# Patient Record
Sex: FEMALE | Race: WHITE | NOT HISPANIC OR LATINO | Employment: OTHER | URBAN - METROPOLITAN AREA
[De-identification: names, ages, dates, MRNs, and addresses within clinical notes are randomized per-mention and may not be internally consistent; named-entity substitution may affect disease eponyms.]

---

## 2017-01-24 ENCOUNTER — COMMUNICATION - HEALTHEAST (OUTPATIENT)
Dept: INTERNAL MEDICINE | Facility: CLINIC | Age: 62
End: 2017-01-24

## 2017-01-24 DIAGNOSIS — F41.9 ANXIETY: ICD-10-CM

## 2017-02-09 ENCOUNTER — COMMUNICATION - HEALTHEAST (OUTPATIENT)
Dept: INTERNAL MEDICINE | Facility: CLINIC | Age: 62
End: 2017-02-09

## 2017-02-27 ENCOUNTER — COMMUNICATION - HEALTHEAST (OUTPATIENT)
Dept: INTERNAL MEDICINE | Facility: CLINIC | Age: 62
End: 2017-02-27

## 2017-02-27 DIAGNOSIS — F41.9 ANXIETY: ICD-10-CM

## 2017-03-28 ENCOUNTER — COMMUNICATION - HEALTHEAST (OUTPATIENT)
Dept: INTERNAL MEDICINE | Facility: CLINIC | Age: 62
End: 2017-03-28

## 2017-03-29 ENCOUNTER — COMMUNICATION - HEALTHEAST (OUTPATIENT)
Dept: INTERNAL MEDICINE | Facility: CLINIC | Age: 62
End: 2017-03-29

## 2017-03-29 DIAGNOSIS — F41.9 ANXIETY: ICD-10-CM

## 2017-04-06 ENCOUNTER — COMMUNICATION - HEALTHEAST (OUTPATIENT)
Dept: PULMONOLOGY | Facility: OTHER | Age: 62
End: 2017-04-06

## 2017-04-06 DIAGNOSIS — K21.9 GERD (GASTROESOPHAGEAL REFLUX DISEASE): ICD-10-CM

## 2017-04-28 ENCOUNTER — COMMUNICATION - HEALTHEAST (OUTPATIENT)
Dept: INTERNAL MEDICINE | Facility: CLINIC | Age: 62
End: 2017-04-28

## 2017-04-28 DIAGNOSIS — F41.9 ANXIETY: ICD-10-CM

## 2017-05-02 ENCOUNTER — COMMUNICATION - HEALTHEAST (OUTPATIENT)
Dept: INTERNAL MEDICINE | Facility: CLINIC | Age: 62
End: 2017-05-02

## 2017-05-02 DIAGNOSIS — F41.9 ANXIETY: ICD-10-CM

## 2017-05-31 ENCOUNTER — COMMUNICATION - HEALTHEAST (OUTPATIENT)
Dept: INTERNAL MEDICINE | Facility: CLINIC | Age: 62
End: 2017-05-31

## 2017-05-31 DIAGNOSIS — F41.9 ANXIETY: ICD-10-CM

## 2017-05-31 DIAGNOSIS — E78.00 PURE HYPERCHOLESTEROLEMIA: ICD-10-CM

## 2017-06-05 ENCOUNTER — COMMUNICATION - HEALTHEAST (OUTPATIENT)
Dept: INTERNAL MEDICINE | Facility: CLINIC | Age: 62
End: 2017-06-05

## 2017-06-05 DIAGNOSIS — R05.3 CHRONIC COUGH: ICD-10-CM

## 2017-06-12 ENCOUNTER — COMMUNICATION - HEALTHEAST (OUTPATIENT)
Dept: INTERNAL MEDICINE | Facility: CLINIC | Age: 62
End: 2017-06-12

## 2017-06-12 ENCOUNTER — AMBULATORY - HEALTHEAST (OUTPATIENT)
Dept: INTERNAL MEDICINE | Facility: CLINIC | Age: 62
End: 2017-06-12

## 2017-06-12 DIAGNOSIS — F41.9 ANXIETY: ICD-10-CM

## 2017-06-16 ENCOUNTER — COMMUNICATION - HEALTHEAST (OUTPATIENT)
Dept: INTERNAL MEDICINE | Facility: CLINIC | Age: 62
End: 2017-06-16

## 2017-06-16 DIAGNOSIS — F41.9 ANXIETY: ICD-10-CM

## 2017-06-30 ENCOUNTER — COMMUNICATION - HEALTHEAST (OUTPATIENT)
Dept: INTERNAL MEDICINE | Facility: CLINIC | Age: 62
End: 2017-06-30

## 2017-07-03 ENCOUNTER — OFFICE VISIT - HEALTHEAST (OUTPATIENT)
Dept: INTERNAL MEDICINE | Facility: CLINIC | Age: 62
End: 2017-07-03

## 2017-07-03 DIAGNOSIS — J45.909 ASTHMA: ICD-10-CM

## 2017-07-03 DIAGNOSIS — F41.9 ANXIETY: ICD-10-CM

## 2017-07-03 DIAGNOSIS — E78.00 PURE HYPERCHOLESTEROLEMIA: ICD-10-CM

## 2017-07-03 DIAGNOSIS — R50.9 FEVER: ICD-10-CM

## 2017-07-13 ENCOUNTER — COMMUNICATION - HEALTHEAST (OUTPATIENT)
Dept: INTERNAL MEDICINE | Facility: CLINIC | Age: 62
End: 2017-07-13

## 2017-07-18 ENCOUNTER — OFFICE VISIT - HEALTHEAST (OUTPATIENT)
Dept: INTERNAL MEDICINE | Facility: CLINIC | Age: 62
End: 2017-07-18

## 2017-07-18 DIAGNOSIS — R05.9 COUGH: ICD-10-CM

## 2017-07-18 DIAGNOSIS — E78.00 PURE HYPERCHOLESTEROLEMIA: ICD-10-CM

## 2017-07-18 DIAGNOSIS — Z29.9 PREVENTIVE MEASURE: ICD-10-CM

## 2017-07-18 DIAGNOSIS — F32.9 MAJOR DEPRESSION: ICD-10-CM

## 2017-07-18 DIAGNOSIS — J45.909 ASTHMA: ICD-10-CM

## 2017-07-18 LAB
CHOLEST SERPL-MCNC: 176 MG/DL
FASTING STATUS PATIENT QL REPORTED: YES
HDLC SERPL-MCNC: 71 MG/DL
LDLC SERPL CALC-MCNC: 93 MG/DL
TRIGL SERPL-MCNC: 60 MG/DL

## 2017-07-19 ENCOUNTER — COMMUNICATION - HEALTHEAST (OUTPATIENT)
Dept: INTERNAL MEDICINE | Facility: CLINIC | Age: 62
End: 2017-07-19

## 2017-07-31 ENCOUNTER — COMMUNICATION - HEALTHEAST (OUTPATIENT)
Dept: INTERNAL MEDICINE | Facility: CLINIC | Age: 62
End: 2017-07-31

## 2017-07-31 DIAGNOSIS — F41.9 ANXIETY: ICD-10-CM

## 2017-07-31 DIAGNOSIS — R05.3 CHRONIC COUGH: ICD-10-CM

## 2017-08-04 ENCOUNTER — COMMUNICATION - HEALTHEAST (OUTPATIENT)
Dept: INTERNAL MEDICINE | Facility: CLINIC | Age: 62
End: 2017-08-04

## 2017-08-07 ENCOUNTER — HOSPITAL ENCOUNTER (OUTPATIENT)
Dept: MAMMOGRAPHY | Facility: CLINIC | Age: 62
Discharge: HOME | End: 2017-08-07
Attending: INTERNAL MEDICINE

## 2017-08-07 DIAGNOSIS — Z12.31 OTHER SCREENING MAMMOGRAM: ICD-10-CM

## 2017-08-16 ENCOUNTER — COMMUNICATION - HEALTHEAST (OUTPATIENT)
Dept: ADMINISTRATIVE | Facility: CLINIC | Age: 62
End: 2017-08-16

## 2017-08-29 ENCOUNTER — COMMUNICATION - HEALTHEAST (OUTPATIENT)
Dept: INTERNAL MEDICINE | Facility: CLINIC | Age: 62
End: 2017-08-29

## 2017-08-29 DIAGNOSIS — E78.00 PURE HYPERCHOLESTEROLEMIA: ICD-10-CM

## 2017-08-29 DIAGNOSIS — F41.9 ANXIETY: ICD-10-CM

## 2017-09-05 ENCOUNTER — COMMUNICATION - HEALTHEAST (OUTPATIENT)
Dept: INTERNAL MEDICINE | Facility: CLINIC | Age: 62
End: 2017-09-05

## 2017-09-05 DIAGNOSIS — F41.9 ANXIETY: ICD-10-CM

## 2017-09-06 ENCOUNTER — COMMUNICATION - HEALTHEAST (OUTPATIENT)
Dept: INTERNAL MEDICINE | Facility: CLINIC | Age: 62
End: 2017-09-06

## 2017-09-06 DIAGNOSIS — F41.9 ANXIETY: ICD-10-CM

## 2017-09-07 ENCOUNTER — OFFICE VISIT - HEALTHEAST (OUTPATIENT)
Dept: INTERNAL MEDICINE | Facility: CLINIC | Age: 62
End: 2017-09-07

## 2017-09-07 DIAGNOSIS — R26.89 IMBALANCE: ICD-10-CM

## 2017-09-07 DIAGNOSIS — F32.9 MAJOR DEPRESSION: ICD-10-CM

## 2017-09-07 DIAGNOSIS — E78.00 PURE HYPERCHOLESTEROLEMIA: ICD-10-CM

## 2017-09-07 DIAGNOSIS — F41.1 ANXIETY STATE: ICD-10-CM

## 2017-09-07 ASSESSMENT — MIFFLIN-ST. JEOR: SCORE: 1258.03

## 2017-09-28 ENCOUNTER — COMMUNICATION - HEALTHEAST (OUTPATIENT)
Dept: INTERNAL MEDICINE | Facility: CLINIC | Age: 62
End: 2017-09-28

## 2017-09-28 DIAGNOSIS — R05.3 CHRONIC COUGH: ICD-10-CM

## 2017-09-28 RX ORDER — ALBUTEROL SULFATE 90 UG/1
AEROSOL, METERED RESPIRATORY (INHALATION)
Qty: 18 G | Refills: 3 | Status: SHIPPED | OUTPATIENT
Start: 2017-09-28 | End: 2021-12-09

## 2017-10-18 ENCOUNTER — OFFICE VISIT - HEALTHEAST (OUTPATIENT)
Dept: INTERNAL MEDICINE | Facility: CLINIC | Age: 62
End: 2017-10-18

## 2017-10-18 DIAGNOSIS — E78.00 PURE HYPERCHOLESTEROLEMIA: ICD-10-CM

## 2017-10-18 DIAGNOSIS — F41.1 ANXIETY STATE: ICD-10-CM

## 2017-10-18 DIAGNOSIS — J45.909 ASTHMA: ICD-10-CM

## 2017-10-18 DIAGNOSIS — F32.9 MAJOR DEPRESSION: ICD-10-CM

## 2017-10-18 ASSESSMENT — MIFFLIN-ST. JEOR: SCORE: 1253.49

## 2017-10-26 ENCOUNTER — COMMUNICATION - HEALTHEAST (OUTPATIENT)
Dept: INTERNAL MEDICINE | Facility: CLINIC | Age: 62
End: 2017-10-26

## 2017-10-26 DIAGNOSIS — F41.9 ANXIETY: ICD-10-CM

## 2017-11-03 ENCOUNTER — AMBULATORY - HEALTHEAST (OUTPATIENT)
Dept: LAB | Facility: CLINIC | Age: 62
End: 2017-11-03

## 2017-11-03 DIAGNOSIS — F32.9 MAJOR DEPRESSION: ICD-10-CM

## 2017-11-03 DIAGNOSIS — E78.00 PURE HYPERCHOLESTEROLEMIA: ICD-10-CM

## 2017-11-03 LAB
CHOLEST SERPL-MCNC: 171 MG/DL
FASTING STATUS PATIENT QL REPORTED: YES
HDLC SERPL-MCNC: 71 MG/DL
LDLC SERPL CALC-MCNC: 91 MG/DL
TRIGL SERPL-MCNC: 44 MG/DL

## 2017-11-07 ENCOUNTER — COMMUNICATION - HEALTHEAST (OUTPATIENT)
Dept: INTERNAL MEDICINE | Facility: CLINIC | Age: 62
End: 2017-11-07

## 2017-11-27 ENCOUNTER — COMMUNICATION - HEALTHEAST (OUTPATIENT)
Dept: INTERNAL MEDICINE | Facility: CLINIC | Age: 62
End: 2017-11-27

## 2017-11-27 DIAGNOSIS — F41.9 ANXIETY: ICD-10-CM

## 2017-12-28 ENCOUNTER — COMMUNICATION - HEALTHEAST (OUTPATIENT)
Dept: INTERNAL MEDICINE | Facility: CLINIC | Age: 62
End: 2017-12-28

## 2017-12-28 DIAGNOSIS — F41.9 ANXIETY: ICD-10-CM

## 2018-01-26 ENCOUNTER — COMMUNICATION - HEALTHEAST (OUTPATIENT)
Dept: INTERNAL MEDICINE | Facility: CLINIC | Age: 63
End: 2018-01-26

## 2018-01-26 DIAGNOSIS — F41.9 ANXIETY: ICD-10-CM

## 2018-01-29 ENCOUNTER — COMMUNICATION - HEALTHEAST (OUTPATIENT)
Dept: INTERNAL MEDICINE | Facility: CLINIC | Age: 63
End: 2018-01-29

## 2018-01-29 DIAGNOSIS — F41.9 ANXIETY: ICD-10-CM

## 2018-02-13 ENCOUNTER — OFFICE VISIT - HEALTHEAST (OUTPATIENT)
Dept: INTERNAL MEDICINE | Facility: CLINIC | Age: 63
End: 2018-02-13

## 2018-02-13 DIAGNOSIS — J20.9 ACUTE BRONCHITIS: ICD-10-CM

## 2018-02-13 DIAGNOSIS — J10.1 INFLUENZA A: ICD-10-CM

## 2018-02-13 ASSESSMENT — MIFFLIN-ST. JEOR: SCORE: 1294.32

## 2018-02-28 ENCOUNTER — COMMUNICATION - HEALTHEAST (OUTPATIENT)
Dept: INTERNAL MEDICINE | Facility: CLINIC | Age: 63
End: 2018-02-28

## 2018-02-28 DIAGNOSIS — F41.9 ANXIETY: ICD-10-CM

## 2018-02-28 DIAGNOSIS — E78.00 PURE HYPERCHOLESTEROLEMIA: ICD-10-CM

## 2018-03-28 ENCOUNTER — COMMUNICATION - HEALTHEAST (OUTPATIENT)
Dept: INTERNAL MEDICINE | Facility: CLINIC | Age: 63
End: 2018-03-28

## 2018-03-28 DIAGNOSIS — J45.909 ASTHMA: ICD-10-CM

## 2018-03-28 DIAGNOSIS — F41.9 ANXIETY: ICD-10-CM

## 2018-03-30 ENCOUNTER — COMMUNICATION - HEALTHEAST (OUTPATIENT)
Dept: INTERNAL MEDICINE | Facility: CLINIC | Age: 63
End: 2018-03-30

## 2018-04-02 ENCOUNTER — COMMUNICATION - HEALTHEAST (OUTPATIENT)
Dept: INTERNAL MEDICINE | Facility: CLINIC | Age: 63
End: 2018-04-02

## 2018-04-05 ENCOUNTER — COMMUNICATION - HEALTHEAST (OUTPATIENT)
Dept: INTERNAL MEDICINE | Facility: CLINIC | Age: 63
End: 2018-04-05

## 2018-04-30 ENCOUNTER — COMMUNICATION - HEALTHEAST (OUTPATIENT)
Dept: INTERNAL MEDICINE | Facility: CLINIC | Age: 63
End: 2018-04-30

## 2018-04-30 DIAGNOSIS — F41.9 ANXIETY: ICD-10-CM

## 2018-04-30 DIAGNOSIS — J45.909 ASTHMA: ICD-10-CM

## 2018-05-01 ENCOUNTER — COMMUNICATION - HEALTHEAST (OUTPATIENT)
Dept: INTERNAL MEDICINE | Facility: CLINIC | Age: 63
End: 2018-05-01

## 2018-05-01 DIAGNOSIS — J45.909 ASTHMA: ICD-10-CM

## 2018-05-01 DIAGNOSIS — F41.9 ANXIETY: ICD-10-CM

## 2018-06-01 ENCOUNTER — COMMUNICATION - HEALTHEAST (OUTPATIENT)
Dept: INTERNAL MEDICINE | Facility: CLINIC | Age: 63
End: 2018-06-01

## 2018-06-01 DIAGNOSIS — F41.9 ANXIETY: ICD-10-CM

## 2018-07-02 ENCOUNTER — COMMUNICATION - HEALTHEAST (OUTPATIENT)
Dept: INTERNAL MEDICINE | Facility: CLINIC | Age: 63
End: 2018-07-02

## 2018-07-02 DIAGNOSIS — F41.9 ANXIETY: ICD-10-CM

## 2018-08-06 ENCOUNTER — COMMUNICATION - HEALTHEAST (OUTPATIENT)
Dept: INTERNAL MEDICINE | Facility: CLINIC | Age: 63
End: 2018-08-06

## 2018-08-06 DIAGNOSIS — F41.9 ANXIETY: ICD-10-CM

## 2018-09-04 ENCOUNTER — COMMUNICATION - HEALTHEAST (OUTPATIENT)
Dept: INTERNAL MEDICINE | Facility: CLINIC | Age: 63
End: 2018-09-04

## 2018-09-04 DIAGNOSIS — F41.9 ANXIETY: ICD-10-CM

## 2018-09-04 DIAGNOSIS — J45.909 ASTHMA: ICD-10-CM

## 2018-09-06 ENCOUNTER — COMMUNICATION - HEALTHEAST (OUTPATIENT)
Dept: INTERNAL MEDICINE | Facility: CLINIC | Age: 63
End: 2018-09-06

## 2018-09-06 DIAGNOSIS — F41.9 ANXIETY: ICD-10-CM

## 2018-10-08 ENCOUNTER — COMMUNICATION - HEALTHEAST (OUTPATIENT)
Dept: INTERNAL MEDICINE | Facility: CLINIC | Age: 63
End: 2018-10-08

## 2018-10-08 DIAGNOSIS — J45.909 ASTHMA: ICD-10-CM

## 2018-10-08 DIAGNOSIS — R05.3 CHRONIC COUGH: ICD-10-CM

## 2018-10-08 DIAGNOSIS — F41.9 ANXIETY: ICD-10-CM

## 2018-11-08 ENCOUNTER — COMMUNICATION - HEALTHEAST (OUTPATIENT)
Dept: INTERNAL MEDICINE | Facility: CLINIC | Age: 63
End: 2018-11-08

## 2018-11-08 DIAGNOSIS — F41.9 ANXIETY: ICD-10-CM

## 2018-11-08 DIAGNOSIS — J45.909 ASTHMA: ICD-10-CM

## 2018-12-12 ENCOUNTER — COMMUNICATION - HEALTHEAST (OUTPATIENT)
Dept: INTERNAL MEDICINE | Facility: CLINIC | Age: 63
End: 2018-12-12

## 2018-12-12 DIAGNOSIS — J45.909 ASTHMA: ICD-10-CM

## 2018-12-12 DIAGNOSIS — R05.3 CHRONIC COUGH: ICD-10-CM

## 2018-12-12 DIAGNOSIS — F41.9 ANXIETY: ICD-10-CM

## 2019-01-10 ENCOUNTER — COMMUNICATION - HEALTHEAST (OUTPATIENT)
Dept: INTERNAL MEDICINE | Facility: CLINIC | Age: 64
End: 2019-01-10

## 2019-01-10 DIAGNOSIS — F41.9 ANXIETY: ICD-10-CM

## 2019-01-24 ENCOUNTER — COMMUNICATION - HEALTHEAST (OUTPATIENT)
Dept: INTERNAL MEDICINE | Facility: CLINIC | Age: 64
End: 2019-01-24

## 2019-01-24 DIAGNOSIS — F41.9 ANXIETY: ICD-10-CM

## 2019-02-12 ENCOUNTER — COMMUNICATION - HEALTHEAST (OUTPATIENT)
Dept: INTERNAL MEDICINE | Facility: CLINIC | Age: 64
End: 2019-02-12

## 2019-02-12 DIAGNOSIS — F41.9 ANXIETY: ICD-10-CM

## 2019-02-18 ENCOUNTER — OFFICE VISIT - HEALTHEAST (OUTPATIENT)
Dept: INTERNAL MEDICINE | Facility: CLINIC | Age: 64
End: 2019-02-18

## 2019-02-18 DIAGNOSIS — F32.5 MAJOR DEPRESSIVE DISORDER WITH SINGLE EPISODE, IN FULL REMISSION (H): ICD-10-CM

## 2019-02-18 DIAGNOSIS — F41.1 ANXIETY STATE: ICD-10-CM

## 2019-02-18 DIAGNOSIS — J45.40 MODERATE PERSISTENT ASTHMA WITHOUT COMPLICATION: ICD-10-CM

## 2019-02-18 DIAGNOSIS — K21.9 GASTROESOPHAGEAL REFLUX DISEASE WITHOUT ESOPHAGITIS: ICD-10-CM

## 2019-02-18 DIAGNOSIS — Z12.11 SCREEN FOR COLON CANCER: ICD-10-CM

## 2019-02-18 DIAGNOSIS — E78.00 PURE HYPERCHOLESTEROLEMIA: ICD-10-CM

## 2019-02-18 ASSESSMENT — MIFFLIN-ST. JEOR: SCORE: 1398.65

## 2019-03-11 ENCOUNTER — COMMUNICATION - HEALTHEAST (OUTPATIENT)
Dept: INTERNAL MEDICINE | Facility: CLINIC | Age: 64
End: 2019-03-11

## 2019-03-12 ENCOUNTER — COMMUNICATION - HEALTHEAST (OUTPATIENT)
Dept: INTERNAL MEDICINE | Facility: CLINIC | Age: 64
End: 2019-03-12

## 2019-03-12 DIAGNOSIS — E78.00 PURE HYPERCHOLESTEROLEMIA: ICD-10-CM

## 2019-03-20 ENCOUNTER — COMMUNICATION - HEALTHEAST (OUTPATIENT)
Dept: INTERNAL MEDICINE | Facility: CLINIC | Age: 64
End: 2019-03-20

## 2019-03-20 DIAGNOSIS — F41.1 ANXIETY STATE: ICD-10-CM

## 2019-04-22 ENCOUNTER — COMMUNICATION - HEALTHEAST (OUTPATIENT)
Dept: INTERNAL MEDICINE | Facility: CLINIC | Age: 64
End: 2019-04-22

## 2019-04-22 DIAGNOSIS — F41.1 ANXIETY STATE: ICD-10-CM

## 2019-05-08 ENCOUNTER — COMMUNICATION - HEALTHEAST (OUTPATIENT)
Dept: INTERNAL MEDICINE | Facility: CLINIC | Age: 64
End: 2019-05-08

## 2019-05-08 DIAGNOSIS — F41.1 ANXIETY STATE: ICD-10-CM

## 2019-05-21 ENCOUNTER — OFFICE VISIT - HEALTHEAST (OUTPATIENT)
Dept: INTERNAL MEDICINE | Facility: CLINIC | Age: 64
End: 2019-05-21

## 2019-05-21 DIAGNOSIS — E78.00 PURE HYPERCHOLESTEROLEMIA: ICD-10-CM

## 2019-05-21 DIAGNOSIS — Z12.31 VISIT FOR SCREENING MAMMOGRAM: ICD-10-CM

## 2019-05-21 DIAGNOSIS — J45.40 MODERATE PERSISTENT ASTHMA WITHOUT COMPLICATION: ICD-10-CM

## 2019-05-21 DIAGNOSIS — F41.1 ANXIETY STATE: ICD-10-CM

## 2019-05-21 DIAGNOSIS — R03.0 PREHYPERTENSION: ICD-10-CM

## 2019-05-21 LAB
ALBUMIN SERPL-MCNC: 3.7 G/DL (ref 3.5–5)
ALP SERPL-CCNC: 109 U/L (ref 45–120)
ALT SERPL W P-5'-P-CCNC: 25 U/L (ref 0–45)
ANION GAP SERPL CALCULATED.3IONS-SCNC: 10 MMOL/L (ref 5–18)
AST SERPL W P-5'-P-CCNC: 26 U/L (ref 0–40)
BILIRUB DIRECT SERPL-MCNC: 0.2 MG/DL
BILIRUB SERPL-MCNC: 0.5 MG/DL (ref 0–1)
BUN SERPL-MCNC: 5 MG/DL (ref 8–22)
CALCIUM SERPL-MCNC: 9.8 MG/DL (ref 8.5–10.5)
CHLORIDE BLD-SCNC: 105 MMOL/L (ref 98–107)
CHOLEST SERPL-MCNC: 217 MG/DL
CO2 SERPL-SCNC: 27 MMOL/L (ref 22–31)
CREAT SERPL-MCNC: 0.85 MG/DL (ref 0.6–1.1)
ERYTHROCYTE [DISTWIDTH] IN BLOOD BY AUTOMATED COUNT: 12.5 % (ref 11–14.5)
FASTING STATUS PATIENT QL REPORTED: YES
GFR SERPL CREATININE-BSD FRML MDRD: >60 ML/MIN/1.73M2
GLUCOSE BLD-MCNC: 85 MG/DL (ref 70–125)
HCT VFR BLD AUTO: 41.2 % (ref 35–47)
HDLC SERPL-MCNC: 71 MG/DL
HGB BLD-MCNC: 13.4 G/DL (ref 12–16)
LDLC SERPL CALC-MCNC: 132 MG/DL
MCH RBC QN AUTO: 27.9 PG (ref 27–34)
MCHC RBC AUTO-ENTMCNC: 32.6 G/DL (ref 32–36)
MCV RBC AUTO: 86 FL (ref 80–100)
PLATELET # BLD AUTO: 328 THOU/UL (ref 140–440)
PMV BLD AUTO: 8.2 FL (ref 7–10)
POTASSIUM BLD-SCNC: 3.3 MMOL/L (ref 3.5–5)
PROT SERPL-MCNC: 6.9 G/DL (ref 6–8)
RBC # BLD AUTO: 4.82 MILL/UL (ref 3.8–5.4)
SODIUM SERPL-SCNC: 142 MMOL/L (ref 136–145)
TRIGL SERPL-MCNC: 68 MG/DL
WBC: 6.6 THOU/UL (ref 4–11)

## 2019-05-21 ASSESSMENT — MIFFLIN-ST. JEOR: SCORE: 1394.11

## 2019-05-22 ENCOUNTER — COMMUNICATION - HEALTHEAST (OUTPATIENT)
Dept: INTERNAL MEDICINE | Facility: CLINIC | Age: 64
End: 2019-05-22

## 2019-06-13 ENCOUNTER — COMMUNICATION - HEALTHEAST (OUTPATIENT)
Dept: INTERNAL MEDICINE | Facility: CLINIC | Age: 64
End: 2019-06-13

## 2019-06-13 ENCOUNTER — HOSPITAL ENCOUNTER (OUTPATIENT)
Dept: MAMMOGRAPHY | Facility: CLINIC | Age: 64
Discharge: HOME | End: 2019-06-13
Attending: INTERNAL MEDICINE

## 2019-06-13 DIAGNOSIS — Z12.31 VISIT FOR SCREENING MAMMOGRAM: ICD-10-CM

## 2019-06-13 DIAGNOSIS — J45.909 ASTHMA: ICD-10-CM

## 2019-06-18 ENCOUNTER — OFFICE VISIT - HEALTHEAST (OUTPATIENT)
Dept: INTERNAL MEDICINE | Facility: CLINIC | Age: 64
End: 2019-06-18

## 2019-06-18 DIAGNOSIS — B37.31 VAGINAL CANDIDIASIS: ICD-10-CM

## 2019-06-18 DIAGNOSIS — Z12.11 SCREENING FOR COLON CANCER: ICD-10-CM

## 2019-06-18 DIAGNOSIS — F32.5 MAJOR DEPRESSIVE DISORDER WITH SINGLE EPISODE, IN FULL REMISSION (H): ICD-10-CM

## 2019-06-18 DIAGNOSIS — F41.1 ANXIETY STATE: ICD-10-CM

## 2019-06-18 DIAGNOSIS — I10 ESSENTIAL HYPERTENSION: ICD-10-CM

## 2019-06-18 DIAGNOSIS — B35.3 TINEA PEDIS OF RIGHT FOOT: ICD-10-CM

## 2019-06-18 DIAGNOSIS — J45.40 MODERATE PERSISTENT ASTHMA WITHOUT COMPLICATION: ICD-10-CM

## 2019-06-18 RX ORDER — CLOTRIMAZOLE AND BETAMETHASONE DIPROPIONATE 10; .64 MG/G; MG/G
CREAM TOPICAL
Qty: 45 G | Refills: 3 | Status: SHIPPED | OUTPATIENT
Start: 2019-06-18 | End: 2022-04-26

## 2019-06-18 ASSESSMENT — MIFFLIN-ST. JEOR: SCORE: 1394.11

## 2019-07-17 ENCOUNTER — COMMUNICATION - HEALTHEAST (OUTPATIENT)
Dept: INTERNAL MEDICINE | Facility: CLINIC | Age: 64
End: 2019-07-17

## 2019-07-17 DIAGNOSIS — F41.1 ANXIETY STATE: ICD-10-CM

## 2019-08-15 ENCOUNTER — COMMUNICATION - HEALTHEAST (OUTPATIENT)
Dept: INTERNAL MEDICINE | Facility: CLINIC | Age: 64
End: 2019-08-15

## 2019-08-15 DIAGNOSIS — F41.1 ANXIETY STATE: ICD-10-CM

## 2019-09-06 ENCOUNTER — OFFICE VISIT - HEALTHEAST (OUTPATIENT)
Dept: INTERNAL MEDICINE | Facility: CLINIC | Age: 64
End: 2019-09-06

## 2019-09-06 DIAGNOSIS — E78.00 PURE HYPERCHOLESTEROLEMIA: ICD-10-CM

## 2019-09-06 DIAGNOSIS — Z12.11 SCREEN FOR COLON CANCER: ICD-10-CM

## 2019-09-06 DIAGNOSIS — I10 ESSENTIAL HYPERTENSION: ICD-10-CM

## 2019-09-06 DIAGNOSIS — J45.40 MODERATE PERSISTENT ASTHMA WITHOUT COMPLICATION: ICD-10-CM

## 2019-09-06 LAB
ANION GAP SERPL CALCULATED.3IONS-SCNC: 13 MMOL/L (ref 5–18)
BUN SERPL-MCNC: 12 MG/DL (ref 8–22)
CALCIUM SERPL-MCNC: 9.7 MG/DL (ref 8.5–10.5)
CHLORIDE BLD-SCNC: 103 MMOL/L (ref 98–107)
CO2 SERPL-SCNC: 25 MMOL/L (ref 22–31)
CREAT SERPL-MCNC: 0.97 MG/DL (ref 0.6–1.1)
GFR SERPL CREATININE-BSD FRML MDRD: 58 ML/MIN/1.73M2
GLUCOSE BLD-MCNC: 119 MG/DL (ref 70–125)
POTASSIUM BLD-SCNC: 3 MMOL/L (ref 3.5–5)
SODIUM SERPL-SCNC: 141 MMOL/L (ref 136–145)

## 2019-09-06 ASSESSMENT — MIFFLIN-ST. JEOR: SCORE: 1339.68

## 2019-09-09 ENCOUNTER — COMMUNICATION - HEALTHEAST (OUTPATIENT)
Dept: INTERNAL MEDICINE | Facility: CLINIC | Age: 64
End: 2019-09-09

## 2019-09-09 DIAGNOSIS — E87.6 HYPOKALEMIA: ICD-10-CM

## 2019-09-16 ENCOUNTER — COMMUNICATION - HEALTHEAST (OUTPATIENT)
Dept: INTERNAL MEDICINE | Facility: CLINIC | Age: 64
End: 2019-09-16

## 2019-09-16 DIAGNOSIS — F41.1 ANXIETY STATE: ICD-10-CM

## 2019-09-17 ENCOUNTER — AMBULATORY - HEALTHEAST (OUTPATIENT)
Dept: LAB | Facility: CLINIC | Age: 64
End: 2019-09-17

## 2019-09-17 DIAGNOSIS — E87.6 HYPOKALEMIA: ICD-10-CM

## 2019-09-17 LAB — POTASSIUM BLD-SCNC: 3.3 MMOL/L (ref 3.5–5)

## 2019-09-18 ENCOUNTER — COMMUNICATION - HEALTHEAST (OUTPATIENT)
Dept: INTERNAL MEDICINE | Facility: CLINIC | Age: 64
End: 2019-09-18

## 2019-10-03 ENCOUNTER — COMMUNICATION - HEALTHEAST (OUTPATIENT)
Dept: INTERNAL MEDICINE | Facility: CLINIC | Age: 64
End: 2019-10-03

## 2019-10-03 DIAGNOSIS — R05.3 CHRONIC COUGH: ICD-10-CM

## 2019-10-15 ENCOUNTER — COMMUNICATION - HEALTHEAST (OUTPATIENT)
Dept: INTERNAL MEDICINE | Facility: CLINIC | Age: 64
End: 2019-10-15

## 2019-10-15 DIAGNOSIS — F41.1 ANXIETY STATE: ICD-10-CM

## 2019-11-12 ENCOUNTER — OFFICE VISIT - HEALTHEAST (OUTPATIENT)
Dept: INTERNAL MEDICINE | Facility: CLINIC | Age: 64
End: 2019-11-12

## 2019-11-12 DIAGNOSIS — J45.40 MODERATE PERSISTENT ASTHMA WITHOUT COMPLICATION: ICD-10-CM

## 2019-11-12 DIAGNOSIS — E78.00 PURE HYPERCHOLESTEROLEMIA: ICD-10-CM

## 2019-11-12 DIAGNOSIS — F32.5 MAJOR DEPRESSIVE DISORDER WITH SINGLE EPISODE, IN FULL REMISSION (H): ICD-10-CM

## 2019-11-12 DIAGNOSIS — K05.00 GINGIVITIS, ACUTE: ICD-10-CM

## 2019-11-12 DIAGNOSIS — I10 ESSENTIAL HYPERTENSION: ICD-10-CM

## 2019-11-12 DIAGNOSIS — K21.9 GASTROESOPHAGEAL REFLUX DISEASE WITHOUT ESOPHAGITIS: ICD-10-CM

## 2019-11-12 LAB
ANION GAP SERPL CALCULATED.3IONS-SCNC: 13 MMOL/L (ref 5–18)
BUN SERPL-MCNC: 8 MG/DL (ref 8–22)
CALCIUM SERPL-MCNC: 10 MG/DL (ref 8.5–10.5)
CHLORIDE BLD-SCNC: 104 MMOL/L (ref 98–107)
CO2 SERPL-SCNC: 25 MMOL/L (ref 22–31)
CREAT SERPL-MCNC: 0.95 MG/DL (ref 0.6–1.1)
GFR SERPL CREATININE-BSD FRML MDRD: 59 ML/MIN/1.73M2
GLUCOSE BLD-MCNC: 122 MG/DL (ref 70–125)
POTASSIUM BLD-SCNC: 3.2 MMOL/L (ref 3.5–5)
SODIUM SERPL-SCNC: 142 MMOL/L (ref 136–145)

## 2019-11-12 ASSESSMENT — MIFFLIN-ST. JEOR: SCORE: 1335.14

## 2019-11-12 ASSESSMENT — PATIENT HEALTH QUESTIONNAIRE - PHQ9: SUM OF ALL RESPONSES TO PHQ QUESTIONS 1-9: 0

## 2019-11-13 ENCOUNTER — COMMUNICATION - HEALTHEAST (OUTPATIENT)
Dept: INTERNAL MEDICINE | Facility: CLINIC | Age: 64
End: 2019-11-13

## 2019-11-14 ENCOUNTER — COMMUNICATION - HEALTHEAST (OUTPATIENT)
Dept: INTERNAL MEDICINE | Facility: CLINIC | Age: 64
End: 2019-11-14

## 2019-11-14 DIAGNOSIS — F41.1 ANXIETY STATE: ICD-10-CM

## 2019-12-09 ENCOUNTER — COMMUNICATION - HEALTHEAST (OUTPATIENT)
Dept: INTERNAL MEDICINE | Facility: CLINIC | Age: 64
End: 2019-12-09

## 2019-12-09 DIAGNOSIS — E87.6 HYPOKALEMIA: ICD-10-CM

## 2019-12-16 ENCOUNTER — COMMUNICATION - HEALTHEAST (OUTPATIENT)
Dept: INTERNAL MEDICINE | Facility: CLINIC | Age: 64
End: 2019-12-16

## 2019-12-16 DIAGNOSIS — F41.1 ANXIETY STATE: ICD-10-CM

## 2019-12-18 ENCOUNTER — COMMUNICATION - HEALTHEAST (OUTPATIENT)
Dept: INTERNAL MEDICINE | Facility: CLINIC | Age: 64
End: 2019-12-18

## 2019-12-18 ENCOUNTER — COMMUNICATION - HEALTHEAST (OUTPATIENT)
Dept: SCHEDULING | Facility: CLINIC | Age: 64
End: 2019-12-18

## 2019-12-18 DIAGNOSIS — J45.40 MODERATE PERSISTENT ASTHMA WITHOUT COMPLICATION: ICD-10-CM

## 2020-01-08 ENCOUNTER — COMMUNICATION - HEALTHEAST (OUTPATIENT)
Dept: INTERNAL MEDICINE | Facility: CLINIC | Age: 65
End: 2020-01-08

## 2020-01-08 DIAGNOSIS — J45.909 ASTHMA: ICD-10-CM

## 2020-01-15 ENCOUNTER — COMMUNICATION - HEALTHEAST (OUTPATIENT)
Dept: INTERNAL MEDICINE | Facility: CLINIC | Age: 65
End: 2020-01-15

## 2020-01-15 DIAGNOSIS — F41.1 ANXIETY STATE: ICD-10-CM

## 2020-02-06 ENCOUNTER — COMMUNICATION - HEALTHEAST (OUTPATIENT)
Dept: INTERNAL MEDICINE | Facility: CLINIC | Age: 65
End: 2020-02-06

## 2020-02-06 DIAGNOSIS — K21.9 GASTROESOPHAGEAL REFLUX DISEASE WITHOUT ESOPHAGITIS: ICD-10-CM

## 2020-02-17 ENCOUNTER — COMMUNICATION - HEALTHEAST (OUTPATIENT)
Dept: INTERNAL MEDICINE | Facility: CLINIC | Age: 65
End: 2020-02-17

## 2020-02-17 DIAGNOSIS — F41.1 ANXIETY STATE: ICD-10-CM

## 2020-03-09 ENCOUNTER — COMMUNICATION - HEALTHEAST (OUTPATIENT)
Dept: INTERNAL MEDICINE | Facility: CLINIC | Age: 65
End: 2020-03-09

## 2020-03-09 DIAGNOSIS — F41.1 ANXIETY STATE: ICD-10-CM

## 2020-03-09 DIAGNOSIS — J45.909 ASTHMA: ICD-10-CM

## 2020-03-11 ENCOUNTER — OFFICE VISIT - HEALTHEAST (OUTPATIENT)
Dept: INTERNAL MEDICINE | Facility: CLINIC | Age: 65
End: 2020-03-11

## 2020-03-11 ENCOUNTER — COMMUNICATION - HEALTHEAST (OUTPATIENT)
Dept: INTERNAL MEDICINE | Facility: CLINIC | Age: 65
End: 2020-03-11

## 2020-03-11 DIAGNOSIS — K21.9 GASTROESOPHAGEAL REFLUX DISEASE WITHOUT ESOPHAGITIS: ICD-10-CM

## 2020-03-11 DIAGNOSIS — I10 ESSENTIAL HYPERTENSION: ICD-10-CM

## 2020-03-11 DIAGNOSIS — H53.8 BLURRED VISION: ICD-10-CM

## 2020-03-11 DIAGNOSIS — E78.00 PURE HYPERCHOLESTEROLEMIA: ICD-10-CM

## 2020-03-11 DIAGNOSIS — Z12.11 SCREEN FOR COLON CANCER: ICD-10-CM

## 2020-03-11 DIAGNOSIS — H90.3 SENSORINEURAL HEARING LOSS (SNHL) OF BOTH EARS: ICD-10-CM

## 2020-03-11 DIAGNOSIS — J45.40 MODERATE PERSISTENT ASTHMA WITHOUT COMPLICATION: ICD-10-CM

## 2020-03-11 DIAGNOSIS — Z00.00 ROUTINE GENERAL MEDICAL EXAMINATION AT A HEALTH CARE FACILITY: ICD-10-CM

## 2020-03-11 DIAGNOSIS — F32.5 MAJOR DEPRESSIVE DISORDER WITH SINGLE EPISODE, IN FULL REMISSION (H): ICD-10-CM

## 2020-03-11 LAB
ALBUMIN SERPL-MCNC: 4.1 G/DL (ref 3.5–5)
ALBUMIN UR-MCNC: NEGATIVE MG/DL
ALP SERPL-CCNC: 124 U/L (ref 45–120)
ALT SERPL W P-5'-P-CCNC: 44 U/L (ref 0–45)
ANION GAP SERPL CALCULATED.3IONS-SCNC: 14 MMOL/L (ref 5–18)
APPEARANCE UR: CLEAR
AST SERPL W P-5'-P-CCNC: 29 U/L (ref 0–40)
BILIRUB DIRECT SERPL-MCNC: 0.2 MG/DL
BILIRUB SERPL-MCNC: 0.7 MG/DL (ref 0–1)
BILIRUB UR QL STRIP: NEGATIVE
BUN SERPL-MCNC: 10 MG/DL (ref 8–22)
CALCIUM SERPL-MCNC: 10.2 MG/DL (ref 8.5–10.5)
CHLORIDE BLD-SCNC: 97 MMOL/L (ref 98–107)
CHOLEST SERPL-MCNC: 200 MG/DL
CO2 SERPL-SCNC: 27 MMOL/L (ref 22–31)
COLOR UR AUTO: YELLOW
CREAT SERPL-MCNC: 0.87 MG/DL (ref 0.6–1.1)
ERYTHROCYTE [DISTWIDTH] IN BLOOD BY AUTOMATED COUNT: 12.9 % (ref 11–14.5)
FASTING STATUS PATIENT QL REPORTED: YES
GFR SERPL CREATININE-BSD FRML MDRD: >60 ML/MIN/1.73M2
GLUCOSE BLD-MCNC: 84 MG/DL (ref 70–125)
GLUCOSE UR STRIP-MCNC: NEGATIVE MG/DL
HCT VFR BLD AUTO: 41.9 % (ref 35–47)
HDLC SERPL-MCNC: 62 MG/DL
HGB BLD-MCNC: 14 G/DL (ref 12–16)
HGB UR QL STRIP: NEGATIVE
KETONES UR STRIP-MCNC: NEGATIVE MG/DL
LDLC SERPL CALC-MCNC: 122 MG/DL
LEUKOCYTE ESTERASE UR QL STRIP: NEGATIVE
MCH RBC QN AUTO: 28.8 PG (ref 27–34)
MCHC RBC AUTO-ENTMCNC: 33.4 G/DL (ref 32–36)
MCV RBC AUTO: 86 FL (ref 80–100)
NITRATE UR QL: NEGATIVE
PH UR STRIP: 7.5 [PH] (ref 5–8)
PLATELET # BLD AUTO: 396 THOU/UL (ref 140–440)
PMV BLD AUTO: 7.8 FL (ref 7–10)
POTASSIUM BLD-SCNC: 3.3 MMOL/L (ref 3.5–5)
PROT SERPL-MCNC: 7 G/DL (ref 6–8)
RBC # BLD AUTO: 4.85 MILL/UL (ref 3.8–5.4)
SODIUM SERPL-SCNC: 138 MMOL/L (ref 136–145)
SP GR UR STRIP: 1.02 (ref 1–1.03)
TRIGL SERPL-MCNC: 80 MG/DL
TSH SERPL DL<=0.005 MIU/L-ACNC: 2.95 UIU/ML (ref 0.3–5)
UROBILINOGEN UR STRIP-ACNC: NORMAL
WBC: 8 THOU/UL (ref 4–11)

## 2020-03-11 ASSESSMENT — MIFFLIN-ST. JEOR: SCORE: 1269.37

## 2020-03-12 ENCOUNTER — COMMUNICATION - HEALTHEAST (OUTPATIENT)
Dept: INTERNAL MEDICINE | Facility: CLINIC | Age: 65
End: 2020-03-12

## 2020-03-12 DIAGNOSIS — E87.6 HYPOKALEMIA: ICD-10-CM

## 2020-03-12 LAB — 25(OH)D3 SERPL-MCNC: 9.4 NG/ML (ref 30–80)

## 2020-03-12 RX ORDER — POTASSIUM CHLORIDE 1500 MG/1
20 TABLET, EXTENDED RELEASE ORAL 2 TIMES DAILY
Qty: 60 TABLET | Refills: 0 | Status: SHIPPED | OUTPATIENT
Start: 2020-03-12 | End: 2021-12-09

## 2020-03-17 ENCOUNTER — AMBULATORY - HEALTHEAST (OUTPATIENT)
Dept: INTERNAL MEDICINE | Facility: CLINIC | Age: 65
End: 2020-03-17

## 2020-03-17 ENCOUNTER — COMMUNICATION - HEALTHEAST (OUTPATIENT)
Dept: INTERNAL MEDICINE | Facility: CLINIC | Age: 65
End: 2020-03-17

## 2020-03-17 DIAGNOSIS — E55.9 VITAMIN D DEFICIENCY: ICD-10-CM

## 2020-03-17 DIAGNOSIS — E87.6 LOW BLOOD POTASSIUM: ICD-10-CM

## 2020-03-17 DIAGNOSIS — E87.6 HYPOKALEMIA: ICD-10-CM

## 2020-03-18 ENCOUNTER — COMMUNICATION - HEALTHEAST (OUTPATIENT)
Dept: INTERNAL MEDICINE | Facility: CLINIC | Age: 65
End: 2020-03-18

## 2020-03-18 DIAGNOSIS — F41.1 ANXIETY STATE: ICD-10-CM

## 2020-03-25 ENCOUNTER — COMMUNICATION - HEALTHEAST (OUTPATIENT)
Dept: AUDIOLOGY | Facility: CLINIC | Age: 65
End: 2020-03-25

## 2020-04-08 ENCOUNTER — COMMUNICATION - HEALTHEAST (OUTPATIENT)
Dept: INTERNAL MEDICINE | Facility: CLINIC | Age: 65
End: 2020-04-08

## 2020-04-08 DIAGNOSIS — J45.909 ASTHMA: ICD-10-CM

## 2020-04-08 DIAGNOSIS — E78.00 PURE HYPERCHOLESTEROLEMIA: ICD-10-CM

## 2020-04-09 ENCOUNTER — COMMUNICATION - HEALTHEAST (OUTPATIENT)
Dept: INTERNAL MEDICINE | Facility: CLINIC | Age: 65
End: 2020-04-09

## 2020-04-20 ENCOUNTER — COMMUNICATION - HEALTHEAST (OUTPATIENT)
Dept: INTERNAL MEDICINE | Facility: CLINIC | Age: 65
End: 2020-04-20

## 2020-04-20 DIAGNOSIS — F41.1 ANXIETY STATE: ICD-10-CM

## 2020-05-07 ENCOUNTER — COMMUNICATION - HEALTHEAST (OUTPATIENT)
Dept: INTERNAL MEDICINE | Facility: CLINIC | Age: 65
End: 2020-05-07

## 2020-05-07 DIAGNOSIS — J45.909 ASTHMA: ICD-10-CM

## 2020-05-20 ENCOUNTER — COMMUNICATION - HEALTHEAST (OUTPATIENT)
Dept: INTERNAL MEDICINE | Facility: CLINIC | Age: 65
End: 2020-05-20

## 2020-05-20 DIAGNOSIS — F41.1 ANXIETY STATE: ICD-10-CM

## 2020-07-08 ENCOUNTER — COMMUNICATION - HEALTHEAST (OUTPATIENT)
Dept: INTERNAL MEDICINE | Facility: CLINIC | Age: 65
End: 2020-07-08

## 2020-07-08 DIAGNOSIS — F41.1 ANXIETY STATE: ICD-10-CM

## 2020-08-07 ENCOUNTER — COMMUNICATION - HEALTHEAST (OUTPATIENT)
Dept: INTERNAL MEDICINE | Facility: CLINIC | Age: 65
End: 2020-08-07

## 2020-08-07 DIAGNOSIS — F41.1 ANXIETY STATE: ICD-10-CM

## 2020-08-07 DIAGNOSIS — R05.3 CHRONIC COUGH: ICD-10-CM

## 2020-08-11 ENCOUNTER — COMMUNICATION - HEALTHEAST (OUTPATIENT)
Dept: INTERNAL MEDICINE | Facility: CLINIC | Age: 65
End: 2020-08-11

## 2020-08-11 DIAGNOSIS — F41.1 ANXIETY STATE: ICD-10-CM

## 2020-09-04 ENCOUNTER — COMMUNICATION - HEALTHEAST (OUTPATIENT)
Dept: INTERNAL MEDICINE | Facility: CLINIC | Age: 65
End: 2020-09-04

## 2020-09-04 DIAGNOSIS — I10 ESSENTIAL HYPERTENSION: ICD-10-CM

## 2020-09-10 ENCOUNTER — COMMUNICATION - HEALTHEAST (OUTPATIENT)
Dept: INTERNAL MEDICINE | Facility: CLINIC | Age: 65
End: 2020-09-10

## 2020-09-10 DIAGNOSIS — F41.1 ANXIETY STATE: ICD-10-CM

## 2020-09-16 ENCOUNTER — COMMUNICATION - HEALTHEAST (OUTPATIENT)
Dept: INTERNAL MEDICINE | Facility: CLINIC | Age: 65
End: 2020-09-16

## 2020-09-16 DIAGNOSIS — F41.1 ANXIETY STATE: ICD-10-CM

## 2020-10-12 ENCOUNTER — OFFICE VISIT - HEALTHEAST (OUTPATIENT)
Dept: INTERNAL MEDICINE | Facility: CLINIC | Age: 65
End: 2020-10-12

## 2020-10-12 DIAGNOSIS — Z23 NEED FOR IMMUNIZATION AGAINST INFLUENZA: ICD-10-CM

## 2020-10-12 DIAGNOSIS — I10 ESSENTIAL HYPERTENSION: ICD-10-CM

## 2020-10-12 DIAGNOSIS — F32.5 MAJOR DEPRESSIVE DISORDER WITH SINGLE EPISODE, IN FULL REMISSION (H): ICD-10-CM

## 2020-10-12 DIAGNOSIS — E55.9 VITAMIN D DEFICIENCY: ICD-10-CM

## 2020-10-12 DIAGNOSIS — E78.00 PURE HYPERCHOLESTEROLEMIA: ICD-10-CM

## 2020-10-12 DIAGNOSIS — K21.9 GASTROESOPHAGEAL REFLUX DISEASE WITHOUT ESOPHAGITIS: ICD-10-CM

## 2020-10-12 DIAGNOSIS — F41.1 ANXIETY STATE: ICD-10-CM

## 2020-10-12 LAB
ALBUMIN SERPL-MCNC: 4.4 G/DL (ref 3.5–5)
ALP SERPL-CCNC: 104 U/L (ref 45–120)
ALT SERPL W P-5'-P-CCNC: 24 U/L (ref 0–45)
ANION GAP SERPL CALCULATED.3IONS-SCNC: 13 MMOL/L (ref 5–18)
AST SERPL W P-5'-P-CCNC: 21 U/L (ref 0–40)
BILIRUB DIRECT SERPL-MCNC: 0.2 MG/DL
BILIRUB SERPL-MCNC: 0.7 MG/DL (ref 0–1)
BUN SERPL-MCNC: 13 MG/DL (ref 8–22)
CALCIUM SERPL-MCNC: 9.8 MG/DL (ref 8.5–10.5)
CHLORIDE BLD-SCNC: 100 MMOL/L (ref 98–107)
CHOLEST SERPL-MCNC: 188 MG/DL
CO2 SERPL-SCNC: 26 MMOL/L (ref 22–31)
CREAT SERPL-MCNC: 0.83 MG/DL (ref 0.6–1.1)
ERYTHROCYTE [DISTWIDTH] IN BLOOD BY AUTOMATED COUNT: 12.1 % (ref 11–14.5)
FASTING STATUS PATIENT QL REPORTED: YES
GFR SERPL CREATININE-BSD FRML MDRD: >60 ML/MIN/1.73M2
GLUCOSE BLD-MCNC: 75 MG/DL (ref 70–125)
HCT VFR BLD AUTO: 39.2 % (ref 35–47)
HDLC SERPL-MCNC: 65 MG/DL
HGB BLD-MCNC: 13.2 G/DL (ref 12–16)
LDLC SERPL CALC-MCNC: 114 MG/DL
MCH RBC QN AUTO: 29.9 PG (ref 27–34)
MCHC RBC AUTO-ENTMCNC: 33.7 G/DL (ref 32–36)
MCV RBC AUTO: 89 FL (ref 80–100)
PLATELET # BLD AUTO: 361 THOU/UL (ref 140–440)
PMV BLD AUTO: 7.6 FL (ref 7–10)
POTASSIUM BLD-SCNC: 3.6 MMOL/L (ref 3.5–5)
PROT SERPL-MCNC: 6.9 G/DL (ref 6–8)
RBC # BLD AUTO: 4.42 MILL/UL (ref 3.8–5.4)
SODIUM SERPL-SCNC: 139 MMOL/L (ref 136–145)
TRIGL SERPL-MCNC: 43 MG/DL
TSH SERPL DL<=0.005 MIU/L-ACNC: 1.66 UIU/ML (ref 0.3–5)
WBC: 7.4 THOU/UL (ref 4–11)

## 2020-10-13 ENCOUNTER — COMMUNICATION - HEALTHEAST (OUTPATIENT)
Dept: INTERNAL MEDICINE | Facility: CLINIC | Age: 65
End: 2020-10-13

## 2020-10-13 DIAGNOSIS — E55.9 VITAMIN D DEFICIENCY: ICD-10-CM

## 2020-10-13 ASSESSMENT — PATIENT HEALTH QUESTIONNAIRE - PHQ9: SUM OF ALL RESPONSES TO PHQ QUESTIONS 1-9: 1

## 2020-10-14 ENCOUNTER — COMMUNICATION - HEALTHEAST (OUTPATIENT)
Dept: INTERNAL MEDICINE | Facility: CLINIC | Age: 65
End: 2020-10-14

## 2020-10-14 LAB — 25(OH)D3 SERPL-MCNC: 13.8 NG/ML (ref 30–80)

## 2020-10-19 ENCOUNTER — COMMUNICATION - HEALTHEAST (OUTPATIENT)
Dept: INTERNAL MEDICINE | Facility: CLINIC | Age: 65
End: 2020-10-19

## 2020-10-19 DIAGNOSIS — F41.1 ANXIETY STATE: ICD-10-CM

## 2020-10-26 ENCOUNTER — COMMUNICATION - HEALTHEAST (OUTPATIENT)
Dept: INTERNAL MEDICINE | Facility: CLINIC | Age: 65
End: 2020-10-26

## 2020-11-19 ENCOUNTER — COMMUNICATION - HEALTHEAST (OUTPATIENT)
Dept: INTERNAL MEDICINE | Facility: CLINIC | Age: 65
End: 2020-11-19

## 2020-11-19 DIAGNOSIS — F41.1 ANXIETY STATE: ICD-10-CM

## 2020-12-15 ENCOUNTER — COMMUNICATION - HEALTHEAST (OUTPATIENT)
Dept: INTERNAL MEDICINE | Facility: CLINIC | Age: 65
End: 2020-12-15

## 2020-12-15 DIAGNOSIS — F41.1 ANXIETY STATE: ICD-10-CM

## 2020-12-22 ENCOUNTER — COMMUNICATION - HEALTHEAST (OUTPATIENT)
Dept: INTERNAL MEDICINE | Facility: CLINIC | Age: 65
End: 2020-12-22

## 2020-12-22 DIAGNOSIS — F41.1 ANXIETY STATE: ICD-10-CM

## 2021-02-08 ENCOUNTER — COMMUNICATION - HEALTHEAST (OUTPATIENT)
Dept: INTERNAL MEDICINE | Facility: CLINIC | Age: 66
End: 2021-02-08

## 2021-02-08 DIAGNOSIS — K21.9 GASTROESOPHAGEAL REFLUX DISEASE WITHOUT ESOPHAGITIS: ICD-10-CM

## 2021-02-08 DIAGNOSIS — F41.1 ANXIETY STATE: ICD-10-CM

## 2021-03-08 ENCOUNTER — COMMUNICATION - HEALTHEAST (OUTPATIENT)
Dept: INTERNAL MEDICINE | Facility: CLINIC | Age: 66
End: 2021-03-08

## 2021-03-08 DIAGNOSIS — R05.3 CHRONIC COUGH: ICD-10-CM

## 2021-03-08 DIAGNOSIS — F41.1 ANXIETY STATE: ICD-10-CM

## 2021-03-08 RX ORDER — ALBUTEROL SULFATE 90 UG/1
AEROSOL, METERED RESPIRATORY (INHALATION)
Qty: 54 G | Refills: 1 | Status: SHIPPED | OUTPATIENT
Start: 2021-03-08 | End: 2022-04-18

## 2021-04-28 ENCOUNTER — COMMUNICATION - HEALTHEAST (OUTPATIENT)
Dept: INTERNAL MEDICINE | Facility: CLINIC | Age: 66
End: 2021-04-28

## 2021-04-28 DIAGNOSIS — K21.9 GASTROESOPHAGEAL REFLUX DISEASE WITHOUT ESOPHAGITIS: ICD-10-CM

## 2021-04-28 DIAGNOSIS — E78.00 PURE HYPERCHOLESTEROLEMIA: ICD-10-CM

## 2021-04-28 DIAGNOSIS — I10 ESSENTIAL HYPERTENSION: ICD-10-CM

## 2021-04-29 RX ORDER — HYDROCHLOROTHIAZIDE 25 MG/1
TABLET ORAL
Qty: 90 TABLET | Refills: 1 | Status: SHIPPED | OUTPATIENT
Start: 2021-04-29 | End: 2021-10-28

## 2021-04-29 RX ORDER — LOVASTATIN 20 MG
TABLET ORAL
Qty: 90 TABLET | Refills: 1 | Status: SHIPPED | OUTPATIENT
Start: 2021-04-29 | End: 2021-10-28

## 2021-04-30 ENCOUNTER — COMMUNICATION - HEALTHEAST (OUTPATIENT)
Dept: INTERNAL MEDICINE | Facility: CLINIC | Age: 66
End: 2021-04-30

## 2021-04-30 DIAGNOSIS — F41.1 ANXIETY STATE: ICD-10-CM

## 2021-05-04 ENCOUNTER — OFFICE VISIT - HEALTHEAST (OUTPATIENT)
Dept: INTERNAL MEDICINE | Facility: CLINIC | Age: 66
End: 2021-05-04

## 2021-05-04 DIAGNOSIS — Z12.31 VISIT FOR SCREENING MAMMOGRAM: ICD-10-CM

## 2021-05-04 DIAGNOSIS — F32.5 MAJOR DEPRESSIVE DISORDER WITH SINGLE EPISODE, IN FULL REMISSION (H): ICD-10-CM

## 2021-05-04 DIAGNOSIS — J45.40 MODERATE PERSISTENT ASTHMA WITHOUT COMPLICATION: ICD-10-CM

## 2021-05-04 DIAGNOSIS — E78.00 PURE HYPERCHOLESTEROLEMIA: ICD-10-CM

## 2021-05-04 DIAGNOSIS — E55.9 VITAMIN D DEFICIENCY: ICD-10-CM

## 2021-05-04 DIAGNOSIS — I10 ESSENTIAL HYPERTENSION: ICD-10-CM

## 2021-05-04 LAB
ALBUMIN SERPL-MCNC: 4.3 G/DL (ref 3.5–5)
ALBUMIN UR-MCNC: NEGATIVE G/DL
ALP SERPL-CCNC: 103 U/L (ref 45–120)
ALT SERPL W P-5'-P-CCNC: 29 U/L (ref 0–45)
ANION GAP SERPL CALCULATED.3IONS-SCNC: 14 MMOL/L (ref 5–18)
APPEARANCE UR: CLEAR
AST SERPL W P-5'-P-CCNC: 24 U/L (ref 0–40)
BACTERIA #/AREA URNS HPF: ABNORMAL /[HPF]
BILIRUB DIRECT SERPL-MCNC: 0.2 MG/DL
BILIRUB SERPL-MCNC: 0.6 MG/DL (ref 0–1)
BILIRUB UR QL STRIP: NEGATIVE
BUN SERPL-MCNC: 11 MG/DL (ref 8–22)
CALCIUM SERPL-MCNC: 9.7 MG/DL (ref 8.5–10.5)
CHLORIDE BLD-SCNC: 99 MMOL/L (ref 98–107)
CHOLEST SERPL-MCNC: 199 MG/DL
CO2 SERPL-SCNC: 28 MMOL/L (ref 22–31)
COLOR UR AUTO: YELLOW
CREAT SERPL-MCNC: 0.86 MG/DL (ref 0.6–1.1)
ERYTHROCYTE [DISTWIDTH] IN BLOOD BY AUTOMATED COUNT: 13.3 % (ref 11–14.5)
FASTING STATUS PATIENT QL REPORTED: NO
GFR SERPL CREATININE-BSD FRML MDRD: >60 ML/MIN/1.73M2
GLUCOSE BLD-MCNC: 93 MG/DL (ref 70–125)
GLUCOSE UR STRIP-MCNC: NEGATIVE MG/DL
HCT VFR BLD AUTO: 41.3 % (ref 35–47)
HDLC SERPL-MCNC: 66 MG/DL
HGB BLD-MCNC: 13.6 G/DL (ref 12–16)
HGB UR QL STRIP: NEGATIVE
KETONES UR STRIP-MCNC: NEGATIVE MG/DL
LDLC SERPL CALC-MCNC: 120 MG/DL
LEUKOCYTE ESTERASE UR QL STRIP: ABNORMAL
MCH RBC QN AUTO: 29.1 PG (ref 27–34)
MCHC RBC AUTO-ENTMCNC: 32.9 G/DL (ref 32–36)
MCV RBC AUTO: 88 FL (ref 80–100)
NITRATE UR QL: NEGATIVE
PH UR STRIP: 6.5 [PH] (ref 5–8)
PLATELET # BLD AUTO: 388 THOU/UL (ref 140–440)
PMV BLD AUTO: 9 FL (ref 7–10)
POTASSIUM BLD-SCNC: 3.8 MMOL/L (ref 3.5–5)
PROT SERPL-MCNC: 7.1 G/DL (ref 6–8)
RBC # BLD AUTO: 4.68 MILL/UL (ref 3.8–5.4)
RBC #/AREA URNS AUTO: ABNORMAL HPF
SODIUM SERPL-SCNC: 141 MMOL/L (ref 136–145)
SP GR UR STRIP: 1.01 (ref 1–1.03)
SQUAMOUS #/AREA URNS AUTO: ABNORMAL LPF
TRIGL SERPL-MCNC: 63 MG/DL
TSH SERPL DL<=0.005 MIU/L-ACNC: 1.41 UIU/ML (ref 0.3–5)
UROBILINOGEN UR STRIP-ACNC: ABNORMAL
WBC #/AREA URNS AUTO: ABNORMAL HPF
WBC: 7.3 THOU/UL (ref 4–11)

## 2021-05-04 ASSESSMENT — PATIENT HEALTH QUESTIONNAIRE - PHQ9: SUM OF ALL RESPONSES TO PHQ QUESTIONS 1-9: 0

## 2021-05-04 ASSESSMENT — MIFFLIN-ST. JEOR: SCORE: 1242.15

## 2021-05-05 LAB
25(OH)D3 SERPL-MCNC: 14.1 NG/ML (ref 30–80)
BACTERIA SPEC CULT: NO GROWTH

## 2021-05-06 ENCOUNTER — COMMUNICATION - HEALTHEAST (OUTPATIENT)
Dept: INTERNAL MEDICINE | Facility: CLINIC | Age: 66
End: 2021-05-06

## 2021-05-06 DIAGNOSIS — E55.9 VITAMIN D DEFICIENCY: ICD-10-CM

## 2021-05-26 ASSESSMENT — PATIENT HEALTH QUESTIONNAIRE - PHQ9
SUM OF ALL RESPONSES TO PHQ QUESTIONS 1-9: 1
SUM OF ALL RESPONSES TO PHQ QUESTIONS 1-9: 0

## 2021-05-26 NOTE — TELEPHONE ENCOUNTER
Prescription Monitoring Program activity reviewed with no discrepancies noted.      Last fill per : 2/25  Quantity/days supply: 90/30  Not due till 2/27  Controlled Substance Agreement on file: No  Date:     Last office visit with provider:  2/18/2019 Sushil Wall MD    Please advise.  Yelena Walker CMA ([facility])

## 2021-05-26 NOTE — TELEPHONE ENCOUNTER
Controlled Substance Refill Request  Medication:   Requested Prescriptions     Pending Prescriptions Disp Refills     clonazePAM (KLONOPIN) 0.5 MG tablet [Pharmacy Med Name: CLONAZEPAM 0.5MG TABLETS] 90 tablet 0     Sig: TAKE ONE TABLET THREE TIMES DAILY     Date Last Fill: 2/18/19  Pharmacy: [id number]   Submit electronically to pharmacy  Controlled Substance Agreement on File:   Encounter-Level CSA Scan Date:    There are no encounter-level csa scan date.       Last office visit: Last office visit pertaining to requested medication was 2/18/19.

## 2021-05-27 VITALS
SYSTOLIC BLOOD PRESSURE: 110 MMHG | OXYGEN SATURATION: 98 % | HEART RATE: 66 BPM | DIASTOLIC BLOOD PRESSURE: 64 MMHG | BODY MASS INDEX: 33.57 KG/M2 | WEIGHT: 171 LBS | HEIGHT: 60 IN

## 2021-05-27 ASSESSMENT — PATIENT HEALTH QUESTIONNAIRE - PHQ9: SUM OF ALL RESPONSES TO PHQ QUESTIONS 1-9: 0

## 2021-05-28 ASSESSMENT — ASTHMA QUESTIONNAIRES
ACT_TOTALSCORE: 20
ACT_TOTALSCORE: 22
ACT_TOTALSCORE: 21
ACT_TOTALSCORE: 25

## 2021-05-28 NOTE — TELEPHONE ENCOUNTER
Controlled Substance Refill Request  Medication:   Requested Prescriptions     Pending Prescriptions Disp Refills     clonazePAM (KLONOPIN) 0.5 MG tablet [Pharmacy Med Name: CLONAZEPAM 0.5MG TABLETS] 90 tablet 0     Sig: TAKE 1 TABLET BY MOUTH THREE TIMES DAILY     Date Last Fill: 3/25/19  Pharmacy: [facility] [id number]   Submit electronically to pharmacy  Controlled Substance Agreement on File:   Encounter-Level CSA Scan Date:    There are no encounter-level csa scan date.       Last office visit: Last office visit pertaining to requested medication was 2/18/19.

## 2021-05-28 NOTE — TELEPHONE ENCOUNTER
Prescription Monitoring Program activity reviewed with no discrepancies noted.      Last fill per : 03/25/2019  Quantity/days supply: #90 for a 30 day supply    Controlled Substance Agreement on file: No  Date: N/A    Last office visit with provider:  2/18/2019 Sushil Wall MD    Please advise.    Adore HOOVER LPN .......... 8:05 AM  04/24/19

## 2021-05-28 NOTE — TELEPHONE ENCOUNTER
Refill Approved    Rx renewed per Medication Renewal Policy. Medication was last renewed on 5/1/18    Clonazepam filled 4/24/19    Sona Culver, [facility] Connection Triage/Med Refill 5/8/2019     Requested Prescriptions   Pending Prescriptions Disp Refills     clonazePAM (KLONOPIN) 0.5 MG tablet [Pharmacy Med Name: CLONAZEPAM 0.5MG TABLETS] 90 tablet 0     Sig: TAKE ONE TABLET THREE TIMES DAILY       Controlled Substances Refill Protocol Failed - 5/8/2019  1:16 PM        Failed - Route all Controlled Substance Requests to Provider        Failed - Patient has controlled substance agreement in past 12 months     Encounter-Level CSA Scan Date:    There are no encounter-level csa scan date.               Passed - Visit with PCP or prescribing provider visit in past 12 months      Last office visit with prescriber/PCP: 2/18/2019 Sushil Wall MD OR same dept: 2/18/2019 Sushil Wall MD OR same specialty: 2/18/2019 Sushil Wall MD Last physical: Visit date not found Last MTM visit: Visit date not found    Next visit within 3 mo: Visit date not found  Next physical within 3 mo: Visit date not found  Prescriber OR PCP: Sushil Wall MD  Last diagnosis associated with med order: 1. Anxiety  - clonazePAM (KLONOPIN) 0.5 MG tablet [Pharmacy Med Name: CLONAZEPAM 0.5MG TABLETS]; TAKE ONE TABLET THREE TIMES DAILY  Dispense: 90 tablet; Refill: 0               FLUoxetine (PROZAC) 20 MG capsule [Pharmacy Med Name: FLUOXETINE 20MG CAPSULES] 180 capsule 0     Sig: TAKE ONE CAPSULE TWICE DAILY       SSRI Refill Protocol  Passed - 5/8/2019  1:16 PM        Passed - PCP or prescribing provider visit in last year     Last office visit with prescriber/PCP: 2/18/2019 Sushil Wall MD OR same dept: 2/18/2019 Sushil Wall MD OR same specialty: 2/18/2019 Sushil Wall MD  Last physical: Visit date not found Last MTM visit: Visit date not found   Next visit within 3 mo: Visit date not found  Next physical  within 3 mo: Visit date not found  Prescriber OR PCP: Sushil Wall MD  Last diagnosis associated with med order: 1. Anxiety  - clonazePAM (KLONOPIN) 0.5 MG tablet [Pharmacy Med Name: CLONAZEPAM 0.5MG TABLETS]; TAKE ONE TABLET THREE TIMES DAILY  Dispense: 90 tablet; Refill: 0    If protocol passes may refill for 12 months if within 3 months of last provider visit (or a total of 15 months).

## 2021-05-29 ENCOUNTER — RECORDS - HEALTHEAST (OUTPATIENT)
Dept: ADMINISTRATIVE | Facility: CLINIC | Age: 66
End: 2021-05-29

## 2021-05-29 NOTE — TELEPHONE ENCOUNTER
RN cannot approve Refill Request    RN can NOT refill this medication med is not covered by policy/route to provider. Last office visit: 5/21/2019 Sushil Wall MD Last Physical: Visit date not found Last MTM visit: Visit date not found Last visit same specialty: 5/21/2019 Sushil Wall MD.  Next visit within 3 mo: Visit date not found  Next physical within 3 mo: Visit date not found      Susy Bright, [facility] Triage/Med Refill 6/15/2019    Requested Prescriptions   Pending Prescriptions Disp Refills     SYMBICORT 160-4.5 mcg/actuation inhaler [Pharmacy Med Name: SYMBICORT 160/4.5MCG (120 ORAL INH)]  0     Sig: INHALE 2 PUFFS BY MOUTH TWICE DAILY       There is no refill protocol information for this order

## 2021-05-29 NOTE — PROGRESS NOTES
Office Visit - Follow Up   Tootie Marin   63 y.o. female    Date of Visit: 6/18/2019    Chief Complaint   Patient presents with     Follow-up     not fasting, BP has been running 140-150/80-90. Has not scheduled colonoscopy. Mammo was done 6/13     Recurrent Skin Infections     complains of fungus infection between toes only on R foot, wants cream        Assessment and Plan   1. Essential hypertension  Not controlled.  Shows blood pressure in the 140s 150 over 90s outside.  Today's blood pressure was 140/70.  Will start low-dose hydrochlorothiazide.  Recheck in 6 weeks.  The meantime continue checking blood pressure and record for me.  - hydroCHLOROthiazide (HYDRODIURIL) 12.5 MG tablet; Take 1 tablet (12.5 mg total) by mouth daily.  Dispense: 90 tablet; Refill: 3    2. Moderate persistent asthma without complication  No flare or recurrence.  Continue rescue albuterol inhaler and maintenance Symbicort inhaler.    3. Major depressive disorder with single episode, in full remission (H)  In remission.  PHQ 9 score is 0.  Continue fluoxetine.    4. Anxiety  In remission.  No flare or recurrence.  MALINDA 7 score is 0.  Continue clonazepam.  - clonazePAM (KLONOPIN) 0.5 MG tablet; Take 1 tablet (0.5 mg total) by mouth 3 (three) times a day.  Dispense: 90 tablet; Refill: 0    5. Tinea pedis of right foot  Complains of recurrence of tinea pedis on the right foot.  Saw podiatry in the past and was treated with clotrimazole betamethasone which works.  Wants to get a refill of this  medication.  - clotrimazole-betamethasone (LOTRISONE) cream; APPLY TO THE AFFECTED AREA TWICE DAILY  Dispense: 45 g; Refill: 3    6. Vaginal candidiasis  Complains of some itchiness in the vaginal area with some discharge.  Thinks started after he was she was treated  with antibiotic.  Wants treatment for this.  Will give a single dose of Diflucan.  - fluconazole (DIFLUCAN) 150 MG tablet; Take 1 tablet (150 mg total) by mouth once for 1 dose.   Dispense: 2 tablet; Refill: 0    7. Screening for colon cancer  Due for colon cancer screening.  Declines colonoscopy.  But agrees to Cologuard.  - Cologuard      Follow up in 6 weeks for  her high blood pressure.     History of Present Illness   This 63 y.o. old female is here for follow-up.  Found to have increased blood pressure on her last visit.  Was advised to monitor her blood pressure and she is been checking it outside.  Blood pressure runs in the 140s to 150s over 90s.  Today's blood pressure is also 130-140/70.  Has strong family history of hypertension.  Denies any associated symptoms with her increased blood pressure.  Complains only of recurrence of tinea pedis on the right foot.  Also complains of some vaginal discharge and itchiness which she thinks due to candidiasis.  Has anxiety and depression controlled now by her medications.  Overall, she is doing and feeling well.  No other complaints.    Review of Systems   A 12 point comprehensive review of systems was negative except as noted..     Medications, Allergies and Problem List   Reviewed and updated             Chief Complaint   Follow-up (not fasting, BP has been running 140-150/80-90. Has not scheduled colonoscopy. Mammo was done 6/13) and Recurrent Skin Infections (complains of fungus infection between toes only on R foot, wants cream)       Patient Profile   Social History     Social History Narrative     Not on file        Past Medical History   Patient Active Problem List   Diagnosis     Hypercholesterolemia     Anxiety     Panic Disorder Without Agoraphobia     Ptosis Of Eyelid     Lipoma Of The Adipose Tissue     Menopause     Asthma     Major depression     Essential hypertension       Past Surgical History  She has a past surgical history that includes Breast biopsy (Right, 1987).       Medications and Allergies   Current Outpatient Medications   Medication Sig     acetaminophen (TYLENOL) 500 MG tablet Take 2 tablets (1,000 mg total) by  "mouth every 6 (six) hours as needed.     Ca-D3-mag-zinc--deanna-boron 600 mg calcium- 800 unit-40 mg Chew Chew.     clonazePAM (KLONOPIN) 0.5 MG tablet Take 1 tablet (0.5 mg total) by mouth 3 (three) times a day.     clotrimazole-betamethasone (LOTRISONE) cream APPLY TO THE AFFECTED AREA TWICE DAILY     FLUoxetine (PROZAC) 20 MG capsule TAKE ONE CAPSULE TWICE DAILY     ibuprofen (ADVIL,MOTRIN) 800 MG tablet TAKE 1 TABLET BY MOUTH EVERY 8 HOURS AS NEEDED     lovastatin (MEVACOR) 20 MG tablet TAKE 1 TABLET BY MOUTH EVERY NIGHT AT BEDTIME     omeprazole (PRILOSEC) 20 MG capsule Take 1 capsule (20 mg total) by mouth daily.     SYMBICORT 160-4.5 mcg/actuation inhaler INHALE 2 PUFFS BY MOUTH TWICE DAILY     VENTOLIN HFA 90 mcg/actuation inhaler INHALE 2 PUFFS BY MOUTH EVERY 6 HOURS AS NEEDED     fluconazole (DIFLUCAN) 150 MG tablet Take 1 tablet (150 mg total) by mouth once for 1 dose.     hydroCHLOROthiazide (HYDRODIURIL) 12.5 MG tablet Take 1 tablet (12.5 mg total) by mouth daily.     Allergies   Allergen Reactions     Aspirin (Tartrazine Only)      Chicken      [place]         Family and Social History   No family history on file.     Social History     Tobacco Use     Smoking status: Never Smoker     Smokeless tobacco: Never Used   Substance Use Topics     Alcohol use: No     Drug use: No        Physical Exam       Physical Exam  /70   Pulse 75   Ht 5' 1\" (1.549 m)   Wt 201 lb (91.2 kg)   SpO2 98%   BMI 37.98 kg/m    General appearance: alert, appears stated age and cooperative  Head: Normocephalic, without obvious abnormality, atraumatic  Throat: lips, mucosa, and tongue normal; teeth and gums normal  Neck: no adenopathy, no carotid bruit, no JVD, supple, symmetrical, trachea midline and thyroid not enlarged, symmetric, no tenderness/mass/nodules  Lungs: clear to auscultation bilaterally  Heart: regular rate and rhythm, S1, S2 normal, no murmur, click, rub or gallop  Abdomen: soft, non-tender; bowel sounds " normal; no masses,  no organomegaly  Extremities: extremities normal, atraumatic, no cyanosis or edema  Skin: Skin color, texture, turgor normal. No rashes or lesions   Psychiatric: Appropriate affect and mood     Additional Information        Sushil Wall MD  Internal Medicine  Contact me at [phone]     Additional Information   Current Outpatient Medications   Medication Sig     acetaminophen (TYLENOL) 500 MG tablet Take 2 tablets (1,000 mg total) by mouth every 6 (six) hours as needed.     Ca-D3-mag-zinc--deanna-boron 600 mg calcium- 800 unit-40 mg Chew Chew.     clonazePAM (KLONOPIN) 0.5 MG tablet Take 1 tablet (0.5 mg total) by mouth 3 (three) times a day.     clotrimazole-betamethasone (LOTRISONE) cream APPLY TO THE AFFECTED AREA TWICE DAILY     FLUoxetine (PROZAC) 20 MG capsule TAKE ONE CAPSULE TWICE DAILY     ibuprofen (ADVIL,MOTRIN) 800 MG tablet TAKE 1 TABLET BY MOUTH EVERY 8 HOURS AS NEEDED     lovastatin (MEVACOR) 20 MG tablet TAKE 1 TABLET BY MOUTH EVERY NIGHT AT BEDTIME     omeprazole (PRILOSEC) 20 MG capsule Take 1 capsule (20 mg total) by mouth daily.     SYMBICORT 160-4.5 mcg/actuation inhaler INHALE 2 PUFFS BY MOUTH TWICE DAILY     VENTOLIN HFA 90 mcg/actuation inhaler INHALE 2 PUFFS BY MOUTH EVERY 6 HOURS AS NEEDED     fluconazole (DIFLUCAN) 150 MG tablet Take 1 tablet (150 mg total) by mouth once for 1 dose.     hydroCHLOROthiazide (HYDRODIURIL) 12.5 MG tablet Take 1 tablet (12.5 mg total) by mouth daily.     Allergies   Allergen Reactions     Aspirin (Tartrazine Only)      Chicken      [place]      Social History     Tobacco Use     Smoking status: Never Smoker     Smokeless tobacco: Never Used   Substance Use Topics     Alcohol use: No     Drug use: No         Time: total time spent with the patient was 25 minutes of which >50% was spent in counseling and coordination of care

## 2021-05-29 NOTE — PROGRESS NOTES
Office Visit - Follow Up   Tootie Marin   63 y.o. female    Date of Visit: 5/21/2019    Chief Complaint   Patient presents with     Follow-up     fasting, needs pap smear, mammo, wants to discuss cologaurd        Assessment and Plan   1. Hypercholesterolemia  Controlled.  Continue lovastatin.  Lipids on 11/3/2017 were good LDL 91, HDL 71, triglycerides 44 and total cholesterol 171. Due for her fasting lipids.  Check lipids and liver function.  - Lipid Cascade  - Hepatic Profile    2. Prehypertension  Shows mild increase of her blood pressure suggesting she has prior hypertension.  Blood pressure  is up to 140 over 90.  Discussed she may be having increased blood pressure now.  Advised to check his blood pressure at least 3-4 times a week and record for me.  Will see her in 4 weeks for reassessment of her blood pressure.  Check CBC and basic metabolic panel.  - HM2(CBC w/o Differential)  - Basic Metabolic Panel    3. Moderate persistent asthma without complication  Compensated.  No flare or recurrence.  But complains of cough and ear pains.  Will treat with amoxicillin empirically 3 times a day for 7 days.  - amoxicillin (AMOXIL) 500 MG tablet; Take 1 tablet (500 mg total) by mouth 3 (three) times a day for 7 days.  Dispense: 21 tablet; Refill: 0    4. Anxiety  Has anxiety.  Comes off and on.  Takes Klonopin which works.  - clonazePAM (KLONOPIN) 0.5 MG tablet; Take 1 tablet (0.5 mg total) by mouth 3 (three) times a day.  Dispense: 90 tablet; Refill: 0    5. Visit for screening mammogram  Due for mammogram.  We will schedule this.  - Mammo Screening Bilateral; Future    The following high BMI interventions were performed this visit: encouragement to exercise and weight monitoring.  Encouraged to lose weight.  Informed her BMI is  up to 37.9 consistent with moderate obesity.    Discussed need for a Pap smear but she declines at this time.      Follow up in 4 weeks for her blood pressure.     History of Present  Illness   This 63 y.o. old female is here for follow-up.  Has hypercholesterolemia.  Now controlled by lovastatin.  Last lipids were good but she is overdue for her fasting lipids rechecked.  Shows mild increase her blood pressures in the 140/90.  May be having starting hypertension now.  Has asthma.  But remains compensated.  No flare or recurrence.  Only complains of cough and ear pains.  Wants treatment for this.  Has anxiety which comes and goes.  Takes Klonopin which helps.  Due for mammogram and Pap smear.  Remains obese.  Overall, stable.  Feels well, otherwise.    Review of Systems   A 12 point comprehensive review of systems was negative except as noted..     Medications, Allergies and Problem List   Reviewed and updated             Chief Complaint   Follow-up (fasting, needs pap smear, mammo, wants to discuss cologaurd)       Patient Profile   Social History     Social History Narrative     Not on file        Past Medical History   Patient Active Problem List   Diagnosis     Hypercholesterolemia     Anxiety     Panic Disorder Without Agoraphobia     Ptosis Of Eyelid     Lipoma Of The Adipose Tissue     Menopause     Asthma     Major depression       Past Surgical History  She has a past surgical history that includes Breast biopsy (Right, 1987).       Medications and Allergies   Current Outpatient Medications   Medication Sig     acetaminophen (TYLENOL) 500 MG tablet Take 2 tablets (1,000 mg total) by mouth every 6 (six) hours as needed.     Ca-D3-mag-zinc--deanna-boron 600 mg calcium- 800 unit-40 mg Chew Chew.     clonazePAM (KLONOPIN) 0.5 MG tablet Take 1 tablet (0.5 mg total) by mouth 3 (three) times a day.     clotrimazole-betamethasone (LOTRISONE) cream APPLY TO THE AFFECTED AREA TWICE DAILY     FLUoxetine (PROZAC) 20 MG capsule TAKE ONE CAPSULE TWICE DAILY     ibuprofen (ADVIL,MOTRIN) 800 MG tablet TAKE 1 TABLET BY MOUTH EVERY 8 HOURS AS NEEDED     lovastatin (MEVACOR) 20 MG tablet TAKE 1 TABLET BY  "MOUTH EVERY NIGHT AT BEDTIME     omeprazole (PRILOSEC) 20 MG capsule Take 1 capsule (20 mg total) by mouth daily.     SYMBICORT 160-4.5 mcg/actuation inhaler INHALE 2 PUFFS BY MOUTH TWICE DAILY     VENTOLIN HFA 90 mcg/actuation inhaler INHALE 2 PUFFS BY MOUTH EVERY 6 HOURS AS NEEDED     amoxicillin (AMOXIL) 500 MG tablet Take 1 tablet (500 mg total) by mouth 3 (three) times a day for 7 days.     Allergies   Allergen Reactions     Aspirin (Tartrazine Only)      Chicken      [place]         Family and Social History   No family history on file.     Social History     Tobacco Use     Smoking status: Never Smoker     Smokeless tobacco: Never Used   Substance Use Topics     Alcohol use: No     Drug use: No      Physical Exam       Physical Exam  /90   Pulse 80   Ht 5' 1\" (1.549 m)   Wt 201 lb (91.2 kg)   SpO2 97%   BMI 37.98 kg/m    General appearance: alert, appears stated age, cooperative and no distress  Head: Normocephalic, without obvious abnormality, atraumatic  Throat: lips, mucosa, and tongue normal; teeth and gums normal  Neck: no adenopathy, no carotid bruit, no JVD, supple, symmetrical, trachea midline and thyroid not enlarged, symmetric, no tenderness/mass/nodules  Lungs: clear to auscultation bilaterally  Heart: regular rate and rhythm, S1, S2 normal, no murmur, click, rub or gallop  Abdomen: soft, non-tender; bowel sounds normal; no masses,  no organomegaly  Extremities: extremities normal, atraumatic, no cyanosis or edema  Skin: Skin color, texture, turgor normal. No rashes or lesions  Neurologic: Grossly normal     Additional Information        Sushil Wall MD  Internal Medicine  Contact me at [phone]     Additional Information   Current Outpatient Medications   Medication Sig     acetaminophen (TYLENOL) 500 MG tablet Take 2 tablets (1,000 mg total) by mouth every 6 (six) hours as needed.     Ca-D3-mag-zinc--deanna-boron 600 mg calcium- 800 unit-40 mg Chew Chew.     clonazePAM " (KLONOPIN) 0.5 MG tablet Take 1 tablet (0.5 mg total) by mouth 3 (three) times a day.     clotrimazole-betamethasone (LOTRISONE) cream APPLY TO THE AFFECTED AREA TWICE DAILY     FLUoxetine (PROZAC) 20 MG capsule TAKE ONE CAPSULE TWICE DAILY     ibuprofen (ADVIL,MOTRIN) 800 MG tablet TAKE 1 TABLET BY MOUTH EVERY 8 HOURS AS NEEDED     lovastatin (MEVACOR) 20 MG tablet TAKE 1 TABLET BY MOUTH EVERY NIGHT AT BEDTIME     omeprazole (PRILOSEC) 20 MG capsule Take 1 capsule (20 mg total) by mouth daily.     SYMBICORT 160-4.5 mcg/actuation inhaler INHALE 2 PUFFS BY MOUTH TWICE DAILY     VENTOLIN HFA 90 mcg/actuation inhaler INHALE 2 PUFFS BY MOUTH EVERY 6 HOURS AS NEEDED     amoxicillin (AMOXIL) 500 MG tablet Take 1 tablet (500 mg total) by mouth 3 (three) times a day for 7 days.     Allergies   Allergen Reactions     Aspirin (Tartrazine Only)      Chicken      [place]      Social History     Tobacco Use     Smoking status: Never Smoker     Smokeless tobacco: Never Used   Substance Use Topics     Alcohol use: No     Drug use: No         Time: total time spent with the patient was 25 minutes of which >50% was spent in counseling and coordination of care

## 2021-05-30 NOTE — TELEPHONE ENCOUNTER
Controlled Substance Refill Request  Medication:   Requested Prescriptions     Pending Prescriptions Disp Refills     clonazePAM (KLONOPIN) 0.5 MG tablet [Pharmacy Med Name: CLONAZEPAM 0.5MG TABLETS] 90 tablet 0     Sig: TAKE 1 TABLET(0.5 MG) BY MOUTH THREE TIMES DAILY     Date Last Fill: 6/18/19  Pharmacy: [facility] on [address] in [place]   Submit electronically to pharmacy  Controlled Substance Agreement on File: none  Encounter-Level CSA Scan Date:    There are no encounter-level csa scan date.       Last office visit: 6/18/2019 Sushil Wall MD

## 2021-05-31 VITALS — WEIGHT: 171 LBS | HEIGHT: 61 IN | BODY MASS INDEX: 32.28 KG/M2

## 2021-05-31 VITALS — BODY MASS INDEX: 32.36 KG/M2 | WEIGHT: 171.25 LBS

## 2021-05-31 VITALS — BODY MASS INDEX: 32.17 KG/M2 | WEIGHT: 170.25 LBS

## 2021-05-31 VITALS — HEIGHT: 61 IN | BODY MASS INDEX: 32.1 KG/M2 | WEIGHT: 170 LBS

## 2021-05-31 NOTE — TELEPHONE ENCOUNTER
Controlled Substance Refill Request  Medication:   Requested Prescriptions     Pending Prescriptions Disp Refills     clonazePAM (KLONOPIN) 0.5 MG tablet [Pharmacy Med Name: CLONAZEPAM 0.5MG TABLETS] 90 tablet 0     Sig: TAKE 1 TABLET(0.5 MG) BY MOUTH THREE TIMES DAILY     Date Last Fill: 7/18/19  Pharmacy: [facility] [id number]   Submit electronically to pharmacy  Controlled Substance Agreement on File:   Encounter-Level CSA Scan Date:    There are no encounter-level csa scan date.       Last office visit: Last office visit pertaining to requested medication was 6/18/19.

## 2021-05-31 NOTE — TELEPHONE ENCOUNTER
Prescription Monitoring Program activity reviewed with no discrepancies noted.      Last fill per : 7/18  Quantity/days supply: 90/30    Controlled Substance Agreement on file: No  Date:     Last office visit with provider:  Visit date not found    Please advise.  Yelena Walker CMA ([facility])

## 2021-06-01 VITALS — WEIGHT: 179 LBS | BODY MASS INDEX: 33.79 KG/M2 | HEIGHT: 61 IN

## 2021-06-01 NOTE — TELEPHONE ENCOUNTER
Called and informed patient of lab results and recommendations below from Md. Patient verbalized understanding and will follow up with lab in 1 week for recheck of potassium.    Still low potassium due to your diuretic, hydrochlorothiazide. Please start Kdur 20 meq 2 times a day and have your potassium repeated after 1 week. Please make lab appointment only. Prescription was sent to your pharmacy. Thanks.     Keely Degroot LPN

## 2021-06-01 NOTE — TELEPHONE ENCOUNTER
Controlled Substance Refill Request  Medication:   Requested Prescriptions     Pending Prescriptions Disp Refills     clonazePAM (KLONOPIN) 0.5 MG tablet [Pharmacy Med Name: CLONAZEPAM 0.5MG TABLETS] 90 tablet 0     Sig: TAKE 1 TABLET(0.5 MG) BY MOUTH THREE TIMES DAILY     Date Last Fill: 8/16/19  Ph[facility]acy: [facility] [id number]   Submit electronically to pharmacy  Controlled Substance Agreement on File:   Encounter-Level CSA Scan Date:    There are no encounter-level csa scan date.       Last office visit: Last office visit pertaining to requested medication was 9/6/19.

## 2021-06-01 NOTE — TELEPHONE ENCOUNTER
Prescription Monitoring Program activity reviewed with no discrepancies noted.      Last fill per : 8/16  Quantity/days supply: 90/30    Controlled Substance Agreement on file: No  Date:     Last office visit with provider:  9/6/19    Dr Yanez filled the last 2 times when you were out    Please advise.  Yelena Walker CMA ([facility])

## 2021-06-01 NOTE — PROGRESS NOTES
Office Visit - Follow Up   Tootie Marin   64 y.o. female    Date of Visit: 9/6/2019    Chief Complaint   Patient presents with     Follow-up     has not done cologaurd yet- has kit at home         Assessment and Plan   1. Essential hypertension  Now better controlled but still not ideal.  Increase hydrochlorothiazide to 25 mg daily.  Check basic metabolic panel.  May need potassium supplement.  - hydroCHLOROthiazide (HYDRODIURIL) 25 MG tablet; Take 1 tablet (25 mg total) by mouth daily.  Dispense: 90 tablet; Refill: 3  - Basic Metabolic Panel    2. Hypercholesterolemia  Controlled.  Continue lovastatin.  Fasting lipids last lipids were good.  Recheck next visit.    3. Moderate persistent asthma without complication  Has moderate persistent asthma.  ACT score is 19.  Complains of some cough prior to this visit and it is now resolved.  Continue albuterol inhaler as needed together with Symbicort 2 puffs twice a day.    4. Screen for colon cancer  Due for preventive colon cancer screening.  Agrees to do Cologuard.  - Cologuard      Follow up in 2 months.     History of Present Illness   This 64 y.o. old female is here for follow-up.  Has hypertension found on her last visit.  Now better controlled by start of hydrochlorothiazide on her last visit.  Takes hydrochlorothiazide 12.5 mg daily.  Has last lipids were good.  Hypercholesterolemia controlled by lovastatin.  Has asthma.  Denies shortness of breath and cough.  But had some cough prior to this visit which resolved.  Due for colon cancer screening.    Review of Systems   A 12 point comprehensive review of systems was negative except as noted..     Medications, Allergies and Problem List   Reviewed and updated             Chief Complaint   Follow-up (has not done cologaurd yet- has kit at home )       Patient Profile   Social History     Social History Narrative     Not on file        Past Medical History   Patient Active Problem List   Diagnosis      "Hypercholesterolemia     Anxiety     Panic Disorder Without Agoraphobia     Ptosis Of Eyelid     Lipoma Of The Adipose Tissue     Menopause     Asthma     Major depression     Essential hypertension       Past Surgical History  She has a past surgical history that includes Breast biopsy (Right, 1987).       Medications and Allergies   Current Outpatient Medications   Medication Sig     acetaminophen (TYLENOL) 500 MG tablet Take 2 tablets (1,000 mg total) by mouth every 6 (six) hours as needed.     Ca-D3-mag-zinc--deanna-boron 600 mg calcium- 800 unit-40 mg Chew Chew.     clonazePAM (KLONOPIN) 0.5 MG tablet TAKE 1 TABLET(0.5 MG) BY MOUTH THREE TIMES DAILY     clotrimazole-betamethasone (LOTRISONE) cream APPLY TO THE AFFECTED AREA TWICE DAILY     FLUoxetine (PROZAC) 20 MG capsule TAKE ONE CAPSULE TWICE DAILY     hydroCHLOROthiazide (HYDRODIURIL) 25 MG tablet Take 1 tablet (25 mg total) by mouth daily.     ibuprofen (ADVIL,MOTRIN) 800 MG tablet TAKE 1 TABLET BY MOUTH EVERY 8 HOURS AS NEEDED     lovastatin (MEVACOR) 20 MG tablet TAKE 1 TABLET BY MOUTH EVERY NIGHT AT BEDTIME     omeprazole (PRILOSEC) 20 MG capsule Take 1 capsule (20 mg total) by mouth daily.     SYMBICORT 160-4.5 mcg/actuation inhaler INHALE 2 PUFFS BY MOUTH TWICE DAILY     VENTOLIN HFA 90 mcg/actuation inhaler INHALE 2 PUFFS BY MOUTH EVERY 6 HOURS AS NEEDED     Allergies   Allergen Reactions     Aspirin (Tartrazine Only)      Chicken      [place]         Family and Social History   No family history on file.     Social History     Tobacco Use     Smoking status: Never Smoker     Smokeless tobacco: Never Used   Substance Use Topics     Alcohol use: No     Drug use: No        Physical Exam       Physical Exam  /80   Pulse 91   Ht 5' 1\" (1.549 m)   Wt 189 lb (85.7 kg)   SpO2 97%   BMI 35.71 kg/m    General appearance: alert, appears stated age, cooperative and no distress  Head: Normocephalic, without obvious abnormality, atraumatic  Throat: " lips, mucosa, and tongue normal; teeth and gums normal  Neck: no adenopathy, no carotid bruit, no JVD, supple, symmetrical, trachea midline and thyroid not enlarged, symmetric, no tenderness/mass/nodules  Lungs: clear to auscultation bilaterally  Heart: regular rate and rhythm, S1, S2 normal, no murmur, click, rub or gallop  Abdomen: soft, non-tender; bowel sounds normal; no masses,  no organomegaly  Extremities: extremities normal, atraumatic, no cyanosis or edema  Skin: Skin color, texture, turgor normal. No rashes or lesions     Additional Information        Sushil Wall MD  Internal Medicine  Contact me at [phone]     Additional Information   Current Outpatient Medications   Medication Sig     acetaminophen (TYLENOL) 500 MG tablet Take 2 tablets (1,000 mg total) by mouth every 6 (six) hours as needed.     Ca-D3-mag-zinc--deanna-boron 600 mg calcium- 800 unit-40 mg Chew Chew.     clonazePAM (KLONOPIN) 0.5 MG tablet TAKE 1 TABLET(0.5 MG) BY MOUTH THREE TIMES DAILY     clotrimazole-betamethasone (LOTRISONE) cream APPLY TO THE AFFECTED AREA TWICE DAILY     FLUoxetine (PROZAC) 20 MG capsule TAKE ONE CAPSULE TWICE DAILY     hydroCHLOROthiazide (HYDRODIURIL) 25 MG tablet Take 1 tablet (25 mg total) by mouth daily.     ibuprofen (ADVIL,MOTRIN) 800 MG tablet TAKE 1 TABLET BY MOUTH EVERY 8 HOURS AS NEEDED     lovastatin (MEVACOR) 20 MG tablet TAKE 1 TABLET BY MOUTH EVERY NIGHT AT BEDTIME     omeprazole (PRILOSEC) 20 MG capsule Take 1 capsule (20 mg total) by mouth daily.     SYMBICORT 160-4.5 mcg/actuation inhaler INHALE 2 PUFFS BY MOUTH TWICE DAILY     VENTOLIN HFA 90 mcg/actuation inhaler INHALE 2 PUFFS BY MOUTH EVERY 6 HOURS AS NEEDED     Allergies   Allergen Reactions     Aspirin (Tartrazine Only)      Chicken      [place]      Social History     Tobacco Use     Smoking status: Never Smoker     Smokeless tobacco: Never Used   Substance Use Topics     Alcohol use: No     Drug use: No         Time: total time  spent with the patient was 25 minutes of which >50% was spent in counseling and coordination of care

## 2021-06-02 ENCOUNTER — RECORDS - HEALTHEAST (OUTPATIENT)
Dept: ADMINISTRATIVE | Facility: CLINIC | Age: 66
End: 2021-06-02

## 2021-06-02 VITALS — WEIGHT: 202 LBS | HEIGHT: 61 IN | BODY MASS INDEX: 38.14 KG/M2

## 2021-06-02 NOTE — TELEPHONE ENCOUNTER
Refill Approved    Rx renewed per Medication Renewal Policy. Medication was last renewed on 9/28/17.    Sona Culver, [facility] Triage/Med Refill 10/4/2019     Requested Prescriptions   Pending Prescriptions Disp Refills     albuterol (PROAIR HFA;PROVENTIL HFA;VENTOLIN HFA) 90 mcg/actuation inhaler [Pharmacy Med Name: ALBUTEROL HFA INH (200 PUFFS) 18GM] 18 g      Sig: INHALE 2 PUFFS BY MOUTH EVERY 6 HOURS AS NEEDED FOR WHEEZING OR SHORTNESS OF BREATH       Albuterol/Levalbuterol Refill Protocol Passed - 10/3/2019  9:59 AM        Passed - PCP or prescribing provider visit in last year     Last office visit with prescriber/PCP: 9/6/2019 Sushil Wall MD OR same dept: 9/6/2019 Sushil Wall MD OR same specialty: 9/6/2019 Sushil Wall MD Last physical: Visit date not found       Next appt within 3 mo: Visit date not found  Next physical within 3 mo: Visit date not found  Prescriber OR PCP: Sushil Wall MD  Last diagnosis associated with med order: 1. Chronic cough  - albuterol (PROAIR HFA;PROVENTIL HFA;VENTOLIN HFA) 90 mcg/actuation inhaler [Pharmacy Med Name: ALBUTEROL HFA INH (200 PUFFS) 18GM]; INHALE 2 PUFFS BY MOUTH EVERY 6 HOURS AS NEEDED FOR WHEEZING OR SHORTNESS OF BREATH  Dispense: 18 g    If protocol passes may refill for 6 months if within 3 months of last provider visit (or a total of 9 months). If patient requesting >1 inhaler per month refill x 6 months and have patient make appointment with provider.

## 2021-06-03 VITALS — WEIGHT: 201 LBS | HEIGHT: 61 IN | BODY MASS INDEX: 37.95 KG/M2

## 2021-06-03 VITALS — BODY MASS INDEX: 37.95 KG/M2 | HEIGHT: 61 IN | WEIGHT: 201 LBS

## 2021-06-03 VITALS
HEIGHT: 61 IN | WEIGHT: 189 LBS | HEART RATE: 91 BPM | DIASTOLIC BLOOD PRESSURE: 80 MMHG | OXYGEN SATURATION: 97 % | SYSTOLIC BLOOD PRESSURE: 126 MMHG | BODY MASS INDEX: 35.68 KG/M2

## 2021-06-03 NOTE — TELEPHONE ENCOUNTER
Prescription Monitoring Program activity reviewed with no discrepancies noted.      Last fill per : 10/16  Quantity/days supply: 90/30    Controlled Substance Agreement on file: No  Date:     Last office visit with provider:  11/12/2019 Sushil Wall MD    Please advise.  Yelena Walker CMA ([facility])

## 2021-06-03 NOTE — PROGRESS NOTES
Office Visit - Follow Up   Tootie Marin   64 y.o. female    Date of Visit: 11/12/2019    Chief Complaint   Patient presents with     Follow-up     blood pressure follow up.     Oral Swelling     bottom lip and top of the mouth can't chew anything hard. going on for two days.        Assessment and Plan   1. Essential hypertension  Now controlled  when I  personally checked her blood pressure here in the clinic.  I get 130/60.  Was given hydrochlorothiazide 25 mg daily on her last visit because she had increased blood pressure.  Also supposed to take potassium 2 times a day because her potassium was low when it was checked after she took hydrochlorothiazide.  But has been taking only once a day.  Check basic metabolic panel.  - Basic Metabolic Panel    2. Hypercholesterolemia  Controlled.  Continue lovastatin.  Last lipids were good.    3. Major depressive disorder with single episode, in full remission (H)  Now in remission.  PHQ 9 score is 0.    4. Moderate persistent asthma without complication  Also controlled.  Denies cough and shortness of breath.  Takes maintenance Symbicort inhaler and as needed albuterol inhaler.  Continue same inhalers.    5. Gastroesophageal reflux disease without esophagitis  Has GERD.  Takes omeprazole.  Gets GERD symptoms when she quits it.    6. Gingivitis, acute  Complains of pains on the upper gums and palate.  Inspection showed carious teeth with gingivitis.  Advised to see a dentist.  Informed I am not a dentist and I am not capable of treating her tooth problem.      Follow up in 2 months.     History of Present Illness   This 64 y.o. old female is here for follow-up  of her hypertension.  Found to have increased blood pressure during last visit.  Hydrochlorothiazide was prescribed.  Her blood pressure still runs in the 140s to 150s over 90s outside.  But today her blood pressure is 130/60 which is normal.  Was also found to have low potassium after taking  hydrochlorothiazide.  Was supposed to take potassium twice a day but she is only taking it once a day or not at all.  Needs recheck her potassium level today.  Has anxiety and depression are controlled with her medications.  Has hypercholesterolemia no controlled by lovastatin.  Last lipids were good.  Has GERD.  Continues to take omeprazole because she got GERD  symptoms when she stopped taking it.  Complains of gum and tooth pains.  Reports she has holes in her teeth.  Overall, stable.    Review of Systems   A 12 point comprehensive review of systems was negative except as noted..     Medications, Allergies and Problem List   Reviewed and updated             Chief Complaint   Follow-up (blood pressure follow up.) and Oral Swelling (bottom lip and top of the mouth can't chew anything hard. going on for two days.)       Patient Profile   Social History     Patient does not qualify to have social determinant information on file (likely too young).   Social History Narrative     Not on file        Past Medical History   Patient Active Problem List   Diagnosis     Hypercholesterolemia     Anxiety     Panic Disorder Without Agoraphobia     Ptosis Of Eyelid     Lipoma Of The Adipose Tissue     Menopause     Asthma     Major depression     Essential hypertension     Gastroesophageal reflux disease without esophagitis       Past Surgical History  She has a past surgical history that includes Breast biopsy (Right, 1987).       Medications and Allergies   Current Outpatient Medications   Medication Sig     acetaminophen (TYLENOL) 500 MG tablet Take 2 tablets (1,000 mg total) by mouth every 6 (six) hours as needed.     albuterol (PROAIR HFA;PROVENTIL HFA;VENTOLIN HFA) 90 mcg/actuation inhaler INHALE 2 PUFFS BY MOUTH EVERY 6 HOURS AS NEEDED FOR WHEEZING OR SHORTNESS OF BREATH     clonazePAM (KLONOPIN) 0.5 MG tablet TAKE 1 TABLET(0.5 MG) BY MOUTH THREE TIMES DAILY     clotrimazole-betamethasone (LOTRISONE) cream APPLY TO THE  "AFFECTED AREA TWICE DAILY     FLUoxetine (PROZAC) 20 MG capsule TAKE ONE CAPSULE TWICE DAILY     hydroCHLOROthiazide (HYDRODIURIL) 25 MG tablet Take 1 tablet (25 mg total) by mouth daily.     ibuprofen (ADVIL,MOTRIN) 800 MG tablet TAKE 1 TABLET BY MOUTH EVERY 8 HOURS AS NEEDED     lovastatin (MEVACOR) 20 MG tablet TAKE 1 TABLET BY MOUTH EVERY NIGHT AT BEDTIME     omeprazole (PRILOSEC) 20 MG capsule Take 1 capsule (20 mg total) by mouth daily.     potassium chloride (K-DUR,KLOR-CON) 20 MEQ tablet Take 1 tablet (20 mEq total) by mouth 2 (two) times a day. (Patient taking differently: Take 20 mEq by mouth daily.       )     SYMBICORT 160-4.5 mcg/actuation inhaler INHALE 2 PUFFS BY MOUTH TWICE DAILY     VENTOLIN HFA 90 mcg/actuation inhaler INHALE 2 PUFFS BY MOUTH EVERY 6 HOURS AS NEEDED     Allergies   Allergen Reactions     Aspirin (Tartrazine Only)      Chicken      [place]         Family and Social History   No family history on file.     Social History     Tobacco Use     Smoking status: Never Smoker     Smokeless tobacco: Never Used   Substance Use Topics     Alcohol use: No     Drug use: No        Physical Exam       Physical Exam  /60   Pulse 92   Ht 5' 1\" (1.549 m)   Wt 188 lb (85.3 kg)   SpO2 98%   BMI 35.52 kg/m    General appearance: alert, appears stated age, cooperative and no distress  Head: Normocephalic, without obvious abnormality, atraumatic  Nose: Nares normal. Septum midline. Mucosa normal. No drainage or sinus tenderness.  Throat: lips, mucosa, and tongue normal; teeth and gums normal  Neck: no adenopathy, no carotid bruit, no JVD, supple, symmetrical, trachea midline and thyroid not enlarged, symmetric, no tenderness/mass/nodules  Lungs: clear to auscultation bilaterally  Heart: regular rate and rhythm, S1, S2 normal, no murmur, click, rub or gallop  Abdomen: soft, non-tender; bowel sounds normal; no masses,  no organomegaly  Extremities: extremities normal, atraumatic, no cyanosis or " edema  Skin: Skin color, texture, turgor normal. No rashes or lesions  Mouth: Carious teeth and inflamed gums, palate     Additional Information        Sushil Wall MD  Internal Medicine  Contact me at [phone]     Additional Information   Current Outpatient Medications   Medication Sig     acetaminophen (TYLENOL) 500 MG tablet Take 2 tablets (1,000 mg total) by mouth every 6 (six) hours as needed.     albuterol (PROAIR HFA;PROVENTIL HFA;VENTOLIN HFA) 90 mcg/actuation inhaler INHALE 2 PUFFS BY MOUTH EVERY 6 HOURS AS NEEDED FOR WHEEZING OR SHORTNESS OF BREATH     clonazePAM (KLONOPIN) 0.5 MG tablet TAKE 1 TABLET(0.5 MG) BY MOUTH THREE TIMES DAILY     clotrimazole-betamethasone (LOTRISONE) cream APPLY TO THE AFFECTED AREA TWICE DAILY     FLUoxetine (PROZAC) 20 MG capsule TAKE ONE CAPSULE TWICE DAILY     hydroCHLOROthiazide (HYDRODIURIL) 25 MG tablet Take 1 tablet (25 mg total) by mouth daily.     ibuprofen (ADVIL,MOTRIN) 800 MG tablet TAKE 1 TABLET BY MOUTH EVERY 8 HOURS AS NEEDED     lovastatin (MEVACOR) 20 MG tablet TAKE 1 TABLET BY MOUTH EVERY NIGHT AT BEDTIME     omeprazole (PRILOSEC) 20 MG capsule Take 1 capsule (20 mg total) by mouth daily.     potassium chloride (K-DUR,KLOR-CON) 20 MEQ tablet Take 1 tablet (20 mEq total) by mouth 2 (two) times a day. (Patient taking differently: Take 20 mEq by mouth daily.       )     SYMBICORT 160-4.5 mcg/actuation inhaler INHALE 2 PUFFS BY MOUTH TWICE DAILY     VENTOLIN HFA 90 mcg/actuation inhaler INHALE 2 PUFFS BY MOUTH EVERY 6 HOURS AS NEEDED     Allergies   Allergen Reactions     Aspirin (Tartrazine Only)      Chicken      [place]      Social History     Tobacco Use     Smoking status: Never Smoker     Smokeless tobacco: Never Used   Substance Use Topics     Alcohol use: No     Drug use: No         Time: total time spent with the patient was 25 minutes of which >50% was spent in counseling and coordination of care

## 2021-06-03 NOTE — TELEPHONE ENCOUNTER
Called and relayed message below from Md regarding lab work. Lab letter mailed to patient.    Low potassium.  Make sure you take your potassium supplement twice a day.  Recheck potassium next visit.  Normal rest of labs.  Thank you.      Keely Degroot LPN

## 2021-06-03 NOTE — TELEPHONE ENCOUNTER
Controlled Substance Refill Request  Medication:   Requested Prescriptions     Pending Prescriptions Disp Refills     clonazePAM (KLONOPIN) 0.5 MG tablet [Pharmacy Med Name: CLONAZEPAM 0.5MG TABLETS] 90 tablet 0     Sig: TAKE 1 TABLET(0.5 MG) BY MOUTH THREE TIMES DAILY     Date Last Fill: 10/16/19  [id number]   Submit electronically to pharmacy  Controlled Substance Agreement on File:   Encounter-Level CSA Scan Date:    There are no encounter-level csa scan date.       Last office visit: Last office visit pertaining to requested medication was 11/12/19.

## 2021-06-04 VITALS
WEIGHT: 177 LBS | SYSTOLIC BLOOD PRESSURE: 122 MMHG | OXYGEN SATURATION: 98 % | BODY MASS INDEX: 34.75 KG/M2 | DIASTOLIC BLOOD PRESSURE: 64 MMHG | HEART RATE: 75 BPM | HEIGHT: 60 IN

## 2021-06-04 VITALS
OXYGEN SATURATION: 98 % | SYSTOLIC BLOOD PRESSURE: 130 MMHG | HEIGHT: 61 IN | WEIGHT: 188 LBS | HEART RATE: 92 BPM | BODY MASS INDEX: 35.5 KG/M2 | DIASTOLIC BLOOD PRESSURE: 60 MMHG

## 2021-06-04 NOTE — TELEPHONE ENCOUNTER
Prescription has been set up for Dr. Wall to review per message below.    Spoke with the patient and relayed message below from Dr. Wall.  She verbalized understanding and had no further questions at this time.  Angeline GOMEZ CMA/CMT....................9:40 AM

## 2021-06-04 NOTE — TELEPHONE ENCOUNTER
Patient woke up yesterday with head cold.  Has asthma. Asthma is well controlled currently.  No crackles.    Feels warm but she slept well last night. Denies fever.    She has been told in past to call in right away for treatment as she goes downhill fast.    Patient wants something to loosen chest congestion/ pain/ and an antibiotic.     Pharmacy confirmed.    Lashawn Jimenez RN Triage and refills

## 2021-06-04 NOTE — TELEPHONE ENCOUNTER
Controlled Substance Refill Request  Medication:   Requested Prescriptions     Pending Prescriptions Disp Refills     clonazePAM (KLONOPIN) 0.5 MG tablet 90 tablet 0     Sig: TAKE 1 TABLET(0.5 MG) BY MOUTH THREE TIMES DAILY     Date Last Fill: 11/15/19  Pharmacy: [id number]   Submit electronically to pharmacy  Controlled Substance Agreement on File:   Encounter-Level CSA Scan Date:    There are no encounter-level csa scan date.       Last office visit: Last office visit pertaining to requested medication was 11/12/19.

## 2021-06-04 NOTE — TELEPHONE ENCOUNTER
Refill Approved    Rx renewed per Medication Renewal Policy. Medication was last renewed on 9/9/19.    Tracey Mitchell, [facility] Triage/Med Refill 12/9/2019     Requested Prescriptions   Pending Prescriptions Disp Refills     potassium chloride (K-DUR,KLOR-CON) 20 MEQ tablet [Pharmacy Med Name: POTASSIUM CL 20MEQ ER TABLETS] 60 tablet 0     Sig: TAKE 1 TABLET(20 MEQ) BY MOUTH TWICE DAILY       Potassium Supplements Refill Protocol Passed - 12/9/2019  8:09 AM        Passed - PCP or prescribing provider visit in past 12 months       Last office visit with prescriber/PCP: 11/12/2019 Sushil Wall MD OR same dept: 11/12/2019 Sushil Wall MD OR same specialty: 11/12/2019 Sushil Wall MD  Last physical: Visit date not found Last MTM visit: Visit date not found   Next visit within 3 mo: Visit date not found  Next physical within 3 mo: Visit date not found  Prescriber OR PCP: Sushil Wall MD  Last diagnosis associated with med order: 1. Hypokalemia  - potassium chloride (K-DUR,KLOR-CON) 20 MEQ tablet [Pharmacy Med Name: POTASSIUM CL 20MEQ ER TABLETS]; TAKE 1 TABLET(20 MEQ) BY MOUTH TWICE DAILY  Dispense: 60 tablet; Refill: 0    If protocol passes may refill for 12 months if within 3 months of last provider visit (or a total of 15 months).             Passed - Potassium level in last 12 months     Lab Results   Component Value Date    Potassium 3.2 (L) 11/12/2019

## 2021-06-05 VITALS
SYSTOLIC BLOOD PRESSURE: 104 MMHG | OXYGEN SATURATION: 97 % | DIASTOLIC BLOOD PRESSURE: 60 MMHG | HEART RATE: 81 BPM | WEIGHT: 170 LBS | BODY MASS INDEX: 33.2 KG/M2

## 2021-06-05 NOTE — TELEPHONE ENCOUNTER
Controlled Substance Refill Request  Medication Name:   Requested Prescriptions     Pending Prescriptions Disp Refills     clonazePAM (KLONOPIN) 0.5 MG tablet [Pharmacy Med Name: CLONAZEPAM 0.5MG TABLETS] 90 tablet 0     Sig: TAKE 1 TABLET(0.5 MG) BY MOUTH THREE TIMES DAILY     Date Last Fill: 12/17/19  [facility]  Submit electronically to pharmacy  Controlled Substance Agreement on file:   Encounter-Level CSA Scan Date:    There are no encounter-level csa scan date.        Last office visit:  11/12/19

## 2021-06-05 NOTE — TELEPHONE ENCOUNTER
RN cannot approve Refill Request    RN can NOT refill this medication med is not covered by policy/route to provider. Last office visit: 11/12/2019 Sushil Wall MD Last Physical: Visit date not found Last MTM visit: Visit date not found Last visit same specialty: 11/12/2019 Sushil Wall MD.  Next visit within 3 mo: Visit date not found  Next physical within 3 mo: Visit date not found      Tracey Mitchell, [facility] Triage/Med Refill 1/8/2020    Requested Prescriptions   Pending Prescriptions Disp Refills     SYMBICORT 160-4.5 mcg/actuation inhaler [Pharmacy Med Name: SYMBICORT 160/4.5MCG (120 ORAL INH)] 1 Inhaler 6     Sig: INHALE 2 PUFFS BY MOUTH TWICE DAILY       There is no refill protocol information for this order

## 2021-06-06 NOTE — TELEPHONE ENCOUNTER
Patient Returning Call  Reason for call:  Patient returning call to the clinic.  Information relayed to patient:  Yes as instructed and patient agrees with plan.  Patient has additional questions:  yes  If YES, what are your questions/concerns:    Patient agreed to schedule the lab in 6 weeks and stated potassium was needed also.  Please enter future labs.   Okay to leave a detailed message?: Yes

## 2021-06-06 NOTE — TELEPHONE ENCOUNTER
Controlled Substance Refill Request  Medication Name:   Requested Prescriptions     Pending Prescriptions Disp Refills     clonazePAM (KLONOPIN) 0.5 MG tablet [Pharmacy Med Name: CLONAZEPAM 0.5MG TABLETS] 90 tablet 0     Sig: TAKE 1 TABLET(0.5 MG) BY MOUTH THREE TIMES DAILY     Date Last Fill: 1/16/20  [facility]  Submit electronically to pharmacy  Controlled Substance Agreement on file:   Encounter-Level CSA Scan Date:    There are no encounter-level csa scan date.        Last office visit:  11/12/19

## 2021-06-06 NOTE — TELEPHONE ENCOUNTER
Refill Approved    Rx renewed per Medication Renewal Policy. Medication was last renewed on 5/8/19.    Sona Culver, [facility] Triage/Med Refill 3/12/2020     Requested Prescriptions   Pending Prescriptions Disp Refills     FLUoxetine (PROZAC) 20 MG capsule [Pharmacy Med Name: FLUOXETINE 20MG CAPSULES] 180 capsule 2     Sig: TAKE ONE CAPSULE TWICE DAILY       [facility] Refill Protocol  Passed - 3/9/2020  3:17 PM        Passed - PCP or prescribing provider visit in last year     Last office visit with prescriber/PCP: 11/12/2019 Sushil Wall MD OR same dept: 11/12/2019 Sushil Wall MD OR same specialty: 11/12/2019 Sushil Wall MD  Last physical: Visit date not found Last MTM visit: Visit date not found   Next visit within 3 mo: Visit date not found  Next physical within 3 mo: 3/11/2020 Sushil Wall MD  Prescriber OR PCP: Sushil Wall MD  Last diagnosis associated with med order: 1. Anxiety  - FLUoxetine (PROZAC) 20 MG capsule [Pharmacy Med Name: FLUOXETINE 20MG CAPSULES]; TAKE ONE CAPSULE TWICE DAILY  Dispense: 180 capsule; Refill: 2    2. Asthma  - SYMBICORT 160-4.5 mcg/actuation inhaler [Pharmacy Med Name: SYMBICORT 160/4.5MCG (120 ORAL INH)]; INHALE 2 PUFFS BY MOUTH TWICE DAILY  Dispense: 1 Inhaler; Refill: 0    If protocol passes may refill for 12 months if within 3 months of last provider visit (or a total of 15 months).                SYMBICORT 160-4.5 mcg/actuation inhaler [Pharmacy Med Name: SYMBICORT 160/4.5MCG (120 ORAL INH)] 1 Inhaler 0     Sig: INHALE 2 PUFFS BY MOUTH TWICE DAILY       There is no refill protocol information for this order

## 2021-06-06 NOTE — TELEPHONE ENCOUNTER
RN cannot approve Refill Request    RN can NOT refill this medication med is not covered by policy/route to provider.       Sona uClver, [facility] Triage/Med Refill 3/12/2020    Requested Prescriptions   Pending Prescriptions Disp Refills     SYMBICORT 160-4.5 mcg/actuation inhaler [Pharmacy Med Name: SYMBICORT 160/4.5MCG (120 ORAL INH)] 1 Inhaler 0     Sig: INHALE 2 PUFFS BY MOUTH TWICE DAILY       There is no refill protocol information for this order      Signed Prescriptions Disp Refills    FLUoxetine (PROZAC) 20 MG capsule 180 capsule 2     Sig: TAKE ONE CAPSULE TWICE DAILY       [facility] Refill Protocol  Passed - 3/9/2020  3:17 PM        Passed - PCP or prescribing provider visit in last year     Last office visit with prescriber/PCP: 11/12/2019 Sushil Wall MD OR same dept: 11/12/2019 Sushil Wall MD OR same specialty: 11/12/2019 Sushil Wall MD  Last physical: Visit date not found Last MTM visit: Visit date not found   Next visit within 3 mo: Visit date not found  Next physical within 3 mo: 3/11/2020 Sushil Wall MD  Prescriber OR PCP: Sushil Wall MD  Last diagnosis associated with med order: 1. Anxiety  - FLUoxetine (PROZAC) 20 MG capsule; TAKE ONE CAPSULE TWICE DAILY  Dispense: 180 capsule; Refill: 2    2. Asthma  - SYMBICORT 160-4.5 mcg/actuation inhaler [Pharmacy Med Name: SYMBICORT 160/4.5MCG (120 ORAL INH)]; INHALE 2 PUFFS BY MOUTH TWICE DAILY  Dispense: 1 Inhaler; Refill: 0    If protocol passes may refill for 12 months if within 3 months of last provider visit (or a total of 15 months).

## 2021-06-06 NOTE — PROGRESS NOTES
Office Visit - Physical Exam   Tootie Marin   64 y.o. female    Date of Visit: 3/11/2020    Chief Complaint   Patient presents with     Annual Exam     declines pap, fasting, wants to do cologaurd        Assessment and Plan   1. Routine general medical examination at a health care facility  Advised her comprehensive physical exam is normal except for her weight. Based on her BMI she falls into moderate obesity.    2. Essential hypertension  Controlled.  Continue hydrochlorothiazide.  Takes potassium supplement to prevent hypokalemia from this diuretic.  Check the following.  - HM2(CBC w/o Differential)  - Urinalysis-UC if Indicated  - Basic Metabolic Panel  - Vitamin D, Total (25-Hydroxy)    3. Hypercholesterolemia  Controlled.  Last lipids were good, , HDL 71, triglycerides 68 and total cholesterol 217 on 5/21/2019.  Continue lovastatin.  Check fasting lipids, TSH and liver function.  - Lipid Cascade  - Thyroid Stimulating Hormone (TSH)  - Hepatic Profile    4. Moderate persistent asthma without complication  Now in remission.  ACT score is normal at 21.  No flare or recurrence.  Continue as needed albuterol and maintenance Symbicort inhalers.    5. Major depressive disorder with single episode, in full remission (H)  In remission.  Followed by psychiatry.  Takes fluoxetine and clonazepam.    6. Gastroesophageal reflux disease without esophagitis  No GERD symptoms.  Continues to take omeprazole.  Gets GERD symptoms when she stops this medication.    7. Sensorineural hearing loss (SNHL) of both ears  Now hard of hearing.  Suspect she has mixed hearing loss.  Will refer for audiogram.  Will benefit from hearing aids.  - Ambulatory referral to Audiology    8. Blurred vision  Has blurred vision and floaters.  Wears  eyeglasses but this is not helping much anymore.  Will refer to ophthalmology.  - Ambulatory referral to Ophthalmology    9. Screen for colon cancer  Due for preventive colon cancer screening.   Agrees to do Cologuard.  - Cologuard    The following high BMI interventions were performed this visit: encouragement to exercise and weight monitoring      Follow up in 4 months.     History of Present Illness   This 64 y.o. old female is here for comprehensive physical exam.  Has hypertension controlled by just hydrochlorothiazide.  Takes potassium supplement because of hyperkalemia.  Has hyperlipidemia, controlled by  lovastatin.  Last lipids were good.  Has asthma but quiescent.  Denies shortness of breath or wheezing.  Has major depression but in remission with fluoxetine and clonazepam.  Has GERD.  Takes omeprazole.  Gets GERD symptoms when he stops omeprazole.  Complains of hearing loss in both ears.  Having difficulty now hearing well.  Also has blurred vision with floaters.  Needs hearing test and eye exam.  Overall, she is feeling and doing well.  Obese based on her BMI.  Denies chest pains and shortness of breath.  Denies urinary and bowel symptoms.  Moves her bowels once or twice a week which is normal for her.  Sleeps during the day and mostly awake at night which is normal for her.  Does not exercise.    Review of Systems   A 12 point comprehensive review of systems was negative except as noted..     Medications, Allergies and Problem List   Reviewed and updated             Chief Complaint   Annual Exam (declines pap, fasting, wants to do cologaurd)       Patient Profile   Social History     Social History Narrative    Single. No children. Worked odd jobs. Work related disability since 1999. Nonsmoker. Non alcohol drinker.        Past Medical History   Patient Active Problem List   Diagnosis     Hypercholesterolemia     Anxiety     Panic Disorder Without Agoraphobia     Ptosis Of Eyelid     Lipoma Of The Adipose Tissue     Menopause     Asthma     Major depression     Essential hypertension     Gastroesophageal reflux disease without esophagitis       Past Surgical History  She has a past surgical  history that includes Breast biopsy (Right, 1987).       Medications and Allergies   Current Outpatient Medications   Medication Sig     albuterol (PROAIR HFA;PROVENTIL HFA;VENTOLIN HFA) 90 mcg/actuation inhaler INHALE 2 PUFFS BY MOUTH EVERY 6 HOURS AS NEEDED FOR WHEEZING OR SHORTNESS OF BREATH     clonazePAM (KLONOPIN) 0.5 MG tablet TAKE 1 TABLET(0.5 MG) BY MOUTH THREE TIMES DAILY     clotrimazole-betamethasone (LOTRISONE) cream APPLY TO THE AFFECTED AREA TWICE DAILY     FLUoxetine (PROZAC) 20 MG capsule TAKE ONE CAPSULE TWICE DAILY     hydroCHLOROthiazide (HYDRODIURIL) 25 MG tablet Take 1 tablet (25 mg total) by mouth daily.     lovastatin (MEVACOR) 20 MG tablet TAKE 1 TABLET BY MOUTH EVERY NIGHT AT BEDTIME     omeprazole (PRILOSEC) 20 MG capsule Take 1 capsule (20 mg total) by mouth daily.     SYMBICORT 160-4.5 mcg/actuation inhaler INHALE 2 PUFFS BY MOUTH TWICE DAILY     VENTOLIN HFA 90 mcg/actuation inhaler INHALE 2 PUFFS BY MOUTH EVERY 6 HOURS AS NEEDED     potassium chloride (K-DUR,KLOR-CON) 20 MEQ tablet Take 1 tablet (20 mEq total) by mouth daily.     Allergies   Allergen Reactions     Aspirin (Tartrazine Only)      Chicken      [place]         Family and Social History   Family History   Problem Relation Age of Onset     Heart disease Mother      Cancer Father      Cancer Brother         Social History     Tobacco Use     Smoking status: Never Smoker     Smokeless tobacco: Never Used   Substance Use Topics     Alcohol use: No     Drug use: No        Physical Exam       Physical Exam  /64   Pulse 75   Ht 5' (1.524 m)   Wt 177 lb (80.3 kg)   SpO2 98%   BMI 34.57 kg/m      General Appearance:    Alert, cooperative, no distress, appears stated age   Head:    Normocephalic, without obvious abnormality, atraumatic   Eyes:    PERRL, conjunctiva/corneas clear, EOM's intact, fundi     benign, both eyes   Ears:    Normal TM's and external ear canals, both ears   Nose:   Nares normal, septum midline, mucosa  normal, no drainage    or sinus tenderness   Throat:   Lips, mucosa, and tongue normal; teeth and gums normal   Neck:   Supple, symmetrical, trachea midline, no adenopathy;     thyroid:  no enlargement/tenderness/nodules; no carotid    bruit or JVD   Back:     Symmetric, no curvature, ROM normal, no CVA tenderness   Lungs:     Clear to auscultation bilaterally, respirations unlabored   Chest Wall:    No tenderness or deformity    Heart:    Regular rate and rhythm, S1 and S2 normal, no murmur, rub   or gallop   Breast Exam:    Pendulous, no tenderness, masses, or nipple abnormality,   Abdomen:     Soft, non-tender, bowel sounds active all four quadrants,     no masses, no organomegaly   Genitalia:    Deferred   Rectal:    Deferred     Extremities:   Extremities normal, atraumatic, no cyanosis or edema   Pulses:   2+ and symmetric all extremities   Skin:   Skin color, texture, turgor normal, no rashes or lesions   Lymph nodes:   Cervical, supraclavicular, and axillary nodes normal   Neurologic:   CNII-XII intact, normal strength, sensation and reflexes     throughout        Additional Information   Post Discharge Medication Reconciliation Status: discharge medications reconciled, continue medications without change      Sushil Wall MD  Internal Medicine  Contact me at [phone]     Additional Information   Current Outpatient Medications   Medication Sig     albuterol (PROAIR HFA;PROVENTIL HFA;VENTOLIN HFA) 90 mcg/actuation inhaler INHALE 2 PUFFS BY MOUTH EVERY 6 HOURS AS NEEDED FOR WHEEZING OR SHORTNESS OF BREATH     clonazePAM (KLONOPIN) 0.5 MG tablet TAKE 1 TABLET(0.5 MG) BY MOUTH THREE TIMES DAILY     clotrimazole-betamethasone (LOTRISONE) cream APPLY TO THE AFFECTED AREA TWICE DAILY     FLUoxetine (PROZAC) 20 MG capsule TAKE ONE CAPSULE TWICE DAILY     hydroCHLOROthiazide (HYDRODIURIL) 25 MG tablet Take 1 tablet (25 mg total) by mouth daily.     lovastatin (MEVACOR) 20 MG tablet TAKE 1 TABLET BY MOUTH  EVERY NIGHT AT BEDTIME     omeprazole (PRILOSEC) 20 MG capsule Take 1 capsule (20 mg total) by mouth daily.     SYMBICORT 160-4.5 mcg/actuation inhaler INHALE 2 PUFFS BY MOUTH TWICE DAILY     VENTOLIN HFA 90 mcg/actuation inhaler INHALE 2 PUFFS BY MOUTH EVERY 6 HOURS AS NEEDED     potassium chloride (K-DUR,KLOR-CON) 20 MEQ tablet Take 1 tablet (20 mEq total) by mouth daily.     Allergies   Allergen Reactions     Aspirin (Tartrazine Only)      Chicken      [place]      Social History     Tobacco Use     Smoking status: Never Smoker     Smokeless tobacco: Never Used   Substance Use Topics     Alcohol use: No     Drug use: No

## 2021-06-07 NOTE — TELEPHONE ENCOUNTER
Controlled Substance Refill Request  Medication Name:   Requested Prescriptions     Pending Prescriptions Disp Refills     clonazePAM (KLONOPIN) 0.5 MG tablet [Pharmacy Med Name: CLONAZEPAM 0.5MG TABLETS] 90 tablet 0     Sig: TAKE 1 TABLET(0.5 MG) BY MOUTH THREE TIMES DAILY     Date Last Fill: 2/20/20  [facility]  Submit electronically to pharmacy  Controlled Substance Agreement on file:   Encounter-Level CSA Scan Date:    There are no encounter-level csa scan date.        Last office visit:  3/11/20

## 2021-06-07 NOTE — TELEPHONE ENCOUNTER
Prior Authorization Request  Who s requesting:  Pharmacy  [facility] Name and Location[facility]  Medication Name: SYMBICORT 160-4.5 mcg/actuation inhaler   Insurance Plan:   Insurance Member ID Number:    CoverMyMeds Key: [id number]      Keely Degroot LPN

## 2021-06-07 NOTE — TELEPHONE ENCOUNTER
I called and left a voicemail for Tootie to let her know that her hearing test appointment that was scheduled on 4/9/20 is cancelled due to COVID19. I told her that she will be contacted to reschedule the appointment when the clinic re-opens. I told her to contact our clinic if she has questions.    Melanie Ortiz., [facility]  [place] Licensed Audiologist #[id number]

## 2021-06-07 NOTE — TELEPHONE ENCOUNTER
Central PA team  [phone]  Pool: HE PA MED ([id number])          PA has been initiated.       PA form completed and faxed insurance via Cover My Meds     [place]:   Tootie Marin (Key: [id number]) - [id number]     Medication:  Budesonide-Formoterol Fumarate 160-4.5MCG/ACT aerosol    Insurance:  [place] MEDICAID        Response will be received via fax and may take up to 5-10 business days depending on plan

## 2021-06-07 NOTE — TELEPHONE ENCOUNTER
RN cannot approve Refill Request    RN can NOT refill this medication med is not covered by policy/route to provider. Last office visit: Visit date not found Last Physical: Visit date not found Last MTM visit: Visit date not found Last visit same specialty: 11/12/2019 Sushil Wall MD.  Next visit within 3 mo: Visit date not found  Next physical within 3 mo: Visit date not found      Katie Hinds, [facility] Triage/Med Refill 4/8/2020    Requested Prescriptions   Pending Prescriptions Disp Refills     SYMBICORT 160-4.5 mcg/actuation inhaler [Pharmacy Med Name: SYMBICORT 160/4.5MCG (120 ORAL INH)] 1 Inhaler 0     Sig: INHALE 2 PUFFS BY MOUTH TWICE DAILY       There is no refill protocol information for this order

## 2021-06-07 NOTE — TELEPHONE ENCOUNTER
Refill Approved    Rx renewed per Medication Renewal Policy. Medication was last renewed on 3/16/19.    Sona Culver, [facility] Triage/Med Refill 4/9/2020     Requested Prescriptions   Pending Prescriptions Disp Refills     lovastatin (MEVACOR) 20 MG tablet [Pharmacy Med Name: LOVASTATIN 20MG TABLETS] 90 tablet 3     Sig: TAKE 1 TABLET BY MOUTH EVERY NIGHT AT BEDTIME       Statins Refill Protocol (Hmg CoA Reductase Inhibitors) Passed - 4/8/2020  9:18 AM        Passed - PCP or prescribing provider visit in past 12 months      Last office visit with prescriber/PCP: 11/12/2019 Sushil Wall MD OR same dept: 11/12/2019 Sushil Wall MD OR same specialty: 11/12/2019 Sushil Wall MD  Last physical: 3/11/2020 Last MTM visit: Visit date not found   Next visit within 3 mo: Visit date not found  Next physical within 3 mo: Visit date not found  Prescriber OR PCP: Sushil Wall MD  Last diagnosis associated with med order: 1. Hypercholesterolemia  - lovastatin (MEVACOR) 20 MG tablet [Pharmacy Med Name: LOVASTATIN 20MG TABLETS]; TAKE 1 TABLET BY MOUTH EVERY NIGHT AT BEDTIME  Dispense: 90 tablet; Refill: 3    If protocol passes may refill for 12 months if within 3 months of last provider visit (or a total of 15 months).

## 2021-06-07 NOTE — TELEPHONE ENCOUNTER
Controlled Substance Refill Request  Medication Name:   Requested Prescriptions     Pending Prescriptions Disp Refills     clonazePAM (KLONOPIN) 0.5 MG tablet [Pharmacy Med Name: CLONAZEPAM 0.5MG TABLETS] 90 tablet 0     Sig: TAKE 1 TABLET(0.5 MG) BY MOUTH THREE TIMES DAILY     Date Last Fill: 3/19/20  [facility]  Submit electronically to pharmacy  Controlled Substance Agreement on file:   Encounter-Level CSA Scan Date:    There are no encounter-level csa scan date.        Last office visit:  3/11/20

## 2021-06-07 NOTE — TELEPHONE ENCOUNTER
Prescription Monitoring Program activity reviewed with no discrepancies noted.      Last fill per : 2/20  Quantity/days supply: 90/30    Controlled Substance Agreement on file: Yes  Date:     Last office visit with provider 3/11/20    Please advise.  Yelena Walker CMA (AAMA)

## 2021-06-08 ENCOUNTER — RECORDS - HEALTHEAST (OUTPATIENT)
Dept: ADMINISTRATIVE | Facility: OTHER | Age: 66
End: 2021-06-08

## 2021-06-08 ENCOUNTER — COMMUNICATION - HEALTHEAST (OUTPATIENT)
Dept: INTERNAL MEDICINE | Facility: CLINIC | Age: 66
End: 2021-06-08

## 2021-06-08 DIAGNOSIS — J45.909 ASTHMA: ICD-10-CM

## 2021-06-08 DIAGNOSIS — R05.3 CHRONIC COUGH: ICD-10-CM

## 2021-06-08 RX ORDER — BUDESONIDE AND FORMOTEROL FUMARATE DIHYDRATE 160; 4.5 UG/1; UG/1
AEROSOL RESPIRATORY (INHALATION)
Qty: 1 INHALER | Refills: 2 | Status: SHIPPED | OUTPATIENT
Start: 2021-06-08 | End: 2021-12-09

## 2021-06-08 NOTE — TELEPHONE ENCOUNTER
Controlled Substance Refill Request  Medication Name:   Requested Prescriptions     Pending Prescriptions Disp Refills     clonazePAM (KLONOPIN) 0.5 MG tablet [Pharmacy Med Name: CLONAZEPAM 0.5MG TABLETS] 90 tablet 0     Sig: TAKE 1 TABLET(0.5 MG) BY MOUTH THREE TIMES DAILY     Date Last Fill: 4/21/20  [facility]  Submit electronically to pharmacy  Controlled Substance Agreement on file:   Encounter-Level CSA Scan Date:    There are no encounter-level csa scan date.        Last office visit:  3/11/20

## 2021-06-08 NOTE — TELEPHONE ENCOUNTER
RN cannot approve Refill Request    RN can NOT refill this medication med is not covered by policy/route to provider     . Last office visit: 11/12/2019 Sushil Wall MD Last Physical: 3/11/2020 Last MTM visit: Visit date not found Last visit same specialty: 11/12/2019 Sushil Wall MD.  Next visit within 3 mo: Visit date not found  Next physical within 3 mo: Visit date not found      Sona Culver, [facility] Triage/Med Refill 5/8/2020    Requested Prescriptions   Pending Prescriptions Disp Refills     SYMBICORT 160-4.5 mcg/actuation inhaler [Pharmacy Med Name: SYMBICORT 160/4.5MCG (120 ORAL INH)] 1 Inhaler 11     Sig: INHALE 2 PUFFS BY MOUTH TWICE DAILY       There is no refill protocol information for this order

## 2021-06-09 ENCOUNTER — COMMUNICATION - HEALTHEAST (OUTPATIENT)
Dept: INTERNAL MEDICINE | Facility: CLINIC | Age: 66
End: 2021-06-09

## 2021-06-09 DIAGNOSIS — F41.1 ANXIETY STATE: ICD-10-CM

## 2021-06-09 RX ORDER — CLONAZEPAM 0.5 MG/1
TABLET ORAL
Qty: 90 TABLET | Refills: 0 | Status: SHIPPED | OUTPATIENT
Start: 2021-06-09 | End: 2021-07-20

## 2021-06-09 NOTE — TELEPHONE ENCOUNTER
Controlled Substance Refill Request  Medication Name:   Requested Prescriptions     Pending Prescriptions Disp Refills     clonazePAM (KLONOPIN) 0.5 MG tablet [Pharmacy Med Name: CLONAZEPAM 0.5MG TABLETS] 90 tablet 0     Sig: TAKE 1 TABLET BY MOUTH THREE TIMES DAILY     Date Last Fill: 5/21/2020  Is patient out of medication?: N/A - electronic request  Patient notified refills processed within 3 business days: N/A - electronic request  [facility]  Submit electronically to pharmacy  Controlled Substance Agreement on file:   Encounter-Level CSA Scan Date:    There are no encounter-level csa scan date.        Last office visit:  3/11/2020

## 2021-06-10 NOTE — TELEPHONE ENCOUNTER
COPD ROS: taking medications as instructed, no medication side effects noted, no significant ongoing wheezing or shortness of breath, using bronchodilator MDI less than twice a week.   New concerns: Productive cough, sore throat. .   Exam: appears well, vitals normal, no respiratory distress, acyanotic, normal RR.   Assessment:  COPD stable.   Spirometry reviewed today, x-ray reviewed today  Plan:SYMBICORT, DUONEB, ALBUTEROL. Current treatment plan is effective, no change in therapy.   Controlled Substance Refill Request  Medication Name:   Requested Prescriptions     Pending Prescriptions Disp Refills     clonazePAM (KLONOPIN) 0.5 MG tablet [Pharmacy Med Name: CLONAZEPAM 0.5MG TABLETS] 90 tablet 0     Sig: TAKE 1 TABLET BY MOUTH THREE TIMES DAILY     Date Last Fill: 7/10/20  [facility]  Submit electronically to pharmacy  Controlled Substance Agreement on file:   Encounter-Level CSA Scan Date:    There are no encounter-level csa scan date.        Last office visit:  3/11/20

## 2021-06-10 NOTE — TELEPHONE ENCOUNTER
Prescription Monitoring Program activity reviewed with no discrepancies noted.      Last fill per : 7/10  Quantity/days supply: 90/30    Controlled Substance Agreement on file: No  Date:     Last office visit with provider:  Visit date 3/11/20 found    Please advise.  Yelena Walker CMA (AAMA)

## 2021-06-10 NOTE — TELEPHONE ENCOUNTER
Patient states last refill she got is for only 15 tables patient is scheduled appointment with PCP on 09/08/20 requesting refill until she see the provider .   Controlled Substance Refill Request  Medication Name:   Requested Prescriptions     Pending Prescriptions Disp Refills     clonazePAM (KLONOPIN) 0.5 MG tablet 15 tablet 0     Sig: Take 1 tablet (0.5 mg total) by mouth 3 (three) times a day.   Date Last Fill: 08/07/20  Is patient out of medication?: No, 1 days left  Patient notified refills processed within 3 business days:  Yes  [facility]  Submit electronically to pharmacy  Controlled Substance Agreement on file:   Encounter-Level CSA Scan Date:    There are no encounter-level csa scan date.        Last office visit:  03/11/20

## 2021-06-10 NOTE — TELEPHONE ENCOUNTER
Refill Approved    Rx renewed per Medication Renewal Policy. Medication was last renewed on 10/4/19.    Sona Culver, [facility] Triage/Med Refill 8/7/2020     Requested Prescriptions   Pending Prescriptions Disp Refills     albuterol (PROAIR HFA;PROVENTIL HFA;VENTOLIN HFA) 90 mcg/actuation inhaler [Pharmacy Med Name: ALBUTEROL HFA INH (200 PUFFS) 18GM] 18 g 3     Sig: INHALE 2 PUFFS BY MOUTH EVERY 6 HOURS AS NEEDED FOR WHEEZING OR SHORTNESS OF BREATH       Albuterol/Levalbuterol Refill Protocol Passed - 8/7/2020  7:51 AM        Passed - PCP or prescribing provider visit in last year     Last office visit with prescriber/PCP: 11/12/2019 Sushil Wall MD OR same dept: 11/12/2019 Sushil Wall MD OR same specialty: 11/12/2019 Sushil Wall MD Last physical: 3/11/2020       Next appt within 3 mo: Visit date not found  Next physical within 3 mo: Visit date not found  Prescriber OR PCP: Sushil Wall MD  Last diagnosis associated with med order: 1. Chronic cough  - albuterol (PROAIR HFA;PROVENTIL HFA;VENTOLIN HFA) 90 mcg/actuation inhaler [Pharmacy Med Name: ALBUTEROL HFA INH (200 PUFFS) 18GM]; INHALE 2 PUFFS BY MOUTH EVERY 6 HOURS AS NEEDED FOR WHEEZING OR SHORTNESS OF BREATH  Dispense: 18 g; Refill: 3    If protocol passes may refill for 6 months if within 3 months of last provider visit (or a total of 9 months). If patient requesting >1 inhaler per month refill x 6 months and have patient make appointment with provider.

## 2021-06-11 NOTE — TELEPHONE ENCOUNTER
Controlled Substance Refill Request  Medication Name:   Requested Prescriptions     Pending Prescriptions Disp Refills     clonazePAM (KLONOPIN) 0.5 MG tablet [Pharmacy Med Name: CLONAZEPAM 0.5MG TABLETS] 75 tablet 0     Sig: TAKE 1 TABLET(0.5 MG) BY MOUTH THREE TIMES DAILY     Date Last Fill: 8/11/20  [facility]  Submit electronically to pharmacy  Controlled Substance Agreement on file:   Encounter-Level CSA Scan Date:    There are no encounter-level csa scan date.        Last office visit:  3/11/20    .

## 2021-06-11 NOTE — TELEPHONE ENCOUNTER
Prescription Monitoring Program activity reviewed with no discrepancies noted.      Last fill per : see below  Quantity/days supply: see below    Controlled Substance Agreement on file: No  Date:     Last office visit with provider:  11/12/2019 Sushil Wall MD    Please advise.      08/11/2020 1 08/11/2020 Clonazepam 0.5 Mg Tablet  75.00 25 Al Bel [id number] [id number] 0/0 3.00 [place]  08/07/2020 1 08/07/2020 Clonazepam 0.5 Mg Tablet  15.00 5 Th Cat [id number] [id number] 0/0 3.00 [place]  07/10/2020 1 07/10/2020 Clonazepam 0.5 Mg Tablet  90.00 30 Mi Jean Walker CMA ([facility])

## 2021-06-11 NOTE — TELEPHONE ENCOUNTER
Patient notified of below message. She states that she has no insurance currently and is hoping to have insurance by next month. I made it clear that this is the last prescription until she is seen. Patient understands. Prescription set up and sent to PCP to sign and send.    Anali Camargo, [facility]

## 2021-06-11 NOTE — PROGRESS NOTES
Office Visit - Follow Up   Tootie Marin   61 y.o. female    Date of Visit: 7/18/2017    Chief Complaint   Patient presents with     Follow-up     2 weeks,pt is fasting        Assessment and Plan   1. Cough  Was seen almost 2 weeks ago for her cough.  Was treated with amoxicillin.  Now her cough is almost gone.  Check CBC.  - HM2(CBC w/o Differential)    2. Asthma  Has asthma.  Because of her URI symptoms this was exacerbated.  Did her ACT and she scored 19 but still not ideal but this will get better once she is back to her baseline.    3. Hypercholesterolemia  Controlled on her last lipids checked.  But lipids have not been checked recently.  Continue lovastatin.  Check the following.  - Lipid Cascade  - Thyroid Stimulating Hormone (TSH)  - Basic Metabolic Panel  - Hepatic Profile    4. Major depression  Has major depression controlled by fluoxetine.  Also takes clonazepam at night.    5. Preventive measure  Due for mammography and colonoscopy.  Will schedule these before she leaves the clinic.  - Mammo Screening Bilateral; Future  - Ambulatory referral for Colonoscopy    Follow up in 4 months.       History of Present Illness   This 61 y.o. old female is here for follow-up.  Had URI symptoms of cough and some shortness of breath about 2 weeks ago.  Was seen and treated with amoxicillin which she already finished due to mild asthma exacerbation..  Her cough is almost gone.  Does not have shortness of breath.  Uses Ventolin inhaler as rescue and Symbicort as maintenance.  Has depression now controlled by fluoxetine.  Has hyperlipidemia.  Takes well lovastatin.  Last lipids were good.  Due for fasting labs today.    Review of Systems   A 12 point comprehensive review of systems was negative except as noted..     Medications, Allergies and Problem List   Reviewed and updated             Chief Complaint   Follow-up (2 weeks,pt is fasting)       Patient Profile   Social History     Social History Narrative         Past Medical History   Patient Active Problem List   Diagnosis     Hypercholesterolemia     Anxiety     Panic Disorder Without Agoraphobia     Ptosis Of Eyelid     Lipoma Of The Adipose Tissue     Menopause     Asthma       Past Surgical History  She has no past surgical history on file.       Medications and Allergies   Current Outpatient Prescriptions   Medication Sig     acetaminophen (TYLENOL) 500 MG tablet Take 2 tablets (1,000 mg total) by mouth every 6 (six) hours as needed.     budesonide-formoterol (SYMBICORT) 160-4.5 mcg/actuation inhaler INHALE 2 PUFFS BY MOUTH TWICE DAILY     clonazePAM (KLONOPIN) 0.5 MG tablet TAKE ONE TABLET THREE TIMES DAILY     clotrimazole-betamethasone (LOTRISONE) cream APPLY TO THE AFFECTED AREA TWICE DAILY     FLUoxetine (PROZAC) 20 MG capsule TAKE 1 CAPSULE BY MOUTH TWICE DAILY     ibuprofen (ADVIL,MOTRIN) 800 MG tablet TAKE 1 TABLET BY MOUTH EVERY 8 HOURS AS NEEDED FOR PAIN     lovastatin (MEVACOR) 20 MG tablet TAKE ONE TABLET AT BEDTIME     VENTOLIN HFA 90 mcg/actuation inhaler INHALE 2 PUFFS BY MOUTH EVERY 6 HOURS AS NEEDED     ascorbic acid (ASCORBIC ACID WITH JIMENA HIPS) 500 MG tablet Take 500 mg by mouth daily.     Ca-D3-mag-zinc--deanna-boron 600 mg calcium- 800 unit-40 mg Chew Chew.     mv-min-folic acid-lutein 400-250 mcg Chew Chew.     omeprazole (PRILOSEC) 20 MG capsule Take 1 capsule by mouth daily.     omeprazole (PRILOSEC) 20 MG capsule TAKE ONE CAPSULE DAILY     Allergies   Allergen Reactions     Aspirin (Tartrazine Only)      Chicken         Family and Social History   No family history on file.     Social History   Substance Use Topics     Smoking status: Never Smoker     Smokeless tobacco: Never Used     Alcohol use No        Physical Exam       Physical Exam  /80  Pulse 68  Wt 170 lb 4 oz (77.2 kg)  BMI 32.17 kg/m2  General appearance: alert, appears stated age, cooperative and no distress  Head: Normocephalic, without obvious abnormality,  atraumatic  Nose: Nares normal. Septum midline. Mucosa normal. No drainage or sinus tenderness.  Throat: lips, mucosa, and tongue normal; teeth and gums normal  Neck: no adenopathy, no carotid bruit, no JVD, supple, symmetrical, trachea midline and thyroid not enlarged, symmetric, no tenderness/mass/nodules  Lungs: clear to auscultation bilaterally  Heart: regular rate and rhythm, S1, S2 normal, no murmur, click, rub or gallop  Abdomen: soft, non-tender; bowel sounds normal; no masses,  no organomegaly  Extremities: extremities normal, atraumatic, no cyanosis or edema  Skin: Skin color, texture, turgor normal. No rashes or lesions     Additional Information        Sushil Wall MD  Internal Medicine  Contact me at [phone]     Additional Information   Current Outpatient Prescriptions   Medication Sig     acetaminophen (TYLENOL) 500 MG tablet Take 2 tablets (1,000 mg total) by mouth every 6 (six) hours as needed.     budesonide-formoterol (SYMBICORT) 160-4.5 mcg/actuation inhaler INHALE 2 PUFFS BY MOUTH TWICE DAILY     clonazePAM (KLONOPIN) 0.5 MG tablet TAKE ONE TABLET THREE TIMES DAILY     clotrimazole-betamethasone (LOTRISONE) cream APPLY TO THE AFFECTED AREA TWICE DAILY     FLUoxetine (PROZAC) 20 MG capsule TAKE 1 CAPSULE BY MOUTH TWICE DAILY     ibuprofen (ADVIL,MOTRIN) 800 MG tablet TAKE 1 TABLET BY MOUTH EVERY 8 HOURS AS NEEDED FOR PAIN     lovastatin (MEVACOR) 20 MG tablet TAKE ONE TABLET AT BEDTIME     VENTOLIN HFA 90 mcg/actuation inhaler INHALE 2 PUFFS BY MOUTH EVERY 6 HOURS AS NEEDED     ascorbic acid (ASCORBIC ACID WITH JIMENA HIPS) 500 MG tablet Take 500 mg by mouth daily.     Ca-D3-mag-zinc--deanna-boron 600 mg calcium- 800 unit-40 mg Chew Chew.     mv-min-folic acid-lutein 400-250 mcg Chew Chew.     omeprazole (PRILOSEC) 20 MG capsule Take 1 capsule by mouth daily.     omeprazole (PRILOSEC) 20 MG capsule TAKE ONE CAPSULE DAILY     Allergies   Allergen Reactions     Aspirin (Tartrazine Only)       Chicken      Social History   Substance Use Topics     Smoking status: Never Smoker     Smokeless tobacco: Never Used     Alcohol use No         Time: total time spent with the patient was 25 minutes of which >50% was spent in counseling and coordination of care

## 2021-06-11 NOTE — TELEPHONE ENCOUNTER
Refill Approved    Rx renewed per Medication Renewal Policy. Medication was last renewed on 9/6/19.    Sona Culver, [facility] Triage/Med Refill 9/7/2020     Requested Prescriptions   Pending Prescriptions Disp Refills     hydroCHLOROthiazide (HYDRODIURIL) 25 MG tablet [Pharmacy Med Name: HYDROCHLOROTHIAZIDE 25MG TABLETS] 90 tablet 3     Sig: TAKE ONE TABLET DAILY       Diuretics/Combination Diuretics Refill Protocol  Passed - 9/4/2020  5:09 PM        Passed - Visit with PCP or prescribing provider visit in past 12 months     Last office visit with prescriber/PCP: 11/12/2019 Sushil Wall MD OR same dept: 11/12/2019 Sushil Wall MD OR same specialty: 11/12/2019 Sushil Wall MD  Last physical: 3/11/2020 Last MTM visit: Visit date not found   Next visit within 3 mo: Visit date not found  Next physical within 3 mo: Visit date not found  Prescriber OR PCP: Sushil Wall MD  Last diagnosis associated with med order: 1. Essential hypertension  - hydroCHLOROthiazide (HYDRODIURIL) 25 MG tablet [Pharmacy Med Name: HYDROCHLOROTHIAZIDE 25MG TABLETS]; TAKE ONE TABLET DAILY  Dispense: 90 tablet; Refill: 3    If protocol passes may refill for 12 months if within 3 months of last provider visit (or a total of 15 months).             Passed - Serum Potassium in past 12 months      Lab Results   Component Value Date    Potassium 3.3 (L) 03/11/2020             Passed - Serum Sodium in past 12 months      Lab Results   Component Value Date    Sodium 138 03/11/2020             Passed - Blood pressure on file in past 12 months     BP Readings from Last 1 Encounters:   03/11/20 122/64             Passed - Serum Creatinine in past 12 months      Creatinine   Date Value Ref Range Status   03/11/2020 0.87 0.60 - 1.10 mg/dL Final

## 2021-06-11 NOTE — TELEPHONE ENCOUNTER
Is there a reason for the change in qty? Or ok to update script for you to sign? Thank you.    Anali Camargo, CMA

## 2021-06-11 NOTE — PROGRESS NOTES
Office Visit - Follow Up   Tootie Marin   61 y.o. female    Date of Visit: 7/3/2017    Chief Complaint   Patient presents with     Follow-up     medication, chills and fever        Assessment and Plan   1. Fever  Complains of fever with some mild cough and sore throat.  Has asthma.  But this seems not to be flaring up.  Will empirically treat with Augmentin for 10 days because of her underlying asthma.  - amoxicillin-clavulanate (AUGMENTIN) 875-125 mg per tablet; Take 1 tablet by mouth 2 (two) times a day for 10 days.  Dispense: 20 tablet; Refill: 0    2. Asthma  Has mild persistent asthma.  Uses albuterol as  rescue inhaler and Symbicort as her maintenance inhaler.  Continue both inhalers.  - budesonide-formoterol (SYMBICORT) 160-4.5 mcg/actuation inhaler; INHALE 2 PUFFS BY MOUTH TWICE DAILY  Dispense: 1 Inhaler; Refill: 5    3. Anxiety  Gets anxious readily.  Takes clonazepam together with levofloxacin.  Prescribed by by psych in the past.  - clonazePAM (KLONOPIN) 0.5 MG tablet; TAKE ONE TABLET THREE TIMES DAILY  Dispense: 90 tablet; Refill: 0  - FLUoxetine (PROZAC) 20 MG capsule; TAKE 1 CAPSULE BY MOUTH TWICE DAILY  Dispense: 60 capsule; Refill: 0    4. Hypercholesterolemia  Controlled by simvastatin but her lipids has not been checked.  Will come back for fasting labs this week.  Continue lovastatin.  - lovastatin (MEVACOR) 20 MG tablet; TAKE ONE TABLET AT BEDTIME  Dispense: 90 tablet; Refill: 0    Follow up in 3 months.  Will return fasting.       History of Present Illness   This 61 y.o. old female is here for follow-up.  Was last seen in October 2016.  Her lipids were last checked in March 2016 which were normal.  Needs her labs done but she is not fasting today.  Complains of fever sore throat and mild cough.  Has underlying asthma but this has not flared.  Uses both albuterol and Symbicort inhalers.  Gets anxious easily.  Used to be followed by psych.  Currently takes clonazepam and  fluoxetine.    Review of Systems   A 12 point comprehensive review of systems was negative except as noted..     Medications, Allergies and Problem List   Reviewed and updated             Chief Complaint   Follow-up (medication, chills and fever)       Patient Profile   Social History     Social History Narrative        Past Medical History   Patient Active Problem List   Diagnosis     Hypercholesterolemia     Anxiety     Panic Disorder Without Agoraphobia     Ptosis Of Eyelid     Lipoma Of The Adipose Tissue     Menopause     Cellulitis     Acute Chest Wall Trauma     Pain     Restrictive lung disease       Past Surgical History  She has no past surgical history on file.       Medications and Allergies   Current Outpatient Prescriptions   Medication Sig     acetaminophen (TYLENOL) 500 MG tablet Take 1,000 mg by mouth every 6 (six) hours as needed.     budesonide-formoterol (SYMBICORT) 160-4.5 mcg/actuation inhaler INHALE 2 PUFFS BY MOUTH TWICE DAILY     clonazePAM (KLONOPIN) 0.5 MG tablet TAKE ONE TABLET THREE TIMES DAILY     clotrimazole-betamethasone (LOTRISONE) cream APPLY TO THE AFFECTED AREA TWICE DAILY     FLUoxetine (PROZAC) 20 MG capsule TAKE 1 CAPSULE BY MOUTH TWICE DAILY     ibuprofen (ADVIL,MOTRIN) 800 MG tablet TAKE 1 TABLET BY MOUTH EVERY 8 HOURS AS NEEDED FOR PAIN     lovastatin (MEVACOR) 20 MG tablet TAKE ONE TABLET AT BEDTIME     omeprazole (PRILOSEC) 20 MG capsule Take 1 capsule by mouth daily.     VENTOLIN HFA 90 mcg/actuation inhaler INHALE 2 PUFFS BY MOUTH EVERY 6 HOURS AS NEEDED     amoxicillin-clavulanate (AUGMENTIN) 875-125 mg per tablet Take 1 tablet by mouth 2 (two) times a day for 10 days.     ascorbic acid (ASCORBIC ACID WITH JIMENA HIPS) 500 MG tablet Take 500 mg by mouth daily.     Ca-D3-mag-zinc--deanna-boron 600 mg calcium- 800 unit-40 mg Chew Chew.     mv-min-folic acid-lutein 400-250 mcg Chew Chew.     omeprazole (PRILOSEC) 20 MG capsule TAKE ONE CAPSULE DAILY     Allergies    Allergen Reactions     Aspirin (Tartrazine Only)      Chicken         Family and Social History   No family history on file.     Social History   Substance Use Topics     Smoking status: Never Smoker     Smokeless tobacco: Never Used     Alcohol use No        Physical Exam       Physical Exam  /70  Pulse 68  Temp 98.5  F (36.9  C) (Oral)   Wt 171 lb 4 oz (77.7 kg)  BMI 32.36 kg/m2  General appearance: alert, appears stated age, cooperative and no distress  Head: Normocephalic, without obvious abnormality, atraumatic  Throat: lips, mucosa, and tongue normal; teeth and gums normal  Neck: no adenopathy, no carotid bruit, no JVD, supple, symmetrical, trachea midline and thyroid not enlarged, symmetric, no tenderness/mass/nodules  Lungs: clear to auscultation bilaterally  Heart: regular rate and rhythm, S1, S2 normal, no murmur, click, rub or gallop  Abdomen: soft, non-tender; bowel sounds normal; no masses,  no organomegaly  Extremities: extremities normal, atraumatic, no cyanosis or edema  Skin: Skin color, texture, turgor normal. No rashes or lesions     Additional Information        Sushil Wall MD  Internal Medicine  Contact me at [phone]     Additional Information   Current Outpatient Prescriptions   Medication Sig     acetaminophen (TYLENOL) 500 MG tablet Take 1,000 mg by mouth every 6 (six) hours as needed.     budesonide-formoterol (SYMBICORT) 160-4.5 mcg/actuation inhaler INHALE 2 PUFFS BY MOUTH TWICE DAILY     clonazePAM (KLONOPIN) 0.5 MG tablet TAKE ONE TABLET THREE TIMES DAILY     clotrimazole-betamethasone (LOTRISONE) cream APPLY TO THE AFFECTED AREA TWICE DAILY     FLUoxetine (PROZAC) 20 MG capsule TAKE 1 CAPSULE BY MOUTH TWICE DAILY     ibuprofen (ADVIL,MOTRIN) 800 MG tablet TAKE 1 TABLET BY MOUTH EVERY 8 HOURS AS NEEDED FOR PAIN     lovastatin (MEVACOR) 20 MG tablet TAKE ONE TABLET AT BEDTIME     omeprazole (PRILOSEC) 20 MG capsule Take 1 capsule by mouth daily.     VENTOLIN HFA 90  mcg/actuation inhaler INHALE 2 PUFFS BY MOUTH EVERY 6 HOURS AS NEEDED     amoxicillin-clavulanate (AUGMENTIN) 875-125 mg per tablet Take 1 tablet by mouth 2 (two) times a day for 10 days.     ascorbic acid (ASCORBIC ACID WITH JIMENA HIPS) 500 MG tablet Take 500 mg by mouth daily.     Ca-D3-mag-zinc--deanna-boron 600 mg calcium- 800 unit-40 mg Chew Chew.     mv-min-folic acid-lutein 400-250 mcg Chew Chew.     omeprazole (PRILOSEC) 20 MG capsule TAKE ONE CAPSULE DAILY     Allergies   Allergen Reactions     Aspirin (Tartrazine Only)      Chicken      Social History   Substance Use Topics     Smoking status: Never Smoker     Smokeless tobacco: Never Used     Alcohol use No         Time: total time spent with the patient was 25 minutes of which >50% was spent in counseling and coordination of care

## 2021-06-11 NOTE — TELEPHONE ENCOUNTER
Medication Question or Clarification  Who is calling: patient  What medication are you calling about (include dose and sig)?: clonazePAM (KLONOPIN) 0.5 MG tablet  Who prescribed the medication?: Sushil Wall MD    What is your question/concern?: patient states that she usually get 90 tablets to last 30 days . Please update prescription if appropriate   [facility]  Okay to leave a detailed message?: Yes

## 2021-06-12 NOTE — TELEPHONE ENCOUNTER
Patient Returning Call  Reason for call:  Patient returning call   Information relayed to patient:  Message below   Patient has additional questions:  No  If YES, what are your questions/concerns:  n/a  Okay to leave a detailed message?: No call back needed

## 2021-06-12 NOTE — TELEPHONE ENCOUNTER
Controlled Substance Refill Request  Medication Name:   Requested Prescriptions     Pending Prescriptions Disp Refills     clonazePAM (KLONOPIN) 0.5 MG tablet [Pharmacy Med Name: CLONAZEPAM 0.5MG TABLETS] 90 tablet 0     Sig: TAKE ONE TABLET THREE TIMES DAILY     Date Last Fill: 9/16/20  [facility]ed [facility]  Submit electronically to pharmacy  Controlled Substance Agreement on file:   Encounter-Level CSA Scan Date:    There are no encounter-level csa scan date.        Last office visit:  10/12/20

## 2021-06-12 NOTE — PROGRESS NOTES
Office Visit - Follow Up   Tootie Marin   62 y.o. female    Date of Visit: 9/7/2017    Chief Complaint   Patient presents with     Follow-up     had 2 falls this month        Assessment and Plan   1. Anxiety  Obesity gets anxious.  Takes clonazepam with fluoxetine and both help controlling her anxiety.    2. Major depression  Has major depression on the Loxitane.  Now in remission.  Did her PHQ 9 and she scored 0.    3. Hypercholesterolemia  Controlled.  Continues lovastatin.  Last lipids were good.    4. Imbalance  Complains of some gait and balance.  Fell twice.  Aggravated by being obese having a BMI of 32.  Okay to get a walker and a cane to help her ambulate and prevent her from having falls.    Follow up in in 1 month as previously scheduled.  Will come back fasting.     History of Present Illness   This 62 y.o. old female is here for follow-up.  Complains of some gait imbalance.  Due to her obesity.  Has fallen twice in August and about a week ago.  He had her right lower extremity during the first fall and had some pains but now she recovered from this.  Had another fall about a week ago.  Now is having some left lower extremity pains but also getting better.  Has anxiety and major depression.  But this are controlled by her medications.  Still does not have permanent home.  Lives in the shelter.    Review of Systems   A 12 point comprehensive review of systems was negative except as noted..     Medications, Allergies and Problem List   Reviewed and updated             Chief Complaint   Follow-up (had 2 falls this month)       Patient Profile   Social History     Social History Narrative        Past Medical History   Patient Active Problem List   Diagnosis     Hypercholesterolemia     Anxiety     Panic Disorder Without Agoraphobia     Ptosis Of Eyelid     Lipoma Of The Adipose Tissue     Menopause     Asthma       Past Surgical History  She has a past surgical history that includes Breast biopsy  "(Right, 1987).       Medications and Allergies   Current Outpatient Prescriptions   Medication Sig     acetaminophen (TYLENOL) 500 MG tablet Take 2 tablets (1,000 mg total) by mouth every 6 (six) hours as needed.     ascorbic acid (ASCORBIC ACID WITH JIMENA HIPS) 500 MG tablet Take 500 mg by mouth daily.     budesonide-formoterol (SYMBICORT) 160-4.5 mcg/actuation inhaler INHALE 2 PUFFS BY MOUTH TWICE DAILY     Ca-D3-mag-zinc--deanna-boron 600 mg calcium- 800 unit-40 mg Chew Chew.     clonazePAM (KLONOPIN) 0.5 MG tablet TAKE 1 TABLET BY MOUTH THREE TIMES DAILY     clonazePAM (KLONOPIN) 0.5 MG tablet TAKE 1 TABLET BY MOUTH THREE TIMES DAILY     clotrimazole-betamethasone (LOTRISONE) cream APPLY TO THE AFFECTED AREA TWICE DAILY     FLUoxetine (PROZAC) 20 MG capsule TAKE 1 CAPSULE BY MOUTH TWICE DAILY     ibuprofen (ADVIL,MOTRIN) 800 MG tablet TAKE 1 TABLET BY MOUTH EVERY 8 HOURS AS NEEDED FOR PAIN     lovastatin (MEVACOR) 20 MG tablet TAKE ONE TABLET AT BEDTIME     lovastatin (MEVACOR) 20 MG tablet TAKE ONE TABLET AT BEDTIME     mv-min-folic acid-lutein 400-250 mcg Chew Chew.     omeprazole (PRILOSEC) 20 MG capsule Take 1 capsule by mouth daily.     omeprazole (PRILOSEC) 20 MG capsule TAKE ONE CAPSULE DAILY     VENTOLIN HFA 90 mcg/actuation inhaler INHALE 2 PUFFS BY MOUTH EVERY 6 HOURS AS NEEDED     Allergies   Allergen Reactions     Aspirin (Tartrazine Only)      Chicken         Family and Social History   No family history on file.     Social History   Substance Use Topics     Smoking status: Never Smoker     Smokeless tobacco: Never Used     Alcohol use No      Physical Exam       Physical Exam  /62  Pulse 76  Ht 5' 1\" (1.549 m)  Wt 171 lb (77.6 kg)  SpO2 98%  BMI 32.31 kg/m2  General appearance: alert, appears stated age, cooperative and no distress  Head: Normocephalic, without obvious abnormality, atraumatic  Throat: lips, mucosa, and tongue normal; teeth and gums normal  Neck: no adenopathy, no carotid " bruit, no JVD, supple, symmetrical, trachea midline and thyroid not enlarged, symmetric, no tenderness/mass/nodules  Lungs: clear to auscultation bilaterally  Heart: regular rate and rhythm, S1, S2 normal, no murmur, click, rub or gallop  Abdomen: soft, non-tender; bowel sounds normal; no masses,  no organomegaly  Extremities: extremities normal, atraumatic, no cyanosis or edema  Skin: Skin color, texture, turgor normal. No rashes or lesions  Neurologic: Basically normal except for her some gait instability.     Additional Information        Sushil Wall MD  Internal Medicine  Contact me at [phone]     Additional Information   Current Outpatient Prescriptions   Medication Sig     acetaminophen (TYLENOL) 500 MG tablet Take 2 tablets (1,000 mg total) by mouth every 6 (six) hours as needed.     ascorbic acid (ASCORBIC ACID WITH JIMENA HIPS) 500 MG tablet Take 500 mg by mouth daily.     budesonide-formoterol (SYMBICORT) 160-4.5 mcg/actuation inhaler INHALE 2 PUFFS BY MOUTH TWICE DAILY     Ca-D3-mag-zinc--deanna-boron 600 mg calcium- 800 unit-40 mg Chew Chew.     clonazePAM (KLONOPIN) 0.5 MG tablet TAKE 1 TABLET BY MOUTH THREE TIMES DAILY     clonazePAM (KLONOPIN) 0.5 MG tablet TAKE 1 TABLET BY MOUTH THREE TIMES DAILY     clotrimazole-betamethasone (LOTRISONE) cream APPLY TO THE AFFECTED AREA TWICE DAILY     FLUoxetine (PROZAC) 20 MG capsule TAKE 1 CAPSULE BY MOUTH TWICE DAILY     ibuprofen (ADVIL,MOTRIN) 800 MG tablet TAKE 1 TABLET BY MOUTH EVERY 8 HOURS AS NEEDED FOR PAIN     lovastatin (MEVACOR) 20 MG tablet TAKE ONE TABLET AT BEDTIME     lovastatin (MEVACOR) 20 MG tablet TAKE ONE TABLET AT BEDTIME     mv-min-folic acid-lutein 400-250 mcg Chew Chew.     omeprazole (PRILOSEC) 20 MG capsule Take 1 capsule by mouth daily.     omeprazole (PRILOSEC) 20 MG capsule TAKE ONE CAPSULE DAILY     VENTOLIN HFA 90 mcg/actuation inhaler INHALE 2 PUFFS BY MOUTH EVERY 6 HOURS AS NEEDED     Allergies   Allergen Reactions      Aspirin (Tartrazine Only)      Chicken      Social History   Substance Use Topics     Smoking status: Never Smoker     Smokeless tobacco: Never Used     Alcohol use No         Time: total time spent with the patient was 25 minutes of which >50% was spent in counseling and coordination of care

## 2021-06-12 NOTE — TELEPHONE ENCOUNTER
Left message to call back for: lab results   Information to relay to patient:  You have very low vitamin D. I will start your on vitamin D 50,000 units weekly for 4 weeks and then have your vitamin D rechecked as lab appointment. Prescription was sent to your pharmacy.     Keely Degroot LPN

## 2021-06-12 NOTE — TELEPHONE ENCOUNTER
Dr. Wall,  Did you want the patient to take 50,000 daily, or 5,000?  Please advise.  Thank you.  Angeline GOMEZ, SHARMIN/CMT....................9:03 AM

## 2021-06-12 NOTE — TELEPHONE ENCOUNTER
Left a message to call back. When patient calls back please inform her the PA for her once weekly Vit D was not covered by her insurance and she will need to purchase Vit D 5000 units OTC to take once per day.     Keely Degroot LPN

## 2021-06-12 NOTE — PROGRESS NOTES
Office Visit - Follow Up   Tootie Marin   65 y.o. female    Date of Visit: 10/12/2020    Chief Complaint   Patient presents with     Follow-up     fasting, declines flu shot, needs prevnar 13        Assessment and Plan   1. Essential hypertension  Controlled. Continue hydrochlorothiazide.   - HM2(CBC w/o Differential)  - Basic Metabolic Panel    2. Hypercholesterolemia  Controlled. Continue lovastatin.   - Lipid Cascade  - Thyroid Stimulating Hormone (TSH)  - Hepatic Profile    3. Major depressive disorder with single episode, in full remission (H)  In remission. PHQ 9 is only 1 today. Continue fluoxetine and clonazepam.     4. Anxiety  Controlled by her meds taken for her depression. Continue same meds for her depression.     5. Gastroesophageal reflux disease without esophagitis  Controlled. Continue omperazole.     6. Vitamin D deficiency  Has low vitamin D. Recheck vitamin D. If it is still low, wants to get prescription for vitamin D supplement because it will be paid by her insurance.   - Vitamin D, Total (25-Hydroxy)    7. Need for immunization against influenza  Prevnar 13 was given.   - Pneumococcal conjugate vaccine 13-valent 6wks-17yrs; >50yrs    Declines a flu shot. I cannot convinced her to get a flu shot.       Follow up in 4 months.      History of Present Illness   This 65 y.o. old female is here for follow up. Has hypertension and hyperlipidemia controlled  By her medications. Has anxiety and depression controlled by her medications. Has asthma but it is quiescent. Denies shortness of breath, wheezing and cough. Has GERD and does not get GERD symptoms anymore with omeprazole. Has vitamin D deficiency. Last vitamin D level was quite low. Wants to get a prescription for vitamin D supplement if it is needed since this will be paid by her insurance. Overall, feels well.      Review of Systems   A 12 point comprehensive review of systems was negative except as noted..     Medications, Allergies and  Problem List   Reviewed and updated             Chief Complaint   Follow-up (fasting, declines flu shot, needs prevnar 13)       Patient Profile   Social History     Social History Narrative    Single. No children. Worked odd jobs. Work related disability since 1999. Nonsmoker. Non alcohol drinker.        Past Medical History   Patient Active Problem List   Diagnosis     Hypercholesterolemia     Anxiety     Panic Disorder Without Agoraphobia     Ptosis Of Eyelid     Lipoma Of The Adipose Tissue     Menopause     Asthma     Major depression     Essential hypertension     Gastroesophageal reflux disease without esophagitis       Past Surgical History  She has a past surgical history that includes Breast biopsy (Right, 1987).       Medications and Allergies   Current Outpatient Medications   Medication Sig     albuterol (PROAIR HFA;PROVENTIL HFA;VENTOLIN HFA) 90 mcg/actuation inhaler INHALE 2 PUFFS BY MOUTH EVERY 6 HOURS AS NEEDED FOR WHEEZING OR SHORTNESS OF BREATH     clonazePAM (KLONOPIN) 0.5 MG tablet TAKE 1 TABLET(0.5 MG) BY MOUTH THREE TIMES DAILY     clotrimazole-betamethasone (LOTRISONE) cream APPLY TO THE AFFECTED AREA TWICE DAILY     FLUoxetine (PROZAC) 20 MG capsule TAKE ONE CAPSULE TWICE DAILY     hydroCHLOROthiazide (HYDRODIURIL) 25 MG tablet TAKE ONE TABLET DAILY     lovastatin (MEVACOR) 20 MG tablet TAKE 1 TABLET BY MOUTH EVERY NIGHT AT BEDTIME     omeprazole (PRILOSEC) 20 MG capsule Take 1 capsule (20 mg total) by mouth daily.     potassium chloride (K-DUR,KLOR-CON) 20 MEQ tablet Take 1 tablet (20 mEq total) by mouth 2 (two) times a day.     SYMBICORT 160-4.5 mcg/actuation inhaler INHALE 2 PUFFS BY MOUTH TWICE DAILY     VENTOLIN HFA 90 mcg/actuation inhaler INHALE 2 PUFFS BY MOUTH EVERY 6 HOURS AS NEEDED     Allergies   Allergen Reactions     Aspirin (Tartrazine Only)      Chicken      [place]         Family and Social History   Family History   Problem Relation Age of Onset     Heart disease Mother       Cancer Father      Cancer Brother         Social History     Tobacco Use     Smoking status: Never Smoker     Smokeless tobacco: Never Used   Substance Use Topics     Alcohol use: No     Drug use: No        Physical Exam       Physical Exam  /60   Pulse 81   Wt 170 lb (77.1 kg)   SpO2 97%   BMI 33.20 kg/m    General appearance: alert, appears stated age, cooperative and no distress  Head: Normocephalic, without obvious abnormality, atraumatic  Throat: lips, mucosa, and tongue normal; teeth and gums normal  Neck: no adenopathy, no carotid bruit, no JVD, supple, symmetrical, trachea midline and thyroid not enlarged, symmetric, no tenderness/mass/nodules  Lungs: clear to auscultation bilaterally  Heart: regular rate and rhythm, S1, S2 normal, no murmur, click, rub or gallop  Abdomen: soft, non-tender; bowel sounds normal; no masses,  no organomegaly  Extremities: extremities normal, atraumatic, no cyanosis or edema  Skin: Skin color, texture, turgor normal. No rashes or lesions  Psychiatric: normal affect and mood     Additional Information   Post Discharge Medication Reconciliation Status: discharge medications reconciled, continue medications without change      Sushil Wall MD  Internal Medicine  Contact me at [phone]     Additional Information   Current Outpatient Medications   Medication Sig     albuterol (PROAIR HFA;PROVENTIL HFA;VENTOLIN HFA) 90 mcg/actuation inhaler INHALE 2 PUFFS BY MOUTH EVERY 6 HOURS AS NEEDED FOR WHEEZING OR SHORTNESS OF BREATH     clonazePAM (KLONOPIN) 0.5 MG tablet TAKE 1 TABLET(0.5 MG) BY MOUTH THREE TIMES DAILY     clotrimazole-betamethasone (LOTRISONE) cream APPLY TO THE AFFECTED AREA TWICE DAILY     FLUoxetine (PROZAC) 20 MG capsule TAKE ONE CAPSULE TWICE DAILY     hydroCHLOROthiazide (HYDRODIURIL) 25 MG tablet TAKE ONE TABLET DAILY     lovastatin (MEVACOR) 20 MG tablet TAKE 1 TABLET BY MOUTH EVERY NIGHT AT BEDTIME     omeprazole (PRILOSEC) 20 MG capsule Take 1  capsule (20 mg total) by mouth daily.     potassium chloride (K-DUR,KLOR-CON) 20 MEQ tablet Take 1 tablet (20 mEq total) by mouth 2 (two) times a day.     SYMBICORT 160-4.5 mcg/actuation inhaler INHALE 2 PUFFS BY MOUTH TWICE DAILY     VENTOLIN HFA 90 mcg/actuation inhaler INHALE 2 PUFFS BY MOUTH EVERY 6 HOURS AS NEEDED     Allergies   Allergen Reactions     Aspirin (Tartrazine Only)      Chicken      [place]      Social History     Tobacco Use     Smoking status: Never Smoker     Smokeless tobacco: Never Used   Substance Use Topics     Alcohol use: No     Drug use: No         Time: total time spent with the patient was 25 minutes of which >50% was spent in counseling and coordination of care

## 2021-06-12 NOTE — TELEPHONE ENCOUNTER
Prior Authorization Request  Who s requesting:  Pharmacy  Pharmacy Name and Location: [facility]  Medication Name: Vit D2 50,000  Insurance Plan:   Insurance Member ID Number:  [id number]  CoverMyMeds [place]: N/A    Keely Degroot LPN

## 2021-06-13 NOTE — PROGRESS NOTES
Office Visit - Follow Up   Tootie Marin   62 y.o. female    Date of Visit: 10/18/2017    Chief Complaint   Patient presents with     Follow-up     non-fasting, complains of productive cough, thinking of moving to [facility]        Assessment and Plan   1. Hypercholesterolemia  Controlled by lovastatin.  Lipids on 7/18/2017 were good, LDL 93, HDL 71, triglycerides 60 and total cholesterol 176.  Not fasting today so we will check her fasting lipids as lab appointment next week.  - Lipid Cascade  - Thyroid Stimulating Hormone (TSH); Future  - Hepatic Profile; Future    2. Anxiety  Controlled.  Takes clonazepam and fluoxetine.    3. Major depression  In remission.  Continue fluoxetine.  - HM2(CBC w/o Differential); Future  - Basic Metabolic Panel    4. Asthma  No flare or recurrence.  Complains of some dry cough.  But now getting better.  Started 2 days ago.  Lung exam is normal.  Informed her cough will resolve without treatment.  Continue same inhalers.    Follow up in in 2-3 months before she plans to move to [place], [place].     History of Present Illness   This 62 y.o. old female here for follow-up.  Doing quite well.  Has not had falls since she uses her walker more regularly.  Has a little bit of balance problem when she walks.  Has hyperlipidemia controlled by lovastatin.  Has asthma.  Well controlled.  No recurrence or flare.  Has anxiety and major depression which are controlled.  Complains of some dry cough.  No fever.  No shortness of breath.  No wheezes.    Review of Systems   A 12 point comprehensive review of systems was negative except as noted..     Medications, Allergies and Problem List   Reviewed and updated             Chief Complaint   Follow-up (non-fasting, complains of productive cough, thinking of moving to [facility])       Patient Profile   Social History     Social History Narrative        Past Medical History   Patient Active Problem List   Diagnosis     Hypercholesterolemia     Anxiety  "    Panic Disorder Without Agoraphobia     Ptosis Of Eyelid     Lipoma Of The Adipose Tissue     Menopause     Asthma     Major depression       Past Surgical History  She has a past surgical history that includes Breast biopsy (Right, 1987).       Medications and Allergies   Current Outpatient Prescriptions   Medication Sig     acetaminophen (TYLENOL) 500 MG tablet Take 2 tablets (1,000 mg total) by mouth every 6 (six) hours as needed.     budesonide-formoterol (SYMBICORT) 160-4.5 mcg/actuation inhaler INHALE 2 PUFFS BY MOUTH TWICE DAILY     clonazePAM (KLONOPIN) 0.5 MG tablet TAKE 1 TABLET BY MOUTH THREE TIMES DAILY     clotrimazole-betamethasone (LOTRISONE) cream APPLY TO THE AFFECTED AREA TWICE DAILY     FLUoxetine (PROZAC) 20 MG capsule TAKE 1 CAPSULE BY MOUTH TWICE DAILY     ibuprofen (ADVIL,MOTRIN) 800 MG tablet TAKE 1 TABLET BY MOUTH EVERY 8 HOURS AS NEEDED FOR PAIN     lovastatin (MEVACOR) 20 MG tablet TAKE ONE TABLET AT BEDTIME     VENTOLIN HFA 90 mcg/actuation inhaler INHALE 2 PUFFS BY MOUTH EVERY 6 HOURS AS NEEDED     Ca-D3-mag-zinc--deanna-boron 600 mg calcium- 800 unit-40 mg Chew Chew.     Allergies   Allergen Reactions     Aspirin (Tartrazine Only)      Chicken      [place]         Family and Social History   No family history on file.     Social History   Substance Use Topics     Smoking status: Never Smoker     Smokeless tobacco: Never Used     Alcohol use No        Physical Exam       Physical Exam  /70 (Patient Site: Left Arm, Patient Position: Sitting, Cuff Size: Adult Large)  Pulse 81  Ht 5' 1\" (1.549 m)  Wt 170 lb (77.1 kg)  SpO2 99%  BMI 32.12 kg/m2  General appearance: alert, appears stated age, cooperative and no distress  Head: Normocephalic, without obvious abnormality, atraumatic  Throat: lips, mucosa, and tongue normal; teeth and gums normal  Neck: no adenopathy, no carotid bruit, no JVD, supple, symmetrical, trachea midline and thyroid not enlarged, symmetric, no " tenderness/mass/nodules  Lungs: clear to auscultation bilaterally  Heart: regular rate and rhythm, S1, S2 normal, no murmur, click, rub or gallop  Abdomen: soft, non-tender; bowel sounds normal; no masses,  no organomegaly  Extremities: extremities normal, atraumatic, no cyanosis or edema  Skin: Skin color, texture, turgor normal. No rashes or lesions  Psychiatric: Appropriate mood and affect     Additional Information        Sushil Wall MD  Internal Medicine  Contact me at [phone]     Additional Information   Current Outpatient Prescriptions   Medication Sig     acetaminophen (TYLENOL) 500 MG tablet Take 2 tablets (1,000 mg total) by mouth every 6 (six) hours as needed.     budesonide-formoterol (SYMBICORT) 160-4.5 mcg/actuation inhaler INHALE 2 PUFFS BY MOUTH TWICE DAILY     clonazePAM (KLONOPIN) 0.5 MG tablet TAKE 1 TABLET BY MOUTH THREE TIMES DAILY     clotrimazole-betamethasone (LOTRISONE) cream APPLY TO THE AFFECTED AREA TWICE DAILY     FLUoxetine (PROZAC) 20 MG capsule TAKE 1 CAPSULE BY MOUTH TWICE DAILY     ibuprofen (ADVIL,MOTRIN) 800 MG tablet TAKE 1 TABLET BY MOUTH EVERY 8 HOURS AS NEEDED FOR PAIN     lovastatin (MEVACOR) 20 MG tablet TAKE ONE TABLET AT BEDTIME     VENTOLIN HFA 90 mcg/actuation inhaler INHALE 2 PUFFS BY MOUTH EVERY 6 HOURS AS NEEDED     Ca-D3-mag-zinc--deanna-boron 600 mg calcium- 800 unit-40 mg Chew Chew.     Allergies   Allergen Reactions     Aspirin (Tartrazine Only)      Chicken      [place]      Social History   Substance Use Topics     Smoking status: Never Smoker     Smokeless tobacco: Never Used     Alcohol use No         Time: total time spent with the patient was 25 minutes of which >50% was spent in counseling and coordination of care

## 2021-06-15 NOTE — TELEPHONE ENCOUNTER
Refill Approved    Rx renewed per Medication Renewal Policy. Medication was last renewed on 8/7/20.    Lorenzo Granado, [facility] Triage/Med Refill 3/8/2021     Requested Prescriptions   Pending Prescriptions Disp Refills     clonazePAM (KLONOPIN) 0.5 MG tablet [Pharmacy Med Name: CLONAZEPAM 0.5MG TABLETS] 90 tablet 0     Sig: TAKE 1 TABLET BY MOUTH THREE TIMES DAILY       Controlled Substances Refill Protocol Failed - 3/8/2021  2:12 PM        Failed - Route all Controlled Substance Requests to Provider        Failed - Patient has controlled substance agreement in past 12 months     Encounter-Level CSA Scan Date:    There are no encounter-level csa scan date.               Passed - Visit with PCP or prescribing provider visit in past 12 months      Last office visit with prescriber/PCP: 10/12/2020 Sushil Wall MD OR same dept: Visit date not found OR same specialty: 10/12/2020 Sushil Wall MD Last physical: 3/11/2020 Last MTM visit: Visit date not found    Next visit within 3 mo: Visit date not found  Next physical within 3 mo: Visit date not found  Prescriber OR PCP: Sushil Wall MD  Last diagnosis associated with med order: 1. Anxiety  - clonazePAM (KLONOPIN) 0.5 MG tablet [Pharmacy Med Name: CLONAZEPAM 0.5MG TABLETS]; TAKE 1 TABLET BY MOUTH THREE TIMES DAILY  Dispense: 90 tablet; Refill: 0    2. Chronic cough  - albuterol (PROAIR HFA;PROVENTIL HFA;VENTOLIN HFA) 90 mcg/actuation inhaler [Pharmacy Med Name: ALBUTEROL HFA INH(200 PUFFS)18GM]; INHALE 2 PUFFS BY MOUTH EVERY 6 HOURS AS NEEDED FOR WHEEZING OR SHORTNESS OF BREATH  Dispense: 54 g; Refill: 0                  albuterol (PROAIR HFA;PROVENTIL HFA;VENTOLIN HFA) 90 mcg/actuation inhaler [Pharmacy Med Name: ALBUTEROL HFA INH(200 PUFFS)18GM] 54 g 0     Sig: INHALE 2 PUFFS BY MOUTH EVERY 6 HOURS AS NEEDED FOR WHEEZING OR SHORTNESS OF BREATH       Albuterol/Levalbuterol Refill Protocol Passed - 3/8/2021  2:12 PM        Passed - PCP or  prescribing provider visit in last year     Last office visit with prescriber/PCP: 10/12/2020 Sushil Wall MD OR same dept: Visit date not found OR same specialty: 10/12/2020 Sushil Wall MD Last physical: 3/11/2020       Next appt within 3 mo: Visit date not found  Next physical within 3 mo: Visit date not found  Prescriber OR PCP: Sushil Wall MD  Last diagnosis associated with med order: 1. Anxiety  - clonazePAM (KLONOPIN) 0.5 MG tablet [Pharmacy Med Name: CLONAZEPAM 0.5MG TABLETS]; TAKE 1 TABLET BY MOUTH THREE TIMES DAILY  Dispense: 90 tablet; Refill: 0    2. Chronic cough  - albuterol (PROAIR HFA;PROVENTIL HFA;VENTOLIN HFA) 90 mcg/actuation inhaler [Pharmacy Med Name: ALBUTEROL HFA INH(200 PUFFS)18GM]; INHALE 2 PUFFS BY MOUTH EVERY 6 HOURS AS NEEDED FOR WHEEZING OR SHORTNESS OF BREATH  Dispense: 54 g; Refill: 0    If protocol passes may refill for 6 months if within 3 months of last provider visit (or a total of 9 months). If patient requesting >1 inhaler per month refill x 6 months and have patient make appointment with provider.

## 2021-06-15 NOTE — TELEPHONE ENCOUNTER
Controlled Substance Refill Request  Medication Name:   Requested Prescriptions     Pending Prescriptions Disp Refills     clonazePAM (KLONOPIN) 0.5 MG tablet [Pharmacy Med Name: CLONAZEPAM 0.5MG TABLETS] 90 tablet 0     Sig: TAKE 1 TABLET BY MOUTH THREE TIMES DAILY     Date Last Fill: 12/22/20  [facility]  Submit electronically to pharmacy  Controlled Substance Agreement on file:   Encounter-Level CSA Scan Date:    There are no encounter-level csa scan date.        Last office visit:  10/12/20

## 2021-06-15 NOTE — TELEPHONE ENCOUNTER
Controlled Substance Refill Request  Medication Name:   Requested Prescriptions     Pending Prescriptions Disp Refills     clonazePAM (KLONOPIN) 0.5 MG tablet [Pharmacy Med Name: CLONAZEPAM 0.5MG TABLETS] 90 tablet 0     Sig: TAKE 1 TABLET BY MOUTH THREE TIMES DAILY     Signed Prescriptions Disp Refills     albuterol (PROAIR HFA;PROVENTIL HFA;VENTOLIN HFA) 90 mcg/actuation inhaler 54 g 1     Sig: INHALE 2 PUFFS BY MOUTH EVERY 6 HOURS AS NEEDED FOR WHEEZING OR SHORTNESS OF BREATH     Authorizing Provider: NORA SOUTH     Ordering User: SRIRAM TEMPLETON     Date Last Fill: 2/9/21  [facility]ed [facility]  Submit electronically to pharmacy  Controlled Substance Agreement on file:   Encounter-Level CSA Scan Date:    There are no encounter-level csa scan date.        Last office visit:  10/12/20

## 2021-06-16 PROBLEM — K21.9 GASTROESOPHAGEAL REFLUX DISEASE WITHOUT ESOPHAGITIS: Status: ACTIVE | Noted: 2019-11-12

## 2021-06-16 PROBLEM — F32.9 MAJOR DEPRESSION: Status: ACTIVE | Noted: 2017-10-18

## 2021-06-16 PROBLEM — J45.909 ASTHMA: Status: ACTIVE | Noted: 2017-07-18

## 2021-06-16 PROBLEM — I10 ESSENTIAL HYPERTENSION: Status: ACTIVE | Noted: 2019-06-18

## 2021-06-16 NOTE — PROGRESS NOTES
"Office Visit - Follow up    Tootie Marin   62 y.o. female    Date of Visit: 2/13/2018    Chief Complaint   Patient presents with     Cough     productive/ green discharge, lightheaded 3-4 days       Subjective: Pleasant patient who is patient of Dr. Sushil Wall and who was seen in emergency room on December 30.  At that time had documented influenza a however had been ill for several days and therefore was not treated with Tamiflu.  Laboratory studies and x-ray were negative    Generally he is otherwise doing well and current comorbidities are reviewed.  Current regimen reviewed and reconciled.    Continues to have productive cough present for the last several weeks.  She noted that her influenza symptoms of myalgias fatigue etc. did improve however continued to have problems with cough.  Cough is increased in frequency and severity denies fever denies dyspnea or chest pain.  Cough producing yellowish phlegm    No other new concerns    ROS: A comprehensive review of systems was performed and was otherwise negative except as mentioned above.     Exam  Lungs relatively clear there are some rhonchi which clear no signs of consolidation   /72  Pulse 91  Ht 5' 1\" (1.549 m)  Wt 179 lb (81.2 kg)  SpO2 97%  BMI 33.82 kg/m2    Assessment and Plan  Given the duration of symptoms which is a bit greater than 6 weeks I would recommend a trial with antibiotics this is discussed with patient will treat with Augmentin and plan symptomatic management with promethazine with codeine follow-up if not improved    Tootie was seen today for cough.    Diagnoses and all orders for this visit:    Influenza A    Acute bronchitis    Other orders  -     ibuprofen (ADVIL,MOTRIN) 800 MG tablet; Take 1 tablet (800 mg total) by mouth every 8 (eight) hours as needed.  -     amoxicillin-clavulanate (AUGMENTIN) 875-125 mg per tablet; Take 1 tablet by mouth 2 (two) times a day for 7 days.  -     promethazine-codeine (PHENERGAN WITH " CODEINE) 6.25-10 mg/5 mL syrup; Take 5 mL by mouth every 4 (four) hours as needed for cough.          Time: total time spent with the patient was 20 minutes of which >50% was spent in counseling and coordination of care        Allergies   Allergen Reactions     Aspirin (Tartrazine Only)      Chicken      [place]        Medications :  Prior to Admission medications    Medication Sig Start Date End Date Taking? Authorizing Provider   acetaminophen (TYLENOL) 500 MG tablet Take 2 tablets (1,000 mg total) by mouth every 6 (six) hours as needed. 7/18/17  Yes Sushil Wall MD   budesonide-formoterol (SYMBICORT) 160-4.5 mcg/actuation inhaler INHALE 2 PUFFS BY MOUTH TWICE DAILY 7/3/17  Yes Sushil Wall MD   Dl-Z4-wdw-zinc--deanna-boron 600 mg calcium- 800 unit-40 mg Chew Chew.   Yes PROVIDER, HISTORICAL   clonazePAM (KLONOPIN) 0.5 MG tablet TAKE 1 TABLET BY MOUTH THREE TIMES DAILY 1/30/18  Yes Sushil Wall MD   clotrimazole-betamethasone (LOTRISONE) cream APPLY TO THE AFFECTED AREA TWICE DAILY 12/21/16  Yes Jamal Santos DPM   FLUoxetine (PROZAC) 20 MG capsule TAKE 1 CAPSULE BY MOUTH TWICE DAILY 8/29/17  Yes Sushil Wall MD   ibuprofen (ADVIL,MOTRIN) 800 MG tablet TAKE 1 TABLET BY MOUTH EVERY 8 HOURS AS NEEDED FOR PAIN 8/29/17  Yes Sushil Wall MD   ibuprofen (ADVIL,MOTRIN) 800 MG tablet Take 1 tablet (800 mg total) by mouth every 8 (eight) hours as needed. 2/13/18  Yes Joe Soto MD   lovastatin (MEVACOR) 20 MG tablet TAKE ONE TABLET AT BEDTIME 7/3/17  Yes Sushil Wall MD   VENTOLIN HFA 90 mcg/actuation inhaler INHALE 2 PUFFS BY MOUTH EVERY 6 HOURS AS NEEDED 9/28/17  Yes Sushil Wall MD   ibuprofen (ADVIL,MOTRIN) 800 MG tablet TAKE 1 TABLET BY MOUTH EVERY 8 HOURS AS NEEDED FOR PAIN 1/30/18 2/13/18 Yes Sushil Wall MD   amoxicillin-clavulanate (AUGMENTIN) 875-125 mg per tablet Take 1 tablet by mouth 2 (two) times a day for 7 days. 2/13/18 2/20/18  Joe Soto MD    promethazine-codeine (PHENERGAN WITH CODEINE) 6.25-10 mg/5 mL syrup Take 5 mL by mouth every 4 (four) hours as needed for cough. 2/13/18   Joe Soto MD        Past Medical History:   Past Medical History:   Diagnosis Date     Anxiety      Cellulitis      Chest wall trauma      Hypercholesteremia      Panic disorder        Past Surgical History:   Past Surgical History:   Procedure Laterality Date     BREAST BIOPSY Right 1987       Social History:   Social History     Social History     Marital status: Single     Spouse name: N/A     Number of children: N/A     Years of education: N/A     Occupational History     Not on file.     Social History Main Topics     Smoking status: Never Smoker     Smokeless tobacco: Never Used     Alcohol use No     Drug use: No     Sexual activity: Not on file     Other Topics Concern     Not on file     Social History Narrative       Family History: No family history on file.      Joe Soto MD

## 2021-06-17 NOTE — TELEPHONE ENCOUNTER
Telephone Encounter by Olga Lidia Griffith at 10/26/2020  3:15 PM     Author: Olga Lidia Griffith Service: -- Author Type: --    Filed: 10/26/2020  3:20 PM Encounter Date: 10/26/2020 Status: Signed    : Olga Lidia Griffith       PRIOR AUTHORIZATION DENIED    Denial Rational: Per call to [facility] this is considered an over the counter medication.  Per Medicare Law Part D drugs that are OTC are not covered, benefit exclusion.  Patient may get medication but will be responsible for cost.

## 2021-06-17 NOTE — TELEPHONE ENCOUNTER
Controlled Substance Refill Request  Medication Name:   Requested Prescriptions     Pending Prescriptions Disp Refills     clonazePAM (KLONOPIN) 0.5 MG tablet [Pharmacy Med Name: CLONAZEPAM 0.5MG TABLETS] 90 tablet 0     Sig: TAKE 1 TABLET BY MOUTH THREE TIMES DAILY     Date Last Fill: 3/9/21  [facility]  Submit electronically to pharmacy  Controlled Substance Agreement on file:   Encounter-Level CSA Scan Date:    There are no encounter-level csa scan date.        Last office visit:  10/12/20         negative...

## 2021-06-17 NOTE — TELEPHONE ENCOUNTER
omeprazole (PRILOSEC) 20 MG capsule [[id number]]    Electronically signed by: Sushil Wall MD on 02/15/21 0919 Status:    Ordering user: Sushil Wall MD 02/15/21 0919 Authorized by: Sushil Wall MD   Frequency: Once 02/15/21 - 1  occurrence Released by: Sushil Wall MD 02/15/21 0919   Diagnoses   Gastroesophageal reflux disease without esophagitis [K21.9]   Medication comments: Overdue for office visit. Notify patient.     RN cannot approve Refill Request    RN can NOT refill this medication medication not on med list. Last office visit: 10/12/2020 Sushil Wall MD Last Physical: 3/11/2020 Last MTM visit: Visit date not found Last visit same specialty: 10/12/2020 Sushil Wall MD.  Next visit within 3 mo: Visit date not found  Next physical within 3 mo: Visit date not found      Lorenzo Granado, [facility] Triage/Med Refill 2021    Requested Prescriptions   Pending Prescriptions Disp Refills     omeprazole (PRILOSEC) 20 MG capsule [Pharmacy Med Name: OMEPRAZOLE 20MG CAPSULES] 90 capsule 0     Sig: TAKE 1 CAPSULE(20 MG) BY MOUTH 1 TIME FOR 1 DOSE       GI Medications Refill Protocol Passed - 2021  5:27 AM        Passed - PCP or prescribing provider visit in last 12 or next 3 months.     Last office visit with prescriber/PCP: 10/12/2020 Sushil Wall MD OR same dept: 10/12/2020 Sushil Wall MD OR same specialty: 10/12/2020 Sushil Wall MD  Last physical: 3/11/2020 Last MTM visit: Visit date not found   Next visit within 3 mo: Visit date not found  Next physical within 3 mo: Visit date not found  Prescriber OR PCP: Sushil Wall MD  Last diagnosis associated with med order: 1. Essential hypertension  - hydroCHLOROthiazide (HYDRODIURIL) 25 MG tablet; TAKE ONE TABLET DAILY  Dispense: 90 tablet; Refill: 1    2. Gastroesophageal reflux disease without esophagitis  - omeprazole (PRILOSEC) 20 MG capsule [Pharmacy Med Name: OMEPRAZOLE 20MG  CAPSULES]; TAKE 1 CAPSULE(20 MG) BY MOUTH 1 TIME FOR 1 DOSE  Dispense: 90 capsule; Refill: 0    3. Hypercholesterolemia  - lovastatin (MEVACOR) 20 MG tablet; TAKE 1 TABLET BY MOUTH EVERY NIGHT AT BEDTIME  Dispense: 90 tablet; Refill: 1    If protocol passes may refill for 12 months if within 3 months of last provider visit (or a total of 15 months).              Signed Prescriptions Disp Refills    hydroCHLOROthiazide (HYDRODIURIL) 25 MG tablet 90 tablet 1     Sig: TAKE ONE TABLET DAILY       Diuretics/Combination Diuretics Refill Protocol  Passed - 4/28/2021  5:27 AM        Passed - Visit with PCP or prescribing provider visit in past 12 months     Last office visit with prescriber/PCP: 10/12/2020 Sushil Wall MD OR same dept: 10/12/2020 Sushil Wall MD OR same specialty: 10/12/2020 Sushil Wall MD  Last physical: 3/11/2020 Last MTM visit: Visit date not found   Next visit within 3 mo: Visit date not found  Next physical within 3 mo: Visit date not found  Prescriber OR PCP: Sushil Wall MD  Last diagnosis associated with med order: 1. Essential hypertension  - hydroCHLOROthiazide (HYDRODIURIL) 25 MG tablet; TAKE ONE TABLET DAILY  Dispense: 90 tablet; Refill: 1    2. Gastroesophageal reflux disease without esophagitis  - omeprazole (PRILOSEC) 20 MG capsule [Pharmacy Med Name: OMEPRAZOLE 20MG CAPSULES]; TAKE 1 CAPSULE(20 MG) BY MOUTH 1 TIME FOR 1 DOSE  Dispense: 90 capsule; Refill: 0    3. Hypercholesterolemia  - lovastatin (MEVACOR) 20 MG tablet; TAKE 1 TABLET BY MOUTH EVERY NIGHT AT BEDTIME  Dispense: 90 tablet; Refill: 1    If protocol passes may refill for 12 months if within 3 months of last provider visit (or a total of 15 months).             Passed - Serum Potassium in past 12 months      Lab Results   Component Value Date    Potassium 3.6 10/12/2020             Passed - Serum Sodium in past 12 months      Lab Results   Component Value Date    Sodium 139 10/12/2020              Passed - Blood pressure on file in past 12 months     BP Readings from Last 1 Encounters:   10/12/20 104/60             Passed - Serum Creatinine in past 12 months      Creatinine   Date Value Ref Range Status   10/12/2020 0.83 0.60 - 1.10 mg/dL Final               lovastatin (MEVACOR) 20 MG tablet 90 tablet 1     Sig: TAKE 1 TABLET BY MOUTH EVERY NIGHT AT BEDTIME       Statins Refill Protocol (Hmg CoA Reductase Inhibitors) Passed - 4/28/2021  5:27 AM        Passed - PCP or prescribing provider visit in past 12 months      Last office visit with prescriber/PCP: 10/12/2020 Sushil Wall MD OR same dept: 10/12/2020 Sushil Wall MD OR same specialty: 10/12/2020 Sushil Wall MD  Last physical: 3/11/2020 Last MTM visit: Visit date not found   Next visit within 3 mo: Visit date not found  Next physical within 3 mo: Visit date not found  Prescriber OR PCP: Sushil Wall MD  Last diagnosis associated with med order: 1. Essential hypertension  - hydroCHLOROthiazide (HYDRODIURIL) 25 MG tablet; TAKE ONE TABLET DAILY  Dispense: 90 tablet; Refill: 1    2. Gastroesophageal reflux disease without esophagitis  - omeprazole (PRILOSEC) 20 MG capsule [Pharmacy Med Name: OMEPRAZOLE 20MG CAPSULES]; TAKE 1 CAPSULE(20 MG) BY MOUTH 1 TIME FOR 1 DOSE  Dispense: 90 capsule; Refill: 0    3. Hypercholesterolemia  - lovastatin (MEVACOR) 20 MG tablet; TAKE 1 TABLET BY MOUTH EVERY NIGHT AT BEDTIME  Dispense: 90 tablet; Refill: 1    If protocol passes may refill for 12 months if within 3 months of last provider visit (or a total of 15 months).

## 2021-06-17 NOTE — TELEPHONE ENCOUNTER
Telephone Encounter by Angeline Michelle CMA at 4/13/2020  7:17 AM     Author: Angeline Michelle CMA Service: -- Author Type: Medical Assistant    Filed: 4/13/2020  7:18 AM Encounter Date: 4/9/2020 Status: Signed    : Angeline Michelle CMA (Medical Assistant)       Dr. Wall,  Please review the following message regarding patient's prior authorization denial:  Seema London [facility] Team Pool 13 minutes ago (7:03 AM)       [place] Medicaid has denied the PA for the generic medication. They prefer the brand, Symbicort. I have called the pharmacy to notify them that the brand is covered. In order for the generic to be covered, there has to be a therapeutic failure with the brand.     Seema London [facility]   Central PA Team    Routing comment      Please advise on how to proceed.  Thank you.  Angeline GOMEZ CMA/TERE....................7:18 AM

## 2021-06-17 NOTE — PROGRESS NOTES
Office Visit - Follow Up   Tootie Marin   65 y.o. female    Date of Visit: 5/4/2021    Chief Complaint   Patient presents with     Follow-up     fasting, HTN, Depressin. face to face for scooter        Assessment and Plan   1. Hypercholesterolemia  Controlled. Last lipids were good. Continue lovastatin.   - Lipid Cascade  - Thyroid Stimulating Hormone (TSH)  - Basic Metabolic Panel  - Hepatic Profile    2. Essential hypertension  Controlled. Continue hydrochlorothiazide. Continue potassium as supplement.   - HM2(CBC w/o Differential)  - Urinalysis-UC if Indicated    3. Moderate persistent asthma without complication  Now in remission. ACT score is 23. Denies shortness of breath and cough.     4. Major depressive disorder with single episode, in full remission (H)  Now in remisssion. PHQ 9 score is 0.     5. Visit for screening mammogram  Due for her screening mammogram. Will schedule it.   - Mammo Screening Bilateral; Future    6. Vitamin D deficiency  Corrected by vitamin D supplement.   - Vitamin D, Total (25-Hydroxy)    Follow up in  4 months.      History of Present Illness   This 65 y.o. old female is here for follow up. Reports she doing quite well. Only has some residual back pains after she picked up something yesterday and twisted her back in a wrong way. Pains are bearable. Ambulates with a walker already.  Has hypertension and hyperlipidemia controlled by her medications. Has major depression and asthma which are both in remission. Does not have complaints.     Review of Systems   A 12 point comprehensive review of systems was negative except as noted..     Medications, Allergies and Problem List   Reviewed and updated             Chief Complaint   Follow-up (fasting, HTN, Depressin. face to face for scooter)       Patient Profile   Social History     Social History Narrative    Single. No children. Worked odd jobs. Work related disability since 1999. Nonsmoker. Non alcohol drinker.        Past  Medical History   Patient Active Problem List   Diagnosis     Hypercholesterolemia     Anxiety     Panic Disorder Without Agoraphobia     Ptosis Of Eyelid     Lipoma Of The Adipose Tissue     Menopause     Asthma     Major depression     Essential hypertension     Gastroesophageal reflux disease without esophagitis       Past Surgical History  She has a past surgical history that includes Breast biopsy (Right, 1987).       Medications and Allergies   Current Outpatient Medications   Medication Sig     albuterol (PROAIR HFA;PROVENTIL HFA;VENTOLIN HFA) 90 mcg/actuation inhaler INHALE 2 PUFFS BY MOUTH EVERY 6 HOURS AS NEEDED FOR WHEEZING OR SHORTNESS OF BREATH     clonazePAM (KLONOPIN) 0.5 MG tablet TAKE 1 TABLET BY MOUTH THREE TIMES DAILY     clotrimazole-betamethasone (LOTRISONE) cream APPLY TO THE AFFECTED AREA TWICE DAILY     FLUoxetine (PROZAC) 20 MG capsule Take 1 capsule (20 mg total) by mouth 2 (two) times a day.     hydroCHLOROthiazide (HYDRODIURIL) 25 MG tablet TAKE ONE TABLET DAILY     lovastatin (MEVACOR) 20 MG tablet TAKE 1 TABLET BY MOUTH EVERY NIGHT AT BEDTIME     omeprazole (PRILOSEC) 20 MG capsule TAKE 1 CAPSULE(20 MG) BY MOUTH 1 TIME FOR 1 DOSE     potassium chloride (K-DUR,KLOR-CON) 20 MEQ tablet Take 1 tablet (20 mEq total) by mouth 2 (two) times a day.     SYMBICORT 160-4.5 mcg/actuation inhaler INHALE 2 PUFFS BY MOUTH TWICE DAILY     VENTOLIN HFA 90 mcg/actuation inhaler INHALE 2 PUFFS BY MOUTH EVERY 6 HOURS AS NEEDED     Allergies   Allergen Reactions     Aspirin (Tartrazine Only)      Chicken      [place]         Family and Social History   Family History   Problem Relation Age of Onset     Heart disease Mother      Cancer Father      Cancer Brother         Social History     Tobacco Use     Smoking status: Never Smoker     Smokeless tobacco: Never Used   Substance Use Topics     Alcohol use: No     Drug use: No        Physical Exam       Physical Exam  /64   Pulse 66   Ht 5' (1.524 m)    Wt 171 lb (77.6 kg)   SpO2 98%   BMI 33.40 kg/m    General appearance: alert, appears stated age, cooperative and no distress  Throat: lips, mucosa, and tongue normal; teeth and gums normal  Neck: no adenopathy, no carotid bruit, no JVD, supple, symmetrical, trachea midline and thyroid not enlarged, symmetric, no tenderness/mass/nodules  Lungs: clear to auscultation bilaterally  Heart: regular rate and rhythm, S1, S2 normal, no murmur, click, rub or gallop  Abdomen: soft, non-tender; bowel sounds normal; no masses,  no organomegaly  Extremities: extremities normal, atraumatic, no cyanosis or edema  Musculoskeletal: normal exam     Additional Information   Post Discharge Medication Reconciliation Status: discharge medications reconciled, continue medications without change      Sushil Wall MD  Internal Medicine  Contact me at [phone]     Additional Information   Current Outpatient Medications   Medication Sig     albuterol (PROAIR HFA;PROVENTIL HFA;VENTOLIN HFA) 90 mcg/actuation inhaler INHALE 2 PUFFS BY MOUTH EVERY 6 HOURS AS NEEDED FOR WHEEZING OR SHORTNESS OF BREATH     clonazePAM (KLONOPIN) 0.5 MG tablet TAKE 1 TABLET BY MOUTH THREE TIMES DAILY     clotrimazole-betamethasone (LOTRISONE) cream APPLY TO THE AFFECTED AREA TWICE DAILY     FLUoxetine (PROZAC) 20 MG capsule Take 1 capsule (20 mg total) by mouth 2 (two) times a day.     hydroCHLOROthiazide (HYDRODIURIL) 25 MG tablet TAKE ONE TABLET DAILY     lovastatin (MEVACOR) 20 MG tablet TAKE 1 TABLET BY MOUTH EVERY NIGHT AT BEDTIME     omeprazole (PRILOSEC) 20 MG capsule TAKE 1 CAPSULE(20 MG) BY MOUTH 1 TIME FOR 1 DOSE     potassium chloride (K-DUR,KLOR-CON) 20 MEQ tablet Take 1 tablet (20 mEq total) by mouth 2 (two) times a day.     SYMBICORT 160-4.5 mcg/actuation inhaler INHALE 2 PUFFS BY MOUTH TWICE DAILY     VENTOLIN HFA 90 mcg/actuation inhaler INHALE 2 PUFFS BY MOUTH EVERY 6 HOURS AS NEEDED     Allergies   Allergen Reactions     Aspirin  (Tartrazine Only)      Chicken      [place]      Social History     Tobacco Use     Smoking status: Never Smoker     Smokeless tobacco: Never Used   Substance Use Topics     Alcohol use: No     Drug use: No

## 2021-06-17 NOTE — TELEPHONE ENCOUNTER
Last Office Visit  10/12/2020 Sushil Wall MD    Notes:  1. Essential hypertension  Controlled. Continue hydrochlorothiazide.   - HM2(CBC w/o Differential)  - Basic Metabolic Panel     2. Hypercholesterolemia  Controlled. Continue lovastatin.   - Lipid Cascade  - Thyroid Stimulating Hormone (TSH)  - Hepatic Profile     3. Major depressive disorder with single episode, in full remission (H)  In remission. PHQ 9 is only 1 today. Continue fluoxetine and clonazepam.      4. Anxiety  Controlled by her meds taken for her depression. Continue same meds for her depression.      5. Gastroesophageal reflux disease without esophagitis  Controlled. Continue omperazole.      6. Vitamin D deficiency  Has low vitamin D. Recheck vitamin D. If it is still low, wants to get prescription for vitamin D supplement because it will be paid by her insurance.   - Vitamin D, Total (25-Hydroxy)     7. Need for immunization against influenza  Prevnar 13 was given.   - Pneumococcal conjugate vaccine 13-valent 6wks-17yrs; >50yrs    Last Filled:  clonazePAM (KLONOPIN) 0.5 MG tablet 90 tablet 0 3/9/2021  No   Sig: TAKE 1 TABLET BY MOUTH THREE TIMES DAILY   Sent to pharmacy as: clonazePAM 0.5 mg tablet (KlonoPIN)   Notes to Pharmacy: Overdue for office visit. Notify patient.   E-Prescribing Status: Receipt confirmed by pharmacy (3/9/2021  8:33 AM CST)       Lab Results   Component Value Date    AMPHET Screen Negative 07/14/2015    HEBENZODIAZ Screen Negative 07/14/2015    OPIATES Screen Negative 07/14/2015    PCP Screen Negative 07/14/2015    THC Screen Negative 07/14/2015    BARBIT Screen Negative 07/14/2015    COCAINEMETAB Screen Negative 07/14/2015    METHADNE Screen Negative 07/14/2015    OXYCODONE Screen Negative 07/14/2015    CREAUR 10.7 07/14/2015         Next OV:  5/4/2021 Sushil Wall MD      Encounter-Level CSA Scan Date:    There are no encounter-level csa scan date.         Medication teed up for provider signature -  please adjust as appropriate.

## 2021-06-17 NOTE — TELEPHONE ENCOUNTER
Please call patient -    ______________________________________________________________________     Home phone:  [phone] (home)     Cell phone:   Telephone Information:   Mobile [phone]       Other contacts:  Name Home Phone Work Phone Mobile Phone Relationship Lgl Grd     ______________________________________________________________________     We did a refill of her clonazepam (Klonopin).    Per Sushil Wall MD's last note, she is due for follow up in the next 30 days.  Could help her to schedule a visit.    Steve Caballero MD  [facility]  5/2/2021, 10:49 PM

## 2021-06-17 NOTE — TELEPHONE ENCOUNTER
Telephone Encounter by Seema London at 4/13/2020  6:47 AM     Author: Seema London Service: -- Author Type: --    Filed: 4/13/2020  7:03 AM Encounter Date: 4/9/2020 Status: Signed    : Seema London       PRIOR AUTHORIZATION DENIED    Denial Rational: THE BRAND NAME PRODUCT IS PREFERRED. A PA MAY ONLY BE GRANTED FOR THE GENERIC IF THERE IS EVIDENCE OF THERAPEUTIC SUCCESS WHILE TAKING THE GENERIC AND THERAPEUTIC FAILURE WHILE TAKING THE GENERIC DRUG.        Appeal Information: If the provider would like to appeal this denial, please provide a letter of medical necessity and once it has been completed and placed in the patient's chart, notify the Central PA Team ([facility] MED [id number]) and the appeal can be initiated on behalf of the patient and provider.  Please also include any therapies that the patient has tried and their outcomes.

## 2021-06-17 NOTE — TELEPHONE ENCOUNTER
Refill Approved    Rx renewed per Medication Renewal Policy. Medication was last renewed on 4/9/20, 9/7/20.    Lorenzo Granado, [facility] Triage/Med Refill 4/29/2021     Requested Prescriptions   Pending Prescriptions Disp Refills     hydroCHLOROthiazide (HYDRODIURIL) 25 MG tablet [Pharmacy Med Name: HYDROCHLOROTHIAZIDE 25MG TABLETS] 90 tablet 1     Sig: TAKE ONE TABLET DAILY       Diuretics/Combination Diuretics Refill Protocol  Passed - 4/28/2021  5:27 AM        Passed - Visit with PCP or prescribing provider visit in past 12 months     Last office visit with prescriber/PCP: 10/12/2020 Sushil Wall MD OR same dept: 10/12/2020 Sushil Wall MD OR same specialty: 10/12/2020 Sushil Wall MD  Last physical: 3/11/2020 Last MTM visit: Visit date not found   Next visit within 3 mo: Visit date not found  Next physical within 3 mo: Visit date not found  Prescriber OR PCP: Sushil Wall MD  Last diagnosis associated with med order: 1. Essential hypertension  - hydroCHLOROthiazide (HYDRODIURIL) 25 MG tablet [Pharmacy Med Name: HYDROCHLOROTHIAZIDE 25MG TABLETS]; TAKE ONE TABLET DAILY  Dispense: 90 tablet; Refill: 1    2. Gastroesophageal reflux disease without esophagitis  - omeprazole (PRILOSEC) 20 MG capsule [Pharmacy Med Name: OMEPRAZOLE 20MG CAPSULES]; TAKE 1 CAPSULE(20 MG) BY MOUTH 1 TIME FOR 1 DOSE  Dispense: 90 capsule; Refill: 0    3. Hypercholesterolemia  - lovastatin (MEVACOR) 20 MG tablet [Pharmacy Med Name: LOVASTATIN 20MG TABLETS]; TAKE 1 TABLET BY MOUTH EVERY NIGHT AT BEDTIME  Dispense: 90 tablet; Refill: 3    If protocol passes may refill for 12 months if within 3 months of last provider visit (or a total of 15 months).             Passed - Serum Potassium in past 12 months      Lab Results   Component Value Date    Potassium 3.6 10/12/2020             Passed - Serum Sodium in past 12 months      Lab Results   Component Value Date    Sodium 139 10/12/2020             Passed - Blood  pressure on file in past 12 months     BP Readings from Last 1 Encounters:   10/12/20 104/60             Passed - Serum Creatinine in past 12 months      Creatinine   Date Value Ref Range Status   10/12/2020 0.83 0.60 - 1.10 mg/dL Final                omeprazole (PRILOSEC) 20 MG capsule [Pharmacy Med Name: OMEPRAZOLE 20MG CAPSULES] 90 capsule 0     Sig: TAKE 1 CAPSULE(20 MG) BY MOUTH 1 TIME FOR 1 DOSE       GI Medications Refill Protocol Passed - 4/28/2021  5:27 AM        Passed - PCP or prescribing provider visit in last 12 or next 3 months.     Last office visit with prescriber/PCP: 10/12/2020 Sushil Wall MD OR same dept: 10/12/2020 Sushil Wall MD OR same specialty: 10/12/2020 Sushil Wall MD  Last physical: 3/11/2020 Last MTM visit: Visit date not found   Next visit within 3 mo: Visit date not found  Next physical within 3 mo: Visit date not found  Prescriber OR PCP: Sushil Wall MD  Last diagnosis associated with med order: 1. Essential hypertension  - hydroCHLOROthiazide (HYDRODIURIL) 25 MG tablet [Pharmacy Med Name: HYDROCHLOROTHIAZIDE 25MG TABLETS]; TAKE ONE TABLET DAILY  Dispense: 90 tablet; Refill: 1    2. Gastroesophageal reflux disease without esophagitis  - omeprazole (PRILOSEC) 20 MG capsule [Pharmacy Med Name: OMEPRAZOLE 20MG CAPSULES]; TAKE 1 CAPSULE(20 MG) BY MOUTH 1 TIME FOR 1 DOSE  Dispense: 90 capsule; Refill: 0    3. Hypercholesterolemia  - lovastatin (MEVACOR) 20 MG tablet [Pharmacy Med Name: LOVASTATIN 20MG TABLETS]; TAKE 1 TABLET BY MOUTH EVERY NIGHT AT BEDTIME  Dispense: 90 tablet; Refill: 3    If protocol passes may refill for 12 months if within 3 months of last provider visit (or a total of 15 months).                lovastatin (MEVACOR) 20 MG tablet [Pharmacy Med Name: LOVASTATIN 20MG TABLETS] 90 tablet 3     Sig: TAKE 1 TABLET BY MOUTH EVERY NIGHT AT BEDTIME       Statins Refill Protocol (Hmg CoA Reductase Inhibitors) Passed - 4/28/2021  5:27 AM        Passed  - PCP or prescribing provider visit in past 12 months      Last office visit with prescriber/PCP: 10/12/2020 Sushil Wall MD OR same dept: 10/12/2020 Sushil Wall MD OR same specialty: 10/12/2020 Sushil Wall MD  Last physical: 3/11/2020 Last MTM visit: Visit date not found   Next visit within 3 mo: Visit date not found  Next physical within 3 mo: Visit date not found  Prescriber OR PCP: Sushil Wall MD  Last diagnosis associated with med order: 1. Essential hypertension  - hydroCHLOROthiazide (HYDRODIURIL) 25 MG tablet [Pharmacy Med Name: HYDROCHLOROTHIAZIDE 25MG TABLETS]; TAKE ONE TABLET DAILY  Dispense: 90 tablet; Refill: 1    2. Gastroesophageal reflux disease without esophagitis  - omeprazole (PRILOSEC) 20 MG capsule [Pharmacy Med Name: OMEPRAZOLE 20MG CAPSULES]; TAKE 1 CAPSULE(20 MG) BY MOUTH 1 TIME FOR 1 DOSE  Dispense: 90 capsule; Refill: 0    3. Hypercholesterolemia  - lovastatin (MEVACOR) 20 MG tablet [Pharmacy Med Name: LOVASTATIN 20MG TABLETS]; TAKE 1 TABLET BY MOUTH EVERY NIGHT AT BEDTIME  Dispense: 90 tablet; Refill: 3    If protocol passes may refill for 12 months if within 3 months of last provider visit (or a total of 15 months).

## 2021-06-19 NOTE — LETTER
Letter by Sushil Wall MD at      Author: Sushil Wall MD Service: -- Author Type: --    Filed:  Encounter Date: 11/13/2019 Status: Signed         Tootie Marin  [address]  [place] [address]             November 13, 2019         Dear Ms. Marin,    Below are the results from your recent visit:    Resulted Orders   Basic Metabolic Panel   Result Value Ref Range    Sodium 142 136 - 145 mmol/L    Potassium 3.2 (L) 3.5 - 5.0 mmol/L    Chloride 104 98 - 107 mmol/L    CO2 25 22 - 31 mmol/L    Anion Gap, Calculation 13 5 - 18 mmol/L    Glucose 122 70 - 125 mg/dL    Calcium 10.0 8.5 - 10.5 mg/dL    BUN 8 8 - 22 mg/dL    Creatinine 0.95 0.60 - 1.10 mg/dL    GFR MDRD Af Amer >60 >60 mL/min/1.73m2    GFR MDRD Non Af Amer 59 (L) >60 mL/min/1.73m2    Narrative    Fasting Glucose reference range is 70-99 mg/dL per  American Diabetes Association (ADA) guidelines.       Low potassium.  Make sure you take your potassium supplement twice a day.  Recheck potassium next visit.  Normal rest of labs.  Thank you.    Please call with questions or contact us using [employer].    Sincerely,        Electronically signed by Sushil Wall MD

## 2021-06-19 NOTE — LETTER
Letter by Sushil Wall MD at      Author: Sushil Wall MD Service: -- Author Type: --    Filed:  Encounter Date: 5/22/2019 Status: (Other)         Tootie Marin  [facility]  [address]  [place] [address]             May 22, 2019         Dear Ms. Marin,    Below are the results from your recent visit:    Resulted Orders   Lipid Cascade   Result Value Ref Range    Cholesterol 217 (H) <=199 mg/dL    Triglycerides 68 <=149 mg/dL    HDL Cholesterol 71 >=50 mg/dL    LDL Calculated 132 (H) <=129 mg/dL    Patient Fasting > 8hrs? Yes    HM2(CBC w/o Differential)   Result Value Ref Range    WBC 6.6 4.0 - 11.0 thou/uL    RBC 4.82 3.80 - 5.40 mill/uL    Hemoglobin 13.4 12.0 - 16.0 g/dL    Hematocrit 41.2 35.0 - 47.0 %    MCV 86 80 - 100 fL    MCH 27.9 27.0 - 34.0 pg    MCHC 32.6 32.0 - 36.0 g/dL    RDW 12.5 11.0 - 14.5 %    Platelets 328 140 - 440 thou/uL    MPV 8.2 7.0 - 10.0 fL   Basic Metabolic Panel   Result Value Ref Range    Sodium 142 136 - 145 mmol/L    Potassium 3.3 (L) 3.5 - 5.0 mmol/L    Chloride 105 98 - 107 mmol/L    CO2 27 22 - 31 mmol/L    Anion Gap, Calculation 10 5 - 18 mmol/L    Glucose 85 70 - 125 mg/dL    Calcium 9.8 8.5 - 10.5 mg/dL    BUN 5 (L) 8 - 22 mg/dL    Creatinine 0.85 0.60 - 1.10 mg/dL    GFR MDRD Af Amer >60 >60 mL/min/1.73m2    GFR MDRD Non Af Amer >60 >60 mL/min/1.73m2    Narrative    Fasting Glucose reference range is 70-99 mg/dL per  American Diabetes Association (ADA) guidelines.   Hepatic Profile   Result Value Ref Range    Bilirubin, Total 0.5 0.0 - 1.0 mg/dL    Bilirubin, Direct 0.2 <=0.5 mg/dL    Protein, Total 6.9 6.0 - 8.0 g/dL    Albumin 3.7 3.5 - 5.0 g/dL    Alkaline Phosphatase 109 45 - 120 U/L    AST 26 0 - 40 U/L    ALT 25 0 - 45 U/L       Normal all labs specifically your lipids.  Some insignificant changes of no clinical importance.  Continue same medications.  Thank you.    Please call with questions or contact us using  MyChart.    Sincerely,        Electronically signed by Sushil Wall MD

## 2021-06-19 NOTE — LETTER
Letter by Sushil Wall MD at      Author: Sushil Wall MD Service: -- Author Type: --    Filed:  Encounter Date: 9/9/2019 Status: (Other)         Tootie Marin  [address]  [place] [address]             September 9, 2019         Dear Ms. Marin,    Below are the results from your recent visit:    Resulted Orders   Basic Metabolic Panel   Result Value Ref Range    Sodium 141 136 - 145 mmol/L    Potassium 3.0 (L) 3.5 - 5.0 mmol/L    Chloride 103 98 - 107 mmol/L    CO2 25 22 - 31 mmol/L    Anion Gap, Calculation 13 5 - 18 mmol/L    Glucose 119 70 - 125 mg/dL    Calcium 9.7 8.5 - 10.5 mg/dL    BUN 12 8 - 22 mg/dL    Creatinine 0.97 0.60 - 1.10 mg/dL    GFR MDRD Af Amer >60 >60 mL/min/1.73m2    GFR MDRD Non Af Amer 58 (L) >60 mL/min/1.73m2    Narrative    Fasting Glucose reference range is 70-99 mg/dL per  American Diabetes Association (ADA) guidelines.       Still low potassium due to your diuretic, hydrochlorothiazide. Please start Kdur 20 meq 2 times a day and have your potassium repeated after 1 week. Please make lab appointment only. Prescription was sent to your pharmacy. Thanks.     Please call with questions or contact us using [email or web address].    Sincerely,        Electronically signed by Sushil Wall MD

## 2021-06-19 NOTE — LETTER
Letter by Sushil Wall MD at      Author: Sushil Wall MD Service: -- Author Type: --    Filed:  Encounter Date: 9/18/2019 Status: (Other)         Tootie Marin  [address]  [place] [address]             September 18, 2019         Dear Ms. Marin,    Below are the results from your recent visit:    Resulted Orders   Potassium   Result Value Ref Range    Potassium 3.3 (L) 3.5 - 5.0 mmol/L       Improved potassium but still bit low. Continue potassium supplement twice a day from now on. Thanks.     Please call with questions or contact us using [employer].    Sincerely,        Electronically signed by Sushil Wall MD

## 2021-06-20 NOTE — LETTER
Letter by Sushil Wall MD at      Author: Sushil Wall MD Service: -- Author Type: --    Filed:  Encounter Date: 10/13/2020 Status: (Other)         Tootie Marin  [address]  [place] [address]             October 13, 2020         Dear Ms. Marin,    Below are the results from your recent visit:    Resulted Orders   HM2(CBC w/o Differential)   Result Value Ref Range    WBC 7.4 4.0 - 11.0 thou/uL    RBC 4.42 3.80 - 5.40 mill/uL    Hemoglobin 13.2 12.0 - 16.0 g/dL    Hematocrit 39.2 35.0 - 47.0 %    MCV 89 80 - 100 fL    MCH 29.9 27.0 - 34.0 pg    MCHC 33.7 32.0 - 36.0 g/dL    RDW 12.1 11.0 - 14.5 %    Platelets 361 140 - 440 thou/uL    MPV 7.6 7.0 - 10.0 fL   Lipid Cascade   Result Value Ref Range    Cholesterol 188 <=199 mg/dL    Triglycerides 43 <=149 mg/dL    HDL Cholesterol 65 >=50 mg/dL    LDL Calculated 114 <=129 mg/dL    Patient Fasting > 8hrs? Yes    Thyroid Stimulating Hormone (TSH)   Result Value Ref Range    TSH 1.66 0.30 - 5.00 uIU/mL   Basic Metabolic Panel   Result Value Ref Range    Sodium 139 136 - 145 mmol/L    Potassium 3.6 3.5 - 5.0 mmol/L    Chloride 100 98 - 107 mmol/L    CO2 26 22 - 31 mmol/L    Anion Gap, Calculation 13 5 - 18 mmol/L    Glucose 75 70 - 125 mg/dL    Calcium 9.8 8.5 - 10.5 mg/dL    BUN 13 8 - 22 mg/dL    Creatinine 0.83 0.60 - 1.10 mg/dL    GFR MDRD Af Amer >60 >60 mL/min/1.73m2    GFR MDRD Non Af Amer >60 >60 mL/min/1.73m2    Narrative    Fasting Glucose reference range is 70-99 mg/dL per  American Diabetes Association (ADA) guidelines.   Hepatic Profile   Result Value Ref Range    Bilirubin, Total 0.7 0.0 - 1.0 mg/dL    Bilirubin, Direct 0.2 <=0.5 mg/dL    Protein, Total 6.9 6.0 - 8.0 g/dL    Albumin 4.4 3.5 - 5.0 g/dL    Alkaline Phosphatase 104 45 - 120 U/L    AST 21 0 - 40 U/L    ALT 24 0 - 45 U/L       Normal all labs specifically your lipids. Good! Continue same meds. Thanks.     Please call with questions or contact us  using [employer]    Sincerely,        Electronically signed by Sushil Wall MD

## 2021-06-20 NOTE — LETTER
Letter by Sushil Wall MD at      Author: Sushil Wall MD Service: -- Author Type: --    Filed:  Encounter Date: 3/11/2020 Status: (Other)         Tootie Marin  [address]  [place] [address]             March 11, 2020         Dear Ms. Marin,    Below are the results from your recent visit:    Resulted Orders   HM2(CBC w/o Differential)   Result Value Ref Range    WBC 8.0 4.0 - 11.0 thou/uL    RBC 4.85 3.80 - 5.40 mill/uL    Hemoglobin 14.0 12.0 - 16.0 g/dL    Hematocrit 41.9 35.0 - 47.0 %    MCV 86 80 - 100 fL    MCH 28.8 27.0 - 34.0 pg    MCHC 33.4 32.0 - 36.0 g/dL    RDW 12.9 11.0 - 14.5 %    Platelets 396 140 - 440 thou/uL    MPV 7.8 7.0 - 10.0 fL   Lipid Cascade   Result Value Ref Range    Cholesterol 200 (H) <=199 mg/dL    Triglycerides 80 <=149 mg/dL    HDL Cholesterol 62 >=50 mg/dL    LDL Calculated 122 <=129 mg/dL    Patient Fasting > 8hrs? Yes    Thyroid Stimulating Hormone (TSH)   Result Value Ref Range    TSH 2.95 0.30 - 5.00 uIU/mL   Urinalysis-UC if Indicated   Result Value Ref Range    Color, UA Yellow Colorless, Yellow, Straw, Light Yellow    Clarity, UA Clear Clear    Glucose, UA Negative Negative    Bilirubin, UA Negative Negative    Ketones, UA Negative Negative    Specific Gravity, UA 1.020 1.005 - 1.030    Blood, UA Negative Negative    pH, UA 7.5 5.0 - 8.0    Protein, UA Negative Negative mg/dL    Urobilinogen, UA 0.2 E.U./dL 0.2 E.U./dL, 1.0 E.U./dL    Nitrite, UA Negative Negative    Leukocytes, UA Negative Negative    Narrative    Microscopic not indicated  UC not indicated   Basic Metabolic Panel   Result Value Ref Range    Sodium 138 136 - 145 mmol/L    Potassium 3.3 (L) 3.5 - 5.0 mmol/L    Chloride 97 (L) 98 - 107 mmol/L    CO2 27 22 - 31 mmol/L    Anion Gap, Calculation 14 5 - 18 mmol/L    Glucose 84 70 - 125 mg/dL    Calcium 10.2 8.5 - 10.5 mg/dL    BUN 10 8 - 22 mg/dL    Creatinine 0.87 0.60 - 1.10 mg/dL    GFR MDRD Af Amer >60 >60  mL/min/1.73m2    GFR MDRD Non Af Amer >60 >60 mL/min/1.73m2    Narrative    Fasting Glucose reference range is 70-99 mg/dL per  American Diabetes Association (ADA) guidelines.   Hepatic Profile   Result Value Ref Range    Bilirubin, Total 0.7 0.0 - 1.0 mg/dL    Bilirubin, Direct 0.2 <=0.5 mg/dL    Protein, Total 7.0 6.0 - 8.0 g/dL    Albumin 4.1 3.5 - 5.0 g/dL    Alkaline Phosphatase 124 (H) 45 - 120 U/L    AST 29 0 - 40 U/L    ALT 44 0 - 45 U/L       Still low potassium.  Increase your potassium supplement to 2 times a day.  Have your potassium repeated in 1 week as lab appointment only.  Otherwise rest of your labs are normal specifically your lipids.  Continue all other medications.  Thank you.    Please call with questions or contact us using [employer].    Sincerely,        Electronically signed by Sushil Wall MD

## 2021-06-20 NOTE — LETTER
Letter by Sushil Wall MD at      Author: Sushil Wall MD Service: -- Author Type: --    Filed:  Encounter Date: 10/13/2020 Status: (Other)         Tootie Marin  [address]  [place] [address]             October 14, 2020         Dear Ms. Marin,    Below are the results from your recent visit:    Resulted Orders   HM2(CBC w/o Differential)   Result Value Ref Range    WBC 7.4 4.0 - 11.0 thou/uL    RBC 4.42 3.80 - 5.40 mill/uL    Hemoglobin 13.2 12.0 - 16.0 g/dL    Hematocrit 39.2 35.0 - 47.0 %    MCV 89 80 - 100 fL    MCH 29.9 27.0 - 34.0 pg    MCHC 33.7 32.0 - 36.0 g/dL    RDW 12.1 11.0 - 14.5 %    Platelets 361 140 - 440 thou/uL    MPV 7.6 7.0 - 10.0 fL   Lipid Cascade   Result Value Ref Range    Cholesterol 188 <=199 mg/dL    Triglycerides 43 <=149 mg/dL    HDL Cholesterol 65 >=50 mg/dL    LDL Calculated 114 <=129 mg/dL    Patient Fasting > 8hrs? Yes    Thyroid Stimulating Hormone (TSH)   Result Value Ref Range    TSH 1.66 0.30 - 5.00 uIU/mL   Basic Metabolic Panel   Result Value Ref Range    Sodium 139 136 - 145 mmol/L    Potassium 3.6 3.5 - 5.0 mmol/L    Chloride 100 98 - 107 mmol/L    CO2 26 22 - 31 mmol/L    Anion Gap, Calculation 13 5 - 18 mmol/L    Glucose 75 70 - 125 mg/dL    Calcium 9.8 8.5 - 10.5 mg/dL    BUN 13 8 - 22 mg/dL    Creatinine 0.83 0.60 - 1.10 mg/dL    GFR MDRD Af Amer >60 >60 mL/min/1.73m2    GFR MDRD Non Af Amer >60 >60 mL/min/1.73m2    Narrative    Fasting Glucose reference range is 70-99 mg/dL per  American Diabetes Association (ADA) guidelines.   Hepatic Profile   Result Value Ref Range    Bilirubin, Total 0.7 0.0 - 1.0 mg/dL    Bilirubin, Direct 0.2 <=0.5 mg/dL    Protein, Total 6.9 6.0 - 8.0 g/dL    Albumin 4.4 3.5 - 5.0 g/dL    Alkaline Phosphatase 104 45 - 120 U/L    AST 21 0 - 40 U/L    ALT 24 0 - 45 U/L   Vitamin D, Total (25-Hydroxy)   Result Value Ref Range    Vitamin D, Total (25-Hydroxy) 13.8 (L) 30.0 - 80.0 ng/mL     Narrative    Deficiency <10.0 ng/mL  Insufficiency 10.0-29.9 ng/mL  Sufficiency 30.0-80.0 ng/mL  Toxicity (possible) >100.0 ng/mL       You have very low vitamin D. I will start your on vitamin D 50,000 units weekly for 4 weeks and then have your vitamin D rechecked as lab appointment. Prescription was sent to your pharmacy.     Thanks.     Please call with questions or contact us using [employer].    Sincerely,        Electronically signed by Sushil Wall MD

## 2021-06-21 NOTE — LETTER
Letter by Sushil Wall MD at      Author: Sushil Wall MD Service: -- Author Type: --    Filed:  Encounter Date: 5/6/2021 Status: (Other)         Tootie Marin  [facility]  [address]  [place] [address]             May 6, 2021         Dear Ms. Marin,    Below are the results from your recent visit:    Resulted Orders   HM2(CBC w/o Differential)   Result Value Ref Range    WBC 7.3 4.0 - 11.0 thou/uL    RBC 4.68 3.80 - 5.40 mill/uL    Hemoglobin 13.6 12.0 - 16.0 g/dL    Hematocrit 41.3 35.0 - 47.0 %    MCV 88 80 - 100 fL    MCH 29.1 27.0 - 34.0 pg    MCHC 32.9 32.0 - 36.0 g/dL    RDW 13.3 11.0 - 14.5 %    Platelets 388 140 - 440 thou/uL    MPV 9.0 7.0 - 10.0 fL   Lipid Cascade   Result Value Ref Range    Cholesterol 199 <=199 mg/dL    Triglycerides 63 <=149 mg/dL    HDL Cholesterol 66 >=50 mg/dL    LDL Calculated 120 <=129 mg/dL    Patient Fasting > 8hrs? No    Thyroid Stimulating Hormone (TSH)   Result Value Ref Range    TSH 1.41 0.30 - 5.00 uIU/mL   Urinalysis-UC if Indicated   Result Value Ref Range    Color, UA Yellow Colorless, Yellow, Straw, Light Yellow    Clarity, UA Clear Clear    Glucose, UA Negative Negative    Protein, UA Negative Negative    Bilirubin, UA Negative Negative    Urobilinogen, UA 0.2 E.U./dL 0.2 E.U./dL, 1.0 E.U./dL    pH, UA 6.5 5.0 - 8.0    Blood, UA Negative Negative    Ketones, UA Negative Negative    Nitrite, UA Negative Negative    Leukocytes, UA Trace (!) Negative    Specific Gravity, UA 1.015 1.005 - 1.030    RBC, UA None Seen None Seen, 0-2 hpf    WBC, UA 0-5 None Seen, 0-5 hpf    Bacteria, UA None Seen None Seen    Squam Epithel, UA 0-5 None Seen, 0-5 lpf    Narrative    Urine Culture ordered based on [facility]' criteria   Basic Metabolic Panel   Result Value Ref Range    Sodium 141 136 - 145 mmol/L    Potassium 3.8 3.5 - 5.0 mmol/L    Chloride 99 98 - 107 mmol/L    CO2 28 22 - 31 mmol/L    Anion Gap, Calculation 14 5 -  18 mmol/L    Glucose 93 70 - 125 mg/dL    Calcium 9.7 8.5 - 10.5 mg/dL    BUN 11 8 - 22 mg/dL    Creatinine 0.86 0.60 - 1.10 mg/dL    GFR MDRD Af Amer >60 >60 mL/min/1.73m2    GFR MDRD Non Af Amer >60 >60 mL/min/1.73m2    Narrative    Fasting Glucose reference range is 70-99 mg/dL per  American Diabetes Association (ADA) guidelines.   Hepatic Profile   Result Value Ref Range    Bilirubin, Total 0.6 0.0 - 1.0 mg/dL    Bilirubin, Direct 0.2 <=0.5 mg/dL    Protein, Total 7.1 6.0 - 8.0 g/dL    Albumin 4.3 3.5 - 5.0 g/dL    Alkaline Phosphatase 103 45 - 120 U/L    AST 24 0 - 40 U/L    ALT 29 0 - 45 U/L   Vitamin D, Total (25-Hydroxy)   Result Value Ref Range    Vitamin D, Total (25-Hydroxy) 14.1 (L) 30.0 - 80.0 ng/mL    Narrative    Deficiency <10.0 ng/mL  Insufficiency 10.0-29.9 ng/mL  Sufficiency 30.0-80.0 ng/mL  Toxicity (possible) >100.0 ng/mL   Culture, Urine   Result Value Ref Range    Culture No Growth        Very low vitamin D. I like you to start vitamin D supplement 50,000 units weekly for 6 weeks. Then have your vitamin D in 6 weeks as lab appointment. Prescription of vitamin D was sent to your pharmacy.     Otherwise, all your other labs are normal. Continue same medications. Thanks.       Please call with questions or contact us using [employer]    Sincerely,        Electronically signed by Sushil Wall MD

## 2021-06-23 NOTE — TELEPHONE ENCOUNTER
Controlled Substance Refill Request  Medication:   Requested Prescriptions     Pending Prescriptions Disp Refills     clonazePAM (KLONOPIN) 0.5 MG tablet [Pharmacy Med Name: CLONAZEPAM 0.5MG TABLETS] 90 tablet 0     Sig: TAKE ONE TABLET THREE TIMES DAILY     Date Last Fill: 12/13/18  Pharmacy: [id number]   Submit electronically to pharmacy  Controlled Substance Agreement on File:   Encounter-Level CSA Scan Date:    There are no encounter-level csa scan date.       Last office visit: Last office visit pertaining to requested medication was 2/13/18.

## 2021-06-23 NOTE — TELEPHONE ENCOUNTER
Prescription Monitoring Program activity reviewed with no discrepancies noted.  Last fill per : 12/13/18    Controlled Substance Agreement on file: No  Date:     Last office visit with provider:      10/18/2017 Sushil Wall MD    Please advise.  Yelena Walker CMA ([facility])

## 2021-06-24 NOTE — TELEPHONE ENCOUNTER
Controlled Substance Refill Request  Medication:   Requested Prescriptions     Pending Prescriptions Disp Refills     clonazePAM (KLONOPIN) 0.5 MG tablet [Pharmacy Med Name: CLONAZEPAM 0.5MG TABLETS] 30 tablet 0     Sig: TAKE ONE TABLET THREE TIMES DAILY     Date Last Fill: 1/24/19  Pharmacy: [id number]   Submit electronically to pharmacy  Controlled Substance Agreement on File:   Encounter-Level CSA Scan Date:    There are no encounter-level csa scan date.       Last office visit: Last office visit pertaining to requested medication was 2/13/18.

## 2021-06-24 NOTE — TELEPHONE ENCOUNTER
Prescription Monitoring Program activity reviewed with no discrepancies noted.  Last fill per : 1/24 #30 for 10 days    Controlled Substance Agreement on file: No Date:     Last office visit with provider:  10/18/2017 Sushil Wall MD    Has appointment 2/18    Please advise.  Yelena Walker CMA ([facility])

## 2021-06-24 NOTE — PROGRESS NOTES
Office Visit - Follow Up   Tootie Marin   63 y.o. female    Date of Visit: 2/18/2019    Chief Complaint   Patient presents with     Follow-up     not fasting, increased fatigue while battling chest cold, complains of increased acid reflux after eating. Has not taken Lovastatin regularly.        Assessment and Plan   1. Moderate persistent asthma without complication  Today her asthma is in remission.  Had some URI symptoms prior to this visit but this is now resolved.  Her ACT score is good at 20.  Continue same inhalers.    2. Hypercholesterolemia  Not compliant taking her lovastatin.  But she is not fasting today.  Will check her fasting lipids next visit.    3. Gastroesophageal reflux disease without esophagitis  Complains of increased acid and some midepigastric discomfort.  Has recurrence of her GERD.  Will prescribe her omeprazole daily.  - omeprazole (PRILOSEC) 20 MG capsule; Take 1 capsule (20 mg total) by mouth daily.  Dispense: 90 capsule; Refill: 3    4. Major depressive disorder with single episode, in full remission (H)  In remission.  Takes fluoxetine.   PHQ 9 score is 1 which is good.    5. Anxiety  Has anxiety.  But MALINDA 7 score is 0.  Continue Klonopin.  - clonazePAM (KLONOPIN) 0.5 MG tablet; Take 1 tablet (0.5 mg total) by mouth 3 (three) times a day.  Dispense: 90 tablet; Refill: 0    6. Screen for colon cancer  Due for colon cancer screening.  We will schedule her for colonoscopy.  - Ambulatory referral for Colonoscopy    The following high BMI interventions were performed this visit: encouragement to exercise, weight monitoring and prescribed dietary intake morbidly obese based on her BMI of 38.      Follow up in 3 months.  Will fast next visit.     History of Present Illness   This 63 y.o. old female is here for follow-up.  Was lost to follow-up since February 2018.  Requested for refill of her medications.  Was advised that she needs to be seen before all her medication can be refilled.   Has asthma.  But now in remission.  Had mild flare of her asthma when she had URI symptoms.  Now  these are resolved.  Reports she is back to her baseline breathing.  Has hypercholesterolemia.  But not compliant taking her lovastatin.  Suspect her lipids may be increased now.  Not fasting today so we will check her fasting lipids next visit.  Has major depression and anxiety but these are now controlled  by her medications.  Complains of recurrence of her GERD symptoms.  Used to take omeprazole.  Remains obese.  Otherwise, feels well.    Review of Systems   A 12 point comprehensive review of systems was negative except as noted..     Medications, Allergies and Problem List   Reviewed and updated             Chief Complaint   Follow-up (not fasting, increased fatigue while battling chest cold, complains of increased acid reflux after eating. Has not taken Lovastatin regularly.)       Patient Profile   Social History     Social History Narrative     Not on file        Past Medical History   Patient Active Problem List   Diagnosis     Hypercholesterolemia     Anxiety     Panic Disorder Without Agoraphobia     Ptosis Of Eyelid     Lipoma Of The Adipose Tissue     Menopause     Asthma     Major depression       Past Surgical History  She has a past surgical history that includes Breast biopsy (Right, 1987).       Medications and Allergies   Current Outpatient Medications   Medication Sig     acetaminophen (TYLENOL) 500 MG tablet Take 2 tablets (1,000 mg total) by mouth every 6 (six) hours as needed.     Ca-D3-mag-zinc--deanna-boron 600 mg calcium- 800 unit-40 mg Chew Chew.     clonazePAM (KLONOPIN) 0.5 MG tablet Take 1 tablet (0.5 mg total) by mouth 3 (three) times a day.     clotrimazole-betamethasone (LOTRISONE) cream APPLY TO THE AFFECTED AREA TWICE DAILY     FLUoxetine (PROZAC) 20 MG capsule Take 1 capsule (20 mg total) by mouth 2 (two) times a day.     ibuprofen (ADVIL,MOTRIN) 800 MG tablet TAKE 1 TABLET BY MOUTH  "EVERY 8 HOURS AS NEEDED     lovastatin (MEVACOR) 20 MG tablet TAKE 1 TABLET BY MOUTH AT BEDTIME     SYMBICORT 160-4.5 mcg/actuation inhaler INHALE 2 PUFFS BY MOUTH TWICE DAILY     VENTOLIN HFA 90 mcg/actuation inhaler INHALE 2 PUFFS BY MOUTH EVERY 6 HOURS AS NEEDED     omeprazole (PRILOSEC) 20 MG capsule Take 1 capsule (20 mg total) by mouth daily.     Allergies   Allergen Reactions     Aspirin (Tartrazine Only)      Chicken      [place]         Family and Social History   No family history on file.     Social History     Tobacco Use     Smoking status: Never Smoker     Smokeless tobacco: Never Used   Substance Use Topics     Alcohol use: No     Drug use: No      Physical Exam       Physical Exam  /84   Pulse 88   Ht 5' 1\" (1.549 m)   Wt 202 lb (91.6 kg)   SpO2 96%   BMI 38.17 kg/m    General appearance: alert, appears stated age, cooperative, no distress and morbidly obese  Head: Normocephalic, without obvious abnormality, atraumatic  Nose: Nares normal. Septum midline. Mucosa normal. No drainage or sinus tenderness.  Throat: lips, mucosa, and tongue normal; teeth and gums normal  Neck: no adenopathy, no carotid bruit, no JVD, supple, symmetrical, trachea midline and thyroid not enlarged, symmetric, no tenderness/mass/nodules  Lungs: clear to auscultation bilaterally  Heart: regular rate and rhythm, S1, S2 normal, no murmur, click, rub or gallop  Abdomen: soft, non-tender; bowel sounds normal; no masses,  no organomegaly  Extremities: extremities normal, atraumatic, no cyanosis or edema  Skin: Skin color, texture, turgor normal. No rashes or lesions     Additional Information        Sushil Wall MD  Internal Medicine  Contact me at [phone]     Additional Information   Current Outpatient Medications   Medication Sig     acetaminophen (TYLENOL) 500 MG tablet Take 2 tablets (1,000 mg total) by mouth every 6 (six) hours as needed.     Ca-D3-mag-zinc--deanna-boron 600 mg calcium- 800 unit-40 mg " Chew Chew.     clonazePAM (KLONOPIN) 0.5 MG tablet Take 1 tablet (0.5 mg total) by mouth 3 (three) times a day.     clotrimazole-betamethasone (LOTRISONE) cream APPLY TO THE AFFECTED AREA TWICE DAILY     FLUoxetine (PROZAC) 20 MG capsule Take 1 capsule (20 mg total) by mouth 2 (two) times a day.     ibuprofen (ADVIL,MOTRIN) 800 MG tablet TAKE 1 TABLET BY MOUTH EVERY 8 HOURS AS NEEDED     lovastatin (MEVACOR) 20 MG tablet TAKE 1 TABLET BY MOUTH AT BEDTIME     SYMBICORT 160-4.5 mcg/actuation inhaler INHALE 2 PUFFS BY MOUTH TWICE DAILY     VENTOLIN HFA 90 mcg/actuation inhaler INHALE 2 PUFFS BY MOUTH EVERY 6 HOURS AS NEEDED     omeprazole (PRILOSEC) 20 MG capsule Take 1 capsule (20 mg total) by mouth daily.     Allergies   Allergen Reactions     Aspirin (Tartrazine Only)      Chicken      [place]      Social History     Tobacco Use     Smoking status: Never Smoker     Smokeless tobacco: Never Used   Substance Use Topics     Alcohol use: No     Drug use: No         Time: total time spent with the patient was 25 minutes of which >50% was spent in counseling and coordination of care

## 2021-06-24 NOTE — TELEPHONE ENCOUNTER
Colonoscopy Order Update:    [facility] has left multiple messages for patient. A contact letter has also been sent to patient's home. No response from patient. We will defer this order at this time in case patient wants to schedule in the future.     No additional action is currently needed.    Thank you

## 2021-06-25 NOTE — TELEPHONE ENCOUNTER
Last Office Visit  5/4/2021 Sushil Wall MD    Notes:  1. Hypercholesterolemia  Controlled. Last lipids were good. Continue lovastatin.   - Lipid Cascade  - Thyroid Stimulating Hormone (TSH)  - Basic Metabolic Panel  - Hepatic Profile     2. Essential hypertension  Controlled. Continue hydrochlorothiazide. Continue potassium as supplement.   - HM2(CBC w/o Differential)  - Urinalysis-UC if Indicated     3. Moderate persistent asthma without complication  Now in remission. ACT score is 23. Denies shortness of breath and cough.      4. Major depressive disorder with single episode, in full remission (H)  Now in remisssion. PHQ 9 score is 0.      5. Visit for screening mammogram  Due for her screening mammogram. Will schedule it.   - Mammo Screening Bilateral; Future     6. Vitamin D deficiency  Corrected by vitamin D supplement.   - Vitamin D, Total (25-Hydroxy)     Follow up in  4 months.     Last Filled:  clonazePAM (KLONOPIN) 0.5 MG tablet 90 tablet 0 5/2/2021  No   Sig: TAKE 1 TABLET BY MOUTH THREE TIMES DAILY   Sent to pharmacy as: clonazePAM 0.5 mg tablet (KlonoPIN)   E-Prescribing Status: Receipt confirmed by pharmacy (5/2/2021 10:49 PM CDT)       Lab Results   Component Value Date    AMPHET Screen Negative 07/14/2015    HEBENZODIAZ Screen Negative 07/14/2015    OPIATES Screen Negative 07/14/2015    PCP Screen Negative 07/14/2015    THC Screen Negative 07/14/2015    BARBIT Screen Negative 07/14/2015    COCAINEMETAB Screen Negative 07/14/2015    METHADNE Screen Negative 07/14/2015    OXYCODONE Screen Negative 07/14/2015    CREAUR 10.7 07/14/2015         Next OV:  Visit date not found      Encounter-Level CSA Scan Date:    There are no encounter-level csa scan date.         Medication teed up for provider signature - please adjust as appropriate.

## 2021-06-25 NOTE — TELEPHONE ENCOUNTER
Refill Approved    Rx renewed per Medication Renewal Policy. Medication was last renewed on 2/28/18    Jarod Dodge, [facility] Triage/Med Refill 3/16/2019     Requested Prescriptions   Pending Prescriptions Disp Refills     lovastatin (MEVACOR) 20 MG tablet [Pharmacy Med Name: LOVASTATIN 20MG TABLETS] 90 tablet 0     Sig: TAKE 1 TABLET BY MOUTH EVERY NIGHT AT BEDTIME    Statins Refill Protocol (Hmg CoA Reductase Inhibitors) Passed - 3/12/2019 11:20 AM       Passed - PCP or prescribing provider visit in past 12 months     Last office visit with prescriber/PCP: 2/18/2019 Sushil Wall MD OR same dept: 2/18/2019 Sushil Wall MD OR same specialty: 2/18/2019 Sushil Wall MD  Last physical: Visit date not found Last MTM visit: Visit date not found   Next visit within 3 mo: Visit date not found  Next physical within 3 mo: Visit date not found  Prescriber OR PCP: Sushil Wall MD  Last diagnosis associated with med order: 1. Hypercholesterolemia  - lovastatin (MEVACOR) 20 MG tablet [Pharmacy Med Name: LOVASTATIN 20MG TABLETS]; TAKE 1 TABLET BY MOUTH EVERY NIGHT AT BEDTIME  Dispense: 90 tablet; Refill: 0    If protocol passes may refill for 12 months if within 3 months of last provider visit (or a total of 15 months).

## 2021-06-25 NOTE — TELEPHONE ENCOUNTER
Controlled Substance Refill Request  Medication Name:   Requested Prescriptions     Pending Prescriptions Disp Refills     clonazePAM (KLONOPIN) 0.5 MG tablet [Pharmacy Med Name: CLONAZEPAM 0.5MG TABLETS] 90 tablet 0     Sig: TAKE 1 TABLET BY MOUTH THREE TIMES DAILY     Date Last Fill: 5/2/21  [facility]  Submit electronically to pharmacy  Controlled Substance Agreement on file:   Encounter-Level CSA Scan Date:    There are no encounter-level csa scan date.        Last office visit:  5/4/21         Uyen Hartman RN, BSN Nurse Triage Advisor 1:03 PM 6/9/2021

## 2021-06-25 NOTE — TELEPHONE ENCOUNTER
RN cannot approve Refill Request    RN can NOT refill this medication med is not covered by policy/route to provider. Last office visit: Visit date not found Last Physical: Visit date not found Last MTM visit: Visit date not found Last visit same specialty: 5/4/2021 Sushil Wall MD.  Next visit within 3 mo: Visit date not found  Next physical within 3 mo: Visit date not found      Nikkie Dye, [facility] Triage/Med Refill 6/8/2021    Requested Prescriptions   Pending Prescriptions Disp Refills     SYMBICORT 160-4.5 mcg/actuation inhaler [Pharmacy Med Name: SYMBICORT 160/4.5MCG (120 ORAL INH)] 1 Inhaler 0     Sig: INHALE 2 PUFFS BY MOUTH TWICE DAILY       There is no refill protocol information for this order

## 2021-07-03 NOTE — ADDENDUM NOTE
Addendum Note by Sushil Wall MD at 10/13/2020  5:06 PM     Author: Sushil Wall MD Service: -- Author Type: Physician    Filed: 10/14/2020  1:20 PM Encounter Date: 10/13/2020 Status: Signed    : Sushil Wall MD (Physician)    Addended by: SUSHIL WALL on: 10/14/2020 01:20 PM        Modules accepted: Orders

## 2021-07-03 NOTE — ADDENDUM NOTE
Addendum Note by Filomena Sanchez MLT at 10/19/2017  8:50 AM     Author: Filomena Sanchez MLT Service: -- Author Type:     Filed: 10/19/2017  8:50 AM Encounter Date: 10/18/2017 Status: Signed    : Filomena Sanchez MLT ()    Addended by: FILOMENA SANCHEZ on: 10/19/2017 08:50 AM        Modules accepted: Orders

## 2021-07-12 DIAGNOSIS — F41.1 ANXIETY STATE: ICD-10-CM

## 2021-07-13 ENCOUNTER — RECORDS - HEALTHEAST (OUTPATIENT)
Dept: ADMINISTRATIVE | Facility: CLINIC | Age: 66
End: 2021-07-13

## 2021-07-14 DIAGNOSIS — F41.1 ANXIETY STATE: ICD-10-CM

## 2021-07-14 NOTE — TELEPHONE ENCOUNTER
Routing refill request to provider for review/approval because:  Controlled substance request    Last Written Prescription Date:  6/9/21  Last Fill Quantity: 90,  # refills: 0   Last office visit provider:  5/4/21     Requested Prescriptions   Pending Prescriptions Disp Refills     clonazePAM (KLONOPIN) 0.5 MG tablet [Pharmacy Med Name: CLONAZEPAM 0.5MG TABLETS] 90 tablet      Sig: TAKE 1 TABLET BY MOUTH THREE TIMES DAILY       There is no refill protocol information for this order          Lorenzo Granado RN 07/14/21 11:08 AM

## 2021-07-14 NOTE — TELEPHONE ENCOUNTER
Controlled Substance Refill Request     Last refill:   clonazePAM (KLONOPIN) 0.5 MG tablet 90 tablet 0 6/9/2021  No   Sig: [CLONAZEPAM (KLONOPIN) 0.5 MG TABLET] TAKE 1 TABLET BY MOUTH THREE TIMES DAILY   Class: Normal       Last clinic visit: 5/4/2021-Dr. Wall     Clinic visit frequency required: Q 4 months  Next appt: Nothing scheduled    Controlled substance agreement on file: No.    Documentation in problem list reviewed:  Yes    Processing:  Rx to be electronically transmitted to pharmacy by provider

## 2021-07-19 NOTE — TELEPHONE ENCOUNTER
Routing refill request to provider for review/approval because:  Controlled substance request    Last Written Prescription Date:  6/9/21  Last Fill Quantity: 90,  # refills: 0   Last office visit provider:  5/4/21     Requested Prescriptions   Pending Prescriptions Disp Refills     clonazePAM (KLONOPIN) 0.5 MG tablet [Pharmacy Med Name: CLONAZEPAM 0.5MG TABLETS] 90 tablet      Sig: TAKE 1 TABLET BY MOUTH THREE TIMES DAILY       There is no refill protocol information for this order          Lorenzo Granado RN 07/19/21 7:36 AM

## 2021-07-20 RX ORDER — CLONAZEPAM 0.5 MG/1
TABLET ORAL
Qty: 90 TABLET | Refills: 0 | Status: SHIPPED | OUTPATIENT
Start: 2021-07-20 | End: 2022-01-17

## 2021-07-20 RX ORDER — CLONAZEPAM 0.5 MG/1
TABLET ORAL
Qty: 90 TABLET | Refills: 0 | Status: SHIPPED | OUTPATIENT
Start: 2021-07-20 | End: 2022-06-07 | Stop reason: ALTCHOICE

## 2021-07-21 ENCOUNTER — RECORDS - HEALTHEAST (OUTPATIENT)
Dept: ADMINISTRATIVE | Facility: CLINIC | Age: 66
End: 2021-07-21

## 2021-07-30 DIAGNOSIS — K21.9 GASTROESOPHAGEAL REFLUX DISEASE WITHOUT ESOPHAGITIS: ICD-10-CM

## 2021-07-30 NOTE — TELEPHONE ENCOUNTER
Pending Prescriptions:                       Disp   Refills    omeprazole (PRILOSEC) 20 MG DR capsule    90 cap*0          Last refill 4/30

## 2021-08-03 NOTE — TELEPHONE ENCOUNTER
"Last Written Prescription Date:  4/30/21  Last Fill Quantity: 90,  # refills: 0   Last office visit provider:  05/04/2021     Requested Prescriptions   Pending Prescriptions Disp Refills     omeprazole (PRILOSEC) 20 MG DR capsule 90 capsule 0       PPI Protocol Passed - 7/30/2021 10:28 AM        Passed - Not on Clopidogrel (unless Pantoprazole ordered)        Passed - No diagnosis of osteoporosis on record        Passed - Recent (12 mo) or future (30 days) visit within the authorizing provider's specialty     Patient has had an office visit with the authorizing provider or a provider within the authorizing providers department within the previous 12 mos or has a future within next 30 days. See \"Patient Info\" tab in inbasket, or \"Choose Columns\" in Meds & Orders section of the refill encounter.              Passed - Medication is active on med list        Passed - Patient is age 18 or older        Passed - No active pregnacy on record        Passed - No positive pregnancy test in past 12 months             Mariaa Ha RN 08/03/21 4:28 PM  "

## 2021-09-03 NOTE — TELEPHONE ENCOUNTER
Spoke with Sydney at the patient's pharmacy.  She states that they already have the approval for symbicort and will be calling the patient to let her know that it is ready to .  There is nothing further that they need from our clinic at this time.  Angeline GOMEZ CMA/TERE....................9:09 AM     0

## 2021-10-25 DIAGNOSIS — E78.00 PURE HYPERCHOLESTEROLEMIA: ICD-10-CM

## 2021-10-25 DIAGNOSIS — F41.1 ANXIETY STATE: ICD-10-CM

## 2021-10-25 DIAGNOSIS — I10 ESSENTIAL HYPERTENSION: ICD-10-CM

## 2021-10-26 NOTE — TELEPHONE ENCOUNTER
"Former patient of Wall & has not established care with another provider.  Please assign refill request to covering provider per clinic standard process.    Routing refill request to provider for review/approval because:  No pcp    Last Written Prescription Date:  12/16/20  Last Fill Quantity: 180,  # refills: 3   Last office visit provider:  5/4/21     Last Written Prescription Date:  4/29/21  Last Fill Quantity: 90,  # refills: 1   Last office visit provider:  5/4/21    Requested Prescriptions   Pending Prescriptions Disp Refills     FLUoxetine (PROZAC) 20 MG capsule [Pharmacy Med Name: FLUOXETINE 20MG CAPSULES] 180 capsule 3     Sig: TAKE 1 CAPSULE(20 MG) BY MOUTH TWICE DAILY       SSRIs Protocol Passed - 10/25/2021  5:28 AM        Passed - Recent (12 mo) or future (30 days) visit within the authorizing provider's specialty     Patient has had an office visit with the authorizing provider or a provider within the authorizing providers department within the previous 12 mos or has a future within next 30 days. See \"Patient Info\" tab in inbasket, or \"Choose Columns\" in Meds & Orders section of the refill encounter.              Passed - Medication is active on med list        Passed - Patient is age 18 or older        Passed - No active pregnancy on record        Passed - No positive pregnancy test in last 12 months           lovastatin (MEVACOR) 20 MG tablet [Pharmacy Med Name: LOVASTATIN 20MG TABLETS] 90 tablet 1     Sig: TAKE 1 TABLET BY MOUTH EVERY NIGHT AT BEDTIME       Statins Protocol Passed - 10/25/2021  5:28 AM        Passed - LDL on file in past 12 months     Recent Labs   Lab Test 05/04/21  1426                Passed - No abnormal creatine kinase in past 12 months     No lab results found.             Passed - Recent (12 mo) or future (30 days) visit within the authorizing provider's specialty     Patient has had an office visit with the authorizing provider or a provider within the authorizing " "providers department within the previous 12 mos or has a future within next 30 days. See \"Patient Info\" tab in inbasket, or \"Choose Columns\" in Meds & Orders section of the refill encounter.              Passed - Medication is active on med list        Passed - Patient is age 18 or older        Passed - No active pregnancy on record        Passed - No positive pregnancy test in past 12 months           hydrochlorothiazide (HYDRODIURIL) 25 MG tablet [Pharmacy Med Name: HYDROCHLOROTHIAZIDE 25MG TABLETS] 90 tablet 1     Sig: TAKE ONE TABLET DAILY       Diuretics (Including Combos) Protocol Passed - 10/25/2021  5:28 AM        Passed - Blood pressure under 140/90 in past 12 months     BP Readings from Last 3 Encounters:   05/04/21 110/64   10/12/20 104/60   03/11/20 122/64                 Passed - Recent (12 mo) or future (30 days) visit within the authorizing provider's specialty     Patient has had an office visit with the authorizing provider or a provider within the authorizing providers department within the previous 12 mos or has a future within next 30 days. See \"Patient Info\" tab in inbasket, or \"Choose Columns\" in Meds & Orders section of the refill encounter.              Passed - Medication is active on med list        Passed - Patient is age 18 or older        Passed - No active pregancy on record        Passed - Normal serum creatinine on file in past 12 months     Recent Labs   Lab Test 05/04/21  1426   CR 0.86              Passed - Normal serum potassium on file in past 12 months     Recent Labs   Lab Test 05/04/21  1426   POTASSIUM 3.8                    Passed - Normal serum sodium on file in past 12 months     Recent Labs   Lab Test 05/04/21  1426                 Passed - No positive pregnancy test in past 12 months             Lorenzo Granado RN 10/26/21 11:12 AM  "

## 2021-10-28 RX ORDER — HYDROCHLOROTHIAZIDE 25 MG/1
TABLET ORAL
Qty: 90 TABLET | Refills: 1 | Status: SHIPPED | OUTPATIENT
Start: 2021-10-28 | End: 2022-02-14

## 2021-10-28 RX ORDER — LOVASTATIN 20 MG
TABLET ORAL
Qty: 90 TABLET | Refills: 1 | Status: SHIPPED | OUTPATIENT
Start: 2021-10-28 | End: 2022-04-05

## 2021-11-17 ENCOUNTER — TELEPHONE (OUTPATIENT)
Dept: INTERNAL MEDICINE | Facility: CLINIC | Age: 66
End: 2021-11-17
Payer: MEDICARE

## 2021-11-17 NOTE — TELEPHONE ENCOUNTER
"LMTCB to pt, Pt is schedule for an \" Est care\" with [facility] on 11/26/2021 at 11am, This was made in error-- [facility] does not take anymore patients, please relay message and reschedule appointment.   "

## 2021-12-05 ENCOUNTER — TELEPHONE (OUTPATIENT)
Dept: INTERNAL MEDICINE | Facility: CLINIC | Age: 66
End: 2021-12-05
Payer: MEDICARE

## 2021-12-05 NOTE — TELEPHONE ENCOUNTER
"HI Keely,  I keep getting pts to \"establish care\" . Not sure who in scheduling is not checking if practice is open to new patiens or not.  Looks like this woman was re-scheduled for second time since they tired to establish care with Beatrice Knapp who also does not take new patients. I'm O'K seeing the patient, but could you get to the bottom if this recurrent scheduling mistakes.    Thank you!  NH  "

## 2021-12-09 ENCOUNTER — OFFICE VISIT (OUTPATIENT)
Dept: INTERNAL MEDICINE | Facility: CLINIC | Age: 66
End: 2021-12-09
Payer: MEDICARE

## 2021-12-09 VITALS
DIASTOLIC BLOOD PRESSURE: 74 MMHG | BODY MASS INDEX: 33.57 KG/M2 | WEIGHT: 171.9 LBS | OXYGEN SATURATION: 97 % | SYSTOLIC BLOOD PRESSURE: 122 MMHG | HEART RATE: 78 BPM

## 2021-12-09 DIAGNOSIS — Z51.81 ENCOUNTER FOR THERAPEUTIC DRUG MONITORING: ICD-10-CM

## 2021-12-09 DIAGNOSIS — B35.3 TINEA PEDIS OF BOTH FEET: ICD-10-CM

## 2021-12-09 DIAGNOSIS — J45.40 MODERATE PERSISTENT ASTHMA WITHOUT COMPLICATION: ICD-10-CM

## 2021-12-09 DIAGNOSIS — Z78.0 POST-MENOPAUSAL: ICD-10-CM

## 2021-12-09 DIAGNOSIS — R05.3 CHRONIC COUGH: ICD-10-CM

## 2021-12-09 DIAGNOSIS — E55.9 VITAMIN D DEFICIENCY: ICD-10-CM

## 2021-12-09 DIAGNOSIS — F41.1 GAD (GENERALIZED ANXIETY DISORDER): ICD-10-CM

## 2021-12-09 DIAGNOSIS — I10 PRIMARY HYPERTENSION: ICD-10-CM

## 2021-12-09 DIAGNOSIS — Z12.11 SCREEN FOR COLON CANCER: ICD-10-CM

## 2021-12-09 DIAGNOSIS — H91.90 HEARING LOSS, UNSPECIFIED HEARING LOSS TYPE, UNSPECIFIED LATERALITY: ICD-10-CM

## 2021-12-09 DIAGNOSIS — Z00.00 ENCOUNTER FOR MEDICARE ANNUAL WELLNESS EXAM: Primary | ICD-10-CM

## 2021-12-09 DIAGNOSIS — Z11.59 NEED FOR HEPATITIS C SCREENING TEST: ICD-10-CM

## 2021-12-09 LAB
AMPHETAMINES UR QL SCN: NORMAL
ANION GAP SERPL CALCULATED.3IONS-SCNC: 12 MMOL/L (ref 5–18)
BARBITURATES UR QL: NORMAL
BENZODIAZ UR QL: NORMAL
BUN SERPL-MCNC: 13 MG/DL (ref 8–22)
CALCIUM SERPL-MCNC: 9.8 MG/DL (ref 8.5–10.5)
CANNABINOIDS UR QL SCN: NORMAL
CHLORIDE BLD-SCNC: 99 MMOL/L (ref 98–107)
CO2 SERPL-SCNC: 24 MMOL/L (ref 22–31)
COCAINE UR QL: NORMAL
CREAT SERPL-MCNC: 1.16 MG/DL (ref 0.6–1.1)
CREAT UR-MCNC: 59 MG/DL
GFR SERPL CREATININE-BSD FRML MDRD: 49 ML/MIN/1.73M2
GLUCOSE BLD-MCNC: 87 MG/DL (ref 70–125)
OPIATES UR QL SCN: NORMAL
OXYCODONE UR QL: NORMAL
PCP UR QL SCN: NORMAL
POTASSIUM BLD-SCNC: 3.4 MMOL/L (ref 3.5–5)
SODIUM SERPL-SCNC: 135 MMOL/L (ref 136–145)

## 2021-12-09 PROCEDURE — 86803 HEPATITIS C AB TEST: CPT | Performed by: INTERNAL MEDICINE

## 2021-12-09 PROCEDURE — 80048 BASIC METABOLIC PNL TOTAL CA: CPT | Performed by: INTERNAL MEDICINE

## 2021-12-09 PROCEDURE — G0009 ADMIN PNEUMOCOCCAL VACCINE: HCPCS | Performed by: INTERNAL MEDICINE

## 2021-12-09 PROCEDURE — G0438 PPPS, INITIAL VISIT: HCPCS | Performed by: INTERNAL MEDICINE

## 2021-12-09 PROCEDURE — 82306 VITAMIN D 25 HYDROXY: CPT | Performed by: INTERNAL MEDICINE

## 2021-12-09 PROCEDURE — 90732 PPSV23 VACC 2 YRS+ SUBQ/IM: CPT | Performed by: INTERNAL MEDICINE

## 2021-12-09 PROCEDURE — 80307 DRUG TEST PRSMV CHEM ANLYZR: CPT | Performed by: INTERNAL MEDICINE

## 2021-12-09 PROCEDURE — 80346 BENZODIAZEPINES1-12: CPT | Mod: 90

## 2021-12-09 PROCEDURE — 99000 SPECIMEN HANDLING OFFICE-LAB: CPT

## 2021-12-09 PROCEDURE — 36415 COLL VENOUS BLD VENIPUNCTURE: CPT | Performed by: INTERNAL MEDICINE

## 2021-12-09 RX ORDER — CLOTRIMAZOLE 1 %
CREAM (GRAM) TOPICAL 2 TIMES DAILY
Qty: 113 G | Refills: 4 | Status: SHIPPED | OUTPATIENT
Start: 2021-12-09 | End: 2023-05-23

## 2021-12-09 RX ORDER — BUDESONIDE AND FORMOTEROL FUMARATE DIHYDRATE 160; 4.5 UG/1; UG/1
AEROSOL RESPIRATORY (INHALATION)
Qty: 10.2 G | Refills: 11 | Status: SHIPPED | OUTPATIENT
Start: 2021-12-09 | End: 2022-11-14

## 2021-12-09 RX ORDER — ALBUTEROL SULFATE 90 UG/1
AEROSOL, METERED RESPIRATORY (INHALATION)
Qty: 18 G | Refills: 3 | Status: SHIPPED | OUTPATIENT
Start: 2021-12-09 | End: 2021-12-10

## 2021-12-09 NOTE — PROGRESS NOTES
SUBJECTIVE:   Tootie Marin is a 66 year old female who presents for Preventive Visit.    Patient has been advised of split billing requirements and indicates understanding: Yes   Are you in the first 12 months of your Medicare coverage?  No    HPI     Is a 66-year-old female with history of asthma, hypertension, depression, anxiety, vitamin D deficiency and hyperlipidemia, possible it which received.  She is to see Dr. Wall and after he retired has seen other doctors but they left the practice as well.  She is currently here to establish care and have wellness exam done.    I did not address with her today but on the form where she needed to remember the words instead of writing them out she opal pictures, I wonder if she might be related to it.    She has hypertension which is well controlled on hydrochlorothiazide.  In the past it looks like potassium was prescribed but she has not filled it recently.  Will check potassium levels.    For asthma she is on Symbicort twice a day and uses albuterol daily-usually with good control.    She has history of depression and panic attacks.  Per her history she has been homeless in the past but currently has her own apartment.  She is on Prozac 40 mg a day.  It also looks that she has been feeling Klonopin 90 tablets for the month.  Last prescription was in August which she says that she still taking it.  Further discussion reveals that she likes to staff some of them away because previously when she had to switch providers there was a break in prescription and she is very afraid that it might happen again.  She did report that currently she has had about a months worth of pills at home still and she has been taking 0.5 mg 3 times a day.      Do you feel safe in your environment? Yes    Have you ever done Advance Care Planning?  She does have problems with hearing  Fall risk: She uses a walker due to balance problems and has not had any recent falls  Cognitive Screening    1) Repeat 3 items (Leader, Season, Table)    2) Clock draw: NORMAL  3) 3 item recall: Recalls 3 objects  Results: 3 items recalled: COGNITIVE IMPAIRMENT LESS LIKELY    Mini-CogTM Copyright S Rodríguez. Licensed by the author for use in [facility]; reprinted with permission (latonia@[facility]). All rights reserved.      Do you have sleep apnea, excessive snoring or daytime drowsiness?: no    Reviewed and updated as needed this visit by clinical staff  Tobacco  Allergies  Meds   Med Hx  Surg Hx  Fam Hx  Soc Hx       Reviewed and updated as needed this visit by Provider               Social History     Tobacco Use     Smoking status: Never Smoker     Smokeless tobacco: Never Used   Substance Use Topics     Alcohol use: No       Patient Care Team:  Sushil Wall MD as PCP - General  Sushil Wall MD as Assigned PCP    The following health maintenance items are reviewed in Epic and correct as of today:  Health Maintenance Due   Topic Date Due     DEXA  Never done     ANNUAL REVIEW OF HM ORDERS  Never done     ASTHMA ACTION PLAN  Never done     ADVANCE CARE PLANNING  Never done     DEPRESSION ACTION PLAN  Never done     HEPATITIS C SCREENING  Never done     ZOSTER IMMUNIZATION (2 of 2) 04/26/2016     COLORECTAL CANCER SCREENING  10/05/2016     MEDICARE ANNUAL WELLNESS VISIT  03/11/2021     MAMMO SCREENING  06/13/2021     INFLUENZA VACCINE (1) 09/01/2021     FALL RISK ASSESSMENT  10/12/2021     ASTHMA CONTROL TEST  11/04/2021     PHQ-9  11/04/2021   Any new diagnosis of family breast, ovarian, or bowel cancer? No    FHS-7: No flowsheet data found.  Pertinent mammograms are reviewed under the imaging tab.    Review of Systems  As above, she denies any falls, she wears glasses, she plans to see a dentist and ophthalmologist in the near future.  Since she has been using walker has not had any recurrent falls but does have poor balance and she appreciates and that is where she is using the walker.  She  is not very physically active but denies any chest pains palpitations or dizziness.  No changes in her bowels, she tends to be more constipated, no urinary problems, no vaginal spotting or bleeding.  She does have intermittent athlete's foot.    OBJECTIVE:   /74 (BP Location: Left arm, Patient Position: Sitting, Cuff Size: Adult Large)   Pulse 78   Wt 78 kg (171 lb 14.4 oz)   SpO2 97%   BMI 33.57 kg/m   Estimated body mass index is 33.4 kg/m  as calculated from the following:    Height as of 5/4/21: 1.524 m (5').    Weight as of 5/4/21: 77.6 kg (171 lb).  Physical Exam  General: well appearing female, alert and oriented x3  EYES: Eyelids, conjunctiva, and sclera were normal. Pupils were normal.   HEAD, EARS, NOSE, MOUTH, AND THROAT: no cervical LAD, no thyromegaly or nodules appreciated. TMs are visualized and normal, oropharynx is clear.  Dentition is poor  RESPIRATORY: respirations non labored, CTA bl, no wheezes, rales, no forced expiratory wheezing.  CARDIOVASCULAR: Heart rate and rhythm were normal. No murmurs, rubs,gallops. There was no peripheral edema.   GASTROINTESTINAL: Positive bowel sounds, abdomen is soft, non tender, non distended.     MUSCULOSKELETAL: Muscle mass was normal for age. No joint synovitis or deformity.  LYMPHATIC: There were no enlarged nodes palpable.  SKIN/HAIR/NAILS: Skin color was normal.  No rashes.  NEUROLOGIC: The patient was alert and oriented.  Speech was normal.  There is no facial asymmetry.   PSYCHIATRIC:  Mood and affect were normal.   Breast exam: No axilla lymphadenopathy breast masses or skin changes appreciated.  She did have a scar from previous biopsy in her right breast.      ASSESSMENT / PLAN:   Tootie was seen today for physical and establish care.    Diagnoses and all orders for this visit:    Encounter for Medicare annual wellness exam  We will check blood work today, discussed colon cancer screening and she would like to do Cologuard.  Mammogram and  bone density was ordered.  Gave her advance care planning paperwork.  She lives by herself and has elderly and an uncle as well as a cousin.    -     Hepatitis C Screen Reflex to HCV RNA Quant and Genotype  -     JADE(EXACT SCIENCES)  -     DEXA HIP/PELVIS/SPINE - Future; Future    Primary hypertension  Well-controlled on hydrochlorothiazide however she currently does not take potassium but in the past levels have been low, will repeat  -     Basic metabolic panel  (Ca, Cl, CO2, Creat, Gluc, K, Na, BUN)    Moderate persistent asthma without complication  She has been using albuterol daily on a scheduled basis but that has been controlling her asthma.  -     SYMBICORT 160-4.5 MCG/ACT Inhaler; [SYMBICORT 160-4.5 MCG/ACTUATION INHALER] INHALE 2 PUFFS BY MOUTH TWICE DAILY  -     albuterol (VENTOLIN HFA) 108 (90 Base) MCG/ACT inhaler; [VENTOLIN HFA 90 MCG/ACTUATION INHALER] INHALE 2 PUFFS BY MOUTH EVERY 6 HOURS AS NEEDED    MALINDA (generalized anxiety disorder)  She has had history of homelessness in the past.  She was on Prozac 40 mg a day and Klonopin 0.5 mg 3 times a day.   was reviewed, last prescription was given in August and she reports that she still taking it 3 times a day.  Further questioning revealed that she does stash her pills because she is afraid with doctors transitions she may run out of them and currently has a month for off Scripts at home.  Discussed that she can call for refill in a month.  Will check urine drug screen for consistency.  Controlled substance agreement signed today.  Overall discussed that she is getting older he would be a good idea to taper very gradually her dose since it can affect her memory, balance and cause propensity to falls.  -     Urine Drugs of Abuse Screen Panel 1 - Drug Screen (Full)    Vitamin D deficiency  She is not on any supplement currently  -     Vitamin D Deficiency    Tinea pedis of both feet  -     clotrimazole (LOTRIMIN) 1 % external cream; Apply  "topically 2 times daily    Hearing loss, unspecified hearing loss type, unspecified laterality  -     Adult Audiology Referral; Future    Encounter for therapeutic drug monitoring  -     Urine Drugs of Abuse Screen Panel 1 - Drug Screen (Full)    Other orders  -     REVIEW OF HEALTH MAINTENANCE PROTOCOL ORDERS  -     PPSV23, IM/SUBQ (2+ YRS) - [id number]    Possible literacy.  We will have to ask her about it when she comes back for follow-up.  For her word recall on the form instead of writing the words she opal the pictures, she was able to \"time on the clock correctly.    Estimated body mass index is 33.4 kg/m  as calculated from the following:    Height as of 5/4/21: 1.524 m (5').    Weight as of 5/4/21: 77.6 kg (171 lb).        She reports that she has never smoked. She has never used smokeless tobacco.      Appropriate preventive services were discussed with this patient, including applicable screening as appropriate for cardiovascular disease, diabetes, osteopenia/osteoporosis, and glaucoma.  As appropriate for age/gender, discussed screening for colorectal cancer, prostate cancer, breast cancer, and cervical cancer. Checklist reviewing preventive services available has been given to the patient.    Reviewed patients plan of care and provided an AVS. The Basic Care Plan (routine screening as documented in Health Maintenance) for Tootie meets the Care Plan requirement. This Care Plan has been established and reviewed with the Patient.    Kathleen Whitt MD  [facility]    Identified Health Risks:  "

## 2021-12-09 NOTE — LETTER
December 17, 2021      Tootie Marin  [facility]  [address]  [place] [address]        Dear ,    We are writing to inform you of your test results.      Potassium level is slightly low, you have  had low potassium in the past. Your blood pressure, hydrochlorothiazide, can cause low potassium levels.  I recommend that you take potassium supplement with hydrochlorothiazide daily.  I sent prescription to the pharmacy.    Kidney function is slightly worse from previous, you should avoid using ibuprofen, Aleve or naproxen daily if taking any.    Hepatitis C screen is negative.    Vitamin D is very low. You should start vitamin D high-dose supplement 50,000 units :take  1 pill ONCE a WEEK  for 3 months and then will repeat vitamin D levels again.  I sent prescription to your pharmacy.        Resulted Orders   Vitamin D Deficiency   Result Value Ref Range    Vitamin D, Total (25-Hydroxy) 19 (L) 30 - 80 ug/L    Narrative    Deficiency <10.0 ug/L  Insufficiency 10.0-29.9 ug/L  Sufficiency 30.0-80.0 ug/L  Toxicity (possible) >100.0 ug/L    Basic metabolic panel  (Ca, Cl, CO2, Creat, Gluc, K, Na, BUN)   Result Value Ref Range    Sodium 135 (L) 136 - 145 mmol/L    Potassium 3.4 (L) 3.5 - 5.0 mmol/L    Chloride 99 98 - 107 mmol/L    Carbon Dioxide (CO2) 24 22 - 31 mmol/L    Anion Gap 12 5 - 18 mmol/L    Urea Nitrogen 13 8 - 22 mg/dL    Creatinine 1.16 (H) 0.60 - 1.10 mg/dL    Calcium 9.8 8.5 - 10.5 mg/dL    Glucose 87 70 - 125 mg/dL    GFR Estimate 49 (L) >60 mL/min/1.73m2      Comment:      As of July 11, 2021, eGFR is calculated by the CKD-EPI creatinine equation, without race adjustment. eGFR can be influenced by muscle mass, exercise, and diet. The reported eGFR is an estimation only and is only applicable if the renal function is stable.   Hepatitis C Screen Reflex to HCV RNA Quant and Genotype   Result Value Ref Range    Hepatitis C Antibody Nonreactive Nonreactive    Narrative    Assay  performance characteristics have not been established for newborns, infants, and children.   Drugs of Abuse 1 Panel, Urine ([facility] Only)   Result Value Ref Range    Amphetamines Urine Screen Negative Screen Negative    Benzodiazepines Urine Screen Negative Screen Negative    Opiates Urine Screen Negative Screen Negative    PCP Urine Screen Negative Screen Negative    Cannabinoids Urine Screen Negative Screen Negative    Barbiturates Urine Screen Negative Screen Negative    Cocaine Urine Screen Negative Screen Negative    Oxycodone Urine Screen Negative Screen Negative    Creatinine Urine mg/dL 59 mg/dL    Narrative    Drug                  Screening Threshold    Amphetamines           1000 ng/mL  Benzodiazepine          200 ng/mL  Opiates                 300 ng/mL  Phencyclidine            25 ng/mL  THC Metabolite           50 ng/mL  Barbiturates            200 ng/mL  Cocaine Metabolite      150 ng/mL  Oxycodone               100 ng/mL    Screening results are to be used only for medical purposes.  Unconfirmed screening results must not be used for non-  medical purposes.       If you have any questions or concerns, please call the clinic at the number listed above.       Sincerely,      Kathleen Whitt MD

## 2021-12-09 NOTE — PATIENT INSTRUCTIONS
1. Schedule mammogram and bone density test    2. Lab work today    3. Pneumococcal 23 vaccine today    4. Urine drug screen today, can refill [place] in [facility]      Patient Education   Personalized Prevention Plan  You are due for the preventive services outlined below.  Your care team is available to assist you in scheduling these services.  If you have already completed any of these items, please share that information with your care team to update in your medical record.  Health Maintenance Due   Topic Date Due     Osteoporosis Screening  Never done     ANNUAL REVIEW OF HM ORDERS  Never done     Asthma Action Plan - yearly  Never done     Discuss Advance Care Planning  Never done     Depression Action Plan  Never done     Hepatitis C Screening  Never done     Zoster (Shingles) Vaccine (2 of 2) 04/26/2016     Colorectal Cancer Screening  10/05/2016     Mammogram  06/13/2021     Flu Vaccine (1) 09/01/2021     FALL RISK ASSESSMENT  10/12/2021     Asthma Control Test  11/04/2021     Depression Assessment  11/04/2021

## 2021-12-09 NOTE — LETTER
[facility]  12/09/21  Patient: Tootie Marin  YOB: 1955  Medical Record Number: [id number]                                                                                  Non-Opioid Controlled Substance Agreement    This is an agreement between you and your provider regarding safe and appropriate use of controlled substances prescribed by your care team. Controlled substances are?medicines that can cause physical and mental dependence (abuse).     There are strict laws about having and using these medicines. We here at [facility] are  committed to working with you in your efforts to get better. To support you in this work, we'll help you schedule regular office appointments for medicine refills. If we must cancel or change your appointment for any reason, we'll make sure you have enough medicine to last until your next appointment.     As a Provider, I will:     Listen carefully to your concerns while treating you with respect.     Recommend a treatment plan that I believe is in your best interest and may involve therapies other than medicine.      Talk with you often about the possible benefits and the risk of harm of any medicine that we prescribe for you.    Assess the safety of this medicine and check how well it works.      Provide a plan on how to taper (discontinue or go off) using this medicine if the decision is made to stop its use.      ::  As a Patient, I understand controlled substances:       Are prescribed by my care provider to help me function or work and manage my condition(s).?    Are strong medicines and can cause serious side effects.       Need to be taken exactly as prescribed.?Combining controlled substances with certain medicines or chemicals (such as illegal drugs, alcohol, sedatives, sleeping pills, and benzodiazepines) can be dangerous or even fatal.? If I stop taking my medicines suddenly, I may have severe withdrawal symptoms.     The risks,  benefits, and side effects of these medicine(s) were explained to me. I agree that:    1. I will take part in other treatments as advised by my care team. This may be psychiatry or counseling, physical therapy, behavioral therapy, group treatment or a referral to specialist.    2. I will keep all my appointments and understand this is part of the monitoring of controlled substances.?My care team may require an office visit for EVERY controlled substance refill. If I miss appointments or don t follow instructions, my care team may stop my medicine    3. I will take my medicines as prescribed. I will not change the dose or schedule unless my care team tells me to. There will be no refills if I run out early.      4. I may be asked to come to the clinic and complete a urine drug test or complete a pill count. If I don t give a urine sample or participate in a pill count, the care team may stop my medicine.    5. I will only receive controlled substance prescriptions from this clinic. If I am treated by another provider, I will tell them that I am taking controlled substances and that I have a treatment agreement with this provider. I will inform my [facility] care team within one business day if I am given a prescription for any controlled substance by another healthcare provider. My [facility] care team can contact other providers and pharmacists about my use of any medicines.    6. It is up to me to make sure that I don't run out of my medicines on weekends or holidays.?If my care team is willing to refill my prescription without a visit, I must request refills only during office hours. Refills may take up to 3 business days to process. I will use one pharmacy to fill all my controlled substance prescriptions. I will notify the clinic about any changes to my insurance or medicine availability.    7. I am responsible for my prescriptions. If the medicine/prescription is lost, stolen or destroyed, it will  not be replaced.?I also agree not to share controlled substance medicines with anyone.     8. I am aware I should not use any illegal or recreational drugs. I agree not to drink alcohol unless my care team says I can.     9. If I enroll in the [place] Medical Cannabis program, I will tell my care team before my next refill.    10. I will tell my care team right away if I become pregnant, have a new medical problem treated outside of my regular clinic, or have a change in my medicines.     11. I understand that this medicine can affect my thinking, judgment and reaction time.? Alcohol and drugs affect the brain and body, which can affect the safety of my driving. Being under the influence of alcohol or drugs can affect my decision-making, behaviors, personal safety and the safety of others. Driving while impaired (DWI) can occur if a person is driving, operating or in physical control of a car, motorcycle, boat, snowmobile, ATV, motorbike, off-road vehicle or any other motor vehicle ([place] Statute 169A.20). I understand the risk if I choose to drive or operate any vehicle or machinery.    I understand that if I do not follow any of the conditions above, my prescriptions or treatment may be stopped or changed.   I agree that my provider, clinic care team and pharmacy may work with any city, state or federal law enforcement agency that investigates the misuse, sale or other diversion of my controlled medicine. I will allow my provider to discuss my care with, or share a copy of, this agreement with any other treating provider, pharmacy or emergency room where I receive care.     I have read this agreement and have asked questions about anything I did not understand.    ________________________________________________________  Patient Signature - Tootie Marin     ___________________                   Date     ________________________________________________________  Provider Signature - Kathleen Whitt MD        ___________________                   Date     ________________________________________________________  Witness Signature (required if provider not present while patient signing)          ___________________                   Date

## 2021-12-10 LAB
DEPRECATED CALCIDIOL+CALCIFEROL SERPL-MC: 19 UG/L (ref 30–80)
HCV AB SERPL QL IA: NONREACTIVE

## 2021-12-10 RX ORDER — ALBUTEROL SULFATE 90 UG/1
AEROSOL, METERED RESPIRATORY (INHALATION)
Qty: 54 G | Refills: 0 | Status: SHIPPED | OUTPATIENT
Start: 2021-12-10 | End: 2022-06-17

## 2021-12-12 ENCOUNTER — TELEPHONE (OUTPATIENT)
Dept: INTERNAL MEDICINE | Facility: CLINIC | Age: 66
End: 2021-12-12
Payer: MEDICARE

## 2021-12-13 NOTE — TELEPHONE ENCOUNTER
Gilbert Worthington,  Pt reports sivakumar she is taking Klonazepam 0.5 mg TID, Urine drug screen is negative.  I tried adding Klonazepam and metabolites to the lab, but not sure if it is urine or serum.    In this situation do we need a confirmatory benzo test or is it safe to assume that she is not taking it based on the dose above?    Thank you!  NH

## 2021-12-14 DIAGNOSIS — F41.1 GAD (GENERALIZED ANXIETY DISORDER): ICD-10-CM

## 2021-12-14 DIAGNOSIS — Z51.81 ENCOUNTER FOR THERAPEUTIC DRUG MONITORING: ICD-10-CM

## 2021-12-16 ENCOUNTER — ANCILLARY PROCEDURE (OUTPATIENT)
Dept: MAMMOGRAPHY | Facility: CLINIC | Age: 66
End: 2021-12-16
Attending: INTERNAL MEDICINE
Payer: MEDICARE

## 2021-12-16 ENCOUNTER — ANCILLARY PROCEDURE (OUTPATIENT)
Dept: BONE DENSITY | Facility: CLINIC | Age: 66
End: 2021-12-16
Attending: INTERNAL MEDICINE
Payer: MEDICARE

## 2021-12-16 ENCOUNTER — TRANSFERRED RECORDS (OUTPATIENT)
Dept: HEALTH INFORMATION MANAGEMENT | Facility: CLINIC | Age: 66
End: 2021-12-16

## 2021-12-16 DIAGNOSIS — Z78.0 POST-MENOPAUSAL: ICD-10-CM

## 2021-12-16 DIAGNOSIS — Z12.31 VISIT FOR SCREENING MAMMOGRAM: ICD-10-CM

## 2021-12-16 PROCEDURE — 77080 DXA BONE DENSITY AXIAL: CPT | Performed by: INTERNAL MEDICINE

## 2021-12-16 PROCEDURE — 77067 SCR MAMMO BI INCL CAD: CPT | Mod: TC | Performed by: RADIOLOGY

## 2021-12-17 ENCOUNTER — TELEPHONE (OUTPATIENT)
Dept: INTERNAL MEDICINE | Facility: CLINIC | Age: 66
End: 2021-12-17
Payer: MEDICARE

## 2021-12-17 DIAGNOSIS — I10 PRIMARY HYPERTENSION: ICD-10-CM

## 2021-12-17 DIAGNOSIS — E55.9 VITAMIN D DEFICIENCY: Primary | ICD-10-CM

## 2021-12-17 RX ORDER — POTASSIUM CHLORIDE 750 MG/1
10 TABLET, EXTENDED RELEASE ORAL 2 TIMES DAILY
Qty: 90 TABLET | Refills: 2 | Status: SHIPPED | OUTPATIENT
Start: 2021-12-17 | End: 2021-12-19

## 2021-12-17 RX ORDER — ERGOCALCIFEROL 1.25 MG/1
50000 CAPSULE, LIQUID FILLED ORAL WEEKLY
Qty: 4 CAPSULE | Refills: 2 | Status: SHIPPED | OUTPATIENT
Start: 2021-12-17 | End: 2022-04-26 | Stop reason: DRUGHIGH

## 2021-12-17 NOTE — TELEPHONE ENCOUNTER
Please let pt know lab results:    Potassium level is slightly low,, she has had low potassium in the past.  Her blood pressure, hydrochlorothiazide, can cause low potassium levels.  I recommend that she takes potassium supplement the other of his hydrochlorothiazide daily.  I sent prescription to the pharmacy.  Kidney function is slightly worse from previous, she should avoid using ibuprofen, Aleve or naproxen daily if she takes any.  Hepatitis C screen is negative.  Vitamin D is very low.  She should start vitamin D high-dose supplement: She should take 1 pill once a week for 3 months and then will repeat levels again.  I sent prescription to her pharmacy.    DR AVILA

## 2021-12-17 NOTE — TELEPHONE ENCOUNTER
Writer spoke with:  Tootie     Message relayed from provider:  Potassium level is slightly low,, she has had low potassium in the past.  Her blood pressure, hydrochlorothiazide, can cause low potassium levels.  I recommend that she takes potassium supplement the other of his hydrochlorothiazide daily.  I sent prescription to the pharmacy.  Kidney function is slightly worse from previous, she should avoid using ibuprofen, Aleve or naproxen daily if she takes any.  Hepatitis C screen is negative.  Vitamin D is very low.  She should start vitamin D high-dose supplement: She should take 1 pill once a week for 3 months and then will repeat levels again.  I sent prescription to her pharmacy.     DR AVILA    Any further questions/comments/concerns? No

## 2021-12-19 RX ORDER — POTASSIUM CHLORIDE 750 MG/1
10 TABLET, EXTENDED RELEASE ORAL DAILY
Qty: 90 TABLET | Refills: 3 | Status: SHIPPED | OUTPATIENT
Start: 2021-12-19 | End: 2022-02-14

## 2021-12-28 ENCOUNTER — TELEPHONE (OUTPATIENT)
Dept: INTERNAL MEDICINE | Facility: CLINIC | Age: 66
End: 2021-12-28
Payer: MEDICARE

## 2021-12-29 LAB
A-OH ALPRAZ/CREAT UR: NEGATIVE NG/ML
ALPRAZ UR CFM-MCNC: NEGATIVE NG/ML
LORAZEPAM UR CFM-MCNC: NEGATIVE NG/ML
NORDIAZEPAM/CREAT UR: NEGATIVE NG/ML
OXAZEPAM UR CFM-MCNC: NEGATIVE NG/ML
TEMAZEPAM/CREAT UR: NEGATIVE NG/ML

## 2021-12-29 NOTE — TELEPHONE ENCOUNTER
I orders benzo confirmation test on 12.09.21 and it is still pending. Can somebody check on this?    Thank you,  Dr AVILA

## 2022-01-05 ENCOUNTER — PATIENT OUTREACH (OUTPATIENT)
Dept: GERIATRIC MEDICINE | Facility: CLINIC | Age: 67
End: 2022-01-05
Payer: MEDICARE

## 2022-01-05 NOTE — PROGRESS NOTES
[facility] Care Coordination Contact    Member became effective with  [facility] on 1/1/22 with [employer].  Previous Health Plan: [employer]  Previous Care System: [employer]  Previous care coordinators name and number: AURORA GREGORIO.  Waiver Type: N/A  Last MMIS Entry: Date 10/06/21, and Type 01 TEL SCREEN.  MMIS visit date (and type) if different from above: n/a  Services Listed in MMIS: None  UTF received: No: Requested on 1/5/22 from [employer]: [email or web address]     Tim Murdock  Care Management Specialist  [facility]  [phone]

## 2022-01-06 ENCOUNTER — TELEPHONE (OUTPATIENT)
Dept: INTERNAL MEDICINE | Facility: CLINIC | Age: 67
End: 2022-01-06
Payer: MEDICARE

## 2022-01-06 NOTE — LETTER
Gilbert Sommers,    We met in December when you came to see me for a physical.    I hope you are doing well.    I did want to inform you that there was inconsistency in our communication about your use of Clonazepam. You mentioned that you were taking it 3 times a day from the pills that you saved overtime from previous prescriptions. However, urine drug test and confirmatory test was negative for any Clonazepam  in your urine. Due to this inconsistent information, I would not be able to prescribe  this medication in the future. However, we could work out a plan to help treat your anxiety with different medications.  Please schedule a follow up visit to discuss.    Dr Whitt

## 2022-01-11 DIAGNOSIS — F41.1 ANXIETY STATE: ICD-10-CM

## 2022-01-14 ENCOUNTER — PATIENT OUTREACH (OUTPATIENT)
Dept: GERIATRIC MEDICINE | Facility: CLINIC | Age: 67
End: 2022-01-14
Payer: MEDICARE

## 2022-01-14 ENCOUNTER — TELEPHONE (OUTPATIENT)
Dept: INTERNAL MEDICINE | Facility: CLINIC | Age: 67
End: 2022-01-14
Payer: MEDICARE

## 2022-01-14 DIAGNOSIS — F41.1 ANXIETY STATE: Primary | ICD-10-CM

## 2022-01-14 NOTE — TELEPHONE ENCOUNTER
Routing refill request to provider for review/approval because:  Controlled substance request    Last Written Prescription Date:  7/20/21  Last Fill Quantity: 90,  # refills: 0   Last office visit provider:  12/9/21     Requested Prescriptions   Pending Prescriptions Disp Refills     clonazePAM (KLONOPIN) 0.5 MG tablet [Pharmacy Med Name: CLONAZEPAM 0.5MG TABLETS] 90 tablet      Sig: TAKE 1 TABLET BY MOUTH THREE TIMES DAILY       There is no refill protocol information for this order          Lorenzo Granado RN 01/14/22 11:12 AM

## 2022-01-14 NOTE — PROGRESS NOTES
[facility] Care Coordination Contact    1st attempt - 1/14/22 - 11am - Care Coordinator called new member, Tootie, and left a voicemail requesting a return call to complete Transitional Health Risk Assessment due to Tootie switching from [employer] to [employer].      MARCO Manrique  [facility]  [phone]

## 2022-01-17 PROBLEM — Z79.899 CHRONIC PRESCRIPTION BENZODIAZEPINE USE: Status: ACTIVE | Noted: 2022-01-17

## 2022-01-17 RX ORDER — CLONAZEPAM 0.5 MG/1
TABLET ORAL
Qty: 90 TABLET | Refills: 0 | Status: SHIPPED | OUTPATIENT
Start: 2022-01-17 | End: 2022-04-18

## 2022-01-17 NOTE — TELEPHONE ENCOUNTER
Medication: Clonazepam  Last Date Filled 7/20/21   pulled: PROVIDER TO PULL FROM Epic.   Only PCP Prescribing? PROVIDER TO PULL  FROM Epic.    Taken as prescribed from physician notes? YES    CSA in last year: NO  Random Utox in last year: NO  (if any of the above answer NO - schedule with PCP)     On opioids in addition to benzodiazepines? NO  (if the above answer YES - schedule with PCP every 6 months)     Is patient on the Executive Review Team? no    All responses suggest: Refilling prescription

## 2022-01-18 ENCOUNTER — PATIENT OUTREACH (OUTPATIENT)
Dept: GERIATRIC MEDICINE | Facility: CLINIC | Age: 67
End: 2022-01-18
Payer: MEDICARE

## 2022-01-18 NOTE — PROGRESS NOTES
[facility] Care Coordination Contact      [facility] Health Plan or Product Change    CC received notification that member's health plan or health plan product changed from [employer] to [employer] effective 1/1/22.  CC contacted member and discussed change and face-to-face visit was offered. Member declined need for home visit. CC reviewed previous Health Risk Assessment/LTCC and POC with member and no changes noted.    Called member and introduced self as member s new CC. Confirmed with member that the welcome letter with writer's name and contact information has been received.  Reviewed LTCC/Health Risk Assessment (HRA) and POC with member. No changes noted.  Transitional HRA completed. Care Plan Summary updated and reflects current services.  Required referral authorization information communicated to CMS: No  Writer reviewed the following with member:  ER visits: No  Hospitalizations: No  TCU stays: No  Significant health status changes: None  Falls/Injuries: No  ADL/IADL changes: No  Changes in services: No    Follow-Up Plan: Member informed of future contact, plan to f/u with member with at next regularly scheduled contact.  Contact information shared with member and family, encouraged member to call with any questions or concerns.  MARCO Manrique  [facility]  [phone]

## 2022-02-10 DIAGNOSIS — I10 ESSENTIAL HYPERTENSION: ICD-10-CM

## 2022-02-10 DIAGNOSIS — I10 PRIMARY HYPERTENSION: ICD-10-CM

## 2022-02-14 RX ORDER — HYDROCHLOROTHIAZIDE 25 MG/1
TABLET ORAL
Qty: 90 TABLET | Refills: 3 | Status: SHIPPED | OUTPATIENT
Start: 2022-02-14 | End: 2022-04-18

## 2022-02-14 RX ORDER — POTASSIUM CHLORIDE 750 MG/1
10 TABLET, EXTENDED RELEASE ORAL DAILY
Qty: 90 TABLET | Refills: 3 | Status: SHIPPED | OUTPATIENT
Start: 2022-02-14 | End: 2022-04-01

## 2022-02-14 NOTE — TELEPHONE ENCOUNTER
Refill signed,She should take hydrochlorothiazide with potassium. please ask her to have repeat potassium level done ( lab order placed).

## 2022-02-14 NOTE — TELEPHONE ENCOUNTER
"Routing refill request to provider for review/approval because:  Labs out of range:  Multiple  Early refill requested.    Last Written Prescription Date:  10/28/21  Last Fill Quantity: 90,  # refills: 1   Last office visit provider:  12/9/21     Requested Prescriptions   Pending Prescriptions Disp Refills     hydrochlorothiazide (HYDRODIURIL) 25 MG tablet [Pharmacy Med Name: HYDROCHLOROTHIAZIDE 25MG TABLETS] 90 tablet 1     Sig: TAKE 1 TABLET BY MOUTH DAILY       Diuretics (Including Combos) Protocol Failed - 2/14/2022 11:15 AM        Failed - Normal serum creatinine on file in past 12 months     Recent Labs   Lab Test 12/09/21  1400   CR 1.16*              Failed - Normal serum potassium on file in past 12 months     Recent Labs   Lab Test 12/09/21  1400   POTASSIUM 3.4*                    Failed - Normal serum sodium on file in past 12 months     Recent Labs   Lab Test 12/09/21  1400   *              Passed - Blood pressure under 140/90 in past 12 months     BP Readings from Last 3 Encounters:   12/09/21 122/74   05/04/21 110/64   10/12/20 104/60                 Passed - Recent (12 mo) or future (30 days) visit within the authorizing provider's specialty     Patient has had an office visit with the authorizing provider or a provider within the authorizing providers department within the previous 12 mos or has a future within next 30 days. See \"Patient Info\" tab in inbasket, or \"Choose Columns\" in Meds & Orders section of the refill encounter.              Passed - Medication is active on med list        Passed - Patient is age 18 or older        Passed - No active pregancy on record        Passed - No positive pregnancy test in past 12 months             Lorenzo Granado RN 02/14/22 11:15 AM  "

## 2022-02-21 DIAGNOSIS — F41.1 ANXIETY STATE: ICD-10-CM

## 2022-02-23 RX ORDER — CLONAZEPAM 0.5 MG/1
TABLET ORAL
Qty: 90 TABLET | Refills: 0 | OUTPATIENT
Start: 2022-02-23

## 2022-02-23 NOTE — TELEPHONE ENCOUNTER
Routing refill request to provider for review/approval because:  Controlled substance request    Last Written Prescription Date:  1/17/22  Last Fill Quantity: 90,  # refills: 0   Last office visit provider:  12/9/21     Requested Prescriptions   Pending Prescriptions Disp Refills     clonazePAM (KLONOPIN) 0.5 MG tablet [Pharmacy Med Name: CLONAZEPAM 0.5MG TABLETS] 90 tablet      Sig: TAKE 1 TABLET BY MOUTH THREE TIMES DAILY       There is no refill protocol information for this order          Janna Carlton RN 02/23/22 7:30 AM

## 2022-02-25 ENCOUNTER — TELEPHONE (OUTPATIENT)
Dept: INTERNAL MEDICINE | Facility: CLINIC | Age: 67
End: 2022-02-25

## 2022-02-25 DIAGNOSIS — J45.40 MODERATE PERSISTENT ASTHMA WITHOUT COMPLICATION: Primary | ICD-10-CM

## 2022-02-25 NOTE — TELEPHONE ENCOUNTER
Left message for Tootie  Message to relay: Please let pt know,Symbicort is not covered by insurance,I'm sending Rx for  breo ellipta once a day inhaler to replace it.     Dr AVILA

## 2022-02-25 NOTE — TELEPHONE ENCOUNTER
Please let pt know,Symbicort is not covered by insurance,I'm sending Rx for  breo ellipta once a day inhaler to replace it.    Dr AVILA

## 2022-03-15 ENCOUNTER — TELEPHONE (OUTPATIENT)
Dept: INTERNAL MEDICINE | Facility: CLINIC | Age: 67
End: 2022-03-15
Payer: MEDICARE

## 2022-03-15 NOTE — TELEPHONE ENCOUNTER
Patient is requesting refill of clonazepam 0.5 mg tablet.    She is a former Dr. Wall patient, she will not be seeing Dr. Whitt.  She states she was very put off by her.  She shares that has scheduled an [facility] appt in April 2022 with Dr. Augustine    She shares she is very offended that she was tested for street drugs and that for 30 years she has been take clonazepam.  She does not appreciate they way she was treated and will not going back to see Dr. Whitt.    [facility] on [address] and [address] is the preferred pharmacy.     [phone] is the best call back number

## 2022-03-18 DIAGNOSIS — F41.1 ANXIETY STATE: ICD-10-CM

## 2022-03-18 RX ORDER — CLONAZEPAM 0.5 MG/1
TABLET ORAL
Qty: 90 TABLET | Refills: 0 | OUTPATIENT
Start: 2022-03-18

## 2022-03-18 NOTE — TELEPHONE ENCOUNTER
----- Message from Jim Hernandez sent at 5/23/2017  2:58 PM EDT -----  Regarding: / refill  Contact: [phone]  Pt is requesting a refill on med Miracle cocktail. Pt uses the [id number] [phone]. Pt's best contact number is [phone] I will not be refilling Clonazepam for patient.

## 2022-03-18 NOTE — TELEPHONE ENCOUNTER
Medication: clonazepam      CSA in last year: NO    Random Utox in last year: NO  (if any of the above answer NO - schedule with PCP)     On opioids in addition to benzodiazepines? NO  (if the above answer YES - schedule with PCP every 6 months)           PROVIDER TO PULL THE FOLLOWING FROM  :    1. Last date filled?  2.   Only PCP Prescribing?  3.   Taken as prescribed from physician notes?

## 2022-03-20 ENCOUNTER — TELEPHONE (OUTPATIENT)
Dept: INTERNAL MEDICINE | Facility: CLINIC | Age: 67
End: 2022-03-20
Payer: MEDICARE

## 2022-03-20 DIAGNOSIS — F41.1 ANXIETY STATE: ICD-10-CM

## 2022-03-20 RX ORDER — CLONAZEPAM 0.5 MG/1
TABLET ORAL
Qty: 90 TABLET | Refills: 0 | OUTPATIENT
Start: 2022-03-20

## 2022-03-20 NOTE — TELEPHONE ENCOUNTER
Controlled Substance Second Request Fax received 03/19/2022 13:26:26 ([facility] faxed to [facility])    Clonazepam (KLONOPIN) 0.5 mg tablet    Last Refill: 01/17/2022  Quantity: 90 # of Refills: 0  Last office visit with PCP = [facility] 12/19/2021

## 2022-03-20 NOTE — TELEPHONE ENCOUNTER
Please let pharmacy know,  I have not been prescribing pt Lorazepam. Please stop sending refills requests.  Dr Tejada signed Rx in January by mistake covering for me.   Pt had inconsistent urine tox results earlier  which did not match her story. No further refills please.

## 2022-03-21 ENCOUNTER — TELEPHONE (OUTPATIENT)
Dept: FAMILY MEDICINE | Facility: CLINIC | Age: 67
End: 2022-03-21
Payer: MEDICARE

## 2022-03-21 DIAGNOSIS — F41.1 ANXIETY STATE: ICD-10-CM

## 2022-03-23 RX ORDER — CLONAZEPAM 0.5 MG/1
TABLET ORAL
Qty: 90 TABLET | Refills: 0 | Status: CANCELLED | OUTPATIENT
Start: 2022-03-23

## 2022-03-23 NOTE — TELEPHONE ENCOUNTER
I don't prescribe benzodiazepines. Please make patient aware and encourage her to set up with another provider

## 2022-03-23 NOTE — TELEPHONE ENCOUNTER
Request received for clonazepam, patient has est care scheduled with you, Dr. Augustine on 4/22. Previous [facility] patient.    Charis MOTT  [facility] Clinical Staff

## 2022-03-29 DIAGNOSIS — I10 PRIMARY HYPERTENSION: ICD-10-CM

## 2022-03-29 NOTE — TELEPHONE ENCOUNTER
Patient is returning call.  Message relayed from Dr. Augustine.    Patient does not understand why she can not get refill she has taken all her life.    Patient is wondering if she can see Dr. Medel.  States she was a patient with him years ago.    Patient is requesting an exception is made so that she can have Dr. Medel as PCP.    Requesting call back.

## 2022-03-29 NOTE — TELEPHONE ENCOUNTER
Request not appropriate. This is not a Dr. Medel patient, has never seen Dr. Medel per this author review.

## 2022-03-30 NOTE — TELEPHONE ENCOUNTER
Left message for pt to call back. Please inform pt that she has never seen Dr. Medel per our records. Please explain that we have to do medication check routine to possible check on labs, vitals to make sure things are still going ok. Please clarify if pt would like to continue with Dr. Augustine or establish care with a new provider. Please assist in scheduling.

## 2022-03-31 NOTE — TELEPHONE ENCOUNTER
Patient calling back in regards to VM below.  Message from provider relayed.  Patient expressed understanding and thanked for the information.  Stating she will just have to wait until she can see Dr Augustine near the end of April, which she is perfectly comfortable with at this time.  If she needs anything additional between now and the appt, she will call us back.

## 2022-04-01 RX ORDER — POTASSIUM CHLORIDE 750 MG/1
TABLET, EXTENDED RELEASE ORAL
Qty: 180 TABLET | Refills: 0 | Status: SHIPPED | OUTPATIENT
Start: 2022-04-01 | End: 2022-05-27

## 2022-04-04 DIAGNOSIS — E78.00 PURE HYPERCHOLESTEROLEMIA: ICD-10-CM

## 2022-04-05 RX ORDER — LOVASTATIN 20 MG
TABLET ORAL
Qty: 90 TABLET | Refills: 0 | Status: SHIPPED | OUTPATIENT
Start: 2022-04-05 | End: 2022-07-12

## 2022-04-05 NOTE — TELEPHONE ENCOUNTER
"Routing refill request to provider for review/approval because:  Early refill requested.    Last Written Prescription Date:  10/28/21  Last Fill Quantity: 90,  # refills: 1   Last office visit provider:  12/9/21     Requested Prescriptions   Pending Prescriptions Disp Refills     lovastatin (MEVACOR) 20 MG tablet [Pharmacy Med Name: LOVASTATIN 20MG TABLETS] 90 tablet 1     Sig: TAKE 1 TABLET BY MOUTH EVERY NIGHT AT BEDTIME       Statins Protocol Passed - 4/5/2022  3:26 PM        Passed - LDL on file in past 12 months     Recent Labs   Lab Test 05/04/21  1426                Passed - No abnormal creatine kinase in past 12 months     No lab results found.             Passed - Recent (12 mo) or future (30 days) visit within the authorizing provider's specialty     Patient has had an office visit with the authorizing provider or a provider within the authorizing providers department within the previous 12 mos or has a future within next 30 days. See \"Patient Info\" tab in inbasket, or \"Choose Columns\" in Meds & Orders section of the refill encounter.              Passed - Medication is active on med list        Passed - Patient is age 18 or older        Passed - No active pregnancy on record        Passed - No positive pregnancy test in past 12 months             Lorenzo Granado RN 04/05/22 3:26 PM  "

## 2022-04-13 DIAGNOSIS — K21.9 GASTROESOPHAGEAL REFLUX DISEASE WITHOUT ESOPHAGITIS: ICD-10-CM

## 2022-04-15 NOTE — PROGRESS NOTES
"    SUBJECTIVE:   Tootie Marin is a 66 year old female who presents for Preventive Visit.    Patient has been advised of split billing requirements and indicates understanding: Yes  Are you in the first 12 months of your Medicare coverage?  No    Healthy Habits:     In general, how would you rate your overall health?  Good    Frequency of exercise:  2-3 days/week    Duration of exercise:  15-30 minutes    Do you usually eat at least 4 servings of fruit and vegetables a day, include whole grains    & fiber and avoid regularly eating high fat or \"junk\" foods?  No    Taking medications regularly:  Yes    Ability to successfully perform activities of daily living:  No assistance needed    Home Safety:  No safety concerns identified    Hearing Impairment:  Difficulty following a conversation in a noisy restaurant or crowded room, difficult to understand a speaker at a public meeting or Judaism service, need to ask people to speak up or repeat themselves, difficulty understanding soft or whispered speech and difficulty understanding speech on the telephone    In the past 6 months, have you been bothered by leaking of urine?  No    In general, how would you rate your overall mental or emotional health?  Excellent      PHQ-2 Total Score: 0    Additional concerns today:  No        Do you feel safe in your environment? Yes    Have you ever done Advance Care Planning? (For example, a Health Directive, POLST, or a discussion with a medical provider or your loved ones about your wishes): No, advance care planning information given to patient to review.  Patient declined advance care planning discussion at this time.       Fall risk  Fallen 2 or more times in the past year?: No  Any fall with injury in the past year?: No    Cognitive Screening   1) Repeat 3 items (Leader, Season, Table)    2) Clock draw: ABNORMAL   3) 3 item recall: Recalls 2 objects   Results: ABNORMAL clock, 1-2 items recalled: PROBABLE COGNITIVE " IMPAIRMENT, **INFORM PROVIDER**    Mini-CogTM Copyright HUNTER Arreguin. Licensed by the author for use in [facility]; reprinted with permission ([email or web address]). All rights reserved.      Do you have sleep apnea, excessive snoring or daytime drowsiness?: no    Reviewed and updated as needed this visit by clinical staff   Tobacco  Allergies  Meds                Reviewed and updated as needed this visit by Provider                   Social History     Tobacco Use     Smoking status: Never Smoker     Smokeless tobacco: Never Used   Substance Use Topics     Alcohol use: No         Alcohol Use 4/18/2022   Prescreen: >3 drinks/day or >7 drinks/week? Not Applicable     Current providers sharing in care for this patient include:   Patient Care Team:  Kathleen Whitt MD as Assigned PCP  Lakisha Rice LSW as Lead Care Coordinator    The following health maintenance items are reviewed in Epic and correct as of today:  Health Maintenance Due   Topic Date Due     ASTHMA ACTION PLAN  Never done     ADVANCE CARE PLANNING  Never done     DEPRESSION ACTION PLAN  Never done     ZOSTER IMMUNIZATION (2 of 2) 04/26/2016     COLORECTAL CANCER SCREENING  10/05/2016     ASTHMA CONTROL TEST  11/04/2021     PHQ-9  11/04/2021     Lab work is in process  Labs reviewed in EPIC  BP Readings from Last 3 Encounters:   04/18/22 110/68   12/09/21 122/74   05/04/21 110/64    Wt Readings from Last 3 Encounters:   04/18/22 71.7 kg (158 lb)   12/09/21 78 kg (171 lb 14.4 oz)   05/04/21 77.6 kg (171 lb)                  Patient Active Problem List   Diagnosis     Hypercholesterolemia     Anxiety     Panic Disorder Without Agoraphobia     Ptosis Of Eyelid     Lipoma Of The Adipose Tissue     Menopause     Asthma     Major depression     Essential hypertension     Gastroesophageal reflux disease without esophagitis     Chronic prescription benzodiazepine use     Past Surgical History:   Procedure Laterality Date     BIOPSY BREAST Right 1987     benign       Social History     Tobacco Use     Smoking status: Never Smoker     Smokeless tobacco: Never Used   Substance Use Topics     Alcohol use: No     Family History   Problem Relation Age of Onset     Heart Disease Mother      Cancer Father      Cancer Brother          Current Outpatient Medications   Medication Sig Dispense Refill     albuterol (PROAIR HFA/PROVENTIL HFA/VENTOLIN HFA) 108 (90 Base) MCG/ACT inhaler INHALE 2 PUFFS BY MOUTH EVERY 6 HOURS AS NEEDED 54 g 0     clonazePAM (KLONOPIN) 0.5 MG tablet TAKE 1 TABLET BY MOUTH THREE TIMES DAILY 90 tablet 0     clotrimazole (LOTRIMIN) 1 % external cream Apply topically 2 times daily 113 g 4     clotrimazole-betamethasone (LOTRISONE) cream [CLOTRIMAZOLE-BETAMETHASONE (LOTRISONE) CREAM] APPLY TO THE AFFECTED AREA TWICE DAILY 45 g 3     FLUoxetine (PROZAC) 20 MG capsule TAKE 1 CAPSULE(20 MG) BY MOUTH TWICE DAILY 180 capsule 3     fluticasone-vilanterol (BREO ELLIPTA) 200-25 MCG/INH inhaler Inhale 1 puff into the lungs daily 60 each 4     hydrochlorothiazide (HYDRODIURIL) 25 MG tablet Take 1 tablet (25 mg) by mouth daily 90 tablet 0     lovastatin (MEVACOR) 20 MG tablet TAKE 1 TABLET BY MOUTH EVERY NIGHT AT BEDTIME 90 tablet 0     omeprazole (PRILOSEC) 20 MG DR capsule TAKE 1 CAPSULE BY MOUTH EVERY DAY 45 capsule 0     potassium chloride ER (KLOR-CON M) 10 MEQ CR tablet TAKE 1 TABLET(10 MEQ) BY MOUTH TWICE DAILY 180 tablet 0     SYMBICORT 160-4.5 MCG/ACT Inhaler [SYMBICORT 160-4.5 MCG/ACTUATION INHALER] INHALE 2 PUFFS BY MOUTH TWICE DAILY 10.2 g 11     vitamin D2 (ERGOCALCIFEROL) 53067 units (1250 mcg) capsule Take 1 capsule (50,000 Units) by mouth once a week For 12 weeks, then have repeat vitamin d levels cheked. (Patient not taking: Reported on 4/18/2022) 4 capsule 2     Allergies   Allergen Reactions     Aspirin (Tartrazine Only) [Tartrazine] Unknown     Chicken [Chicken Protein] Unknown     Other Food Allergy Unknown     TURKEY       Any new diagnosis  "of family breast, ovarian, or bowel cancer? No    FHS-7:   Breast CA Risk Assessment (FHS-7) 12/16/2021 4/18/2022   Did any of your first-degree relatives have breast or ovarian cancer? No No   Did any of your relatives have bilateral breast cancer? No Unknown   Did any man in your family have breast cancer? No No   Did any woman in your family have breast and ovarian cancer? No No   Did any woman in your family have breast cancer before age 50 y? No No   Do you have 2 or more relatives with breast and/or ovarian cancer? No Unknown   Do you have 2 or more relatives with breast and/or bowel cancer? No Unknown         Pertinent mammograms are reviewed under the imaging tab.    Review of Systems  Constitutional, HEENT, cardiovascular, pulmonary, GI, , musculoskeletal, neuro, skin, endocrine and psych systems are negative, except as otherwise noted.    OBJECTIVE:   /68 (BP Location: Right arm, Patient Position: Sitting, Cuff Size: Adult Regular)   Pulse 78   Temp 98.1  F (36.7  C) (Oral)   Resp 24   Ht 1.543 m (5' 0.75\")   Wt 71.7 kg (158 lb)   SpO2 99%   Breastfeeding No   BMI 30.10 kg/m   Estimated body mass index is 30.1 kg/m  as calculated from the following:    Height as of this encounter: 1.543 m (5' 0.75\").    Weight as of this encounter: 71.7 kg (158 lb).  Physical Exam  GENERAL APPEARANCE: healthy, alert and no distress  EYES: Eyes grossly normal to inspection, PERRL and conjunctivae and sclerae normal  HENT: ear canals and TM's normal, nose and mouth without ulcers or lesions, oropharynx clear and oral mucous membranes moist  NECK: no adenopathy, no asymmetry, masses, or scars and thyroid normal to palpation  RESP: lungs clear to auscultation - no rales, rhonchi or wheezes  BREAST: declined  CV: regular rate and rhythm, normal S1 S2, no S3 or S4, no murmur, click or rub, no peripheral edema and peripheral pulses strong  ABDOMEN: soft, nontender, no hepatosplenomegaly, no masses and bowel sounds " "normal  MS: no musculoskeletal defects are noted and gait is age appropriate without ataxia  SKIN: no suspicious lesions or rashes  NEURO: Normal strength and tone, sensory exam grossly normal, mentation intact and speech normal  PSYCH: mentation appears normal and affect normal, poor short term memory recall , tangential.     ASSESSMENT / PLAN:       ICD-10-CM    1. Encounter for Medicare annual wellness exam  Z00.00 TSH with free T4 reflex     Basic metabolic panel  (Ca, Cl, CO2, Creat, Gluc, K, Na, BUN)   2. Screen for colon cancer  Z12.11    3. Essential hypertension  I10 hydrochlorothiazide (HYDRODIURIL) 25 MG tablet   4. MALINDA (generalized anxiety disorder)  F41.1 Adult Mental Health  Referral   5. Class 1 obesity due to excess calories with serious comorbidity and body mass index (BMI) of 30.0 to 30.9 in adult  E66.09     Z68.30    6. Anxiety  F41.1    7. Moderate episode of recurrent major depressive disorder (H)  F33.1        Patient is a 66-year-old female with PMH of HLD, MALINDA, panic disorder, postmenopausal, MDD, HTN, GERD who presents today for an annual physical/establish care.     Patient used to see Dr. Wall who is now retired    Patient lives in apartment by herself.  Is not currently sexually active.  Says that her last Pap smear was more than 10 years ago.  Is not interested in getting repeat Pap smear.  Patient is up-to-date on her mammogram.  Most recent DEXA scan 2021 showed osteopenia with chance of high risk fracture at 12%.  Vitamin D and calcium were recommended-patient is on vitamin D but not calcium.  Will start taking calcium.  Declines colonoscopy today.  Has MDD and MALINDA (see discussion below).  Do an abnormal clock and did not pass her mini cog.  Patient however, says that her Rob has always been \"poor\" but her ADLs and IADLs are intact.  Uses a walker at baseline but denies any falls over the last year.  Denies any heavy alcohol use, drug use, marijuana use, cigarette use.  " "Is in menopause without any postmenopausal symptoms.is on a high protein diet and has lost weight. Due for baseline bloodwork. UTD on vaccine except shingles.        HTN:   Refilled hydrochlorothiazide. However, patient consistent hypokalemic on the last few BMP. May opt ot switch it if repeat BMP is low on K     Medication refill  Patient is looking for refill on her clonazepam today.  Had communicated that I do not prescribe benzodiazepines in patients previous telephone encounter.  Patient said that she has been on this medication for 25 years and they have tried \"all types of other medications\" and none of them worked like the clonazepam does.  She takes 0.5 mg 3 times daily.  Her last provider refused to prescribe it because her urine drug screen came back negative for benzo despite daily use.  Reviewed with the patient that I generally don't prescribe benzodiazepines and opiates in my practice.  I am not comfortable prescribing this medication for her or managing it, especially given the most recent urine drug screen test.  If she is interested in coming off the clonazepam, can try to help her come off of it but I will not be prescribing it.  Patient is upset and frustrated-understandably.  After prolonged discussion, patient ultimately came to the decision that she would like to try to see if she can come off the clonazepam.  She would like to trial alternate as needed medication for anxiety. Told her that I would reach out to the MTM team to come up with a time line to wean her off. She has 1 month's worth of medication left. I have also given her a referral to psych to get set up with them in case she is unable to wean herself of the benzos for medication management,  Patient verbalized understanding and is comfortable with the plan.   Patient has been advised of split billing requirements and indicates understanding: Yes    COUNSELING:  Reviewed preventive health counseling, as reflected in patient " "instructions    Estimated body mass index is 30.1 kg/m  as calculated from the following:    Height as of this encounter: 1.543 m (5' 0.75\").    Weight as of this encounter: 71.7 kg (158 lb).    Weight management plan: Discussed healthy diet and exercise guidelines    She reports that she has never smoked. She has never used smokeless tobacco.      Appropriate preventive services were discussed with this patient, including applicable screening as appropriate for cardiovascular disease, diabetes, osteopenia/osteoporosis, and glaucoma.  As appropriate for age/gender, discussed screening for colorectal cancer, prostate cancer, breast cancer, and cervical cancer. Checklist reviewing preventive services available has been given to the patient.    Reviewed patients plan of care and provided an AVS. The Basic Care Plan (routine screening as documented in Health Maintenance) for Tootie meets the Care Plan requirement. This Care Plan has been established and reviewed with the Patient.    DO MAURICIO Stearns [facility]    Identified Health Risks:    The patient was counseled and encouraged to consider modifying their diet and eating habits. She was provided with information on recommended healthy diet options.  The patient was provided with written information regarding signs of hearing loss.  "

## 2022-04-18 ENCOUNTER — OFFICE VISIT (OUTPATIENT)
Dept: FAMILY MEDICINE | Facility: CLINIC | Age: 67
End: 2022-04-18
Payer: MEDICARE

## 2022-04-18 VITALS
HEART RATE: 78 BPM | BODY MASS INDEX: 29.83 KG/M2 | TEMPERATURE: 98.1 F | SYSTOLIC BLOOD PRESSURE: 110 MMHG | DIASTOLIC BLOOD PRESSURE: 68 MMHG | RESPIRATION RATE: 24 BRPM | HEIGHT: 61 IN | OXYGEN SATURATION: 99 % | WEIGHT: 158 LBS

## 2022-04-18 DIAGNOSIS — E66.811 CLASS 1 OBESITY DUE TO EXCESS CALORIES WITH SERIOUS COMORBIDITY AND BODY MASS INDEX (BMI) OF 30.0 TO 30.9 IN ADULT: ICD-10-CM

## 2022-04-18 DIAGNOSIS — E66.09 CLASS 1 OBESITY DUE TO EXCESS CALORIES WITH SERIOUS COMORBIDITY AND BODY MASS INDEX (BMI) OF 30.0 TO 30.9 IN ADULT: ICD-10-CM

## 2022-04-18 DIAGNOSIS — Z00.00 ENCOUNTER FOR MEDICARE ANNUAL WELLNESS EXAM: Primary | ICD-10-CM

## 2022-04-18 DIAGNOSIS — F41.1 ANXIETY STATE: ICD-10-CM

## 2022-04-18 DIAGNOSIS — F33.1 MODERATE EPISODE OF RECURRENT MAJOR DEPRESSIVE DISORDER (H): ICD-10-CM

## 2022-04-18 DIAGNOSIS — F41.1 GAD (GENERALIZED ANXIETY DISORDER): ICD-10-CM

## 2022-04-18 DIAGNOSIS — Z12.11 SCREEN FOR COLON CANCER: ICD-10-CM

## 2022-04-18 DIAGNOSIS — I10 ESSENTIAL HYPERTENSION: ICD-10-CM

## 2022-04-18 LAB
ANION GAP SERPL CALCULATED.3IONS-SCNC: 13 MMOL/L (ref 5–18)
BUN SERPL-MCNC: 16 MG/DL (ref 8–22)
CALCIUM SERPL-MCNC: 10.2 MG/DL (ref 8.5–10.5)
CHLORIDE BLD-SCNC: 100 MMOL/L (ref 98–107)
CO2 SERPL-SCNC: 27 MMOL/L (ref 22–31)
CREAT SERPL-MCNC: 1.05 MG/DL (ref 0.6–1.1)
GFR SERPL CREATININE-BSD FRML MDRD: 58 ML/MIN/1.73M2
GLUCOSE BLD-MCNC: 92 MG/DL (ref 70–125)
POTASSIUM BLD-SCNC: 3.9 MMOL/L (ref 3.5–5)
SODIUM SERPL-SCNC: 140 MMOL/L (ref 136–145)
TSH SERPL DL<=0.005 MIU/L-ACNC: 0.96 UIU/ML (ref 0.3–5)

## 2022-04-18 PROCEDURE — 80048 BASIC METABOLIC PNL TOTAL CA: CPT | Performed by: STUDENT IN AN ORGANIZED HEALTH CARE EDUCATION/TRAINING PROGRAM

## 2022-04-18 PROCEDURE — 84443 ASSAY THYROID STIM HORMONE: CPT | Performed by: STUDENT IN AN ORGANIZED HEALTH CARE EDUCATION/TRAINING PROGRAM

## 2022-04-18 PROCEDURE — 36415 COLL VENOUS BLD VENIPUNCTURE: CPT | Performed by: STUDENT IN AN ORGANIZED HEALTH CARE EDUCATION/TRAINING PROGRAM

## 2022-04-18 RX ORDER — HYDROCHLOROTHIAZIDE 25 MG/1
25 TABLET ORAL DAILY
Qty: 90 TABLET | Refills: 0 | Status: SHIPPED | OUTPATIENT
Start: 2022-04-18 | End: 2023-03-31

## 2022-04-18 ASSESSMENT — ACTIVITIES OF DAILY LIVING (ADL): CURRENT_FUNCTION: NO ASSISTANCE NEEDED

## 2022-04-18 ASSESSMENT — ASTHMA QUESTIONNAIRES: ACT_TOTALSCORE: 21

## 2022-04-18 NOTE — LETTER
April 20, 2022      Tootiekaruna Marin  [address]  [place] [address]        Dear ,    We are writing to inform you of your test results.    Bloodwork looks good.       Resulted Orders   TSH with free T4 reflex   Result Value Ref Range    TSH 0.96 0.30 - 5.00 uIU/mL   Basic metabolic panel  (Ca, Cl, CO2, Creat, Gluc, K, Na, BUN)   Result Value Ref Range    Sodium 140 136 - 145 mmol/L    Potassium 3.9 3.5 - 5.0 mmol/L    Chloride 100 98 - 107 mmol/L    Carbon Dioxide (CO2) 27 22 - 31 mmol/L    Anion Gap 13 5 - 18 mmol/L    Urea Nitrogen 16 8 - 22 mg/dL    Creatinine 1.05 0.60 - 1.10 mg/dL    Calcium 10.2 8.5 - 10.5 mg/dL    Glucose 92 70 - 125 mg/dL    GFR Estimate 58 (L) >60 mL/min/1.73m2      Comment:      Effective December 21, 2021 eGFRcr in adults is calculated using the 2021 CKD-EPI creatinine equation which includes age and gender ( et al., NEJM, DOI: 10.1056/KJZFsr1347619)       If you have any questions or concerns, please call the clinic at the number listed above.       Sincerely,      Sharita Augustine, DO

## 2022-04-18 NOTE — PATIENT INSTRUCTIONS
Patient Education   Personalized Prevention Plan  You are due for the preventive services outlined below.  Your care team is available to assist you in scheduling these services.  If you have already completed any of these items, please share that information with your care team to update in your medical record.  Health Maintenance Due   Topic Date Due     Asthma Action Plan - yearly  Never done     Discuss Advance Care Planning  Never done     Depression Action Plan  Never done     Zoster (Shingles) Vaccine (2 of 2) 04/26/2016     Colorectal Cancer Screening  10/05/2016     Asthma Control Test  11/04/2021     Depression Assessment  11/04/2021       Understanding USDA MyPlate  The USDA has guidelines to help you make healthy food choices. These are called MyPlate. MyPlate shows the food groups that make up healthy meals using the image of a place setting. Before you eat, think about the healthiest choices for what to put on your plate or in your cup or bowl. To learn more about building a healthy plate, visit www.[employer]myplate.gov.    The food groups    Fruits. Any fruit or 100% fruit juice counts as part of the Fruit Group. Fruits may be fresh, canned, frozen, or dried, and may be whole, cut-up, or pureed. Make 1/2 of your plate fruits and vegetables.    Vegetables. Any vegetable or 100% vegetable juice counts as a member of the Vegetable Group. Vegetables may be fresh, frozen, canned, or dried. They can be served raw or cooked and may be whole, cut-up, or mashed. Make 1/2 of your plate fruits and vegetables.    Grains. All foods made from grains are part of the Grains Group. These include wheat, rice, oats, cornmeal, and barley. Grains are often used to make foods such as bread, pasta, oatmeal, cereal, tortillas, and grits. Grains should be no more than 1/4 of your plate. At least half of your grains should be whole grains.    Protein. This group includes meat, poultry, seafood, beans and peas, eggs, processed  soy products (such as tofu), nuts (including nut butters), and seeds. Make protein choices no more than 1/4 of your plate. Meat and poultry choices should be lean or low fat.    Dairy. The Dairy Group includes all fluid milk products and foods made from milk that contain calcium, such as yogurt and cheese. (Foods that have little calcium, such as cream, butter, and cream cheese, are not part of this group.) Most dairy choices should be low-fat or fat-free.    Oils. Oils aren't a food group, but they do contain essential nutrients. However it's important to watch your intake of oils. These are fats that are liquid at room temperature. They include canola, corn, olive, soybean, vegetable, and sunflower oil. Foods that are mainly oil include mayonnaise, certain salad dressings, and soft margarines. You likely already get your daily oil allowance from the foods you eat.  Things to limit  Eating healthy also means limiting these things in your diet:       Salt (sodium). Many processed foods have a lot of sodium. To keep sodium intake down, eat fresh vegetables, meats, poultry, and seafood when possible. Purchase low-sodium, reduced-sodium, or no-salt-added food products at the store. And don't add salt to your meals at home. Instead, season them with herbs and spices such as dill, oregano, cumin, and paprika. Or try adding flavor with lemon or lime zest and juice.    Saturated fat. Saturated fats are most often found in animal products such as beef, pork, and chicken. They are often solid at room temperature, such as butter. To reduce your saturated fat intake, choose leaner cuts of meat and poultry. And try healthier cooking methods such as grilling, broiling, roasting, or baking. For a simple lower-fat swap, use plain nonfat yogurt instead of mayonnaise when making potato salad or macaroni salad.    Added sugars. These are sugars added to foods. They are in foods such as ice cream, candy, soda, fruit drinks, sports  drinks, energy drinks, cookies, pastries, jams, and syrups. Cut down on added sugars by sharing sweet treats with a family member or friend. You can also choose fruit for dessert, and drink water or other unsweetened beverages.     [employer] last reviewed this educational content on 6/1/2020 2000-2021 [employer]. All rights reserved. This information is not intended as a substitute for professional medical care. Always follow your healthcare professional's instructions.          Signs of Hearing Loss      Hearing much better with one ear can be a sign of hearing loss.   Hearing loss is a problem shared by many people. In fact, it is one of the most common health problems, particularly as people age. Most people age 65 and older have some hearing loss. By age 80, almost everyone does. Hearing loss often occurs slowly over the years. So you may not realize your hearing has gotten worse.  Have your hearing checked  Call your healthcare provider if you:    Have to strain to hear normal conversation    Have to watch other people s faces very carefully to follow what they re saying    Need to ask people to repeat what they ve said    Often misunderstand what people are saying    Turn the volume of the television or radio up so high that others complain    Feel that people are mumbling when they re talking to you    Find that the effort to hear leaves you feeling tired and irritated    Notice, when using the phone, that you hear better with one ear than the other  [employer] last reviewed this educational content on 1/1/2020 2000-2021 [employer]. All rights reserved. This information is not intended as a substitute for professional medical care. Always follow your healthcare professional's instructions.

## 2022-04-19 ENCOUNTER — TELEPHONE (OUTPATIENT)
Dept: PHARMACY | Facility: CLINIC | Age: 67
End: 2022-04-19
Payer: MEDICARE

## 2022-04-19 DIAGNOSIS — F41.1 ANXIETY STATE: Primary | ICD-10-CM

## 2022-04-19 NOTE — TELEPHONE ENCOUNTER
----- Message from Sharita Augustine DO sent at 4/18/2022  4:52 PM CDT -----  Hello,     I am looking to wean this patient off clonazepam. She has a 30 day window to get off the clonazepam. Would this be something that you guys can help me formulate a plan for her?   Would this require an officila consult to you guys?       Thanks,   Sharita

## 2022-04-19 NOTE — TELEPHONE ENCOUNTER
On brief chart review, this would work best as a full MTM appointment (phone/virtual fine). I called to schedule and left VM x1, pending to our billing/scheduling team both as an FYI and ideally for one more call attempt. MTM referral also placed.    Gaviota BuenoD, [facility] HospitalCP  Medication Therapy Management Provider  Phone: [phone]  [email or web address]

## 2022-04-20 ENCOUNTER — MEDICAL CORRESPONDENCE (OUTPATIENT)
Dept: HEALTH INFORMATION MANAGEMENT | Facility: CLINIC | Age: 67
End: 2022-04-20
Payer: MEDICARE

## 2022-04-20 NOTE — TELEPHONE ENCOUNTER
Can we call this patient and make them aware that MTM is trying to get in touch with them to wean them off the clonazepam as she has ~ a month to wean off as I am not comfortable managing the medication moving forward.

## 2022-04-26 ENCOUNTER — VIRTUAL VISIT (OUTPATIENT)
Dept: PHARMACY | Facility: CLINIC | Age: 67
End: 2022-04-26
Payer: COMMERCIAL

## 2022-04-26 DIAGNOSIS — E87.6 HYPOKALEMIA: ICD-10-CM

## 2022-04-26 DIAGNOSIS — F41.1 ANXIETY STATE: Primary | ICD-10-CM

## 2022-04-26 DIAGNOSIS — I10 HTN (HYPERTENSION), BENIGN: ICD-10-CM

## 2022-04-26 DIAGNOSIS — E78.2 MIXED HYPERLIPIDEMIA: ICD-10-CM

## 2022-04-26 DIAGNOSIS — Z78.9 TAKES DIETARY SUPPLEMENTS: ICD-10-CM

## 2022-04-26 DIAGNOSIS — K21.9 GASTROESOPHAGEAL REFLUX DISEASE WITHOUT ESOPHAGITIS: ICD-10-CM

## 2022-04-26 DIAGNOSIS — J45.40 MODERATE PERSISTENT ASTHMA WITHOUT COMPLICATION: ICD-10-CM

## 2022-04-26 PROCEDURE — 99607 MTMS BY PHARM ADDL 15 MIN: CPT | Performed by: PHARMACIST

## 2022-04-26 PROCEDURE — 99605 MTMS BY PHARM NP 15 MIN: CPT | Performed by: PHARMACIST

## 2022-04-26 RX ORDER — CHOLECALCIFEROL (VITAMIN D3) 50 MCG
1 TABLET ORAL DAILY
COMMUNITY

## 2022-04-26 RX ORDER — ESCITALOPRAM OXALATE 10 MG/1
10 TABLET ORAL DAILY
Qty: 30 TABLET | Refills: 1 | Status: SHIPPED | OUTPATIENT
Start: 2022-04-26 | End: 2022-05-10 | Stop reason: DRUGHIGH

## 2022-04-26 NOTE — PATIENT INSTRUCTIONS
Recommendations from today's MTM visit:                                                    MTM (medication therapy management) is a service provided by a clinical pharmacist designed to help you get the most of out of your medicines.   Today we reviewed what your medicines are for, how to know if they are working, that your medicines are safe and how to make your medicine regimen as easy as possible.      1. Stop fluoxetine today. This medicine has a very long half life so it will stay active in your system for the next 4+ weeks without you having to take any. Fluoxetine can be a great medication for anxiety but it is stimulating, so sometimes it can actually be agitating in people, especially as they get older.    2. Start escitalopram (Lexapro) 10mg daily in 1 week (May 3rd). This is often a therapeutic dose, but we can go up to 20mg daily if needed in the future. Escitalopram will be less stimulating than fluoxetine so hopefully will be better for your anxiety and may help your sleep cycle a little.    3. For the next week, alternate clonazepam twice daily with three times daily (Start this by taking only two today). Starting May 3rd, drop to 2 tablets every day. Expect to be a little bit more on edge as we decrease this, but if you develop any symptoms that you would consider severe, call the clinic for guidance.    4. See if you can swap out one of your Diet Cokes per day with a caffeine free diet option for the next week. Starting May 3rd, try to get down to just two Diet Cokes per day. Ideally we would get your caffeine intake down to one beverage or less per day to limit its effect on your anxiety. Take acetaminophen (Tylenol) if needed to manage caffeine withdrawal headaches.    5. When you get time, I would suggest at least one Shingrix dose (depending on your documented history) and restarting vitamin D at 2000 international unit(s) daily.    Follow-up: Return in 2 weeks (on 5/10/2022) for phone  "visit.    It was great speaking with you today.  I value your experience and would be very thankful for your time in providing feedback in our clinic survey. In the next few days, you may receive an email or text message from [facility] [employer] with a link to a survey related to your  clinical pharmacist.\"     To schedule another MTM appointment, please call the clinic directly or you may call the MTM scheduling line at [phone] or toll-free at [phone].     My Clinical Pharmacist's contact information:                                                      Please feel free to contact me with any questions or concerns you have.      Cindi Nieves PharmD, [facility]  Medication Therapy Management Provider  Phone: [phone]  [email or web address]    "

## 2022-04-26 NOTE — PROGRESS NOTES
Medication Therapy Management (MTM) Encounter    ASSESSMENT:                            Medication Adherence/Access: No issues identified. I did mention patient appears to be due for Shingrix (either first or second dose- hard to tell based on chart).    Anxiety: Pros and cons of chronic benzo use reviewed with patient, especially risks for patients over 65 including dementia and falls. We also discussed that fluoxetine tends to be on the stimulating side for SSRIs and a more neutral selective serotonin reuptake inhibitor may be a better choice. Patient is open to cross taper to Lexapro to see if this would be better coverage of anxiety as well as a plan to taper down on clonazepam slowly. General recommendation is to avoid tapering faster than 10-20% of TDD per week.    We also started the discussion on non-pharm recommendations for anxiety by reviewing her high caffeine intake- patient would benefit from reduction and if possible, cessation.    Hypertension/Hypokalemia: Stable. BP at goal <140/90.    Hyperlipidemia: Stable.    Asthma: Stable per patient.    GERD: Stable. Will discuss pros/cons long-term PPI use at future visit.    Vitamin D: Patient would likely benefit from Vitamin D maintenance dosing.    PLAN:                            1. Stop fluoxetine today. This medicine has a very long half life so it will stay active in your system for the next 4+ weeks without you having to take any. Fluoxetine can be a great medication for anxiety but it is stimulating, so sometimes it can actually be agitating in people, especially as they get older.    2. Start escitalopram (Lexapro) 10mg daily in 1 week (May 3rd). This is often a therapeutic dose, but we can go up to 20mg daily if needed in the future. Escitalopram will be less stimulating than fluoxetine so hopefully will be better for your anxiety and may help your sleep cycle a little.    3. For the next week, alternate clonazepam twice daily with three times  "daily (Start this by taking only two today). Starting May 3rd, drop to 2 tablets every day. Expect to be a little bit more on edge as we decrease this, but if you develop any symptoms that you would consider severe, call the clinic for guidance.    4. See if you can swap out one of your Diet Cokes per day with a caffeine free diet option for the next week. Starting May 3rd, try to get down to just two Diet Cokes per day. Ideally we would get your caffeine intake down to one beverage or less per day to limit its effect on your anxiety. Take acetaminophen (Tylenol) if needed to manage caffeine withdrawal headaches.    5. When you get time, I would suggest at least one Shingrix dose (depending on your documented history) and restarting vitamin D at 2000 international unit(s) daily.    Follow-up: Return in 2 weeks (on 5/10/2022) for phone visit.    SUBJECTIVE/OBJECTIVE:                          Tootie Marin is a 66 year old female called for an initial visit. She was referred to me from Dr. Augustine.      Reason for visit: Patient was referred for clonazepam taper, anxiety med discussion.    Allergies/ADRs: Reviewed in chart  Past Medical History: Reviewed in chart  Tobacco: She reports that she has never smoked. She has never used smokeless tobacco.  Alcohol: none  Caffeine: Drinks Diet Coke \"like it's water\" averaging maybe 4 per day (has cut down from 10 a year ago)    Medication Adherence/Access: Patient uses pill box(es).  Per patient, misses medication 0 times per week.     Anxiety: Currently taking clonazepam 0.5mg three times daily and fluoxetine 20mg twice daily. She has been on both of these since she was in her 30s and is very nervous about trying to make any changes. She thinks long ago she was on Zoloft but it didn't work. She doesn't recall taking other things, eg Lexapro.    Hypertension/Hypokalemia: Current medications include hydrochlorothiazide 25mg daily, as well as potassium 10mEq twice daily for " repletion. Patient reports no current medication side effects.  BP Readings from Last 3 Encounters:   04/18/22 110/68   12/09/21 122/74   05/04/21 110/64     Hyperlipidemia: Current therapy includes lovastatin 20mg daily.  Patient reports no significant myalgias or other side effects.    Recent Labs   Lab Test 05/04/21  1426 10/12/20  1057   CHOL 199 188   HDL 66 65    114   TRIG 63 43     Asthma: Current medications: ICS/LABA- Symbicort 2 puff(s) twice daily  Short-Acting Bronchodilator: Albuterol MDI. Patient rinses their mouth after using steroid inhaler. Triggers include: exercise or sports and cold air.  Patient reports the following symptoms: none.  ACT Total Scores 10/13/2020 5/4/2021 4/18/2022   ACT TOTAL SCORE (Goal Greater than or Equal to 20) 25 22 21   In the past 12 months, how many times did you visit the emergency room for your asthma without being admitted to the hospital? 0 0 0   In the past 12 months, how many times were you hospitalized overnight because of your asthma? 0 0 0     GERD: Current medications include: Prilosec (omeprazole) 20mg once daily. Patient has not tried a trial off of therapy and is not interested in doing so. Patient feels that current regimen is effective.    Vitamin D: Patient historically has taken vitamin D 50,000 international unit(s) daily for 2-3 months at a time for low D. She is not currently taking vitamin D.    Last Comprehensive Metabolic Panel:  Sodium   Date Value Ref Range Status   04/18/2022 140 136 - 145 mmol/L Final     Potassium   Date Value Ref Range Status   04/18/2022 3.9 3.5 - 5.0 mmol/L Final     Chloride   Date Value Ref Range Status   04/18/2022 100 98 - 107 mmol/L Final     Carbon Dioxide (CO2)   Date Value Ref Range Status   04/18/2022 27 22 - 31 mmol/L Final     Anion Gap   Date Value Ref Range Status   04/18/2022 13 5 - 18 mmol/L Final     Glucose   Date Value Ref Range Status   04/18/2022 92 70 - 125 mg/dL Final     Urea Nitrogen   Date  Value Ref Range Status   04/18/2022 16 8 - 22 mg/dL Final     Creatinine   Date Value Ref Range Status   04/18/2022 1.05 0.60 - 1.10 mg/dL Final     GFR Estimate   Date Value Ref Range Status   04/18/2022 58 (L) >60 mL/min/1.73m2 Final     Comment:     Effective December 21, 2021 eGFRcr in adults is calculated using the 2021 CKD-EPI creatinine equation which includes age and gender (Peyton et al., NE, DOI: 10.1056/YWNUpx0626366)   05/04/2021 >60 >60 mL/min/1.73m2 Final     Calcium   Date Value Ref Range Status   04/18/2022 10.2 8.5 - 10.5 mg/dL Final     I spent 56 minutes with this patient today. All changes were made via collaborative practice agreement with Dr. Augustine. A copy of the visit note was provided to the patient's provider(s).    The patient was mailed a summary of these recommendations.     Gaviota BuenoD, BCACP  Medication Therapy Management Provider  Phone: [phone]  [email or web address]    Telemedicine Visit Details  Type of service:  Telephone visit  Start Time: 8:26 AM  End Time: 9:22 AM  Originating Location (patient location): Home  Distant Location (provider location):  [facility]     Medication Therapy Recommendations  Anxiety state    Current Medication: FLUoxetine (PROZAC) 20 MG capsule (Discontinued)   Rationale: Undesirable effect - Adverse medication event - Safety   Recommendation: Change Medication - Lexapro 10 MG Tabs   Status: Accepted per CPA          Current Medication: clonazePAM (KLONOPIN) 0.5 MG tablet   Rationale: Unsafe medication for the patient - Adverse medication event - Safety   Recommendation: Decrease Dose   Status: Accepted per Provider         Takes dietary supplements    Current Medication: vitamin D2 (ERGOCALCIFEROL) 43142 units (1250 mcg) capsule (Discontinued)   Rationale: Dose too high - Dosage too high - Safety   Recommendation: Decrease Dose - VITAMIN D   Status: Accepted per CPA                 FOLLOW UP WITH YOUR PEDIATRICIAN  IN 1 DAY FOR REEVALUATION.      RETURN TO ED IMMEDIATELY WITH ANY WORSENING SYMPTOMS, PERSISTENT VOMITING OR DIARRHEA, DECREASED WET DIAPERS OR TEARS, CHANGE IN BEHAVIOR, WEAKNESS OR LETHARGY, HIGH FEVER, ABDOMINAL PAIN, DIFFICULTY BREATHING OR ANY OTHER CONCERNS.    Headache    A headache is pain or discomfort felt around the head or neck area. The specific cause of a headache may not be found as there are many types including tension headaches, migraine headaches, and cluster headaches. Watch your condition for any changes. Things you can do to manage your pain include taking over the counter and prescription medications as instructed by your health care provider, lying down in a dark quiet room, limiting stress, getting regular sleep, and refraining from alcohol and tobacco products. If headache persists you must follow up with your pmd and or neurologist for futher tests.     SEEK IMMEDIATE MEDICAL CARE IF YOU HAVE ANY OF THE FOLLOWING SYMPTOMS: fever, vomiting, stiff neck, loss of vision, problems with speech, muscle weakness, loss of balance, trouble walking, passing out, or confusion.    Nausea / Vomiting    Nausea is the feeling that you have to vomit. As nausea gets worse, it can lead to vomiting. Vomiting puts you at an increased risk for dehydration. Older adults and people with other diseases or a weak immune system are at higher risk for dehydration. Drink clear fluids in small but frequent amounts as tolerated. Eat bland, easy-to-digest foods in small amounts as tolerated.    SEEK IMMEDIATE MEDICAL CARE IF YOU HAVE ANY OF THE FOLLOWING SYMPTOMS: fever, inability to keep sufficient fluids down, black or bloody vomitus, black or bloody stools, lightheadedness/dizziness, chest pain, severe headache, rash, shortness of breath, cold or clammy skin, confusion, pain with urination, or any signs of dehydration.    Abdominal Pain    Many things can cause abdominal pain. . Your health care provider will do a physical exam to determine if there is a dangerous cause of your pain; blood tests and imaging can sometimes help determine the cause of your pain. However, in many cases, no cause may be found and you may need further testing as an outpatient. Monitor your abdominal pain for any changes.     SEEK IMMEDIATE MEDICAL CARE IF YOU HAVE ANY OF THE FOLLOWING SYMPTOMS: worsening abdominal pain, uncontrollable vomiting, profuse diarrhea, inability to have bowel movements or pass gas, black or bloody stools, fever accompanying chest pain or back pain, or fainting. These symptoms may represent a serious problem that is an emergency. Do not wait to see if the symptoms will go away. Get medical help right away. Call 911 and do not drive yourself to the hospital.    Dizziness    Dizziness can manifest as a feeling of unsteadiness or light-headedness. You may feel like you are about to faint. This condition can be caused by a number of things, including medicines, dehydration, or illness. Drink enough fluid to keep your urine clear or pale yellow. Do not drink alcohol and limit your caffeine intake. Avoid quick or sudden movements.  Rise slowly from chairs and steady yourself until you feel okay. In the morning, first sit up on the side of the bed.    SEEK IMMEDIATE MEDICAL CARE IF YOU HAVE ANY OF THE FOLLOWING SYMPTOMS: vomiting, changes in your vision or speech, weakness in your arms or legs, trouble speaking or swallowing, chest pain, abdominal pain, shortness of breath, sweating, bleeding, headache, neck pain, or fever.

## 2022-05-10 ENCOUNTER — VIRTUAL VISIT (OUTPATIENT)
Dept: PHARMACY | Facility: CLINIC | Age: 67
End: 2022-05-10
Payer: COMMERCIAL

## 2022-05-10 DIAGNOSIS — F41.1 ANXIETY STATE: Primary | ICD-10-CM

## 2022-05-10 DIAGNOSIS — I10 HTN (HYPERTENSION), BENIGN: ICD-10-CM

## 2022-05-10 PROCEDURE — 99607 MTMS BY PHARM ADDL 15 MIN: CPT | Performed by: PHARMACIST

## 2022-05-10 PROCEDURE — 99606 MTMS BY PHARM EST 15 MIN: CPT | Performed by: PHARMACIST

## 2022-05-10 RX ORDER — ESCITALOPRAM OXALATE 20 MG/1
20 TABLET ORAL DAILY
Qty: 90 TABLET | Refills: 0 | Status: SHIPPED | OUTPATIENT
Start: 2022-05-10 | End: 2022-07-18

## 2022-05-10 NOTE — PATIENT INSTRUCTIONS
"Recommendations from today's MTM visit:                                                       1. Increase Lexapro to 20mg daily. I have sent a new order.    2. On May 14th, move clonazepam to 1 and 1/2 tablets daily (you can take 1/2 tablet 3 times a day). After a week, decrease to 1 tablet total for the day (1/2 twice daily). If you are completely unable to tolerate this lower dose (and have decreased your caffeine intake), go back to 1 and 1/2 tablets daily for the week you are out of town, then retry dropping the total daily intake to 1 tablet.    3. I will talk to Dr. Augustine about an as needed supply of lorazepam going forward. It is up to Dr. Augustine to determine whether she is comfortable with this.    4. If you feel like your anxiety is getting worse, please try to cut back on Diet Coke further with a goal of 2 per day (less is fine).    5. Have your electrolytes rechecked with Dr. Augustine. Stay on 12.5mg hydrochlorothiazide daily for now.    Follow-up: Return in 3 weeks (on 5/31/2022) for phone visit.    It was great speaking with you today.  I value your experience and would be very thankful for your time in providing feedback in our clinic survey. In the next few days, you may receive an email or text message from [employer] with a link to a survey related to your  clinical pharmacist.\"     To schedule another MTM appointment, please call the clinic directly or you may call the MTM scheduling line at [phone] or toll-free at [phone].     My Clinical Pharmacist's contact information:                                                      Please feel free to contact me with any questions or concerns you have.      Cindi Nieves, Diana, BCACP  Medication Therapy Management Provider  Phone: [phone]  [email or web address]  "

## 2022-05-10 NOTE — Clinical Note
Good afternoon Dr. Augustine, let me know what you think about allowing this patient a small amount of prn lorazepam based on her history. It would be easier to  on urine drug screening as well as easier for her body to metabolize. I was very clear that regardless, we will continue to taper completely off clonazepam.

## 2022-05-10 NOTE — PROGRESS NOTES
Medication Therapy Management (MTM) Encounter    ASSESSMENT:                            Medication Adherence/Access: See below for considerations    Anxiety: Patient may benefit from increase of Lexapro to 20mg daily- we discussed that realistically she could wait another 2 more weeks to see full effect of 10mg but she'd rather be on the higher dose in interest of getting to steady state on the higher dose sooner. Ideally we would get a BMP in the next week or two to make sure this doesn't yield hyponatremia. We discussed that the Lexapro would ideally be her only daily agent- that we hopefully won't need an adjunct like Buspar. In regard to her request to avoid further tapering before her trip, I acknowledged her concerns about further tapering but reminded her that she has a given supply and we need to make sure she has enough tablets to finish out a taper. She's open to trying.     I do feel that this patient may need a supply of benzos for breakthrough and shared this with her, making clear distinction between daily use and as-needed use for breakthrough panic attacks and reiterating that Dr. Augustine has no responsibility to prescribe anything. I see that she has an appointment with psych for a consult in October and encouraged her to keep this appointment, as I definitely think psychiatry would be the best to determine needs long-term. My assessment would be that she be switched to as needed lorazepam if she does get an as-needed supply, as it is a better agent in patients over 65 due to slowed metabolism of clonazepam in patients over 65.    We discussed the urine drug screen, which I feel is somewhat of a moot point as of now as we are not providing further clonazepam prescriptions, but clonazepam is often missed on generic drugs of abuse assay screens and that the follow-up [facility] screen on the same sample did not include 7-aminoclonazepam, the primary urine metabolite of clonazepam. As such I think  "there's reasonable doubt that the screen represents the patient's clonazepam use.    Hypertension: Patient may be fine on current dose of hydrochlorothiazide since dramatically decreasing caffeine use and because BP has been fairly low on recent checks. Patient would benefit from BMP recheck as already planned and BP recheck at her scheduled PCP follow-up next week.    PLAN:                            1. Increase Lexapro to 20mg daily. I have sent a new order.    2. On May 14th, move clonazepam to 1 and 1/2 tablets daily (you can take 1/2 tablet 3 times a day). After a week, decrease to 1 tablet total for the day (1/2 twice daily).    3. I will talk to Dr. Augustine about an as needed supply of lorazepam going forward. It is up to Dr. Augustine to determine whether she is comfortable with this.    4. If you feel like your anxiety is getting worse, please try to cut back on Diet Coke further with a goal of 2 per day (less is fine).    5. Have your electrolytes rechecked with Dr. Augustine. Stay on 12.5mg hydrochlorothiazide daily for now.    Follow-up: Return in 3 weeks (on 5/31/2022) for phone visit.    SUBJECTIVE/OBJECTIVE:                          Tootie Marin is a 66 year old female called for a follow-up visit.  Today's visit is a follow-up MTM visit from 4/26     Reason for visit: follow up on anxiety med taper.    Allergies/ADRs: Reviewed in chart  Past Medical History: Reviewed in chart  Tobacco: She reports that she has never smoked. She has never used smokeless tobacco.  Alcohol: none  Caffeine: still using 4 Diet Cokes per day- feels this is not really negotiable, as she really enjoys them.    Medication Adherence/Access: see HTN    Anxiety: Patient is currently taking Lexapro 10mg daily (started about 5/3 after fluoxetine stop 4/26), clonazepam 0.5mg twice daily (down from three times daily since 4/26). She admits she's a bit more \"jumpy\" but is not having major anxiety issues or other symptoms of " "withdrawal from the clonazepam, nor any concerns with the Lexapro start. She would really like to move up on Lexapro to 20mg today to make sure she is taking enough. She is also wondering if there is a \"better version of Buspar available now because that might help but I didn't tolerate Buspar and won't take it again.\" She is still concerned about the future of all this as she has a history of panic attacks so we discussed this more today.     Patient reports she thinks she started having panic attacks around age 12 or 13- tough to discern because she had childhood asthma as well. Around age 17 she really felt like she was having them- they prevented her from going out and doing anything that was not mandatory (school work-related), and anytime she had them, she would go outside for a few minutes to hours to come down. She was finally referred to psych and diagnosed when she had a bad panic attack in her 30s that lasted hours, possibly more than a day- she thought she was dying and went to the ED where the physical assessment was unremarkable. The nurse there reassured her that she likely was having a panic attack. Psychiatry started her on fluoxetine and \"I think Ativan 1mg four times a day\" and she felt like a different person. Since then she was switched to clonazepam, she thinks in hopes of maintaining her on a lower benzo dose. She has had one panic attack since that was provoked by a bus trip with her mother in which they had to transfer on 5 or 6 busses, but has otherwise felt very stable on this medication.    Patient will be out of town the 23rd to the 27th so is wondering if we can wait on any further decrease in clonazepam until June.    Patient has been open about her failed urine drug screening both this visit and last- is still vehement that she has been taking her clonazepam as directed.    Hypertension: Current medications include hydrochlorothiazide 12.5mg daily- patient reports that she dropped the " dose from 25mg on her own after my mentioning in passing that her leg cramps may be caused by this at our last visit. She is still taking potassium. Patient does not self-monitor blood pressure- no home cuff.  Patient reports no current medication side effects.  BP Readings from Last 3 Encounters:   04/18/22 110/68   12/09/21 122/74   05/04/21 110/64     Last Comprehensive Metabolic Panel:  Sodium   Date Value Ref Range Status   04/18/2022 140 136 - 145 mmol/L Final     Potassium   Date Value Ref Range Status   04/18/2022 3.9 3.5 - 5.0 mmol/L Final     Chloride   Date Value Ref Range Status   04/18/2022 100 98 - 107 mmol/L Final     Carbon Dioxide (CO2)   Date Value Ref Range Status   04/18/2022 27 22 - 31 mmol/L Final     Anion Gap   Date Value Ref Range Status   04/18/2022 13 5 - 18 mmol/L Final     Glucose   Date Value Ref Range Status   04/18/2022 92 70 - 125 mg/dL Final     Urea Nitrogen   Date Value Ref Range Status   04/18/2022 16 8 - 22 mg/dL Final     Creatinine   Date Value Ref Range Status   04/18/2022 1.05 0.60 - 1.10 mg/dL Final     GFR Estimate   Date Value Ref Range Status   04/18/2022 58 (L) >60 mL/min/1.73m2 Final     Comment:     Effective December 21, 2021 eGFRcr in adults is calculated using the 2021 CKD-EPI creatinine equation which includes age and gender (Peyton et al., NE, DOI: 10.1056/INAOyw4981423)   05/04/2021 >60 >60 mL/min/1.73m2 Final     Calcium   Date Value Ref Range Status   04/18/2022 10.2 8.5 - 10.5 mg/dL Final     I spent 55 minutes with this patient today. All changes were made via collaborative practice agreement with Dr. Augustine. A copy of the visit note was provided to the patient's provider(s).    The patient was mailed a summary of these recommendations.     Cindi Nieves PharmD, BCACP  Medication Therapy Management Provider  Phone: [phone]  [email or web address]    Telemedicine Visit Details  Type of service:  Telephone visit  Start Time: 8:31 AM  End Time: 9:26  AM  Originating Location (patient location): Home  Distant Location (provider location):  [facility] [place]     Medication Therapy Recommendations  No medication therapy recommendations to display

## 2022-05-16 ENCOUNTER — OFFICE VISIT (OUTPATIENT)
Dept: FAMILY MEDICINE | Facility: CLINIC | Age: 67
End: 2022-05-16
Payer: MEDICARE

## 2022-05-16 VITALS
HEIGHT: 60 IN | HEART RATE: 74 BPM | OXYGEN SATURATION: 98 % | SYSTOLIC BLOOD PRESSURE: 122 MMHG | WEIGHT: 158.3 LBS | DIASTOLIC BLOOD PRESSURE: 64 MMHG | RESPIRATION RATE: 24 BRPM | TEMPERATURE: 98.2 F | BODY MASS INDEX: 31.08 KG/M2

## 2022-05-16 DIAGNOSIS — Z23 ENCOUNTER FOR IMMUNIZATION: ICD-10-CM

## 2022-05-16 DIAGNOSIS — F33.1 MODERATE EPISODE OF RECURRENT MAJOR DEPRESSIVE DISORDER (H): ICD-10-CM

## 2022-05-16 DIAGNOSIS — Z51.81 ENCOUNTER FOR THERAPEUTIC DRUG MONITORING: ICD-10-CM

## 2022-05-16 DIAGNOSIS — F41.1 GAD (GENERALIZED ANXIETY DISORDER): Primary | ICD-10-CM

## 2022-05-16 DIAGNOSIS — I10 ESSENTIAL HYPERTENSION: ICD-10-CM

## 2022-05-16 LAB
ANION GAP SERPL CALCULATED.3IONS-SCNC: 12 MMOL/L (ref 5–18)
BUN SERPL-MCNC: 16 MG/DL (ref 8–22)
CALCIUM SERPL-MCNC: 9.5 MG/DL (ref 8.5–10.5)
CHLORIDE BLD-SCNC: 98 MMOL/L (ref 98–107)
CO2 SERPL-SCNC: 27 MMOL/L (ref 22–31)
CREAT SERPL-MCNC: 0.78 MG/DL (ref 0.6–1.1)
GFR SERPL CREATININE-BSD FRML MDRD: 83 ML/MIN/1.73M2
GLUCOSE BLD-MCNC: 89 MG/DL (ref 70–125)
POTASSIUM BLD-SCNC: 3.5 MMOL/L (ref 3.5–5)
SODIUM SERPL-SCNC: 137 MMOL/L (ref 136–145)

## 2022-05-16 PROCEDURE — 99215 OFFICE O/P EST HI 40 MIN: CPT | Mod: 25 | Performed by: STUDENT IN AN ORGANIZED HEALTH CARE EDUCATION/TRAINING PROGRAM

## 2022-05-16 PROCEDURE — 80048 BASIC METABOLIC PNL TOTAL CA: CPT | Performed by: STUDENT IN AN ORGANIZED HEALTH CARE EDUCATION/TRAINING PROGRAM

## 2022-05-16 PROCEDURE — 0064A COVID-19,PF,MODERNA (18+ YRS BOOSTER .25ML): CPT | Performed by: STUDENT IN AN ORGANIZED HEALTH CARE EDUCATION/TRAINING PROGRAM

## 2022-05-16 PROCEDURE — 90471 IMMUNIZATION ADMIN: CPT | Performed by: STUDENT IN AN ORGANIZED HEALTH CARE EDUCATION/TRAINING PROGRAM

## 2022-05-16 PROCEDURE — 91306 COVID-19,PF,MODERNA (18+ YRS BOOSTER .25ML): CPT | Performed by: STUDENT IN AN ORGANIZED HEALTH CARE EDUCATION/TRAINING PROGRAM

## 2022-05-16 PROCEDURE — 36415 COLL VENOUS BLD VENIPUNCTURE: CPT | Performed by: STUDENT IN AN ORGANIZED HEALTH CARE EDUCATION/TRAINING PROGRAM

## 2022-05-16 PROCEDURE — 90714 TD VACC NO PRESV 7 YRS+ IM: CPT | Performed by: STUDENT IN AN ORGANIZED HEALTH CARE EDUCATION/TRAINING PROGRAM

## 2022-05-16 ASSESSMENT — PAIN SCALES - GENERAL: PAINLEVEL: MODERATE PAIN (5)

## 2022-05-16 NOTE — PROGRESS NOTES
Assessment & Plan       ICD-10-CM    1. MALINDA (generalized anxiety disorder)  F41.1    2. Encounter for immunization  Z23 TD PRESERV FREE, IM (7+ YRS) (DECAVAC/TENIVAC)     COVID-19,PF,MODERNA (18+ YRS BOOSTER .25ML)   3. Encounter for therapeutic drug monitoring  Z51.81 Basic metabolic panel  (Ca, Cl, CO2, Creat, Gluc, K, Na, BUN)     Basic metabolic panel  (Ca, Cl, CO2, Creat, Gluc, K, Na, BUN)   4. Moderate episode of recurrent major depressive disorder (H)  F33.1    5. Essential hypertension  I10        Benzo taper  Today, VSS and doing well with weaning off the clonazepam. Is not having significant breakthrough anxiety sxs or panic attacks. Will continue the plan to wean her off the clonazepam. Have sent a message to Cindi so that we can touch base about next steps and dosage to switch to PRN lorazepam. Told patient that I would be willing to bridge with the PRN loarzepam until she is able to see psych October of this year.  They can ultimately assess her long-term needs for benzodiazepines.     Has cut down on the hydrochlorothiazide dose to 12.5 and normotensive in the office today. Ok to continue lower dose. Will check BMP as she in on lexapro + hydrochlorothiazide for hyponatremia.     46 minutes spent on the date of the encounter doing chart review, history and exam, documentation and further activities per the note       Return in about 2 months (around 7/16/2022) for Benzo taper follow up .    Sharita Augustine DO  [facility]    Subjective     The last time patient was here, was looking for refill of her clonazepam.  Discussed that I do not generally prescribe benzodiazepines long-term.patient was initially frustrated but ultimately amenable to weaning off of her clonazepam.  MTM referral placed and she has been working closely with Cindi to come off her clonazepam.    Patient has done exceedingly well.  She seems motivated.  She is now down to clonazepam 1 tab in the a.m. and half  "a tab in the evening.  Starting next week, the plan is for her to go down to 1 tab daily.    Cindi did feel that patient would benefit from switching to lorazepam and using it on an as-needed basis.    Today, patient is motivated to continue to cut down on the clonazepam.  She is also okay eventually switching to lorazepam.  However, she does not think that she can go without benzodiazepines completely.  Has an appointment coming up with psychiatry in       Review of Systems   As per HPI       Objective    /64 (BP Location: Right arm, Patient Position: Sitting, Cuff Size: Adult Regular)   Pulse 74   Temp 98.2  F (36.8  C) (Oral)   Resp 24   Ht 1.53 m (5' 0.25\")   Wt 71.8 kg (158 lb 4.8 oz)   SpO2 98%   BMI 30.66 kg/m    Body mass index is 30.66 kg/m .  Physical Exam   GENERAL: healthy, alert and no distress  NECK: no adenopathy, no asymmetry, masses, or scars and thyroid normal to palpation  RESP: lungs clear to auscultation - no rales, rhonchi or wheezes  CV: regular rate and rhythm, normal S1 S2, no S3 or S4, no murmur, click or rub, no peripheral edema and peripheral pulses strong  MS: no gross musculoskeletal defects noted, no edema  PSYCH: mentation appears normal, affect normal/bright        "

## 2022-05-19 ENCOUNTER — TELEPHONE (OUTPATIENT)
Dept: FAMILY MEDICINE | Facility: CLINIC | Age: 67
End: 2022-05-19
Payer: MEDICARE

## 2022-05-19 NOTE — TELEPHONE ENCOUNTER
----- Message from Sharita Augustine DO sent at 5/18/2022  8:21 AM CDT -----  Call patient:     Kidney function looks good.

## 2022-05-23 DIAGNOSIS — I10 PRIMARY HYPERTENSION: ICD-10-CM

## 2022-05-25 NOTE — TELEPHONE ENCOUNTER
Please chek with pt who her PCP is, she is not seeing me any more. Forward to the new PCP (and change PCP in the computer).

## 2022-05-26 NOTE — TELEPHONE ENCOUNTER
Attempted to call pt, no answer (1/3). Left message requesting pt to call back clinic. See recent MD note for this encounter. Please obtain current PCP information and route this to the current PCP for a refill since Dr. Whitt no longer sees this pt.         Jeanette Cisse RN   [facility]

## 2022-05-27 RX ORDER — POTASSIUM CHLORIDE 750 MG/1
TABLET, EXTENDED RELEASE ORAL
Qty: 180 TABLET | Refills: 0 | Status: SHIPPED | OUTPATIENT
Start: 2022-05-27 | End: 2023-01-11

## 2022-05-31 ENCOUNTER — TELEPHONE (OUTPATIENT)
Dept: PHARMACY | Facility: CLINIC | Age: 67
End: 2022-05-31
Payer: MEDICARE

## 2022-05-31 ENCOUNTER — VIRTUAL VISIT (OUTPATIENT)
Dept: PHARMACY | Facility: CLINIC | Age: 67
End: 2022-05-31
Payer: COMMERCIAL

## 2022-05-31 DIAGNOSIS — F41.1 ANXIETY STATE: ICD-10-CM

## 2022-05-31 DIAGNOSIS — F41.1 ANXIETY STATE: Primary | ICD-10-CM

## 2022-05-31 PROCEDURE — 99606 MTMS BY PHARM EST 15 MIN: CPT | Performed by: PHARMACIST

## 2022-05-31 PROCEDURE — 99607 MTMS BY PHARM ADDL 15 MIN: CPT | Performed by: PHARMACIST

## 2022-05-31 RX ORDER — CLONAZEPAM 0.5 MG/1
TABLET ORAL
Qty: 21 TABLET | Refills: 0 | Status: CANCELLED | OUTPATIENT
Start: 2022-05-31 | End: 2022-07-08

## 2022-05-31 NOTE — PATIENT INSTRUCTIONS
"Recommendations from today's MTM visit:                                                       1. I will suggest either a continuation of your clonazepam or a lorazepam prescription to Dr. Augustine to continue your taper and ideally to allow for a few as-needed tablets once you are tapered off (Prashant Hightower). As always, Dr. Augustine will be the one to determine whether or not to send this.    2. Decrease your clonazepam to a total of 0.5mg daily (1 tablet). You are welcome to split that in half or take the whole tablet.    Follow-up: Return in 1 week (on 6/7/2022) for phone visit.    It was great speaking with you today.  I value your experience and would be very thankful for your time in providing feedback in our clinic survey. In the next few days, you may receive an email or text message from [employer] with a link to a survey related to your  clinical pharmacist.\"     To schedule another MTM appointment, please call the clinic directly or you may call the MTM scheduling line at [phone] or toll-free at [phone].     My Clinical Pharmacist's contact information:                                                      Please feel free to contact me with any questions or concerns you have.      Cindi Nieves PharmD, [facility]  Medication Therapy Management Provider  Phone: [phone]  [email or web address]    "

## 2022-05-31 NOTE — PROGRESS NOTES
Medication Therapy Management (MTM) Encounter    ASSESSMENT:                            Medication Adherence/Access: No issues identified    Anxiety with Panic: Patient did delay the taper by 1 week by continuing 0.75mg daily for 2 weeks instead of dropping to 0.5mg daily the second week as advised last visit, but we are entering the phase of the taper where slower is better to avoid withdrawal and the potential for the patient to become averse to the plan. Discussed with patient that I will make a couple suggestions to Dr. Augustine as to how to proceed, one being a continuation taper for clonazepam with an as needed order of lorazepam and the other being an immediate switch to lorazepam to finish out the taper with a few extra tablets at the end for breakthrough panic use going forward.    PLAN:                            1. I will suggest either a continuation of your clonazepam or a lorazepam prescription to Dr. Augustine to continue your taper and ideally to allow for a few as-needed tablets once you are tapered off (Prashant Hightower). As always, Dr. Augustine will be the one to determine whether or not to send this.    2. Decrease your clonazepam to a total of 0.5mg daily (1 tablet). You are welcome to split that in half or take the whole tablet.    Follow-up: Return in 1 week (on 6/7/2022) for phone visit.    SUBJECTIVE/OBJECTIVE:                          Tootie Marin is a 66 year old female called for a follow-up visit.  Today's visit is a follow-up MTM visit from 5/10     Reason for visit: follow up on benzo taper, anxiety.    Allergies/ADRs: Reviewed in chart  Past Medical History: Reviewed in chart  Tobacco: She reports that she has never smoked. She has never used smokeless tobacco.  Alcohol: not currently using    Medication Adherence/Access: no issues reported    Anxiety with Panic: Patient is taking Lexapro 20mg with clonazepam taper and feels Lexapro is going fine even though it is different from  Prozac. She has felt a little more sluggish but no other concerns and she has not had anything along the lines of a panic attack.    Has been on clonazepam 0.75mg total daily for 2 weeks. She has 4 whole 0.5mg tablets left and is concerned that this means she will run out going into a weekend and won't have anything to take if she has a panic attack. She is still very open to tapering off clonazepam but understandably would like a fall-back plan once she's off in the event of an attack.    Last Comprehensive Metabolic Panel:  Sodium   Date Value Ref Range Status   05/16/2022 137 136 - 145 mmol/L Final     Potassium   Date Value Ref Range Status   05/16/2022 3.5 3.5 - 5.0 mmol/L Final     Chloride   Date Value Ref Range Status   05/16/2022 98 98 - 107 mmol/L Final     Carbon Dioxide (CO2)   Date Value Ref Range Status   05/16/2022 27 22 - 31 mmol/L Final     Anion Gap   Date Value Ref Range Status   05/16/2022 12 5 - 18 mmol/L Final     Glucose   Date Value Ref Range Status   05/16/2022 89 70 - 125 mg/dL Final     Urea Nitrogen   Date Value Ref Range Status   05/16/2022 16 8 - 22 mg/dL Final     Creatinine   Date Value Ref Range Status   05/16/2022 0.78 0.60 - 1.10 mg/dL Final     GFR Estimate   Date Value Ref Range Status   05/16/2022 83 >60 mL/min/1.73m2 Final     Comment:     Effective December 21, 2021 eGFRcr in adults is calculated using the 2021 CKD-EPI creatinine equation which includes age and gender (Peyton et al., NEJ, DOI: 10.1056/WISPdz4195976)   05/04/2021 >60 >60 mL/min/1.73m2 Final     Calcium   Date Value Ref Range Status   05/16/2022 9.5 8.5 - 10.5 mg/dL Final     I spent 30 minutes with this patient today. All changes were made via collaborative practice agreement with Dr. Augustine. A copy of the visit note was provided to the patient's provider(s).    The patient was mailed a summary of these recommendations.     Cindi Nieves, PharmD, BCACP  Medication Therapy Management Provider  Phone:  [phone]  [email or web address]    Telemedicine Visit Details  Type of service:  Telephone visit  Start Time: 8:07 AM  End Time: 8:37 AM  Originating Location (patient location): Home  Distant Location (provider location):  [facility] [place]     Medication Therapy Recommendations  No medication therapy recommendations to display

## 2022-05-31 NOTE — TELEPHONE ENCOUNTER
Please see full MTM note from today. Patient is nearing the end of her clonazepam taper (will be moving to 0.5mg total daily today) but will likely need a small amount of either clonazepam or lorazepam to finish out the taper while avoiding withdrawal, as she has been on benzos for many years. She currently has 4 clonazepam 0.5mg tablets left. I would suggest one of two plans:    Clonazepam 0.5mg daily for 2 weeks, then 0.25mg daily for 2 weeks, then 0.125mg daily for 2 weeks (0.25mg every other day if too hard to split), then stop. Once she completes the taper, we could give her ten 1mg lorazepam tablets as a PRN only in the case of onset of panic symptoms.    Alternatively, patient is open to converting to lorazepam for the end of the taper, as she was told by Dr. Augustine that she would not be prescribing anymore clonazepam. We would convert clonazepam to lorazepam in a 0.25:1 approximate equivalent dose ratio, so 41mg of lorazepam to finish out the taper (41 1mg tabs). This could be prescribed with or without an extra 10 tablets as a PRN (could delay prescribing this until the end of the taper or give in the same rx- Rx below does not include any PRN tablets).    Certainly the course of action is ultimately up to the provider and the above suggestions are just that. Orders pended reflecting above plans can be adjusted or deleted as deemed appropriate. Feel free to route back and I can contact the patient with whatever is decided. Thank you for considering.    Cindi Nieves, Diana, BCACP  Medication Therapy Management Provider  Phone: [phone]  [email or web address]

## 2022-06-01 RX ORDER — LORAZEPAM 1 MG/1
TABLET ORAL
Qty: 41 TABLET | Refills: 0 | Status: SHIPPED | OUTPATIENT
Start: 2022-06-01 | End: 2022-07-09

## 2022-06-01 NOTE — TELEPHONE ENCOUNTER
Dr. Fawn Robertson is out of the office this week and I am covering for her. Based on her last note, I think she was leaning toward the second option of weaning her to lorazepam. As such, I will send in the 1 mg lorazepam tablets with #41. I will let Dr. Augustine determine if she would like to continue the additional PRN tablets once this final taper is completed.

## 2022-06-07 ENCOUNTER — VIRTUAL VISIT (OUTPATIENT)
Dept: PHARMACY | Facility: CLINIC | Age: 67
End: 2022-06-07
Payer: COMMERCIAL

## 2022-06-07 DIAGNOSIS — F41.1 ANXIETY STATE: Primary | ICD-10-CM

## 2022-06-07 PROCEDURE — 99606 MTMS BY PHARM EST 15 MIN: CPT | Performed by: PHARMACIST

## 2022-06-07 NOTE — PATIENT INSTRUCTIONS
"Recommendations from today's MTM visit:                                                       1. You are now transitioning from clonazepam to lorazepam to finish your daily taper. Anything left over can be used as as-needed only in the case of a panic attack.    2. Take lorazepam 1mg (1 tablet) in the morning and 0.5mg (1/2 tablet) in the evening for the next 10 days, then decrease to a total of 1mg (1 tablet) daily until we talk next. Contact the clinic if you develop concerning signs of withdrawal such as severe agitation or the sensation of crawling out of your skin (this would be extremely unlikely as we are going very slow).    Follow-up: Return in 2 weeks (on 6/21/2022) for phone visit.    It was great speaking with you today.  I value your experience and would be very thankful for your time in providing feedback in our clinic survey. In the next few days, you may receive an email or text message from [employer] with a link to a survey related to your  clinical pharmacist.\"     To schedule another MTM appointment, please call the clinic directly or you may call the MTM scheduling line at [phone] or toll-free at [phone].     My Clinical Pharmacist's contact information:                                                      Please feel free to contact me with any questions or concerns you have.      Cindi Nieves PharmD, [facility]  Medication Therapy Management Provider  Phone: [phone]  [email or web address]  "

## 2022-06-07 NOTE — PROGRESS NOTES
Medication Therapy Management (MTM) Encounter    ASSESSMENT:                            Medication Adherence/Access: No issues identified    Anxiety with Panic: Current therapy appropriate. We discussed that moving from 0.5mg clonazepam daily to 2mg lorazepam would be approximate equivalent potency but that going slightly quicker is also fine- she is happy to go a little quicker. Shrewdly, this will also leave a few tablets at the end of the taper that can be used as-needed.    PLAN:                            1. You are now transitioning from clonazepam to lorazepam to finish your daily taper. Anything left over can be used as as-needed only in the case of a panic attack.    2. Take lorazepam 1mg (1 tablet) in the morning and 0.5mg (1/2 tablet) in the evening for the next 10 days, then decrease to a total of 1mg (1 tablet) daily until we talk next. Contact the clinic if you develop concerning signs of withdrawal such as severe agitation or the sensation of crawling out of your skin (this would be extremely unlikely as we are going very slow).    Follow-up: Return in 2 weeks (on 6/21/2022) for phone visit.    SUBJECTIVE/OBJECTIVE:                          Tootie Marin is a 66 year old female called for a follow-up visit.  Today's visit is a follow-up MTM visit from 05/31.     Reason for visit: follow up on anxiety/benzo taper.    Allergies/ADRs: Reviewed in chart  Past Medical History: Reviewed in chart  Tobacco: She reports that she has never smoked. She has never used smokeless tobacco.  Alcohol: not currently using    Medication Adherence/Access: no issues reported    Anxiety with Panic: Patient received the lorazepam prescription as signed last week and ran out of clonazepam yesterday. Took 1 (1mg) lorazepam this morning, wants to take 1/2 before bed as she thinks a whole additional milligram might be too much considering she ended her clonazepam prescription on 0.5mg daily. She is somewhat tired because of  recent vacation but no withdrawal, side effects or panic attacks. Patient is still not wanting behavioral health as this has not worked for her in the past.    Last Comprehensive Metabolic Panel:  Sodium   Date Value Ref Range Status   05/16/2022 137 136 - 145 mmol/L Final     Potassium   Date Value Ref Range Status   05/16/2022 3.5 3.5 - 5.0 mmol/L Final     Chloride   Date Value Ref Range Status   05/16/2022 98 98 - 107 mmol/L Final     Carbon Dioxide (CO2)   Date Value Ref Range Status   05/16/2022 27 22 - 31 mmol/L Final     Anion Gap   Date Value Ref Range Status   05/16/2022 12 5 - 18 mmol/L Final     Glucose   Date Value Ref Range Status   05/16/2022 89 70 - 125 mg/dL Final     Urea Nitrogen   Date Value Ref Range Status   05/16/2022 16 8 - 22 mg/dL Final     Creatinine   Date Value Ref Range Status   05/16/2022 0.78 0.60 - 1.10 mg/dL Final     GFR Estimate   Date Value Ref Range Status   05/16/2022 83 >60 mL/min/1.73m2 Final     Comment:     Effective December 21, 2021 eGFRcr in adults is calculated using the 2021 CKD-EPI creatinine equation which includes age and gender (Peyton et al., NEJM, DOI: 10.1056/JLDKdu3280285)   05/04/2021 >60 >60 mL/min/1.73m2 Final     Calcium   Date Value Ref Range Status   05/16/2022 9.5 8.5 - 10.5 mg/dL Final     I spent 14 minutes with this patient today. All changes were made via collaborative practice agreement with Dr. Augustine. A copy of the visit note was provided to the patient's provider(s).    The patient was mailed a summary of these recommendations.     Cindi Nieves, Diana, BCACP  Medication Therapy Management Provider  Phone: [phone]  [email or web address]    Telemedicine Visit Details  Type of service:  Telephone visit  Start Time: 8:30 AM  End Time: 8:44 AM  Originating Location (patient location): [employer]  Distant Location (provider location):  [facility]     Medication Therapy Recommendations  No medication therapy recommendations to  display

## 2022-06-16 DIAGNOSIS — R05.3 CHRONIC COUGH: ICD-10-CM

## 2022-06-17 RX ORDER — ALBUTEROL SULFATE 90 UG/1
AEROSOL, METERED RESPIRATORY (INHALATION)
Qty: 54 G | Refills: 0 | Status: SHIPPED | OUTPATIENT
Start: 2022-06-17 | End: 2023-03-01

## 2022-06-17 NOTE — TELEPHONE ENCOUNTER
"  Patient needs to be seen because:  q 6mo    Last Written Prescription Date:  12/10/21  Last Fill Quantity: 54,  # refills: 0   Last office visit provider:  4/18/22   Patient is a 66-year-old female with PMH of HLD, MALINDA, panic disorder, postmenopausal, MDD, HTN, GERD who presents today for an annual physical/establish care.        Requested Prescriptions   Pending Prescriptions Disp Refills     albuterol (PROAIR HFA/PROVENTIL HFA/VENTOLIN HFA) 108 (90 Base) MCG/ACT inhaler 54 g 0     Sig: INHALE 2 PUFFS BY MOUTH EVERY 6 HOURS AS NEEDED       Asthma Maintenance Inhalers - Anticholinergics Failed - 6/16/2022  2:12 PM        Failed - Recent (6 mo) or future (30 days) visit within the authorizing provider's specialty     Patient had office visit in the last 6 months or has a visit in the next 30 days with authorizing provider or within the authorizing provider's specialty.  See \"Patient Info\" tab in inbasket, or \"Choose Columns\" in Meds & Orders section of the refill encounter.            Passed - Patient is age 12 years or older        Passed - Asthma control assessment score within normal limits in last 6 months     Please review ACT score.           Passed - Medication is active on med list       Short-Acting Beta Agonist Inhalers Protocol  Failed - 6/16/2022  2:12 PM        Failed - Recent (6 mo) or future (30 days) visit within the authorizing provider's specialty     Patient had office visit in the last 6 months or has a visit in the next 30 days with authorizing provider or within the authorizing provider's specialty.  See \"Patient Info\" tab in inbasket, or \"Choose Columns\" in Meds & Orders section of the refill encounter.            Passed - Patient is age 12 or older        Passed - Asthma control assessment score within normal limits in last 6 months     Please review ACT score.           Passed - Medication is active on med list             Sona Culver RN 06/17/22 3:10 PM  "

## 2022-06-21 ENCOUNTER — VIRTUAL VISIT (OUTPATIENT)
Dept: PHARMACY | Facility: CLINIC | Age: 67
End: 2022-06-21
Payer: COMMERCIAL

## 2022-06-21 ENCOUNTER — TELEPHONE (OUTPATIENT)
Dept: PHARMACY | Facility: CLINIC | Age: 67
End: 2022-06-21

## 2022-06-21 DIAGNOSIS — F41.1 GAD (GENERALIZED ANXIETY DISORDER): Primary | ICD-10-CM

## 2022-06-21 DIAGNOSIS — R52 OCCASIONAL PAIN: ICD-10-CM

## 2022-06-21 DIAGNOSIS — F41.1 ANXIETY STATE: Primary | ICD-10-CM

## 2022-06-21 PROCEDURE — 99607 MTMS BY PHARM ADDL 15 MIN: CPT | Performed by: PHARMACIST

## 2022-06-21 PROCEDURE — 99606 MTMS BY PHARM EST 15 MIN: CPT | Performed by: PHARMACIST

## 2022-06-21 RX ORDER — ACETAMINOPHEN 500 MG
500-1000 TABLET ORAL EVERY 6 HOURS PRN
Qty: 100 TABLET | Refills: 3 | Status: SHIPPED | OUTPATIENT
Start: 2022-06-21

## 2022-06-21 NOTE — PROGRESS NOTES
Medication Therapy Management (MTM) Encounter    ASSESSMENT:                            Medication Adherence/Access: See below for considerations    Anxiety with Panic: Discussed with patient that first priority should be to limit clonazepam intake enough to buy time to determine a plan going forward. Reiterated the risks of long-term benzo use including short term memory lapses and increased risk for alzheimer's. Patient again insists that she intends to continue taking clonazepam 0.5mg twice daily. We negotiated that she try once daily for now, especially as she had been weaning down and may be fine on a lower dose. She states she will try.    As I am unsure of next steps since patient cannot get in to see psychiatry until October, E-Consult may be helpful to have psychiatry input on how to handle this going forward.     Occasional Pain: Acetaminophen appropriate.    PLAN:                            1. Restart Lexapro (escitalopram) 20mg daily.    2. Please limit clonazepam to 0.5mg once daily to avoid quickly running out and going through chemical withdrawal.    3. I will forward an E-Consult to Dr. Augustine for consideration regarding next steps in treating your anxiety and panic.    4. I sent an order for Tylenol Extra Strength generic to your pharmacy.    Follow-up: Return in about 9 days (around 6/30/2022) for phone visit. (to review results of E-Consult if signed)    SUBJECTIVE/OBJECTIVE:                          Tootie Marin is a 66 year old female called for a follow-up visit.  Today's visit is a follow-up MTM visit from 6/7.     Reason for visit: Follow up on anxiety, benzo taper.    Allergies/ADRs: Reviewed in chart  Past Medical History: Reviewed in chart  Tobacco: She reports that she has never smoked. She has never used smokeless tobacco.  Alcohol: not currently using    Medication Adherence/Access: see below- patient not taking medications as prescribed.    Anxiety with Panic:  Patient stopped  "Lexapro and lorazepam taper 2 days ago. She is instead taking clonazepam 0.5mg twice daily. She has maybe a dozen clonazepam tablets left over from a previous prescription she found. When asked what the plan is when she runs out in two weeks, she has no specific plan, just says \"I'll be fine\".    Patient said while attempting the taper, she wasn't sleeping well and admits she was having small episodes of anxiety pretty regularly but didn't want to tell us because she wanted to \"give it a try\". Felt \"dead inside\" while on lorazepam and Lexapro so went back to taking clonazepam only. She says she feels better now and is not having issues with drowsiness or memory lapses. However, we did meet later than scheduled today because she slept through our appointment time.    She is open to going back on Lexapro at my request but is refusing to continue to attempt a benzo taper with either lorazepam or clonazepam- she wishes to stay on clonazepam 0.5mg twice daily indefinitely.    Patient has long history of anxiety and reports having very frequent panic attacks in her younger years before she was formally diagnosed and was started on benzodiazepines. Patient states she did see psychiatry \"recently\" and they reviewed her meds with her and advised her to stay on fluoxetine and scheduled clonazepam as prescribed at the time.    Occasional Pain: Takes acetaminophen 500mg 1 to 2 tablets infrequently, mostly for knee pain- was told by her health plan that the generic is covered and is wondering if she can get this ordered so it would be covered.    Last Comprehensive Metabolic Panel:  Sodium   Date Value Ref Range Status   05/16/2022 137 136 - 145 mmol/L Final     Potassium   Date Value Ref Range Status   05/16/2022 3.5 3.5 - 5.0 mmol/L Final     Chloride   Date Value Ref Range Status   05/16/2022 98 98 - 107 mmol/L Final     Carbon Dioxide (CO2)   Date Value Ref Range Status   05/16/2022 27 22 - 31 mmol/L Final     Anion Gap "   Date Value Ref Range Status   05/16/2022 12 5 - 18 mmol/L Final     Glucose   Date Value Ref Range Status   05/16/2022 89 70 - 125 mg/dL Final     Urea Nitrogen   Date Value Ref Range Status   05/16/2022 16 8 - 22 mg/dL Final     Creatinine   Date Value Ref Range Status   05/16/2022 0.78 0.60 - 1.10 mg/dL Final     GFR Estimate   Date Value Ref Range Status   05/16/2022 83 >60 mL/min/1.73m2 Final     Comment:     Effective December 21, 2021 eGFRcr in adults is calculated using the 2021 CKD-EPI creatinine equation which includes age and gender (Peyton et al., NEJ, DOI: 10.1056/ZMIYyl2993383)   05/04/2021 >60 >60 mL/min/1.73m2 Final     Calcium   Date Value Ref Range Status   05/16/2022 9.5 8.5 - 10.5 mg/dL Final     I spent 34 minutes with this patient today. All changes were made via collaborative practice agreement with Dr. Augustine. A copy of the visit note was provided to the patient's provider(s).    The patient was mailed a summary of these recommendations.     Gaviota BuenoD, BCACP  Medication Therapy Management Provider  Phone: [phone]  [email or web address]    Telemedicine Visit Details  Type of service:  Telephone visit  Start Time: 11:24 AM  End Time: 11:58 AM  Originating Location (patient location): [place]  Distant Location (provider location):  [facility]     Medication Therapy Recommendations  Anxiety state    Current Medication: escitalopram (LEXAPRO) 20 MG tablet   Rationale: Patient prefers not to take - Adherence - Adherence   Recommendation: Provide Education   Status: Accepted - no CPA Needed

## 2022-06-21 NOTE — TELEPHONE ENCOUNTER
I will send along full visit not from today but patient has decided to go back to taking clonazepam 0.5mg 1 to 2 tablets daily from a previous prescription. She is refusing to continue to attempt to taper down on benzodiazepine use. We discussed the very near consequences of this as she has a very limited supply now, Dr. Augustine has been very clear about not continuing this medication, and the patient does not have her first visit with psych until October.    I am sending an E-consult to Dr. Augustine to consider getting an opinion on course of action. Dr. Augustine, if deemed appropriate, please complete the clinical question section and sign. Patient was consented to the consult and potential charge. I have set aside time to go over the consult results with the patient next week if needed.     Cindi Nieves, PharmD, BCACP  Medication Therapy Management Provider  Phone: [phone]  [email or web address]

## 2022-06-21 NOTE — PATIENT INSTRUCTIONS
"Recommendations from today's MTM visit:                                                       1. Restart Lexapro (escitalopram) 20mg daily.    2. Please limit clonazepam to 0.5mg once daily to avoid quickly running out and going through chemical withdrawal.    3. I will forward an E-Consult to Dr. Augustine for consideration regarding next steps in treating your anxiety and panic.    4. I sent an order for Tylenol Extra Strength generic to your pharmacy.    Follow-up: Return in about 9 days (around 6/30/2022) for phone visit. (to review results of E-Consult if signed)    It was great speaking with you today.  I value your experience and would be very thankful for your time in providing feedback in our clinic survey. In the next few days, you may receive an email or text message from [employer] with a link to a survey related to your  clinical pharmacist.\"     To schedule another MTM appointment, please call the clinic directly or you may call the MTM scheduling line at [phone] or toll-free at [phone].     My Clinical Pharmacist's contact information:                                                      Please feel free to contact me with any questions or concerns you have.      Cindi Nieves, Diana, BCACP  Medication Therapy Management Provider  Phone: [phone]  [email or web address]    "

## 2022-06-21 NOTE — Clinical Note
Sadly you were right. I'm not really sure what to do at this point so I forwarded you that referral, obviously feel free to pursue other plans if something else makes more sense. Thanks again for referring her, she is at least still willing to meet with both of us.

## 2022-06-23 NOTE — TELEPHONE ENCOUNTER
I can send it to start on Monday of next week.    Just to clarify, we will just have her on clonazepam 0.5 mg.  She will be on no other benzodiazepines?

## 2022-06-24 RX ORDER — CLONAZEPAM 0.5 MG/1
0.5 TABLET ORAL DAILY PRN
Qty: 30 TABLET | Refills: 0 | Status: SHIPPED | OUTPATIENT
Start: 2022-06-29 | End: 2022-07-18

## 2022-06-24 NOTE — TELEPHONE ENCOUNTER
Have sent in clonazepam for patient to take daily PRN as she has been not been able to titrate down any further and off the benzos all together per MTM     The prescription is to be picked up  Wednesday of next week. Meant to send it in on Monday but accidentally set the date for Wednesday.     Will not be uptitrating the medication     Will only refill med until she sees psych in October who can take over    Per  - she was dispensed 38 days of lorazepam on 6/1 but has discontinued them ( per MTM provider). Please ask patient get ride of them appropriately from the home so they don't confuse the two benzos at home

## 2022-06-28 NOTE — TELEPHONE ENCOUNTER
Called patient to make sure she understands the plan. Found out incidentally that the pharmacy already dispensed the clonazepam but patient states she has not started the new rx- she still has some from last fill. She informs me she is taking clonazepam 0.5mg in the morning, 0.25mg in the evening. I explained several ways that she needs to move to 0.5mg total for the day, as that is what Dr. Augustine is willing to continue to prescribe- that she will not dispense another supply of 30 tablets until 30 days out from this one. She agrees that she will taper down with what she has left over from last fill so that she will not need another fill for 30 days.    I did ask about the lorazepam and patient says she is not taking any and understands that taking both would be unsafe. She is continuing on the Lexapro as advised last visit.    I realized after the call we are scheduled to meet again Thursday- I will call her Thursday to set another time.    Cindi Nieves, GaviotaD, BCACP  Medication Therapy Management Provider  Phone: [phone]  [email or web address]

## 2022-07-12 DIAGNOSIS — E78.00 PURE HYPERCHOLESTEROLEMIA: ICD-10-CM

## 2022-07-12 RX ORDER — LOVASTATIN 20 MG
TABLET ORAL
Qty: 90 TABLET | Refills: 0 | Status: SHIPPED | OUTPATIENT
Start: 2022-07-12 | End: 2022-10-30

## 2022-07-12 NOTE — TELEPHONE ENCOUNTER
"Routing refill request to provider for review/approval because:  Labs not current:  ldl    Last Written Prescription Date:  4/5/22  Last Fill Quantity: 90,  # refills: 0   Last office visit provider:  5/16/22     Requested Prescriptions   Pending Prescriptions Disp Refills     lovastatin (MEVACOR) 20 MG tablet [Pharmacy Med Name: LOVASTATIN 20MG TABLETS] 90 tablet 0     Sig: TAKE 1 TABLET BY MOUTH EVERY NIGHT AT BEDTIME       Statins Protocol Failed - 7/12/2022  5:32 AM        Failed - LDL on file in past 12 months     Recent Labs   Lab Test 05/04/21  1426                Passed - No abnormal creatine kinase in past 12 months     No lab results found.             Passed - Recent (12 mo) or future (30 days) visit within the authorizing provider's specialty     Patient has had an office visit with the authorizing provider or a provider within the authorizing providers department within the previous 12 mos or has a future within next 30 days. See \"Patient Info\" tab in inbasket, or \"Choose Columns\" in Meds & Orders section of the refill encounter.              Passed - Medication is active on med list        Passed - Patient is age 18 or older        Passed - No active pregnancy on record        Passed - No positive pregnancy test in past 12 months             Sona Culver RN 07/12/22 5:29 PM  "

## 2022-07-14 ENCOUNTER — VIRTUAL VISIT (OUTPATIENT)
Dept: PHARMACY | Facility: CLINIC | Age: 67
End: 2022-07-14
Payer: COMMERCIAL

## 2022-07-14 DIAGNOSIS — F41.1 GAD (GENERALIZED ANXIETY DISORDER): Primary | ICD-10-CM

## 2022-07-14 PROCEDURE — 99607 MTMS BY PHARM ADDL 15 MIN: CPT | Performed by: PHARMACIST

## 2022-07-14 PROCEDURE — 99606 MTMS BY PHARM EST 15 MIN: CPT | Performed by: PHARMACIST

## 2022-07-14 NOTE — PATIENT INSTRUCTIONS
"Recommendations from today's MTM visit:                                                       1. Follow up with Dr. Augustine next week as planned to discuss a clear plan for your clonazepam until you meet with psychiatry in October to establish care.    Follow-up: Return in 26 days (on 8/9/2022) for phone visit.    It was great speaking with you today.  I value your experience and would be very thankful for your time in providing feedback in our clinic survey. In the next few days, you may receive an email or text message from [employer] with a link to a survey related to your  clinical pharmacist.\"     To schedule another MTM appointment, please call the clinic directly or you may call the MTM scheduling line at [phone] or toll-free at [phone].     My Clinical Pharmacist's contact information:                                                      Please feel free to contact me with any questions or concerns you have.      Cindi Nieves, Diana, BCACP  Medication Therapy Management Provider  Phone: [phone]  [email or web address]    "

## 2022-07-14 NOTE — PROGRESS NOTES
Medication Therapy Management (MTM) Encounter    ASSESSMENT:                            Medication Adherence/Access: See below for considerations    Anxiety: After some back and forth, we settled on that patient will need to be up front and honest with Dr. Augustine about her clonazepam dosing when they meet next week. We discussed that the psychiatry appointment is essential for her to continue receiving clonazepam. She was falsely thinking this was a consult only so I cleared up that this is to be a long-term relationship with a psychiatrist and she is much more open to this idea- says she will keep the appointment.    PLAN:                            1. Follow up with Dr. Augustine next week as planned to discuss a clear plan for your clonazepam until you meet with psychiatry in October to establish care.    Follow-up: Return in 26 days (on 8/9/2022) for phone visit.    SUBJECTIVE/OBJECTIVE:                          Tootie Marin is a 66 year old female called for a follow-up visit.  Today's visit is a follow-up MTM visit from 06/21.     Reason for visit: follow up on benzodiazepine dosing.    Allergies/ADRs: Reviewed in chart  Past Medical History: Reviewed in chart  Tobacco: She reports that she has never smoked. She has never used smokeless tobacco.  Alcohol: not currently using    Medication Adherence/Access: see below    Anxiety: Still taking Lexapro 20mg daily and is taking clonazepam 0.5 in the morning and 0.125 in the evening (not 0.5mg total for the day as sent by Dr. Augustine). Patient is vehement that this is the amount she needs and no less. We discussed the options of trying to take 0.25mg twice daily and 0.375 AM, 0.125 PM, and patient is not interested. She is pleased with the amount of medication she was able to get off of but does not want to decrease any further. When discussing her upcoming psychiatry appointment, she is unclear of why the appointment is necessary, as she is hoping to continue  with Dr. Augustine.    Last Comprehensive Metabolic Panel:  Sodium   Date Value Ref Range Status   05/16/2022 137 136 - 145 mmol/L Final     Potassium   Date Value Ref Range Status   05/16/2022 3.5 3.5 - 5.0 mmol/L Final     Chloride   Date Value Ref Range Status   05/16/2022 98 98 - 107 mmol/L Final     Carbon Dioxide (CO2)   Date Value Ref Range Status   05/16/2022 27 22 - 31 mmol/L Final     Anion Gap   Date Value Ref Range Status   05/16/2022 12 5 - 18 mmol/L Final     Glucose   Date Value Ref Range Status   05/16/2022 89 70 - 125 mg/dL Final     Urea Nitrogen   Date Value Ref Range Status   05/16/2022 16 8 - 22 mg/dL Final     Creatinine   Date Value Ref Range Status   05/16/2022 0.78 0.60 - 1.10 mg/dL Final     GFR Estimate   Date Value Ref Range Status   05/16/2022 83 >60 mL/min/1.73m2 Final     Comment:     Effective December 21, 2021 eGFRcr in adults is calculated using the 2021 CKD-EPI creatinine equation which includes age and gender (Peyton et al., NEJ, DOI: 10.1056/IOEAub6921843)   05/04/2021 >60 >60 mL/min/1.73m2 Final     Calcium   Date Value Ref Range Status   05/16/2022 9.5 8.5 - 10.5 mg/dL Final     I spent 30 minutes with this patient today. All changes were made via collaborative practice agreement with Dr. Augustine. A copy of the visit note was provided to the patient's provider(s).    The patient was mailed a summary of these recommendations.     Cindi Nieves, PharmD, BCACP  Medication Therapy Management Provider  Phone: [phone]  [email or web address]    Telemedicine Visit Details  Type of service:  Telephone visit  Start Time: 3:05 PM  End Time: 3:35 PM  Originating Location (patient location): Home  Distant Location (provider location):  [facility]     Medication Therapy Recommendations  No medication therapy recommendations to display

## 2022-07-18 ENCOUNTER — OFFICE VISIT (OUTPATIENT)
Dept: FAMILY MEDICINE | Facility: CLINIC | Age: 67
End: 2022-07-18
Payer: MEDICARE

## 2022-07-18 VITALS
BODY MASS INDEX: 33.1 KG/M2 | OXYGEN SATURATION: 97 % | TEMPERATURE: 98.1 F | HEART RATE: 77 BPM | DIASTOLIC BLOOD PRESSURE: 62 MMHG | WEIGHT: 170.9 LBS | SYSTOLIC BLOOD PRESSURE: 120 MMHG | RESPIRATION RATE: 20 BRPM

## 2022-07-18 DIAGNOSIS — F41.1 GAD (GENERALIZED ANXIETY DISORDER): ICD-10-CM

## 2022-07-18 DIAGNOSIS — F41.1 ANXIETY STATE: ICD-10-CM

## 2022-07-18 PROCEDURE — 99214 OFFICE O/P EST MOD 30 MIN: CPT | Performed by: STUDENT IN AN ORGANIZED HEALTH CARE EDUCATION/TRAINING PROGRAM

## 2022-07-18 RX ORDER — CLONAZEPAM 0.5 MG/1
0.5 TABLET ORAL DAILY PRN
Qty: 30 TABLET | Refills: 0 | Status: CANCELLED | OUTPATIENT
Start: 2022-07-18

## 2022-07-18 RX ORDER — CLONAZEPAM 0.5 MG/1
0.5 TABLET ORAL 2 TIMES DAILY PRN
Qty: 60 TABLET | Refills: 0 | Status: SHIPPED | OUTPATIENT
Start: 2022-07-21 | End: 2022-08-25

## 2022-07-18 RX ORDER — ESCITALOPRAM OXALATE 20 MG/1
20 TABLET ORAL DAILY
Qty: 90 TABLET | Refills: 0 | Status: SHIPPED | OUTPATIENT
Start: 2022-07-18 | End: 2022-08-25 | Stop reason: ALTCHOICE

## 2022-07-18 RX ORDER — RIBOFLAVIN (VITAMIN B2) 100 MG
500 TABLET ORAL DAILY
COMMUNITY

## 2022-07-18 ASSESSMENT — ANXIETY QUESTIONNAIRES
5. BEING SO RESTLESS THAT IT IS HARD TO SIT STILL: NOT AT ALL
GAD7 TOTAL SCORE: 0
3. WORRYING TOO MUCH ABOUT DIFFERENT THINGS: NOT AT ALL
4. TROUBLE RELAXING: NOT AT ALL
GAD7 TOTAL SCORE: 0
8. IF YOU CHECKED OFF ANY PROBLEMS, HOW DIFFICULT HAVE THESE MADE IT FOR YOU TO DO YOUR WORK, TAKE CARE OF THINGS AT HOME, OR GET ALONG WITH OTHER PEOPLE?: NOT DIFFICULT AT ALL
2. NOT BEING ABLE TO STOP OR CONTROL WORRYING: NOT AT ALL
6. BECOMING EASILY ANNOYED OR IRRITABLE: NOT AT ALL
7. FEELING AFRAID AS IF SOMETHING AWFUL MIGHT HAPPEN: NOT AT ALL
GAD7 TOTAL SCORE: 0
7. FEELING AFRAID AS IF SOMETHING AWFUL MIGHT HAPPEN: NOT AT ALL
1. FEELING NERVOUS, ANXIOUS, OR ON EDGE: NOT AT ALL

## 2022-07-18 ASSESSMENT — PAIN SCALES - GENERAL: PAINLEVEL: NO PAIN (0)

## 2022-07-18 ASSESSMENT — PATIENT HEALTH QUESTIONNAIRE - PHQ9
SUM OF ALL RESPONSES TO PHQ QUESTIONS 1-9: 0
10. IF YOU CHECKED OFF ANY PROBLEMS, HOW DIFFICULT HAVE THESE PROBLEMS MADE IT FOR YOU TO DO YOUR WORK, TAKE CARE OF THINGS AT HOME, OR GET ALONG WITH OTHER PEOPLE: NOT DIFFICULT AT ALL
SUM OF ALL RESPONSES TO PHQ QUESTIONS 1-9: 0

## 2022-07-18 NOTE — PROGRESS NOTES
"Assessment & Plan   Problem List Items Addressed This Visit        Other    Anxiety    Relevant Medications    escitalopram (LEXAPRO) 20 MG tablet    clonazePAM (KLONOPIN) 0.5 MG tablet (Start on 7/21/2022)      Other Visit Diagnoses     MALINDA (generalized anxiety disorder)        Relevant Medications    escitalopram (LEXAPRO) 20 MG tablet    clonazePAM (KLONOPIN) 0.5 MG tablet (Start on 7/21/2022)         Reviewed the notes from MTM.  Per the their note, \"clonazepam is often missed on generic drugs of abuse assay screens and that the follow-up [facility] screen on the same sample did not include 7-aminoclonazepam, the primary urine metabolite of clonazepam. As such I think there's reasonable doubt that the screen represents the patient's clonazepam use\".     Patient also seems earnest and has been honest about her struggles thus far.  Has been willing to trial a taper but was unsuccessful.  Once again revisited the I do not generally prescribe opiates or benzodiazepines in my practice.  I made an exception for her and was dispensing clonazepam with the goal of her coming off the clonazepam altogether.  Now that she is unable to/unwilling to further taper down, I will not be prescribing the medications long-term.    Last prescription she picked up was on 6/24/2022-was for clonazepam 0.5 mg daily.  Patient is going through the medication faster than that because she recently increased it to twice daily dosing to prevent panic attacks. She will run out of medication in 3 days.  She does not think she can come down on the dose anymore.  After much discussion, I have agreed to prescribe her clonazepam 0.5 mg twice daily until October 6 when she has an appointment with psychiatry.  I will not be dispensing any medication after that.  Patient verbalized understanding.    Refill provided - to be picked up in 3 days     35 minutes spent on the date of the encounter doing chart review, history and exam, documentation and " "further activities per the note    Return in about 3 months (around 10/18/2022) for MALINDA.    Sharita Augustine DO  [facility] EULOGIO Sommers is a 66 year old , presenting for the following health issues:  Follow Up (Would like to discuss the klonopin. Was tapering off and now she is taking more. )    When she first came to establish care with me 4/2022-patient was looking for refills for her benzodiazepines for longstanding history of anxiety.  Last PCP had declined to prescribe her benzos because of negative urine drug screen despite daily use.  I had also declined to prescribe her opiates due to the previous drug screen.  Had offered to put patient in touch with resources to help wean off the benzos.  Patient had initially agreed.  Had been working with MTM over the last few months to come down from her clonazepam.    Was initially doing well with the taper but then recently had a panic attack while weaning down on the benzodiazepines.  She did \"not like the way that made her feel\".  She got scared and went back on her clonazepam.  Was unable to tolerate the switch from clonazepam to Ativan.  She is now taking 0.5 mg twice daily.  She does not believe that she will be able to come off the medication.  She refuses to try to calm down any more than she has.  She says that she feels \"normal\" and \"like herself\" when she is taking the clonazepam.    She is wondering if I am able to prescribe her clonazepam until she is able to see psychiatry on August 6.    Review of Systems   As per HPI       Objective    /62 (BP Location: Right arm, Patient Position: Sitting, Cuff Size: Adult Regular)   Pulse 77   Temp 98.1  F (36.7  C) (Oral)   Resp 20   Wt 77.5 kg (170 lb 14.4 oz)   SpO2 97%   BMI 33.10 kg/m    Body mass index is 33.1 kg/m .  Physical Exam   GENERAL: healthy, alert and no distress  PSYCH: mentation appears normal, affect anxious and a bit restless               .  ..  "

## 2022-08-18 ENCOUNTER — PATIENT OUTREACH (OUTPATIENT)
Dept: GERIATRIC MEDICINE | Facility: CLINIC | Age: 67
End: 2022-08-18

## 2022-08-18 NOTE — PROGRESS NOTES
CC updated program tasks and targets for [employer] launch    MARCO Manrique  [employer]  [phone]

## 2022-08-23 DIAGNOSIS — F41.1 GAD (GENERALIZED ANXIETY DISORDER): ICD-10-CM

## 2022-08-24 RX ORDER — CLONAZEPAM 0.5 MG/1
0.5 TABLET ORAL 2 TIMES DAILY PRN
Qty: 60 TABLET | Refills: 0 | OUTPATIENT
Start: 2022-08-24

## 2022-08-25 ENCOUNTER — VIRTUAL VISIT (OUTPATIENT)
Dept: PHARMACY | Facility: CLINIC | Age: 67
End: 2022-08-25
Payer: COMMERCIAL

## 2022-08-25 DIAGNOSIS — F41.1 GAD (GENERALIZED ANXIETY DISORDER): Primary | ICD-10-CM

## 2022-08-25 DIAGNOSIS — S00.532A HEMATOMA OF ORAL CAVITY: ICD-10-CM

## 2022-08-25 DIAGNOSIS — F41.1 GAD (GENERALIZED ANXIETY DISORDER): ICD-10-CM

## 2022-08-25 PROCEDURE — 99607 MTMS BY PHARM ADDL 15 MIN: CPT | Performed by: PHARMACIST

## 2022-08-25 PROCEDURE — 99606 MTMS BY PHARM EST 15 MIN: CPT | Performed by: PHARMACIST

## 2022-08-25 RX ORDER — CLONAZEPAM 0.5 MG/1
0.5 TABLET ORAL 2 TIMES DAILY PRN
Qty: 60 TABLET | Refills: 0 | Status: CANCELLED | OUTPATIENT
Start: 2022-08-25

## 2022-08-25 RX ORDER — CLONAZEPAM 0.5 MG/1
0.5 TABLET ORAL 2 TIMES DAILY PRN
Qty: 60 TABLET | Refills: 0 | Status: SHIPPED | OUTPATIENT
Start: 2022-08-25 | End: 2022-09-23

## 2022-08-25 NOTE — TELEPHONE ENCOUNTER
Patient is out of clonazepam. Patient tried to refill this through the pharmacy 8/18 but was denied by another provider before Dr. Augustine was able to review. Re-pending this order for review and signature if deemed appropriate (last ordered 7/21 for 30 day supply).    Cindi Nieves, GaviotaD, [facility]  Medication Therapy Management Provider  Phone: [phone]  [email or web address]

## 2022-08-25 NOTE — TELEPHONE ENCOUNTER
I am re-pending this to Dr. Augustine. Patient reports today that she is out of this medication.    Cindi Nieves PharmD, [facility]  Medication Therapy Management Provider  Phone: [phone]  [email or web address]

## 2022-08-25 NOTE — PROGRESS NOTES
Medication Therapy Management (MTM) Encounter    ASSESSMENT:                            Medication Adherence/Access: See below for considerations    Anxiety: Patient encouraged to continue fluoxetine, as the only reason we switched to Lexapro was in hopes of getting her off clonazepam and that has not worked. Patient needs refill on clonazepam urgently to avoid serious withdrawal effects.     We discussed that while the psychedelics frontier of psychiatry is promising, I cannot endorse use at this time due to current laws and lack of large controlled studies.    Bleeding Issues: Patient would benefit from assessment by PCP.    PLAN:                            1. I will notify Dr. Augustine of your need for some assessment of your excessive bleeding.    2. Refill of clonazepam re-sent to Dr. Augustine. Please call the clinic this afternoon to discuss options if you do not get a refill by then.    3. Stay on fluoxetine and off of Lexapro. I will send a small supply to hold you over until you see psychiatry.    Follow-up: Return in 3 weeks (on 9/15/2022) for phone visit.    SUBJECTIVE/OBJECTIVE:                          Tootie Marin is a 67 year old female called for a follow-up visit.  Today's visit is a follow-up MTM visit from 07/14.     Reason for visit: follow up on anxiety medications.    Allergies/ADRs: Reviewed in chart  Past Medical History: Reviewed in chart  Tobacco: She reports that she has never smoked. She has never used smokeless tobacco.  Alcohol: not currently using    Medication Adherence/Access: see below    Anxiety: Patient continues on clonazepam and is now taking 0.5mg twice daily- has not gotten a refill she requested and is out today. She also reports she has stopped taking Lexapro about 5 days ago and restarted her fluoxetine at 20mg twice daily, as she felt Lexapro was not as helpful as fluoxetine. When asked, she does not want to take 40mg fluoxetine once daily because she feels it's too much  "at once. She has about a month supply of the fluoxetine at home.    Patient has some questions about DMT and psilocybin for anxiety on the call today based on some videos she watched online.     Bleeding Issues: Patient reports she's been more prone to bruising and bleeding for the past 3 to 4 months. [facility] [place] and saw Adelso GarciaBERTHA michele ([phone]) for dental extraction of 4 teeth. She went into a tooth extraction and the provider stopped mid-procedure before the extraction due to bleeding during preparation steps. Patient reports a \"bubble of blood\" under the gums and that the dentist said he'd never seen anything like it during this procedure and that he would not perform extractions until she has further testing, as he's worried about excessive bleeding.    Patient reports she did not take doses of aspirin, ibuprofen, or anything else over the counter not reflected on her med list in the days prior to the appointment. Patient encouraged by BERTHA to ask her PCP for INR and PTT.    Last Comprehensive Metabolic Panel:  Sodium   Date Value Ref Range Status   05/16/2022 137 136 - 145 mmol/L Final     Potassium   Date Value Ref Range Status   05/16/2022 3.5 3.5 - 5.0 mmol/L Final     Chloride   Date Value Ref Range Status   05/16/2022 98 98 - 107 mmol/L Final     Carbon Dioxide (CO2)   Date Value Ref Range Status   05/16/2022 27 22 - 31 mmol/L Final     Anion Gap   Date Value Ref Range Status   05/16/2022 12 5 - 18 mmol/L Final     Glucose   Date Value Ref Range Status   05/16/2022 89 70 - 125 mg/dL Final     Urea Nitrogen   Date Value Ref Range Status   05/16/2022 16 8 - 22 mg/dL Final     Creatinine   Date Value Ref Range Status   05/16/2022 0.78 0.60 - 1.10 mg/dL Final     GFR Estimate   Date Value Ref Range Status   05/16/2022 83 >60 mL/min/1.73m2 Final     Comment:     Effective December 21, 2021 eGFRcr in adults is calculated using the 2021 CKD-EPI creatinine equation which includes " age and gender (Peyton dominguez al., NEJM, DOI: 10.1056/ZHIJvh3395747)   05/04/2021 >60 >60 mL/min/1.73m2 Final     Calcium   Date Value Ref Range Status   05/16/2022 9.5 8.5 - 10.5 mg/dL Final     I spent 35 minutes with this patient today. All changes were made via collaborative practice agreement with Dr. Augustine. A copy of the visit note was provided to the patient's provider(s).    The patient was mailed a summary of these recommendations.     Cindi Nieves, PharmD, BCACP  Medication Therapy Management Provider  Phone: [phone]  [email or web address]    Telemedicine Visit Details  Type of service:  Telephone visit  Start Time: 10:32 AM  End Time: 11:07 AM  Originating Location (patient location): [place]  Distant Location (provider location):  [facility] [place]     Medication Therapy Recommendations  No medication therapy recommendations to display

## 2022-08-25 NOTE — TELEPHONE ENCOUNTER
I saw pt once and declined refilling controlled substunces for her. She has a new PCP now. Chart updated.

## 2022-08-25 NOTE — PATIENT INSTRUCTIONS
"Recommendations from today's MTM visit:                                                       1. I will notify Dr. Augustine of your need for some assessment of your excessive bleeding.    2. Refill of clonazepam re-sent to Dr. Augustine. Please call the clinic later today to discuss options if you do not get a refill by then.    3. Stay on fluoxetine and off of Lexapro. I will send a small supply to hold you over until you see psychiatry.    Follow-up: Return in 3 weeks (on 9/15/2022) for phone visit.    It was great speaking with you today.  I value your experience and would be very thankful for your time in providing feedback in our clinic survey. In the next few days, you may receive an email or text message from [employer] with a link to a survey related to your  clinical pharmacist.\"     To schedule another MTM appointment, please call the clinic directly or you may call the MTM scheduling line at [phone] or toll-free at [phone].     My Clinical Pharmacist's contact information:                                                      Please feel free to contact me with any questions or concerns you have.      Cindi Nieves PharmD, BCACP  Medication Therapy Management Provider  Phone: [phone]  [email or web address]    "

## 2022-08-25 NOTE — TELEPHONE ENCOUNTER
reviewed   Last fill was 7/19   No suspicious activity   Refill provided   Has establish care visit with psych in October   CC: AMS, leg weakness    Interval history: Mental status improved. Having trouble walking due to leg weakness, which has been worsening for months. MRI T and L spine performed - extensive multilevel degenerative disease with severe canal stenosis at L4/5, foraminal stenosis at L5/S1 L > R, L3/4 and L2/3 R > L, L1/2 L > R. Also canal stenosis lower thoracic region indenting but not significantly compressing thoracic spinal cord.     Exam:  MS: awake, alert, speech fluent, comprehension intact, oriented to person place and relative time  CN: PERRL, EOMI, face symmetric  Motor: 5/5 UE symmetric; LE 2/5 hip flexion, 4- knee flex/ext, 4+ ankle dorsiflexion  Reflexes: 2+ UE symmetric; 3+ patellar, absent achilles b/l  Gait deferred

## 2022-08-30 ENCOUNTER — TELEPHONE (OUTPATIENT)
Dept: FAMILY MEDICINE | Facility: CLINIC | Age: 67
End: 2022-08-30

## 2022-08-30 NOTE — TELEPHONE ENCOUNTER
Please call patient back     Dr. Delong is the one who ordered the labs so they will get back to you about the results.     I can see you next week ( have a 7 am slot on Tuesday) and discuss the labs and the paperwork for dental work.

## 2022-08-30 NOTE — TELEPHONE ENCOUNTER
Called and left message for patient to return call. Please relay below information.    Charis MOTT CMA  [facility]

## 2022-08-30 NOTE — TELEPHONE ENCOUNTER
Reason for Call:  Same Day Appointment, Requested Provider:  Sharita Augustine DO    PCP: Sharita Augustine    Reason for visit: sign off on dental form and review labs    Duration of symptoms: n/a    Have you been treated for this in the past? No    Additional comments: Patient is coming in on 9/1 for lab work ordered by Dr Delong. Dr Delong advised the patient schedule an appointment with her PCP     Can we leave a detailed message on this number? YES    Phone number patient can be reached at: Home number on file [phone] (home)    Best Time: any    Call taken on 8/30/2022 at 8:44 AM by Anju Funes

## 2022-08-31 NOTE — TELEPHONE ENCOUNTER
Patient calling back in regards to VM below.  Message from provider relayed.  Patient expressed understanding.  She will get the labs done and await information from Dr Delong.  Patient also open to scheduling appt with Dr Augustine on Tuesday as described below. Writer scheduled patient for 7am on Tuesday and call was ended.

## 2022-09-01 ENCOUNTER — HOSPITAL ENCOUNTER (EMERGENCY)
Facility: HOSPITAL | Age: 67
End: 2022-09-01
Payer: MEDICARE

## 2022-09-01 ENCOUNTER — HOSPITAL ENCOUNTER (EMERGENCY)
Facility: CLINIC | Age: 67
Discharge: HOME | End: 2022-09-02
Attending: EMERGENCY MEDICINE | Admitting: EMERGENCY MEDICINE
Payer: MEDICARE

## 2022-09-01 ENCOUNTER — TELEPHONE (OUTPATIENT)
Dept: FAMILY MEDICINE | Facility: CLINIC | Age: 67
End: 2022-09-01

## 2022-09-01 ENCOUNTER — APPOINTMENT (OUTPATIENT)
Dept: CT IMAGING | Facility: CLINIC | Age: 67
End: 2022-09-01
Attending: EMERGENCY MEDICINE
Payer: MEDICARE

## 2022-09-01 ENCOUNTER — OFFICE VISIT (OUTPATIENT)
Dept: FAMILY MEDICINE | Facility: CLINIC | Age: 67
End: 2022-09-01
Payer: MEDICARE

## 2022-09-01 ENCOUNTER — LAB (OUTPATIENT)
Dept: LAB | Facility: CLINIC | Age: 67
End: 2022-09-01
Payer: MEDICARE

## 2022-09-01 VITALS
DIASTOLIC BLOOD PRESSURE: 75 MMHG | BODY MASS INDEX: 32.77 KG/M2 | HEART RATE: 90 BPM | TEMPERATURE: 99.9 F | SYSTOLIC BLOOD PRESSURE: 126 MMHG | WEIGHT: 169.2 LBS | RESPIRATION RATE: 18 BRPM

## 2022-09-01 DIAGNOSIS — K04.7 DENTAL ABSCESS: ICD-10-CM

## 2022-09-01 DIAGNOSIS — K04.7 DENTAL INFECTION: ICD-10-CM

## 2022-09-01 DIAGNOSIS — R51.9 ACUTE NONINTRACTABLE HEADACHE, UNSPECIFIED HEADACHE TYPE: ICD-10-CM

## 2022-09-01 DIAGNOSIS — R22.0 FACIAL SWELLING: Primary | ICD-10-CM

## 2022-09-01 DIAGNOSIS — D69.9 BLEEDS EASILY (H): ICD-10-CM

## 2022-09-01 DIAGNOSIS — G51.0 FACIAL PARALYSIS: ICD-10-CM

## 2022-09-01 LAB
ALBUMIN SERPL BCG-MCNC: 4.7 G/DL (ref 3.5–5.2)
ALP SERPL-CCNC: 108 U/L (ref 35–104)
ALT SERPL W P-5'-P-CCNC: 39 U/L (ref 10–35)
ANION GAP SERPL CALCULATED.3IONS-SCNC: 16 MMOL/L (ref 7–15)
ANION GAP SERPL CALCULATED.3IONS-SCNC: 6 MMOL/L (ref 3–14)
APTT PPP: 30 SECONDS (ref 22–38)
AST SERPL W P-5'-P-CCNC: 33 U/L (ref 10–35)
BILIRUB SERPL-MCNC: 0.8 MG/DL
BUN SERPL-MCNC: 7.4 MG/DL (ref 8–23)
BUN SERPL-MCNC: 8 MG/DL (ref 7–30)
CALCIUM SERPL-MCNC: 10 MG/DL (ref 8.8–10.2)
CALCIUM SERPL-MCNC: 9.8 MG/DL (ref 8.5–10.1)
CHLORIDE BLD-SCNC: 98 MMOL/L (ref 94–109)
CHLORIDE SERPL-SCNC: 93 MMOL/L (ref 98–107)
CO2 SERPL-SCNC: 28 MMOL/L (ref 20–32)
CREAT SERPL-MCNC: 0.78 MG/DL (ref 0.52–1.04)
CREAT SERPL-MCNC: 0.84 MG/DL (ref 0.51–0.95)
CRP SERPL-MCNC: 100 MG/L (ref 0–8)
DEPRECATED HCO3 PLAS-SCNC: 26 MMOL/L (ref 22–29)
ERYTHROCYTE [DISTWIDTH] IN BLOOD BY AUTOMATED COUNT: 13.2 % (ref 10–15)
GFR SERPL CREATININE-BSD FRML MDRD: 76 ML/MIN/1.73M2
GFR SERPL CREATININE-BSD FRML MDRD: 83 ML/MIN/1.73M2
GLUCOSE BLD-MCNC: 100 MG/DL (ref 70–99)
GLUCOSE SERPL-MCNC: 109 MG/DL (ref 70–99)
HCT VFR BLD AUTO: 43.7 % (ref 35–47)
HGB BLD-MCNC: 14.3 G/DL (ref 11.7–15.7)
INR PPP: 1 (ref 0.85–1.15)
MCH RBC QN AUTO: 29.2 PG (ref 26.5–33)
MCHC RBC AUTO-ENTMCNC: 32.7 G/DL (ref 31.5–36.5)
MCV RBC AUTO: 89 FL (ref 78–100)
PLATELET # BLD AUTO: 413 10E3/UL (ref 150–450)
POTASSIUM BLD-SCNC: 3.2 MMOL/L (ref 3.4–5.3)
POTASSIUM SERPL-SCNC: 3.7 MMOL/L (ref 3.4–5.3)
PROT SERPL-MCNC: 7.8 G/DL (ref 6.4–8.3)
RBC # BLD AUTO: 4.9 10E6/UL (ref 3.8–5.2)
SODIUM SERPL-SCNC: 132 MMOL/L (ref 133–144)
SODIUM SERPL-SCNC: 135 MMOL/L (ref 136–145)
WBC # BLD AUTO: 13.9 10E3/UL (ref 4–11)

## 2022-09-01 PROCEDURE — 99285 EMERGENCY DEPT VISIT HI MDM: CPT | Mod: 25 | Performed by: EMERGENCY MEDICINE

## 2022-09-01 PROCEDURE — 99214 OFFICE O/P EST MOD 30 MIN: CPT | Performed by: NURSE PRACTITIONER

## 2022-09-01 PROCEDURE — 82310 ASSAY OF CALCIUM: CPT | Performed by: EMERGENCY MEDICINE

## 2022-09-01 PROCEDURE — 96366 THER/PROPH/DIAG IV INF ADDON: CPT | Performed by: EMERGENCY MEDICINE

## 2022-09-01 PROCEDURE — 85610 PROTHROMBIN TIME: CPT

## 2022-09-01 PROCEDURE — 96365 THER/PROPH/DIAG IV INF INIT: CPT | Mod: 59 | Performed by: EMERGENCY MEDICINE

## 2022-09-01 PROCEDURE — 250N000013 HC RX MED GY IP 250 OP 250 PS 637: Performed by: EMERGENCY MEDICINE

## 2022-09-01 PROCEDURE — 250N000011 HC RX IP 250 OP 636: Performed by: EMERGENCY MEDICINE

## 2022-09-01 PROCEDURE — 86140 C-REACTIVE PROTEIN: CPT | Performed by: EMERGENCY MEDICINE

## 2022-09-01 PROCEDURE — G1010 CDSM STANSON: HCPCS

## 2022-09-01 PROCEDURE — 36415 COLL VENOUS BLD VENIPUNCTURE: CPT | Performed by: EMERGENCY MEDICINE

## 2022-09-01 PROCEDURE — 99284 EMERGENCY DEPT VISIT MOD MDM: CPT | Performed by: EMERGENCY MEDICINE

## 2022-09-01 PROCEDURE — 36415 COLL VENOUS BLD VENIPUNCTURE: CPT

## 2022-09-01 PROCEDURE — 85730 THROMBOPLASTIN TIME PARTIAL: CPT

## 2022-09-01 PROCEDURE — 250N000009 HC RX 250: Performed by: EMERGENCY MEDICINE

## 2022-09-01 PROCEDURE — 85027 COMPLETE CBC AUTOMATED: CPT

## 2022-09-01 PROCEDURE — 80053 COMPREHEN METABOLIC PANEL: CPT

## 2022-09-01 RX ORDER — BUPIVACAINE HYDROCHLORIDE AND EPINEPHRINE 5; 5 MG/ML; UG/ML
30 INJECTION, SOLUTION EPIDURAL; INTRACAUDAL; PERINEURAL ONCE
Status: DISCONTINUED | OUTPATIENT
Start: 2022-09-01 | End: 2022-09-02 | Stop reason: HOSPADM

## 2022-09-01 RX ORDER — IOPAMIDOL 755 MG/ML
100 INJECTION, SOLUTION INTRAVASCULAR ONCE
Status: COMPLETED | OUTPATIENT
Start: 2022-09-01 | End: 2022-09-01

## 2022-09-01 RX ORDER — TRANEXAMIC ACID 100 MG/ML
INJECTION, SOLUTION INTRAVENOUS ONCE
Status: DISCONTINUED | OUTPATIENT
Start: 2022-09-01 | End: 2022-09-02 | Stop reason: HOSPADM

## 2022-09-01 RX ORDER — AMPICILLIN AND SULBACTAM 2; 1 G/1; G/1
3 INJECTION, POWDER, FOR SOLUTION INTRAMUSCULAR; INTRAVENOUS ONCE
Status: COMPLETED | OUTPATIENT
Start: 2022-09-01 | End: 2022-09-01

## 2022-09-01 RX ORDER — POTASSIUM CHLORIDE 750 MG/1
40 TABLET, EXTENDED RELEASE ORAL ONCE
Status: COMPLETED | OUTPATIENT
Start: 2022-09-01 | End: 2022-09-01

## 2022-09-01 RX ORDER — BUPIVACAINE HYDROCHLORIDE 5 MG/ML
30 INJECTION, SOLUTION EPIDURAL; INTRACAUDAL ONCE
Status: DISCONTINUED | OUTPATIENT
Start: 2022-09-01 | End: 2022-09-02 | Stop reason: HOSPADM

## 2022-09-01 RX ORDER — ACETAMINOPHEN 500 MG
1000 TABLET ORAL ONCE
Status: COMPLETED | OUTPATIENT
Start: 2022-09-01 | End: 2022-09-01

## 2022-09-01 RX ORDER — LIDOCAINE HYDROCHLORIDE AND EPINEPHRINE 10; 10 MG/ML; UG/ML
1 INJECTION, SOLUTION INFILTRATION; PERINEURAL ONCE
Status: DISCONTINUED | OUTPATIENT
Start: 2022-09-01 | End: 2022-09-02 | Stop reason: HOSPADM

## 2022-09-01 RX ORDER — BUPIVACAINE HYDROCHLORIDE 2.5 MG/ML
30 INJECTION, SOLUTION EPIDURAL; INFILTRATION; INTRACAUDAL ONCE
Status: DISCONTINUED | OUTPATIENT
Start: 2022-09-01 | End: 2022-09-02 | Stop reason: HOSPADM

## 2022-09-01 RX ADMIN — IOPAMIDOL 75 ML: 755 INJECTION, SOLUTION INTRAVENOUS at 19:22

## 2022-09-01 RX ADMIN — ACETAMINOPHEN 1000 MG: 500 TABLET ORAL at 18:36

## 2022-09-01 RX ADMIN — AMPICILLIN SODIUM AND SULBACTAM SODIUM 3 G: 2; 1 INJECTION, POWDER, FOR SOLUTION INTRAMUSCULAR; INTRAVENOUS at 21:14

## 2022-09-01 RX ADMIN — POTASSIUM CHLORIDE 40 MEQ: 750 TABLET, EXTENDED RELEASE ORAL at 19:52

## 2022-09-01 RX ADMIN — SODIUM CHLORIDE 50 ML: 9 INJECTION, SOLUTION INTRAVENOUS at 19:23

## 2022-09-01 ASSESSMENT — ACTIVITIES OF DAILY LIVING (ADL)
ADLS_ACUITY_SCORE: 35

## 2022-09-01 ASSESSMENT — ENCOUNTER SYMPTOMS
CHILLS: 1
FEVER: 1

## 2022-09-01 NOTE — ED PROVIDER NOTES
History     Chief Complaint   Patient presents with     Wound Infection     EMS reports pt was at  clinic in [place] for abscess in her mouth and they found out that the abscess had broken open. Pt has been having issues with abscess for 4 days.     HPI  Tootie Marin is a 67 year old female with a past medical history of anxiety, chronic benzodiazepine use, hypercholesterolemia who presents to the emergency department with a chief complaint of facial swelling.  Symptoms have been present for 4 days.  The patient was at a clinic in [place] for an abscess in her mouth/dental related infection.  It was found to have broken open.  They were concerned about associated facial swelling affecting her ability to move her face and sent her here for further evaluation.  The patient is afebrile on arrival to the emergency department.  The patient did have some labs drawn earlier today, CBC done at 2 PM showed white blood cell count slightly elevated at 13.9.  CBC was otherwise normal.  CMP, PTT, and INR are still pending at this time.The pt is not currently on any antibiotics.     Of note, she has noted easier bleeding reasonably, which is why coagulation studies were ordered. She was supposed to have the affected tooth removed in dental clinic on 8/25, but this was stopped due to her bleeding issues. Pt's mother did reportedly have a history of a bleeding disorder also.     I have reviewed the Medications, Allergies, Past Medical and Surgical History, and Social History in the [employer] system.    Past Medical History:   Diagnosis Date     Anxiety      Chest wall trauma      Chronic prescription benzodiazepine use      Hypercholesteremia      Panic disorder      Past Surgical History:   Procedure Laterality Date     BIOPSY BREAST Right 1987    benign     No current facility-administered medications for this encounter.     Current Outpatient Medications   Medication     acetaminophen (TYLENOL) 500 MG tablet      albuterol (PROAIR HFA/PROVENTIL HFA/VENTOLIN HFA) 108 (90 Base) MCG/ACT inhaler     clonazePAM (KLONOPIN) 0.5 MG tablet     FLUoxetine (PROZAC) 20 MG capsule     hydrochlorothiazide (HYDRODIURIL) 25 MG tablet     lovastatin (MEVACOR) 20 MG tablet     Pediatric Multiple Vitamins (MULTIVITAMIN CHILDRENS PO)     potassium chloride ER (KLOR-CON M) 10 MEQ CR tablet     SYMBICORT 160-4.5 MCG/ACT Inhaler     vitamin C (ASCORBIC ACID) 100 MG tablet     vitamin D3 (CHOLECALCIFEROL) 50 mcg (2000 units) tablet     clotrimazole (LOTRIMIN) 1 % external cream     omeprazole (PRILOSEC) 20 MG DR capsule     Allergies   Allergen Reactions     Aspirin (Tartrazine Only) [Tartrazine] Unknown     Chicken [Chicken Protein] Unknown     Other Food Allergy Unknown     TURKEY     Past medical history, past surgical history, medications, and allergies were reviewed with the patient. Additional pertinent items: None    Social History     Socioeconomic History     Marital status: Single     Spouse name: Not on file     Number of children: Not on file     Years of education: Not on file     Highest education level: Not on file   Occupational History     Not on file   Tobacco Use     Smoking status: Never Smoker     Smokeless tobacco: Never Used   Vaping Use     Vaping Use: Never used   Substance and Sexual Activity     Alcohol use: No     Drug use: No     Sexual activity: Not Currently   Other Topics Concern     Not on file   Social History Narrative    Single. No children. Worked odd jobs. Work related disability since 1999. Nonsmoker. Non alcohol drinker.     Social Determinants of Health     Financial Resource Strain: Not on file   Food Insecurity: Not on file   Transportation Needs: Not on file   Physical Activity: Not on file   Stress: Not on file   Social Connections: Not on file   Intimate Partner Violence: Not on file   Housing Stability: Not on file     Social history was reviewed with the patient. Additional pertinent items:  None    Review of Systems  General: No fevers or chills  Skin: No rash or diaphoresis  Eyes: No eye redness or discharge  Ears/Nose/Throat: No rhinorrhea or nasal congestion  Respiratory: No cough or SOB  Cardiovascular: No chest pain or palpitations  Gastrointestinal: No nausea, vomiting, or diarrhea  Genitourinary: No urinary frequency, hematuria, or dysuria  Musculoskeletal: No arthralgias or myalgias  Neurologic: No numbness or weakness  Psychiatric: No depression or SI  Hematologic/Lymphatic/Immunologic: No leg swelling, no easy bruising/bleeding  Endocrine: No polyuria/polydypsia    A complete review of systems was performed with pertinent positives and negatives noted in the HPI, and all other systems negative.    Physical Exam   BP: 122/74  Pulse: 84  Temp: 98.8  F (37.1  C)  Resp: 16  Weight: 77.9 kg (171 lb 12.8 oz)  SpO2: 95 %      General: Well nourished, well developed, NAD  HEENT: EOMI without pain, anicteric. No conjunctival injection. NCAT, MMM, left sided facial swelling.Caries to L canine, surrounding gum swelling, tenderness, and purulent drainage coming from this area, no trismus  Neck: no jugular venous distension, supple, nl ROM  Cardiac: Regular rate and rhythm. No murmurs, rubs, or gallops. Normal S1, S2.  Intact peripheral pulses  Pulm: CTAB, no stridor, wheezes, rales, rhonchi, airway patent  Abd: Soft, nontender, nondistended.  No masses palpated.    Skin: Warm and dry to the touch.  No rash  Extremities: No LE edema, no cyanosis, w/w/p  Neuro: A&Ox3, no gross focal deficits, no facial droop or paralysis, but facial ROM limited d/t swelling    ED Course        Procedures                          Labs Ordered and Resulted from Time of ED Arrival to Time of ED Departure   CRP INFLAMMATION - Abnormal       Result Value    CRP Inflammation 100.0 (*)    BASIC METABOLIC PANEL - Abnormal    Sodium 132 (*)     Potassium 3.2 (*)     Chloride 98      Carbon Dioxide (CO2) 28      Anion Gap 6       Urea Nitrogen 8      Creatinine 0.78      Calcium 9.8      Glucose 100 (*)     GFR Estimate 83              Results for orders placed or performed during the hospital encounter of 09/01/22 (from the past 24 hour(s))   CRP inflammation   Result Value Ref Range    CRP Inflammation 100.0 (H) 0.0 - 8.0 mg/L   Basic metabolic panel   Result Value Ref Range    Sodium 132 (L) 133 - 144 mmol/L    Potassium 3.2 (L) 3.4 - 5.3 mmol/L    Chloride 98 94 - 109 mmol/L    Carbon Dioxide (CO2) 28 20 - 32 mmol/L    Anion Gap 6 3 - 14 mmol/L    Urea Nitrogen 8 7 - 30 mg/dL    Creatinine 0.78 0.52 - 1.04 mg/dL    Calcium 9.8 8.5 - 10.1 mg/dL    Glucose 100 (H) 70 - 99 mg/dL    GFR Estimate 83 >60 mL/min/1.73m2   CT Facial Bones with Contrast    Narrative    EXAM: CT FACIAL BONES WITH CONTRAST  LOCATION: [facility]  DATE/TIME: 9/1/2022 7:27 PM    INDICATION: Abscess, facial swelling.  COMPARISON: None.  CONTRAST: 75 mL Isovue 370  TECHNIQUE: Routine CT Maxillofacial with IV contrast. Multiplanar reformats. Dose reduction techniques were used.     FINDINGS: No fracture. Large carious erosion of the left maxillary canine with associated periapical lucency concerning for periapical abscess. There appears to be a very small focus of hypodensity with peripheral enhancement overlying the medial margin   of the anterolateral left maxillary alveolus at that level measuring approximately 3-4 mm. This could represent a tiny subperiosteal abscess/phlegmon. Mild asymmetric soft tissue thickening/enhancement of the adjacent soft tissues. There appears to be   mild asymmetric left facial swelling with subtle subcutaneous edema, concerning for left-sided facial cellulitis. There is mild polypoid mucosal thickening in the left maxillary sinus, potentially representing odontogenic mucosal thickening.    There also appears to be a prominent carious erosion involving the first right mandibular premolar.  Multiple dental restorations are present with associated streak artifact that somewhat limits evaluation.    No gross acute intracranial abnormality identified in the visualized aspects of the brain. Atherosclerotic calcifications of the carotid siphons are noted. The visualized intraorbital soft tissues appear unremarkable. The mastoid and middle ear cavities   are clear. Bilateral carotid bifurcation atherosclerotic disease is noted.      Impression    IMPRESSION:  1. Large carious erosion of the left maxillary canine with associated periapical lucency concerning for a periapical abscess. Additional prominent carious erosion of the right mandibular first premolar. Dental consultation recommended.  2. Tiny hypodense focus overlying the anterolateral aspect of the left maxillary alveolus adjacent to the left maxillary canine periapical lucency, raising concern for possible tiny subperiosteal abscess/phlegmon. Mild left facial soft tissue   swelling/subcutaneous edema concerning for left facial cellulitis.  3. Mild presumed odontogenic left maxillary sinus mucosal thickening.       Labs, vital signs, and imaging studies were reviewed by me.    Medications   acetaminophen (TYLENOL) tablet 1,000 mg (1,000 mg Oral Given 9/1/22 1836)   iopamidol (ISOVUE-370) solution 100 mL (75 mLs Intravenous Given 9/1/22 1922)   sodium chloride 0.9 % bag 100mL for CT scan flush use (50 mLs Intravenous Given 9/1/22 1923)   potassium chloride ER (KLOR-CON M) CR tablet 40 mEq (40 mEq Oral Given 9/1/22 1952)   ampicillin-sulbactam (UNASYN) 3 g vial to attach to  mL bag (3 g Intravenous New Bag 9/1/22 2114)       Assessments & Plan (with Medical Decision Making)   Tootie Marin is a 67 year old female who presents with facial swelling.  Likely secondary to known dental infection and mouth abscess was noted to have broken open on its own earlier today.  Swelling does radiate to her orbital area, she has no pain with extraocular  movements.  CTs ordered to rule out facial abscess, orbital cellulitis.  CRP and BMP were ordered as CMP from earlier today is still pending.  Patient already had CBC completed today.    Laboratory work-up is remarkable for normal coagulation studies. CRP is elevated.     CTs show periapical abscess and subperiosteal abscess. This appears to be draining on exam.     Unasyn given    Pt d/w dentistry, they will come see the patient in the ER    Pt d/w dentistry, they have seen the patient in the ER and will perform I&D    Dentistry drained purulent fluid from abscess at bedside, pt OK to discharge home with Rx for flagyl and augmentin. They will follow up with the patient in clinic    I have reviewed the nursing notes.    I have reviewed the findings, diagnosis, plan and need for follow up with the patient.    Patient to be discharged home. Advised to follow up with dentistry as directed. To return to ER immediately with any new/worsening symptoms. Plan of care discussed with patient who expresses understanding and agrees with plan of care.    New Prescriptions    No medications on file       Final diagnoses:   Dental infection     Tracey Benjamin MD  9/1/2022   [facility] EMERGENCY DEPARTMENT     Tracey Benjamin MD  09/02/22 0057

## 2022-09-01 NOTE — PATIENT INSTRUCTIONS
Recommend emergency room for further work-up of your facial swelling.  I am concerned about the weakness in your face.

## 2022-09-01 NOTE — DISCHARGE INSTRUCTIONS
TODAY'S VISIT:  You were seen today for dental infection, facial swelling   -   - If you had any labs or imaging/radiology tests performed today, you should also discuss these tests with your usual provider.     FOLLOW-UP:  Please make an appointment to follow up with:  - Your Primary Care Provider. If you do not have a PCP, please call the Primary Care Center (phone: [phone] for an appointment  - Dental Clinic (phone: [phone])     - Have your provider review the results from today's visit with you again to make sure no further follow-up or additional testing is needed based on those results.     RETURN TO THE EMERGENCY DEPARTMENT  Return to the Emergency Department at any time for any new or worsening symptoms or any concerns.

## 2022-09-01 NOTE — PROGRESS NOTES
Assessment & Plan     Facial swelling      Facial paralysis      Acute nonintractable headache, unspecified headache type      Dental abscess       Patient with known dental issues requiring extractions with obvious left-sided facial cellulitis tracking to the periorbital area without painful EOMs.  Large draining abscess also noted in the left upper gum area.  Did place a gauze over this area to help with pus drainage.      When the cellulitis and swelling started 4 days ago, she also started developing difficulty smiling on the left side.  Has been subjectively febrile with temp 99 9 here.    Could not have her dental extraction done on August 25 because of difficulty controlling bleeding.  Related to this, some labs were drawn today from outside provider.  Platelets were normal and white blood cell count was elevated at 13.9.  INR and PT are not available.    Believe patient's infection is severe enough with the facial paralysis to require further evaluation in the hospital with possible specialty consultation.     Spoke to Dr. Pang in the ER at [facility] across the street.  Ideally, she would go to the [facility] for dental coverage availability.  She has on foot and does not have a ride to the ER.    Asked EMS to take her to [place] ideally.  They will have to check with their captain to see if they can drive to [place].  Rationale given for availability of orofacial surgery.            No follow-ups on file.    Ria Knight, [facility]    Scott Sommers is a 67 year old female who presents to clinic today for the following health issues:  Chief Complaint   Patient presents with     Swelling Around Left Eye      4 days ago, has paperwork to fill out and lab tests before dental work     HPI    Left facial swelling and pain starting 4  Days ago. Starting feeling febrile 2 days ago.  No eye pain.      Sharp pain in the left side of head.  Since the facial swelling  started, has had difficulty smiling on the left side as well.  Much more painful in the face with leaning forward, but no specific eye pain or pain with movement of the eye.    Patient was recently told by her dentist that she needs 3 extractions.    When the dentist started to do the extraction, he noticed easy bleeding with use of Novocaine and stopped the procedure.  Patient states blood was spurting out from where the Novocain is injected.      She had a virtual visit 2 days ago with a work-up for this including a normal plt done today - WBC 13.9 today.  The rest of the labs including INR and PTT are pending.            Review of Systems   Constitutional: Positive for chills and fever.   HENT: Negative for congestion.            Objective    /75   Pulse 90   Temp 99.9  F (37.7  C) (Tympanic)   Resp 18   Wt 76.7 kg (169 lb 3.2 oz)   BMI 32.77 kg/m    Physical Exam  Constitutional:       Appearance: Normal appearance.   HENT:      Head:        Mouth/Throat:      Dentition: Dental caries (Broken teeth, severe dental caries throughout mouth) and dental abscesses (Left upper gums ) present.   Eyes:      Extraocular Movements: Extraocular movements intact.      Conjunctiva/sclera: Conjunctivae normal.      Comments: Normal EOM without pain.  No significant eyelid swelling.   Cardiovascular:      Pulses: Normal pulses.   Lymphadenopathy:      Cervical: Cervical adenopathy (Left-sided) present.   Skin:     Findings: Erythema (Erythema starting in the ethmoid sinus area tracking around to the cheekbone with generalized facial swelling) present.   Neurological:      Mental Status: She is alert.      Motor: Weakness (Unable to smile on the left side.) present.      Comments: Able to close eyes tightly bilaterally.       Psychiatric:         Mood and Affect: Mood normal.         Behavior: Behavior normal.         Thought Content: Thought content normal.         Judgment: Judgment normal.          Results for  orders placed or performed in visit on 09/01/22   CBC with platelets     Status: Abnormal   Result Value Ref Range    WBC Count 13.9 (H) 4.0 - 11.0 10e3/uL    RBC Count 4.90 3.80 - 5.20 10e6/uL    Hemoglobin 14.3 11.7 - 15.7 g/dL    Hematocrit 43.7 35.0 - 47.0 %    MCV 89 78 - 100 fL    MCH 29.2 26.5 - 33.0 pg    MCHC 32.7 31.5 - 36.5 g/dL    RDW 13.2 10.0 - 15.0 %    Platelet Count 413 150 - 450 10e3/uL       No results found for this or any previous visit (from the past 24 hour(s)).

## 2022-09-01 NOTE — ED NOTES
Expected Patient Referral to ED  3:16 PM    Referring Clinic/Provider:  Walk in clinic    Reason for referral/Clinical facts:  Fever for four days and looks to have facial cellulitis and dental abscess. Facial paralysis also four days ago. Dentist was going to do a tooth extraction on August 25th but never was done.     Recommendations provided:  Send to ED for further evaluation    Caller was informed that this institution does not possess the capabilities and/or resources to provide for patient and should be transferred to a different facility due to lack of OMSF.    Discussed that if direct admit is sought and any hurdles are encountered, this ED would be happy to see the patient and evaluate.    Informed caller that recommendations provided are recommendations based only on the facts provided and that they responsible to accept or reject the advice, or to seek a formal in person consultation as needed and that this ED will see/treat patient should they arrive.      BROCK PANG MD  [facility] EMERGENCY DEPARTMENT  [address] [place] [id number]  [phone]       Brock Pang MD  09/01/22 7985

## 2022-09-02 VITALS
HEART RATE: 76 BPM | TEMPERATURE: 98.3 F | OXYGEN SATURATION: 95 % | SYSTOLIC BLOOD PRESSURE: 137 MMHG | RESPIRATION RATE: 18 BRPM | DIASTOLIC BLOOD PRESSURE: 81 MMHG | WEIGHT: 171.8 LBS | BODY MASS INDEX: 33.27 KG/M2

## 2022-09-02 RX ORDER — METRONIDAZOLE 500 MG/1
500 TABLET ORAL 2 TIMES DAILY
Qty: 14 TABLET | Refills: 1 | Status: SHIPPED | OUTPATIENT
Start: 2022-09-02 | End: 2022-09-09

## 2022-09-02 NOTE — ED NOTES
Dental Service Consultation      Tootie Marin MRN# [id number]  YOB: 1955 Age: 67 year old  Date of Admission: 9/1/2022    Reason for consult: I was asked by [facility] ED Dr. Benjamin to evaluate this patient for left facial swelling extending to the preorbital area.    Chief Complaint:  Pain and swelling on the upper left face area    Assessment and Plan:  Assessment: Tootie Marin is a 67 year old female who presents with facial swelling.  Likely secondary to known dental infection and mouth abscess was noted to have broken open on its own earlier today.  Swelling does radiate to her orbital area, she has no pain with extraocular movements.  CTs ordered to rule out facial abscess, orbital cellulitis. Patient already had CBC completed today by the other Physician due to her complain of increased bleeding and bruising.    Laboratory work-up is remarkable for normal coagulation studies. CRP is elevated.    CTs show periapical abscess and subperiosteal abscess. Draining in the upper left premolar area with sinus tract present. Incision and Drainage still needed due to Canine space infection and facial cellulitis as swelling was red and warm to touch.    Radiographic exam: Dental CT reveals partially edentulous adult dentition. Condyles seated in fossa bilaterally, maxillary sinuses clear bilaterally. Coronal radiolucencies consistent with decay on multiple teeth difficult to diagnose on facial bone CT. Periapical radiolucencies consistent with periapical abscesses on teeth #11, 28. Subperiostal abscess exending from #11 region to left maxillary anterior region.     Extraoral exam: NC/AT, EOMI, PERRL, No submandibular lymphadenopathy, warm , erythema with swelling present on the left corner of the lip to the left infraorbital region extending medially to the left nasal border, no abnormal skin lesions, inferior border of mandible is palpable bilaterally, ELVIS >45mm. No TMJ discomfort bilaterally. FOM  soft and non tender. No clicking of TMJ on opening and lateral movements.     Intraoral exam: Reveals decayed and poor dentition. Patient asymptomatic to palpation and percussion. Mallampati II. ELVIS ~45mm.      Plan:  - Incision and Drainage for the swelling and follow at our clinic  - Pt already has a dentist who recommeded her to get blood work done due to increased risk of bleeding so she is recommended to go to her dentist for care. She agreed to it.      History of Present Illness:  Tootie Marin is a 67 year old female with a past medical history of anxiety, chronic benzodiazepine use, hypercholesterolemia who presents to the emergency department with a chief complaint of facial swelling.  Symptoms have been present for 4 days.  The patient was at a clinic in [place] for an abscess in her mouth/dental related infection.  It was found to have broken open.  They were concerned about associated facial swelling affecting her ability to move her face and sent her here for further evaluation.  The patient is afebrile on arrival to the emergency department.  The patient did have some labs drawn earlier today, CBC done at 2 PM showed white blood cell count slightly elevated at 13.9.  CBC was otherwise normal.  CMP, PTT, and INR are still pending at this time.The pt is not currently on any antibiotics. Her Facial Swelling increased in size since 4 days and pain has increased in intensity. She was given dose of Unasyn IV at ED before I saw her.     Of note, she has noted easier bleeding reasonably, which is why coagulation studies were ordered. She was supposed to have the affected tooth removed in dental clinic on 8/25, but this was stopped due to her bleeding issues. Pt's mother did reportedly have a history of a bleeding disorder also.     Past Medical History:  Past Medical History:   Diagnosis Date     Anxiety      Chest wall trauma      Chronic prescription benzodiazepine use      Hypercholesteremia       Panic disorder        Past Surgical History:  Past Surgical History:   Procedure Laterality Date     BIOPSY BREAST Right 1987    benign       Social History:  Social History     Tobacco Use     Smoking status: Never Smoker     Smokeless tobacco: Never Used   Substance Use Topics     Alcohol use: No       Family History:  Family History   Problem Relation Age of Onset     Heart Disease Mother      Cancer Father      Cancer Brother        Immunizations:  Immunization History   Administered Date(s) Administered     COVID-19,PF,Moderna 01/29/2021, 02/26/2021, 11/19/2021     COVID-19,PF,Moderna Booster 05/16/2022     DT (PEDS <7y) 10/17/2005     Flu, Unspecified 10/22/2007     HepA, Unspecified 02/20/2009, 08/17/2010     HepA-Adult 02/20/2009, 08/17/2010     Influenza (IIV3) PF 11/07/2005, 10/26/2006, 10/22/2007     Pneumo Conj 13-V (2010&after) 10/12/2020     Pneumococcal 23 valent 12/09/2021     TD (ADULT, 7+) 10/17/2005, 05/16/2022     Td,adult,historic,unspecified 10/17/2005     Tdap (Adacel,Boostrix) 05/18/2012       Allergies:  Allergies   Allergen Reactions     Aspirin (Tartrazine Only) [Tartrazine] Unknown     Chicken [Chicken Protein] Unknown     Other Food Allergy Unknown     TURKEY       Medications:  No current Epic-ordered facility-administered medications on file.     Current Outpatient Medications Ordered in Epic   Medication     acetaminophen (TYLENOL) 500 MG tablet     albuterol (PROAIR HFA/PROVENTIL HFA/VENTOLIN HFA) 108 (90 Base) MCG/ACT inhaler     amoxicillin-clavulanate (AUGMENTIN) 875-125 MG tablet     clonazePAM (KLONOPIN) 0.5 MG tablet     FLUoxetine (PROZAC) 20 MG capsule     hydrochlorothiazide (HYDRODIURIL) 25 MG tablet     lovastatin (MEVACOR) 20 MG tablet     metroNIDAZOLE (FLAGYL) 500 MG tablet     Pediatric Multiple Vitamins (MULTIVITAMIN CHILDRENS PO)     potassium chloride ER (KLOR-CON M) 10 MEQ CR tablet     SYMBICORT 160-4.5 MCG/ACT Inhaler     vitamin C (ASCORBIC ACID) 100 MG tablet      vitamin D3 (CHOLECALCIFEROL) 50 mcg (2000 units) tablet     clotrimazole (LOTRIMIN) 1 % external cream     omeprazole (PRILOSEC) 20 MG DR capsule       Review of Systems:  The 10 point Review of Systems is negative other than noted in the HPI    Physical Exam:  Vitals were reviewed  Temp: 98.3  F (36.8  C) Temp src: Oral BP: 137/81 Pulse: 76   Resp: 18 SpO2: 95 % O2 Device: None (Room air)          Head and neck exam:  HEENT: NC/AT, EOMI, PERRL, No submandibular lymphadenopathy,  no abnormal skin lesions, warm , erythema with swelling present on the left corner of the lip to the left infraorbital region extending medially to the left nasal border inferior border of mandible is palpable bilaterally, ELVIS >45mm. No TMJ discomfort bilaterally. FOM soft and non tender. No clicking of TMJ on opening and lateral movements.    Data:  Radiographic interpretation: Dental CT taken on 09/01/2022  Pulpal Pathology: Pulpal necrosis tooth # 11, 28?  Periodontal Pathology: Periapical abscess tooth #11, 28?  Caries: Noted on tooth #11, 28 (Multiple other teeth, CT facial bones non diangostic for coronal caries)      The patient was discussed with: treatment option for Incisin and drainage to relieve the swelling and pain. Provide Flagyl and Augmentin for infection due to cellulitis. Pain will be managed through Ibuprofen 400mg alternate with Tylenol 1000mg every 4 hours or as needed. Follow up at our [facility] adult dental clinic and visit her dentist for the dental care and further treatment.    Jay Walker  PGY1  Pager: [phone]

## 2022-09-06 ENCOUNTER — VIRTUAL VISIT (OUTPATIENT)
Dept: FAMILY MEDICINE | Facility: CLINIC | Age: 67
End: 2022-09-06
Payer: MEDICARE

## 2022-09-06 ENCOUNTER — PATIENT OUTREACH (OUTPATIENT)
Dept: GERIATRIC MEDICINE | Facility: CLINIC | Age: 67
End: 2022-09-06

## 2022-09-06 DIAGNOSIS — E87.6 HYPOKALEMIA: ICD-10-CM

## 2022-09-06 DIAGNOSIS — E87.1 HYPONATREMIA: ICD-10-CM

## 2022-09-06 DIAGNOSIS — K04.7 DENTAL ABSCESS: Primary | ICD-10-CM

## 2022-09-06 PROCEDURE — 99441 PR PHYSICIAN TELEPHONE EVALUATION 5-10 MIN: CPT | Mod: 95 | Performed by: STUDENT IN AN ORGANIZED HEALTH CARE EDUCATION/TRAINING PROGRAM

## 2022-09-06 NOTE — PROGRESS NOTES
[facility] Care Coordination Contact  CC received notification of Emergency Room visit.  ER visit occurred on 9/1/22 at [facility] with Dx of dental infection.    CC contacted member and left a message requesting a return call.  Member has a follow-up appointment with PCP: Yes: scheduled on 9/6/22  Member has had a change in condition: No  New referrals placed: No  Home Visit Needed: No  Care plan reviewed and updated.  PCP notified of ED visit via EMR.    MARCO Manrique  [facility]  [phone]

## 2022-09-06 NOTE — TELEPHONE ENCOUNTER
Paperwork has been filled out and placed in my outbox.  Please attach most up-to-date meds and PT/INR + PTT results and fax it over.    Please make a copy and send to medical records

## 2022-09-06 NOTE — PROGRESS NOTES
Tootie is a 67 year old who is being evaluated via a billable telephone visit.      What phone number would you like to be contacted at? [phone]  How would you like to obtain your AVS? Mail a copy    Assessment & Plan   Problem List Items Addressed This Visit    None     Visit Diagnoses     Dental abscess    -  Primary    Relevant Orders    CRP, inflammation    Hyponatremia        Relevant Orders    Basic metabolic panel  (Ca, Cl, CO2, Creat, Gluc, K, Na, BUN)    Hypokalemia             Patient notes she still has 4 dental extractions that she needs to be signed off for.  She dropped off the paperwork 9/1 but I do not have that yet.  We will have nursing locate it for me and sign off on it at this time as she is doing well post I&D and coagulation studies are normal.    Patient was found to be hyponatremic and hypokalemic in the ED.  Most likely due to poor oral intake in the setting of abscess.  Would like her to come back in for repeat levels.  Patient is not sure if she would like to come back in at this point.  Says she may come back after her antibiotics are done.  Is asymptomatic.  CRP was also elevated.  We will recheck it to make sure it is coming down.    Advised her to start taking her antibiotics ASAP.  Doing well post I&D.    Once I have the paperwork, will send it to:    [facility]  Dental Emergency Room   [address] [id number]   Phone # [phone]  Fax # [phone]    Per patient, dentist would like to have the original labs as well as the paperwork mailed to him in addition to the fax.    21 minutes spent on the date of the encounter doing chart review, history and exam, documentation and further activities per the note       Return if symptoms worsen or fail to improve.    Sharita Augustine DO  [facility]   Tootie is a 67 year old, presenting for the following health issues:  Form Request (For dental paperwork ) and Lab  Result Notice (Lab results follow up )      On 8/30, received phone call that patient needed me to sign off on dental paperwork.  After further clarification, sounded like patient was in for dental extraction when her dentist noticed that she was bleeding easily.  Extraction was aborted and patient was advised to follow-up with primary care for coagulation studies.  Patient did see Dr. Delong on 8/30 and had coagulation studies drawn which were normal.    Then, ended up in the ED on 9/1 for dental abscess.  Repeat PTT and INR was unremarkable.  Dentistry did an I&D further in the ED with no issues bleeding issues.  Was discharged home on Augmentin.    Today, patient is following up on the ED visit and April work.  Is doing well post I&D with no fevers/chills, nausea or vomiting.  Has not started the antibiotics yet because the pharmacy was closed over the holiday weekend.      Review of Systems   As per HPI       Objective    Vitals - Patient Reported  Pain Score: Mild Pain (3)      Vitals:  No vitals were obtained today due to virtual visit.    Physical Exam   healthy, alert and no distress  PSYCH: Alert and oriented times 3; coherent speech, normal   rate and volume, able to articulate logical thoughts, able   to abstract reason, no tangential thoughts, no hallucinations   or delusions  Her affect is normal  RESP: No cough, no audible wheezing, able to talk in full sentences  Remainder of exam unable to be completed due to telephone visits      Phone call duration: 8 minutes and 47 seconds.  [facility]

## 2022-09-07 NOTE — TELEPHONE ENCOUNTER
Printed med list, INR/PTT results and made a copy of the form. Sent copy of form to medical records. Placed original form with med list and INR/PTT results up at the . Called patient and left a generic message informing her that this is ready to be picked up.

## 2022-09-12 ENCOUNTER — LAB (OUTPATIENT)
Dept: LAB | Facility: CLINIC | Age: 67
End: 2022-09-12
Payer: MEDICARE

## 2022-09-12 DIAGNOSIS — E87.1 HYPONATREMIA: ICD-10-CM

## 2022-09-12 DIAGNOSIS — K04.7 DENTAL ABSCESS: ICD-10-CM

## 2022-09-12 LAB
ANION GAP SERPL CALCULATED.3IONS-SCNC: 10 MMOL/L (ref 7–15)
BUN SERPL-MCNC: 13.3 MG/DL (ref 8–23)
CALCIUM SERPL-MCNC: 9.9 MG/DL (ref 8.8–10.2)
CHLORIDE SERPL-SCNC: 96 MMOL/L (ref 98–107)
CREAT SERPL-MCNC: 0.74 MG/DL (ref 0.51–0.95)
CRP SERPL-MCNC: 5.4 MG/L
DEPRECATED HCO3 PLAS-SCNC: 30 MMOL/L (ref 22–29)
GFR SERPL CREATININE-BSD FRML MDRD: 88 ML/MIN/1.73M2
GLUCOSE SERPL-MCNC: 109 MG/DL (ref 70–99)
POTASSIUM SERPL-SCNC: 3.4 MMOL/L (ref 3.4–5.3)
SODIUM SERPL-SCNC: 136 MMOL/L (ref 136–145)

## 2022-09-12 PROCEDURE — 36415 COLL VENOUS BLD VENIPUNCTURE: CPT

## 2022-09-12 PROCEDURE — 86140 C-REACTIVE PROTEIN: CPT

## 2022-09-12 PROCEDURE — 80048 BASIC METABOLIC PNL TOTAL CA: CPT

## 2022-09-14 ENCOUNTER — TELEPHONE (OUTPATIENT)
Dept: FAMILY MEDICINE | Facility: CLINIC | Age: 67
End: 2022-09-14

## 2022-09-14 NOTE — TELEPHONE ENCOUNTER
----- Message from Sharita Augustine DO sent at 9/14/2022  7:47 AM CDT -----  Call patient     Labs have normalized/improved. No further intervention at this time

## 2022-09-14 NOTE — TELEPHONE ENCOUNTER
Called patient and left a generic message for her to return phone call. Please inform her of the results/message below.

## 2022-09-15 ENCOUNTER — VIRTUAL VISIT (OUTPATIENT)
Dept: PHARMACY | Facility: CLINIC | Age: 67
End: 2022-09-15
Payer: COMMERCIAL

## 2022-09-15 DIAGNOSIS — K04.7 TOOTH ABSCESS: ICD-10-CM

## 2022-09-15 DIAGNOSIS — F41.1 GAD (GENERALIZED ANXIETY DISORDER): Primary | ICD-10-CM

## 2022-09-15 PROCEDURE — 99607 MTMS BY PHARM ADDL 15 MIN: CPT | Performed by: PHARMACIST

## 2022-09-15 PROCEDURE — 99606 MTMS BY PHARM EST 15 MIN: CPT | Performed by: PHARMACIST

## 2022-09-15 NOTE — PROGRESS NOTES
Medication Therapy Management (MTM) Encounter    ASSESSMENT:                            Medication Adherence/Access: No issues identified    Tooth Abscess: Improving. Plan appropriate.    Anxiety with Panic: Stable per patient. I will follow up with her after her psychiatry appointment.    PLAN:                            1. No changes today. Make sure you complete both antibiotics as directed.    Follow-up: Return in 26 days (on 10/11/2022) for phone visit.    SUBJECTIVE/OBJECTIVE:                          Tootie Marin is a 67 year old female called for a follow-up visit.  Today's visit is a follow-up MTM visit from 08/25.     Reason for visit: follow up on anxiety medications. Patient was also recently seen in the ED for a tooth abscess.    Allergies/ADRs: Reviewed in chart  Past Medical History: Reviewed in chart  Tobacco: She reports that she has never smoked. She has never used smokeless tobacco.  Alcohol: not currently using    Medication Adherence/Access: no issues reported    Tooth Abscess: Went to urgent care on 9/1 for evaluation of significant facial swelling, pain and possible fever (patient doesn't have thermometer), during the assessment the abscess ruptured and she was told to go to the ED for further assistance. ED consulted dentistry and she had an I&D performed. Going back to see the dentist on the 22nd for a recheck. Redness and swelling continues to go down, no fever as far as the patient can tell.     She is taking her Augmentin and metronidazole as prescribed- says she has about 3 days worth left (started late due to holiday weekend). Taking Tylenol 2 times daily to manage residual pain.    Anxiety with Panic: Patient continues on clonazepam 0.5mg twice daily without complaint of side effects or mood concerns. Does hope to go up to clonazepam 1.5mg daily when she sees psychiatry next month but has no plans of trying to increase on her own prior to that visit.    Last Comprehensive Metabolic  Panel:  Sodium   Date Value Ref Range Status   09/12/2022 136 136 - 145 mmol/L Final     Potassium   Date Value Ref Range Status   09/12/2022 3.4 3.4 - 5.3 mmol/L Final   09/01/2022 3.2 (L) 3.4 - 5.3 mmol/L Final     Chloride   Date Value Ref Range Status   09/12/2022 96 (L) 98 - 107 mmol/L Final   09/01/2022 98 94 - 109 mmol/L Final     Carbon Dioxide (CO2)   Date Value Ref Range Status   09/12/2022 30 (H) 22 - 29 mmol/L Final   09/01/2022 28 20 - 32 mmol/L Final     Anion Gap   Date Value Ref Range Status   09/12/2022 10 7 - 15 mmol/L Final   09/01/2022 6 3 - 14 mmol/L Final     Glucose   Date Value Ref Range Status   09/12/2022 109 (H) 70 - 99 mg/dL Final   09/01/2022 100 (H) 70 - 99 mg/dL Final     Urea Nitrogen   Date Value Ref Range Status   09/12/2022 13.3 8.0 - 23.0 mg/dL Final   09/01/2022 8 7 - 30 mg/dL Final     Creatinine   Date Value Ref Range Status   09/12/2022 0.74 0.51 - 0.95 mg/dL Final     GFR Estimate   Date Value Ref Range Status   09/12/2022 88 >60 mL/min/1.73m2 Final     Comment:     Effective December 21, 2021 eGFRcr in adults is calculated using the 2021 CKD-EPI creatinine equation which includes age and gender (Peyton et al., NEJ, DOI: 10.1056/YELTem1901155)   05/04/2021 >60 >60 mL/min/1.73m2 Final     Calcium   Date Value Ref Range Status   09/12/2022 9.9 8.8 - 10.2 mg/dL Final     I spent 17 minutes with this patient today. All changes were made via collaborative practice agreement with Dr. Augustine. A copy of the visit note was provided to the patient's provider(s).    The patient was mailed a summary of these recommendations.     Cindi Nieves PharmD, BCACP  Medication Therapy Management Provider  Phone: [phone]  [email or web address]    Telemedicine Visit Details  Type of service:  Telephone visit  Start Time: 10:32 AM  End Time: 10:49 AM  Originating Location (patient location): Home  Distant Location (provider location):  [facility] [place]     [facility]  Therapy Recommendations  No medication therapy recommendations to display

## 2022-09-15 NOTE — PATIENT INSTRUCTIONS
"Recommendations from today's MTM visit:                                                       1. No changes today. Make sure you complete both antibiotics as directed.    Follow-up: Return in 26 days (on 10/11/2022) for phone visit.    It was great speaking with you today.  I value your experience and would be very thankful for your time in providing feedback in our clinic survey. In the next few days, you may receive an email or text message from [facility] [employer] with a link to a survey related to your  clinical pharmacist.\"     To schedule another MTM appointment, please call the clinic directly or you may call the MTM scheduling line at [phone] or toll-free at [phone].     My Clinical Pharmacist's contact information:                                                      Please feel free to contact me with any questions or concerns you have.      Cindi Nieves PharmD, [facility]  Medication Therapy Management Provider  Phone: [phone]  [email or web address]  "

## 2022-09-21 DIAGNOSIS — K21.9 GASTROESOPHAGEAL REFLUX DISEASE WITHOUT ESOPHAGITIS: ICD-10-CM

## 2022-09-22 NOTE — TELEPHONE ENCOUNTER
"Last Written Prescription Date:  4/13/2022  Last Fill Quantity: 45,  # refills: 0   Last office visit provider:  9/6/2022     Requested Prescriptions   Pending Prescriptions Disp Refills     omeprazole (PRILOSEC) 20 MG DR capsule [Pharmacy Med Name: OMEPRAZOLE 20MG CAPSULES] 90 capsule      Sig: TAKE 1 CAPSULE BY MOUTH EVERY DAY       PPI Protocol Passed - 9/21/2022  9:30 AM        Passed - Not on Clopidogrel (unless Pantoprazole ordered)        Passed - No diagnosis of osteoporosis on record        Passed - Recent (12 mo) or future (30 days) visit within the authorizing provider's specialty     Patient has had an office visit with the authorizing provider or a provider within the authorizing providers department within the previous 12 mos or has a future within next 30 days. See \"Patient Info\" tab in inbasket, or \"Choose Columns\" in Meds & Orders section of the refill encounter.              Passed - Medication is active on med list        Passed - Patient is age 18 or older        Passed - No active pregnacy on record        Passed - No positive pregnancy test in past 12 months             Brenda Hair RN 09/22/22 4:27 PM  "

## 2022-09-23 DIAGNOSIS — F41.1 GAD (GENERALIZED ANXIETY DISORDER): ICD-10-CM

## 2022-09-23 RX ORDER — CLONAZEPAM 0.5 MG/1
0.5 TABLET ORAL 2 TIMES DAILY PRN
Qty: 10 TABLET | Refills: 0 | Status: SHIPPED | OUTPATIENT
Start: 2022-09-23 | End: 2022-09-29

## 2022-09-29 ENCOUNTER — PATIENT OUTREACH (OUTPATIENT)
Dept: GERIATRIC MEDICINE | Facility: CLINIC | Age: 67
End: 2022-09-29

## 2022-09-29 NOTE — TELEPHONE ENCOUNTER
Patient called me today to report that she only received 10 tablets of clonazepam on her last fill and she has one tablet left for this evening. She is wondering why she only got 10, which is not clear, though appears it was signed while patient's provider was out of office.    Forwarding 10 day supply to Dr. Augustine for consideration. Patient does intend to go to her psychiatrist appointment on 10/6. She would like more than 10 day supply if possible just in case there are issues getting it from the psychiatrist on time.    Cindi Nieves, GaviotaD, BCACP  Medication Therapy Management Provider  Phone: [phone]  [email or web address]

## 2022-09-29 NOTE — PROGRESS NOTES
[facility] Care Coordination Contact    Late disenrollment notice -  No longer active with [facility] community case management effective 2/28/22.  Reason for community disenrollment: Change Insurance    MARCO Manrique  [facility]  [phone]

## 2022-09-30 RX ORDER — CLONAZEPAM 0.5 MG/1
0.5 TABLET ORAL 2 TIMES DAILY PRN
Qty: 20 TABLET | Refills: 0 | Status: SHIPPED | OUTPATIENT
Start: 2022-10-01 | End: 2022-10-06

## 2022-09-30 NOTE — TELEPHONE ENCOUNTER
reviewed, 5 days worth of medication dispensed on 9/26    Refill provided for 10 days only. No further refills. Will be set up with psych by that time .    No suspicious activity on

## 2022-10-06 ENCOUNTER — VIRTUAL VISIT (OUTPATIENT)
Dept: PSYCHIATRY | Facility: CLINIC | Age: 67
End: 2022-10-06
Attending: STUDENT IN AN ORGANIZED HEALTH CARE EDUCATION/TRAINING PROGRAM
Payer: COMMERCIAL

## 2022-10-06 VITALS
WEIGHT: 178 LBS | HEART RATE: 89 BPM | SYSTOLIC BLOOD PRESSURE: 125 MMHG | BODY MASS INDEX: 34.48 KG/M2 | DIASTOLIC BLOOD PRESSURE: 73 MMHG

## 2022-10-06 DIAGNOSIS — B35.3 TINEA PEDIS OF BOTH FEET: ICD-10-CM

## 2022-10-06 DIAGNOSIS — F41.1 GAD (GENERALIZED ANXIETY DISORDER): ICD-10-CM

## 2022-10-06 PROCEDURE — 99205 OFFICE O/P NEW HI 60 MIN: CPT | Mod: 95 | Performed by: NURSE PRACTITIONER

## 2022-10-06 RX ORDER — CLONAZEPAM 0.5 MG/1
TABLET ORAL
Qty: 14 TABLET | Refills: 0 | Status: SHIPPED | OUTPATIENT
Start: 2022-10-06 | End: 2022-10-24

## 2022-10-06 RX ORDER — CLONAZEPAM 0.5 MG/1
0.5 TABLET ORAL 2 TIMES DAILY PRN
Qty: 20 TABLET | Status: CANCELLED | OUTPATIENT
Start: 2022-10-06

## 2022-10-06 ASSESSMENT — ANXIETY QUESTIONNAIRES
GAD7 TOTAL SCORE: 0
1. FEELING NERVOUS, ANXIOUS, OR ON EDGE: NOT AT ALL
5. BEING SO RESTLESS THAT IT IS HARD TO SIT STILL: NOT AT ALL
7. FEELING AFRAID AS IF SOMETHING AWFUL MIGHT HAPPEN: NOT AT ALL
4. TROUBLE RELAXING: NOT AT ALL
6. BECOMING EASILY ANNOYED OR IRRITABLE: NOT AT ALL
2. NOT BEING ABLE TO STOP OR CONTROL WORRYING: NOT AT ALL
3. WORRYING TOO MUCH ABOUT DIFFERENT THINGS: NOT AT ALL
GAD7 TOTAL SCORE: 0

## 2022-10-06 ASSESSMENT — PATIENT HEALTH QUESTIONNAIRE - PHQ9: SUM OF ALL RESPONSES TO PHQ QUESTIONS 1-9: 0

## 2022-10-06 ASSESSMENT — PAIN SCALES - GENERAL: PAINLEVEL: SEVERE PAIN (6)

## 2022-10-06 NOTE — PROGRESS NOTES
[facility] Care Coordination Contact    Disenrollment processed and documents submitted to [employer] ([email or web address]).    Tim Murdock  Care Management Specialist  [facility]  [phone]

## 2022-10-06 NOTE — PROGRESS NOTES
"  The patient has been notified of following:      \"This virtual  visit will be conducted via a call between you and your physician/provider. We have found that certain health care needs can be provided without the need for a physical exam.  This service lets us provide the care you need virtually/via video   If a prescription is necessary we can send it directly to your pharmacy.  If lab work is needed we can place an order for that and you can then stop by our lab to have the test done at a later time.      Virtual/Video visits are billed at different rates depending on your insurance coverage.Some insurers they may be billed the same as an in-person visit.  Please reach out to your insurance provider with any questions.    Patient has given verbal consent for virtual visit? Yes    How would you like to obtain your AVS? Mail a copy  If the video visit is dropped, the invitation should be resent by: Send to e-mail at: [email or web address]  Will anyone else be joining your video visit? No  {If patient encounters technical issues they should call [phone]      Date of Service:  10/6/2022    Name:  Tootie Marin  :  1955  MRN:  [id number]    HPI:   Tootie Marin is a 67 year old female with history of anxiety.  Patient referred by primary care.  Patient has been treated by primary care with both lorazepam and clonazepam at primary care has been attempting to wean off patient from benzos but patient has been unwilling to further taper down from benzodiazepines.  She has revealed indicates she has met with MTM several times with the goal of helping her wean off but family notes it appears associated with weaning off has been unsuccessful.  Patient is also on Prozac 20 mg 2 times daily.  It is not clear from the notes with aberrant nonbenzodiazepines options have been explored to manage anxiety.  I did review notes from both primary care provider and MTM consultations.  Patient is here today " "to establish psychiatric care to manage anxiety.\" No doctor will prescribe my mediation , my primary also told me she will no longer prescribe these medications\"        Per patient statement today: Reports being on current medications for several years especially while in [place].  Has had stress before and all does not want to be prescribing the same medications long-term.  She likes to primary care provider but she was not prescribing medications for her.  She has not tried other options in the past but buspirone.  She is ambivalent about any change proposed and would rather continue on current medications in fact wanting to increase her dose of Xanax.  Currently reports that symptoms are manageable and not overly depression or anxiety symptoms present for the past couple months.  I did spend some time discussing medications benefits and side effects profile specifically benzodiazepines risk of dependency tolerance and withdrawal.  I also did mention that I do not recommend long-term management of anxiety we have benzodiazepines and that I am agreeable with her primary care provider that she needs to wean off from benzodiazepine and can utilize other alternative therapies to manage anxiety and depression on long-term basis.  After much discussion patient is amenable to my recommendation to increase the dosage of fluoxetine from 40 mg daily to 60 mg daily.  Continue tapering off Xanax.  Currently on 0.5 twice daily will go down to 0.25 twice daily.  We did discuss initiating buspirone but she is reluctant to do that today.  I also highly recommended psychotherapy but she is not interested and states it has not worked well in the past for her.  I will have patient return to the clinic in approximately 2 weeks.  She is aware that our goal is to continue weaning off the benzodiazepines and manage anxiety with nonbenzodiazepines option.  Patient will call in between visits with any questions or concerns.    Past " psychiatric medications include  Clonazepam 0.5 mg BID  Lorazepam   Buspar    Current psychiatric medications include  Fluoxetine 20 mg BID       Current psychiatric symptoms include  Depression-  Suicidal homicidal ideations-  Anxiety-        Psychiatric History:  Current psychiatrist: None   Current psychotherapist:None   Current :Karyna    Primary Care Provider: Sharita Augustine,  Hospitalizations: Denies   Suicide attempts: Denies.  Electroconvulsive therapy: Denies   Judicial commitments: Denies   Anxiety General : History  of Anxiety   Social anxiety: Denies   OCD: Denies   Depression: Endorses   Rufina/hypomania:   PTSD: Denies   Hallucinations : Denies   Eating disorder:  ADHD: Denies   Personality disorder including history of oppositional defiant disorder or conduct disorder in childhood: Pe patient she was in Special Ed classes   Hx of autism spectrum disorder, learning disability, and or other cognitive disorder: Denies   History of seizures-Denies        Decision Making Capacity   Patient has the capacity to make independent decisions regarding medical and psychiatric care.    Chemical use History:             Nicotine : Denies   Alcohol : Denies   Cannabis : Denies   Denies use of al mood altering substance     Past Medical History:       Past Medical History:   Diagnosis Date     Anxiety      Chest wall trauma      Chronic prescription benzodiazepine use      Hypercholesteremia      Panic disorder        Past Surgical History:   Procedure Laterality Date     BIOPSY BREAST Right 1987    benign        Family Psychiatric History:    Mental illness : Unknown   Addiction: Dad -alcoholic   Suicide: Denies       Social History:       Marital Status  Single , Never    Number of children:  No children   Current living circumstances: Lives alone in her independent  senior living apartment   Current sources of financial support: SSD    Obstetric History:  Last menstrual period: No LMP recorded.  "Patient is postmenopausal.     History:  Denied  service.    Access to weapons  Denies access to weapons.           Trauma & Abuse History:  Major accidents and injuries:  Denies   Concussion or traumatic brain injury: Denies   Abuse: Denies     Spiritual History:  Sources of hope, meaning, comfort, strength, peace and love:\" a few friends but very close\"     Part of an organized Roman Catholic: Mosque     Birth & Development History:  City and state of birth: Born in [place] , moved to [place] , then moved back to [place] in 2006  Highest education achieved:12th grade     Legal History:  Denies       [place] Prescription Monitoring Program:  No worrisome pharmacy activity.    Medications:   These were reviewed.  Current Outpatient Medications   Medication     acetaminophen (TYLENOL) 500 MG tablet     albuterol (PROAIR HFA/PROVENTIL HFA/VENTOLIN HFA) 108 (90 Base) MCG/ACT inhaler     clonazePAM (KLONOPIN) 0.5 MG tablet     FLUoxetine (PROZAC) 20 MG capsule     hydrochlorothiazide (HYDRODIURIL) 25 MG tablet     lovastatin (MEVACOR) 20 MG tablet     omeprazole (PRILOSEC) 20 MG DR capsule     Pediatric Multiple Vitamins (MULTIVITAMIN CHILDRENS PO)     potassium chloride ER (KLOR-CON M) 10 MEQ CR tablet     SYMBICORT 160-4.5 MCG/ACT Inhaler     vitamin C (ASCORBIC ACID) 100 MG tablet     vitamin D3 (CHOLECALCIFEROL) 50 mcg (2000 units) tablet     clotrimazole (LOTRIMIN) 1 % external cream     No current facility-administered medications for this visit.               Medication adherence: Reviewed risk/benefits of medication , Patient able to verbalize understanding of side effects and Patient verbally consents to taking medications      Lab Results: *  Personally reviewed and discussed with the patient    Lab Results   Component Value Date    WBC 13.9 (H) 09/01/2022    HGB 14.3 09/01/2022    HCT 43.7 09/01/2022     09/01/2022    CHOL 199 05/04/2021    TRIG 63 05/04/2021    HDL 66 05/04/2021    ALT 39 (H) " "09/01/2022    AST 33 09/01/2022     09/12/2022    BUN 13.3 09/12/2022    CO2 30 (H) 09/12/2022    TSH 0.96 04/18/2022    INR 1.00 09/01/2022     No results found for: PHENYTOIN, PHENOBARB, VALPROATE, CBMZ        Vital signs:  There were no vitals taken for this visit.    Allergies:   Aspirin (tartrazine only) [tartrazine], Chicken [chicken protein], and Other food allergy        Associated Clinical Documents:       Notes reviewed in EPIC and [facility] including: medication reconciliation, progress notes, recent labs, PMH, and OSH records.    ROS:       10 point ROS was negative except for the items listed in HPI.No Medication s/e's      MSE:        Appearance: Well groomed, good eye contact, appears as stated age   Orientation: Patient alert and oriented to person, place, time, and situation  Reliability:  Patient appears to be an adequate historian.    Behavior: Cooperative   Speech: Speech is spontaneous and coherent, with a normal rate, rhythm and tone.    Language:There are no difficulties with expressive or receptive language as observed throughout the interview.    Mood: Described as \"ok\".    Affect: Congruent   Judgement: Able to make basic decision regarding safety.  Insight: Good awareness of physical and mental health conditions and aware of needs around care for these.  Gait and station: unable to assess  Thought process: Logical   Thought content: No evidence of delusions or paranoia.    Hallucinations : No evidence of any hallucination  Thought content: No evidence of delusions or paranoia.   Suicidal /Homical Ideations:  No thoughts of self harm or suicide. No thoughts of harming others.  Associations: Connected  Fund of knowledge: Average  Attention / Concentration: Able to remain focused during the interview with minimal distractibility or need for redirection.  Short Term Memory: Grossly intact as evidence by client recalling themes and ideas discussed.  Long Term Memory: Intact  Motor Status: " unable to asse            Clinical Outcome Measures:  See flow sheet   Working diagnosis :           Major depressive disorder, chronic  Generalized anxiety disorder  Plan:         Patient and I reviewed diagnosis and treatment plan.   Reviewed risks/benefits of medication with patient.  Ongoing education given regarding diagnostic and treatment options with adequate verbalization of understanding  Patient agrees with following recommendations:    1.  Continue tapering off benzodiazepines.  Stop taking clonazepam 0.25 mg twice daily as needed for anxiety .  We will continue further taper in the next 2 weeks and the goal is to completely wean off  2.  Increase fluoxetine to 60 mg daily  3.  Highly recommend psychotherapy-patient is not interested  4.  Recommend additional buspirone to address any residual anxiety symptoms-patient reluctant at this time.  We will continue this discussion during the next visit  5.  Return to the clinic in approximately 2 weeks.  Call in between visits any questions or concerns.            Crisis Resources:    1. Present to the Emergency Department as needed or call after hours crisis line at [phone] or [phone].   2. [place] Crisis Text [place]: Text [place] to 673377.  3. Suicide LifeLine Chat: suicidepreSouthwest Pet[employer] & [employer] Fund.org/chat/.  4. National Suicide Prevention Lifeline: 392.340.4559 (TTY: 548.558.6647). Call anytime for help.  (www.suicidepreventionlifeline.org)  5. National [place] on Mental Illness (www.[place].org): 392.449.5972 or 432-085-4756.  6. Mental Health Association (www.mentalhealth.org): 366.293.2608 or 898-962-9442.    Video-Visit Details    Type of service:  Video Visit    Originating Location (pt. Location): Home    Distant Location (provider location):  [facility]     Platform used for Video Visit: Glenny        Total Time:      60 Minutes spent on this visit with >50% time spent on  dscussing and educating  patient about diagnosis, treatment options, risks, benefits ,side effects of medications and instructions for follow up.  Time also spent on reviewing  Past  EHR from the providers  For better transition of care    This dictation was completed with speech recognition software and there may be unintended word substitutions.  Start Time : 0900  End Time : 1000

## 2022-10-06 NOTE — PROGRESS NOTES
Reason for visit: Psychiatric med management intake.   Anything the provider should be aware of for today's appointment: Pt would like to continue with Klonopin, reports taking it BID    Patient verified allergies, medications and pharmacy   Mood disorder: 0  MALINDA-7 scores:  0  MALINDA-7 SCORE 7/18/2022   Total Score 0 (minimal anxiety)   Total Score 0     PHQ-9 scores: 0  PHQ-9 SCORE 5/4/2021 7/18/2022   PHQ-9 Total Score MyChart - 0   PHQ-9 Total Score 0 0       Patient states they are ready for visit.    Angelic Beebe October 6, 2022 8:39 AM

## 2022-10-06 NOTE — PATIENT INSTRUCTIONS
Continue medications as prescribed  Have your pharmacy contact us for a refill if you are running low on medications (We may ask you to come into clinic to get a refill from the nurse  No Alcohol or drug use  No driving if sedated  Call the clinic with any questions or concerns   Reach out for help if you feel like hurting yourself or others ([facility] [phone]: [address] [facility], [address] or [facility] Suicide Hotline   [phone] , call 911 or go to nearest Emergency room     Crisis Resources:    Present to the Emergency Department as needed or call after hours crisis line at [phone] or [phone].   [place] Crisis Text [place]: Text [place] to 525899.  Suicide LifeLine Chat: suicidepreventionlifeline.org/chat/.  National Suicide Prevention Lifeline: 758.872.6736 (TTY: 207.147.7873). Call anytime for help.  (www.suicidepreventionlifeline.org)  National [place] on Mental Illness (www.[place].org): 967.648.5379 or 284-293-6797.  Mental Health Association (www.mentalhealth.org): 708.646.3371 or 385-694-6164.       Follow up as directed, for your appointments, per your After Visit Summary Form.

## 2022-10-24 ENCOUNTER — TELEPHONE (OUTPATIENT)
Dept: FAMILY MEDICINE | Facility: CLINIC | Age: 67
End: 2022-10-24

## 2022-10-24 ENCOUNTER — VIRTUAL VISIT (OUTPATIENT)
Dept: PSYCHIATRY | Facility: CLINIC | Age: 67
End: 2022-10-24
Payer: COMMERCIAL

## 2022-10-24 ENCOUNTER — APPOINTMENT (OUTPATIENT)
Dept: NURSING | Facility: CLINIC | Age: 67
End: 2022-10-24
Payer: MEDICARE

## 2022-10-24 VITALS
WEIGHT: 180 LBS | HEART RATE: 74 BPM | BODY MASS INDEX: 34.86 KG/M2 | DIASTOLIC BLOOD PRESSURE: 86 MMHG | SYSTOLIC BLOOD PRESSURE: 120 MMHG

## 2022-10-24 DIAGNOSIS — F41.1 GAD (GENERALIZED ANXIETY DISORDER): ICD-10-CM

## 2022-10-24 PROCEDURE — 99214 OFFICE O/P EST MOD 30 MIN: CPT | Mod: 95 | Performed by: NURSE PRACTITIONER

## 2022-10-24 RX ORDER — CLONAZEPAM 0.5 MG/1
TABLET ORAL
Qty: 7 TABLET | Refills: 0 | Status: SHIPPED | OUTPATIENT
Start: 2022-10-24 | End: 2022-12-08

## 2022-10-24 ASSESSMENT — ANXIETY QUESTIONNAIRES
1. FEELING NERVOUS, ANXIOUS, OR ON EDGE: NOT AT ALL
6. BECOMING EASILY ANNOYED OR IRRITABLE: NOT AT ALL
GAD7 TOTAL SCORE: 0
4. TROUBLE RELAXING: NOT AT ALL
5. BEING SO RESTLESS THAT IT IS HARD TO SIT STILL: NOT AT ALL
GAD7 TOTAL SCORE: 0
3. WORRYING TOO MUCH ABOUT DIFFERENT THINGS: NOT AT ALL
2. NOT BEING ABLE TO STOP OR CONTROL WORRYING: NOT AT ALL
7. FEELING AFRAID AS IF SOMETHING AWFUL MIGHT HAPPEN: NOT AT ALL

## 2022-10-24 ASSESSMENT — PAIN SCALES - GENERAL: PAINLEVEL: MILD PAIN (2)

## 2022-10-24 ASSESSMENT — PATIENT HEALTH QUESTIONNAIRE - PHQ9: SUM OF ALL RESPONSES TO PHQ QUESTIONS 1-9: 0

## 2022-10-24 NOTE — TELEPHONE ENCOUNTER
Called patient and left a generic message for her to return phone call. Please see message below for more details.

## 2022-10-24 NOTE — TELEPHONE ENCOUNTER
"Medication discontinued \"Alternate Therapy\" prescribed. Provider to review.     08/25/22 1125 Discontinue Cindi Nieves [facility] Reason:Alternate therapy     Outpatient Medication Detail     Disp Refills Start End LOUIS   escitalopram (LEXAPRO) 20 MG tablet (Discontinued) 90 tablet 0 7/18/2022 8/25/2022 No   Sig - Route: Take 1 tablet (20 mg) by mouth daily - Oral   Sent to pharmacy as: Escitalopram Oxalate 20 MG Oral Tablet (LEXAPRO)   Class: E-Prescribe   Reason for Discontinue: Alternate therapy   Order: [id number]   E-Prescribing Status: Receipt confirmed by pharmacy (7/18/2022  1:31 PM CDT)       "

## 2022-10-24 NOTE — TELEPHONE ENCOUNTER
Patient Returning Call    Reason for call:  Received message to call clinic    Information relayed to patient:  Relayed Dr. Augustine's message.  Per patient she is only taking Floxetine she is not sure why the pharmacy requested refill (patient may have requested automatic refills in the past)

## 2022-10-24 NOTE — TELEPHONE ENCOUNTER
Pharmacy calling to get a medication refill on medications.      escitalopram (LEXAPRO) 20 MG tablet (Discontinued)

## 2022-10-24 NOTE — PROGRESS NOTES
Reason for visit: med management follow up  Anything the provider should be aware of for today's appointment: pt is doing OK, needing refill on Klonopin    Patient verified allergies, medications and pharmacy.     MALINDA-7 scores:  0  MALINDA-7 SCORE 7/18/2022 10/6/2022   Total Score 0 (minimal anxiety) -   Total Score 0 0       PHQ-9 scores: 0  PHQ-9 SCORE 7/18/2022 10/6/2022   PHQ-9 Total Score MyChart 0 -   PHQ-9 Total Score 0 0       Patient states they are ready for visit.    Angelic Beebe October 24, 2022 2:14 PM    [facility] Post-Appointment Chart -check      Medications ordered this visit were e-scribed.  Verified by order class [x] yes  [] no    List Medications:    clonazePAM (KLONOPIN) 0.5 MG tablet  Class: E-Prescribe    Medication changes or discontinuations were communicated to patient's pharmacy: [] yes  [x] no    UA collected [] yes  [x] no  [] n/a-virtual     Outside referrals / labs, etc support staff to follow up: [] yes  [x] no    Future appointment was made: [x] yes  [] no  [] n/a      Dictation completed at time of chart check: [x] yes  [] no    I have checked the documentation for today s encounters and the above information has been reviewed and completed.      Angelic Beebe on October 24, 2022 at 4:05 PM

## 2022-10-24 NOTE — PROGRESS NOTES
"  The patient has been notified of following:      \"This virtual  visit will be conducted via a call between you and your physician/provider. We have found that certain health care needs can be provided without the need for a physical exam.  This service lets us provide the care you need virtually/via video   If a prescription is necessary we can send it directly to your pharmacy.  If lab work is needed we can place an order for that and you can then stop by our lab to have the test done at a later time.     Virtual/Video visits are billed at different rates depending on your insurance coverage.Some insurers they may be billed the same as an in-person visit.  Please reach out to your insurance provider with any questions.    Patient has given verbal consent for virtual  visit : Yes      Ho w would you like to obtain your AVS? Mail a copy  If the video visit is dropped, the invitation should be resent by: Send to e-mail at: [email or web address]  Will anyone else be joining your video visit? No            Psychiatric  Out- Patient  Follow Up Progress Note  Date of visit:10/24/2022           Discussion of Care and Treatment Recommendations:   This is a 67 year old female with a history of anxiety.  Patient presents to the clinic today for follow-up appointment.      Last visit  0/06/2022  Recommendation at last visit .  1.  Continue tapering off benzodiazepines.  Stop taking clonazepam 0.25 mg twice daily as needed for anxiety .  We will continue further taper in the next 2 weeks and the goal is to completely wean off  2.  Increase fluoxetine to 60 mg daily  3.  Highly recommend psychotherapy-patient is not interested  4.  Recommend additional buspirone to address any residual anxiety symptoms-patient reluctant at this time.  We will continue this discussion during the next visit  5.  Return to the clinic in approximately 2 weeks.  Call in between visits any questions or concerns  Patient and I reviewed diagnosis " and treatment plan and patient agrees with following recommendations:  Ongoing education given regarding diagnostic and treatment options with adequate verbalization of understanding.  Plan   1. Continue fluoxetine to 60 mg daily  Anxiety : Taper Down clonazepam 0.25 mg daily as needed   3.  Highly recommend psychotherapy-patient is not interested  4.  Recommend additional buspirone to address any residual anxiety symptoms-patient reluctant at this time.  We will continue this discussion during the next visit  5.  Return to the clinic in approximately 2 weeks.  Call in between visits any questions or concerns         DIagnoses:     Major depressive disorder, chronic  Generalized anxiety disorder    Patient Active Problem List   Diagnosis     Hypercholesterolemia     Anxiety     Panic Disorder Without Agoraphobia     Ptosis Of Eyelid     Lipoma Of The Adipose Tissue     Menopause     Asthma     Major depression     Essential hypertension     Gastroesophageal reflux disease without esophagitis     Chronic prescription benzodiazepine use             Chief Complaint / Subjective:    Chief complaint: Depression and anxiety      History of Present Illness:   Per patient statement-she was able to begin the clonazepam taper down and also tolerated an increase in fluoxetine to 60 mg okay.  Our goal is to continue tapering off and completely weaning her off.  We will continue with a further taper down on 0.25 mg daily as needed.  We did discuss adding as needed hydroxyzine as needed cross-taper during the weaning off.  But she is not interested.  She has not experienced any aggravated anxiety as we wean down.  She will call if she notices an increase in anxiety.  I will have her return to the clinic in approximately 2 weeks for follow-up appointment.  She will call in between visits with any questions or concerns.  Mental Status Examination:   Appearance: Well groomed, good eye contact   Orientation: Patient alert and  "oriented to person, place, time, and situation  Reliability:  Patient appears to be an adequate historian.    Behavior: cooperative   Speech: Speech is spontaneous and coherent, with a normal rate, rhythm and tone.    Language:There are no difficulties with expressive or receptive language as observed throughout the interview.    Mood: Described as \"ok\".    Affect: congruent   Judgement: Able to make basic decision regarding safety.  Insight: Good awareness of physical and mental health conditions and aware of needs around care for these.  Gait and station: unable to assess  Thought process: Logical   Thought content: No evidence of delusions or paranoia.    Hallucinations : No evidence of any hallucination  Thought content: No evidence of delusions or paranoia.   Suicidal /Homical Ideations:  No thoughts of self harm or suicide. No thoughts of harming others.  Associations: Connected  Fund of knowledge: Average  Attention / Concentration: Able to remain focused during the interview with minimal distractibility or need for redirection.  Short Term Memory: Grossly intact as evidence by client recalling themes and ideas discussed.  Long Term Memory: Intact  Motor Status: unable to asse    Drug/treatment history and current pattern of use:   Drug of choice  Age of first use: **  Date of last use: **  Blackouts: **  Seizures/DTs/hallucinations: **  : ; **  Relapse/Triggers : **    Medication changes: See Above   Medication adherence: compliant  Medication side effects: absent  Information about medications: Side effects, benefits and alternative treatments discussed and patient agrees .    Psychotherapy: Supportive therapy day-to-day living    Education: Diet, exercise, abstinence from drugs and alcohol, patient will not drive if sedated and medications or  under influence of any substance    Lab Results:   Personally reviewed and discussed with the patient    Lab Results   Component Value Date    WBC 13.9 (H) " 09/01/2022    HGB 14.3 09/01/2022    HCT 43.7 09/01/2022     09/01/2022    CHOL 199 05/04/2021    TRIG 63 05/04/2021    HDL 66 05/04/2021    ALT 39 (H) 09/01/2022    AST 33 09/01/2022     09/12/2022    BUN 13.3 09/12/2022    CO2 30 (H) 09/12/2022    TSH 0.96 04/18/2022    INR 1.00 09/01/2022       Vital signs:  /86 (BP Location: Left arm, Patient Position: Sitting, Cuff Size: Adult Regular)   Pulse 74   Wt 81.6 kg (180 lb)   BMI 34.86 kg/m    Telemedicine visit-no vital signs completed  Allergies: Aspirin (tartrazine only) [tartrazine], Chicken [chicken protein], and Other food allergy         Medications:     Current Outpatient Medications   Medication     acetaminophen (TYLENOL) 500 MG tablet     albuterol (PROAIR HFA/PROVENTIL HFA/VENTOLIN HFA) 108 (90 Base) MCG/ACT inhaler     clonazePAM (KLONOPIN) 0.5 MG tablet     FLUoxetine (PROZAC) 20 MG capsule     hydrochlorothiazide (HYDRODIURIL) 25 MG tablet     lovastatin (MEVACOR) 20 MG tablet     omeprazole (PRILOSEC) 20 MG DR capsule     Pediatric Multiple Vitamins (MULTIVITAMIN CHILDRENS PO)     potassium chloride ER (KLOR-CON M) 10 MEQ CR tablet     SYMBICORT 160-4.5 MCG/ACT Inhaler     vitamin C (ASCORBIC ACID) 100 MG tablet     vitamin D3 (CHOLECALCIFEROL) 50 mcg (2000 units) tablet     clotrimazole (LOTRIMIN) 1 % external cream     No current facility-administered medications for this visit.                 Medication adherence: Reviewed risk/benefits of medication , Patient able to verbalize understanding of side effects and Patient verbally consents to taking medications           Review of Systems:      ROS:    Subjective Data Only- Tele-Health Visit    10 point ROS was negative except for the items listed in HPI.      Crisis Resources:    1. Present to the Emergency Department as needed or call after hours crisis line at [phone] or [phone].   2. [place] Crisis Text Line: Text [place] to 703739.  3. Suicide LifeLine Chat:  suicidepreventionlifeline.org/chat/.  4. National Suicide Prevention Lifeline: 669.204.4943 (TTY: 910.618.7034). Call anytime for help.  (www.suicidepreventionlifeline.org)  5. National [place] on Mental Illness (www.marcell.org): 914.424.7980 or 958-052-0233.  6. Mental Health Association (www.mentalhealth.org): 832.486.6014 or 737-349-5915.      Coordination of Care:   More than 30 minutes spent on this visit  with more than 50% of time spent on coordination of care including: Educating patient about diagnosis, prognosis, side effects and benefits of medications, diet, exercise.  Time also spent providing supportive therapy regarding above issues.    Video-Visit Details    Type of service:  Video Visit    Originating Location (pt. Location): Home    Distant Location (provider location):  [facility]     Platform used for Video Visit: Glenny      This note was created using a dictation system. All typing errors or contextual distortion is unintentional and software inherent.  Start Time : 1430  End time :1500

## 2022-10-24 NOTE — PATIENT INSTRUCTIONS
Continue medications as prescribed  Have your pharmacy contact us for a refill if you are running low on medications (We may ask you to come into clinic to get a refill from the nurse  No Alcohol or drug use  No driving if sedated  Call the clinic with any questions or concerns   Reach out for help if you feel like hurting yourself or others ([facility] [phone]: [address] [facility], [address] or [facility] Suicide Hotline   [phone] , call 911 or go to nearest Emergency room     Crisis Resources:    Present to the Emergency Department as needed or call after hours crisis line at [phone] or [phone].   [place] Crisis Text Line: Text [place] to 522334.  Suicide LifeLine Chat: suicidepreventionlifeline.org/chat/.  National Suicide Prevention Lifeline: 430.151.9612 (TTY: 253.569.1213). Call anytime for help.  (www.suicidepreventionlifeline.org)  National [place] on Mental Illness (www.marcell.org): 513.723.6686 or 202-643-8397.  Mental Health Association (www.mentalhealth.org): 525.995.3818 or 989-656-9118.       Follow up as directed, for your appointments, per your After Visit Summary Form.

## 2022-10-26 DIAGNOSIS — E78.00 PURE HYPERCHOLESTEROLEMIA: ICD-10-CM

## 2022-10-27 NOTE — TELEPHONE ENCOUNTER
"Routing refill request to provider for review/approval because:  Labs not current:  ldl    Last Written Prescription Date:  7/12/22  Last Fill Quantity: 90,  # refills: 0   Last office visit provider:  9/6/22     Requested Prescriptions   Pending Prescriptions Disp Refills     lovastatin (MEVACOR) 20 MG tablet [Pharmacy Med Name: LOVASTATIN 20MG TABLETS] 90 tablet 0     Sig: TAKE 1 TABLET BY MOUTH EVERY NIGHT AT BEDTIME       Statins Protocol Failed - 10/26/2022  2:57 PM        Failed - LDL on file in past 12 months     Recent Labs   Lab Test 05/04/21  1426                Passed - No abnormal creatine kinase in past 12 months     No lab results found.             Passed - Recent (12 mo) or future (30 days) visit within the authorizing provider's specialty     Patient has had an office visit with the authorizing provider or a provider within the authorizing providers department within the previous 12 mos or has a future within next 30 days. See \"Patient Info\" tab in inbasket, or \"Choose Columns\" in Meds & Orders section of the refill encounter.              Passed - Medication is active on med list        Passed - Patient is age 18 or older        Passed - No active pregnancy on record        Passed - No positive pregnancy test in past 12 months             Sona Culver, RN 10/27/22 1:14 PM  "

## 2022-10-28 ENCOUNTER — TELEPHONE (OUTPATIENT)
Dept: PSYCHIATRY | Facility: CLINIC | Age: 67
End: 2022-10-28

## 2022-10-28 NOTE — TELEPHONE ENCOUNTER
"Pt called to speak with a nurse.  She states she's been having a \"bad week\" and that the reduction of her medications is not working for her.  She would like to speak to someone about this as soon as possible @ [phone]  "

## 2022-10-28 NOTE — TELEPHONE ENCOUNTER
"Reports that she has been having panic attacks. Reports frustration that Bonny Menard is tapering off of Clonazepam.     Reiterated several times that \"I have no problems, It works for me. Naz been on it for 35 years.\"    RN spoke with the patient regarding concern of this medication being a controlled substance. She verbalized acknowledgement.     She reports   \"if it works for me, why does she want to change it? I guarantee I will be fine\"     Patient went into length about how she has been taking this medication for years and said \"If she wont work with me on this medication then I cant do it.\" RN asked to elaborate and she stated that she would like a new provider who will prescribe it.     RN asked if patient needed a refill. Patient declined.   RN reminded patient that Bonny does not work Friday's and that she will be back in on Monday to review message.   Patient verbalized acknowledgement.     Bonny - Please advise with your response     Daksha Tomas RN on 10/28/2022 at 10:19 AM    "

## 2022-10-30 RX ORDER — LOVASTATIN 20 MG
TABLET ORAL
Qty: 90 TABLET | Refills: 0 | Status: SHIPPED | OUTPATIENT
Start: 2022-10-30 | End: 2023-01-11

## 2022-11-07 ENCOUNTER — TELEPHONE (OUTPATIENT)
Dept: PSYCHIATRY | Facility: CLINIC | Age: 67
End: 2022-11-07

## 2022-11-07 NOTE — TELEPHONE ENCOUNTER
11/7/22: Pt called to state that she will not be meeting with provider again, but wanted to thank the nursing team and a staff named Alden for the great care.

## 2022-11-08 ENCOUNTER — TELEPHONE (OUTPATIENT)
Dept: PHARMACY | Facility: CLINIC | Age: 67
End: 2022-11-08

## 2022-11-08 NOTE — TELEPHONE ENCOUNTER
Patient contacted me to let me know that she needs help because her new psychiatrist is wanting to wean her off clonazepam. She has some left over and felt awful mentally during the weaning process so is back up to taking 0.5mg twice daily.    I pushed strongly on her to consider therapy. She seemed disinterested at first but we discussed that panic attacks are often associated with deep trauma, and that she needs to be properly assessed by a professional to address the potential trauma, or in the very least, discuss coping strategies.    Patient at first admits that her father was an alcoholic, but then admits that she has not shared with her current care team (appers to have only shared with a provider in [facility] in 2015 per chart review) that she inwardly has identified as a boy/man since childhood but has refused to acknowledge these feelings seriously because she is Oriental orthodox. He considered transitioning several times in his life but never did. He at one point decided he would transition after his mom passed. He admits his mother passed a few years ago.    Patient is open to me sharing this information with his care team and open to finding a  specialist who is versed in transgender care. I would ask if the provider has a BH specialist to refer to, please do, otherwise I will be combing my resources and will call the patient back next week.    Cindi Nieves, GaviotaD, [facility]  Medication Therapy Management Provider  Phone: [phone]  [email or web address]

## 2022-11-09 NOTE — TELEPHONE ENCOUNTER
Below is a list of some gender specific provider. I am not sure if all of them take adults on as patients. This sounds like a good step for the patient.     Gender care providers:       [facility]- [email or web address] [phone]     [facility]   [address] [place] [address]       [facility]  [phone]  or email at [email or web address]   [facility]        Individual Mental Health Practitioners               Therapist Children Adolescents Adults Family Other   Yovany Flores, PhD, LP   [address]  [address]   [place] [address]   PH: [phone]       [facility]     Lakisha Galvan MA, LMFT, CST   [address]   [place] [id number]   [phone]   [facility]   YES YES YES Sex positive therapy

## 2022-11-13 ENCOUNTER — TELEPHONE (OUTPATIENT)
Dept: FAMILY MEDICINE | Facility: CLINIC | Age: 67
End: 2022-11-13

## 2022-11-13 NOTE — TELEPHONE ENCOUNTER
Subjective:     Encounter Date:04/18/2018    Patient ID: Julian Willoughby is a 38 y.o. 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    PHYSICIAN: Maribel Syed MD  ALLERGIST: Lala Jeong MD    Chief Complaint:   Chief Complaint   Patient presents with   • Hypertension     Problem List:  1. Elevated labile blood pressure readings without definitive diagnosis of hypertension with residual class I symptoms and normal EKG  2. Hyperlipidemia; ASCVD 10-year risk is 1%, 0.6% if treated  3. Allergic rhinitis; gets routine allergy shots  4. Borderline overweight with recent 10-15 pound weight loss with diet (winter 2018)  5. Surgical history:  A. Right tear duct repair  B. [place] teeth extraction    Allergies   Allergen Reactions   • Claritin [Loratadine] Other (See Comments)     Depressed     • Sudafed [Pseudoephedrine Hcl] Other (See Comments)     depressed   • Sulfa Antibiotics GI Intolerance         Current Outpatient Prescriptions:   •  Multiple Vitamin (MULTI-VITAMIN DAILY PO), Take  by mouth As Needed., Disp: , Rfl:     History of Present Illness  This is a 38-year-old white male who presents to establish cardiology care for elevated blood pressure.  He had concerns about having an elevated blood pressure when he went to his physician's office and was referred to us for consultation.  He does not currently take any antihypertensive medications and denies any hyperlipidemia, diabetes mellitus, chest pain, shortness of breath, orthopnea, PND, palpitations, presyncope, syncope, GERD, CVAs, TIAs, seizures, DVTs, PEs, kidney dysfunction, valvular dysfunction, rheumatic fever, prolonged fevers as a child, cardiomegaly, arrhythmias, or thyroid dysfunction.  He has intentionally lost 15 pounds over the past 6 months.  He has a blood pressure monitor at home that he can use to monitor his blood pressure.  He is active and  Please call patient     I review the chart and it looks like psych fel that tapering off the benzodiazepines would be beneficial. I understand patient is frustrated but I am not comfortable with prescribing the medications. I agree with psych that getting off the benzos + stabilizing with controller medications + therapy will be most beneficial.     If patient is in disagreement with the care plan, they may benefit from seeing another provider.    typically, while at work, will use the stairs instead of an elevator.  He cycles with a friend when the weather permits and is looking forward to running again once the weather improves.  He has had no MIs, stress tests, heart monitors, heart catheterizations, or echocardiograms in the past.  He has no family history of early CAD and has never been a smoker.  He denies any daytime sleepiness, apneic spells, or snoring and sleeps with only one pillow.  He has had increased stress lately because his mother has metastatic cancer to the lungs.  Patient otherwise denies chest pain, shortness of breath, PND, edema, palpitations, syncope or presyncope at this time.    Cardiovascular Disease Risk Factors  male gender    Social History     Social History   • Marital status: Single     Spouse name: N/A   • Number of children: 0   • Years of education: N/A     Occupational History   • Not on file.     Social History Main Topics   • Smoking status: Never Smoker   • Smokeless tobacco: Never Used   • Alcohol use No   • Drug use: No   • Sexual activity: Defer     Other Topics Concern   • Not on file     Social History Narrative   • No narrative on file       Family History   Problem Relation Age of Onset   • Hypertension Other    • Cancer Mother    • Hypertension Father    • No Known Problems Brother        Review of Systems   Constitution: Positive for weight loss (intentional 15 pound weight loss over the past 6 months). Negative for weakness and malaise/fatigue.   HENT: Negative.    Eyes: Negative.    Cardiovascular: Negative for chest pain, dyspnea on exertion, irregular heartbeat, leg swelling, near-syncope, orthopnea, palpitations, paroxysmal nocturnal dyspnea and syncope.   Respiratory: Negative for shortness of breath, sleep disturbances due to breathing and snoring.    Endocrine: Negative.    Hematologic/Lymphatic: Negative.    Skin: Negative.    Musculoskeletal: Negative.    Gastrointestinal: Negative for change in bowel  "habit, heartburn and melena.   Genitourinary: Negative.    Neurological: Negative for excessive daytime sleepiness, focal weakness, light-headedness and seizures.   Psychiatric/Behavioral: Negative.    Allergic/Immunologic: Positive for environmental allergies.      Obtained and negative except as outlined in problem list and HPI.      ECG 12 Lead  Date/Time: 4/18/2018 9:38 AM  Performed by: BHARATH PIERCE  Authorized by: BHARATH PIERCE   Rhythm comments: Normal sinus rhythm, normal ECG, 78 bpm,  ms, QRS 78 ms,  ms                 Objective:       Vitals:    04/18/18 0859 04/18/18 0901 04/18/18 0902 04/18/18 0903   BP: (!) 132/101 123/90 125/86 132/94   BP Location: Right arm Right arm Left arm Left arm   Patient Position: Sitting Standing Standing Sitting   Pulse: 89 88 87 84   Weight: 85.3 kg (188 lb)      Height: 180.3 cm (71\")        Body mass index is 26.22 kg/m².     Physical Exam   Constitutional: He is oriented to person, place, and time. He appears well-developed and well-nourished.   HENT:   Mouth/Throat: Uvula is midline, oropharynx is clear and moist and mucous membranes are normal. He does not have dentures. No oral lesions. Normal dentition. No dental abscesses, uvula swelling, lacerations or dental caries.   Eyes:   Fundoscopic exam:       The right eye shows no AV nicking, no exudate and no hemorrhage.        The left eye shows no AV nicking, no exudate and no hemorrhage.   Neck: No JVD present. Carotid bruit is not present. No thyromegaly present.   Cardiovascular: Regular rhythm, S1 normal, S2 normal and normal heart sounds.  Exam reveals no gallop, no S3 and no friction rub.    No murmur heard.  Pulses:       Dorsalis pedis pulses are 2+ on the right side, and 2+ on the left side.        Posterior tibial pulses are 2+ on the right side, and 2+ on the left side.   Pulmonary/Chest: Effort normal and breath sounds normal. He has no wheezes. He has no rhonchi. He has no rales. "   Abdominal: Soft. He exhibits no mass. There is no hepatosplenomegaly. There is no tenderness. There is no guarding.   Bowel sounds audible x4   Musculoskeletal: Normal range of motion. He exhibits no edema.   Lymphadenopathy:     He has no cervical adenopathy.   Neurological: He is alert and oriented to person, place, and time.   Skin: Skin is warm, dry and intact. No rash noted.   Vitals reviewed.      Lab Review:   9/13/17:  · CBC: WBC 5.4, RBC 5.95, hemoglobin 17.5, hematocrit 52.2, MCV 87.7, MCH 29.4, MCHC 33.5, RDW 12.8, platelets 254, MPV 10.6, neutrophils 61.3, lymphocytes 26.7, monocytes 9.8, eos 1.1, basos 0.9  · CMP: Sodium 142, potassium 4.2, chloride 106, CO2 27, glucose 82, BUN 17, creatinine 0.9, calcium 8.7, protein 7.6, albumin 4.1, bilirubin 0.9, alkaline phosphatase 80, AST 25, ALTs 50, globulin 3.5, GFR 95  · Lipid panel: Cholesterol 176, triglycerides 100, HDL 44,   · Hemoglobin A1c - 5.1%        Assessment:   Patient is asymptomatic and had complaints of occasional high blood pressure readings at his physician's office.  Encouraged patient to monitor his blood pressure daily at home and to call us in a couple of weeks with his results.  His blood pressure and ECG are normal in the office today. We will reserve a 24-hour ambulatory blood pressure monitor at this time.     Diagnosis Plan   1. Transient elevated blood pressure  Monitor blood pressures daily at home and call us in 2 weeks    2. Mixed hyperlipidemia  ASCVD 10 year risk is 1%, 0.6% if treated           Plan:   1. Patient to continue current medications and close follow up with the above providers.  2. Tentative cardiology follow up in June 2018, or patient may return sooner PRN.  3. Monitor blood pressure at home and call us in 2 weeks with results  4. 1 800 card provided.    Scribed for Per Foster MD by MICHAEL Cross. 4/18/2018  11:23 AM    I, Per Foster MD, [facility], personally performed the services  described in this documentation as scribed by the above named individual in my presence, and it is both accurate and complete. At 11:14 AM on 04/18/2018

## 2022-11-14 DIAGNOSIS — J45.40 MODERATE PERSISTENT ASTHMA WITHOUT COMPLICATION: ICD-10-CM

## 2022-11-14 RX ORDER — BUDESONIDE AND FORMOTEROL FUMARATE DIHYDRATE 160; 4.5 UG/1; UG/1
AEROSOL RESPIRATORY (INHALATION)
Qty: 10.2 G | Refills: 11 | Status: SHIPPED | OUTPATIENT
Start: 2022-11-14 | End: 2023-05-23

## 2022-11-14 NOTE — TELEPHONE ENCOUNTER
Patient calling back from a missed call from the provider.    Writer relayed message to the patient.    Patient understood the message and had no further questions.    Call Back  [phone]

## 2022-11-15 ENCOUNTER — VIRTUAL VISIT (OUTPATIENT)
Dept: PHARMACY | Facility: CLINIC | Age: 67
End: 2022-11-15
Payer: COMMERCIAL

## 2022-11-15 DIAGNOSIS — F41.1 GAD (GENERALIZED ANXIETY DISORDER): Primary | ICD-10-CM

## 2022-11-15 PROCEDURE — 99606 MTMS BY PHARM EST 15 MIN: CPT | Mod: 93 | Performed by: PHARMACIST

## 2022-11-15 PROCEDURE — 99607 MTMS BY PHARM ADDL 15 MIN: CPT | Mod: 93 | Performed by: PHARMACIST

## 2022-11-15 NOTE — PROGRESS NOTES
Medication Therapy Management (MTM) Encounter    ASSESSMENT:                            Medication Adherence/Access: See below for considerations    Anxiety with Panic 2/2 Gender Dysphoria: Current situation not ideal, unfortunately patient has been encouraged several times to taper slowly off clonazepam but has not. With the tablets they have left, I advised the patient to taper best they can.    To replace medication therapy with benzos, we discussed behavioral health resources suggested by Dr. Augustine (see chart notes) as well as our [facility]. Patient is open to calling and establishing with one of these practitioners.    PLAN:                            1. Move clonazepam to 0.25mg (1/2 tablet) once daily for 4 days, then 1/2 tablet every other day until you are out.    2. Call the clinic or go in to the ED if you start having symptoms of rapid heart beat, severe agitation, etc.    3. I have sent a letter with contact information for the various gender positive behavior health offices we discussed today.    Follow-up: Return in 3 weeks (on 12/6/2022) for phone visit. Feel free to call if you want to talk earlier than that.    SUBJECTIVE/OBJECTIVE:                          Tootie Marin is a 67 year old female called for a follow-up visit.  Today's visit is a follow-up MTM visit from 9/15.     Reason for visit: follow up on anxiety meds, gender dysphoria.    Allergies/ADRs: Reviewed in chart  Past Medical History: Reviewed in chart  Tobacco: She reports that she has never smoked. She has never used smokeless tobacco.  Alcohol: not currently using    Medication Adherence/Access: See below    Anxiety with Panic 2/2 Gender Dysphoria: Patient is currently taking clonazepam half of a 0.5 tablet twice daily since the 11th and has 3-5 days left (6 1/2 tablets). They were unable to tolerate fluoxetine 60mg daily so are taking 40mg daily. They are already feeling uncomfortable and looking for advice  as to what to do now that they will no longer be able to receive anymore clonazepam from their care team.    I spent 21 minutes with this patient today. All changes were made via collaborative practice agreement with Dr. Augustine. A copy of the visit note was provided to the patient's provider(s).    The patient was mailed a summary of these recommendations.     Cindi Nieves, PharmD, BCACP  Medication Therapy Management Provider  Phone: [phone]  [email or web address]    Telemedicine Visit Details  Type of service:  Telephone visit  Start Time: 1:31PM  End Time: 1:52PM  Originating Location (pt. Location): Home   Distant Location (provider location):  On-site  Provider has received verbal consent for a visit from the patient? Yes     Medication Therapy Recommendations  Anxiety state    Current Medication: clonazePAM (KLONOPIN) 0.5 MG tablet   Rationale: Does not understand instructions - Adherence - Adherence   Recommendation: Provide Education   Status: Accepted - no CPA Needed

## 2022-11-15 NOTE — LETTER
November 15, 2022      Tootie Marin  [address]  [place] [address]        Dear Tootie,     Below are the resources we discussed:     [facility] [phone]    [facility]- [email or web address] [phone]     [facility]   [address] [place] [id number]       [facility]  [phone]  or email at [email or web address]   [facility]        Individual Mental Health Practitioners               Therapist Children [facility]olescents [facility] Family Other   Yovany Flores, PhD, LP   [address]  [address]   [place] [address]   PH: [phone]        [email or web address]     Lakisha Galvan MA, LMFT, CST   [address]   [place], [place] [address]   [phone]   [email or web address]   YES YES YES Sex positive therapy         Sincerely,        Cindi Nieves [facility]

## 2022-11-17 DIAGNOSIS — F41.1 GAD (GENERALIZED ANXIETY DISORDER): ICD-10-CM

## 2022-11-17 NOTE — PATIENT INSTRUCTIONS
"Recommendations from today's MTM visit:                                                       1. Move clonazepam to 0.25mg (1/2 tablet) once daily for 4 days, then 1/2 tablet every other day until you are out.    2. Call the clinic or go in to the ED if you start having symptoms of rapid heart beat, severe agitation, etc.    3. I have sent a letter with contact information for the various gender positive behavior health offices we discussed today.    Follow-up: Return in 3 weeks (on 12/6/2022) for phone visit. Feel free to call if you want to talk earlier than that.    It was great speaking with you today.  I value your experience and would be very thankful for your time in providing feedback in our clinic survey. In the next few days, you may receive an email or text message from [employer] with a link to a survey related to your  clinical pharmacist.\"     To schedule another MTM appointment, please call the clinic directly or you may call the MTM scheduling line at [phone] or toll-free at [phone].     My Clinical Pharmacist's contact information:                                                      Please feel free to contact me with any questions or concerns you have.      Cindi Nieves PharmD, BCACP  Medication Therapy Management Provider  Phone: [phone]  [email or web address]  "

## 2022-11-21 RX ORDER — CLONAZEPAM 0.5 MG/1
TABLET ORAL
Qty: 7 TABLET | Refills: 0 | OUTPATIENT
Start: 2022-11-21

## 2022-11-22 ENCOUNTER — TELEPHONE (OUTPATIENT)
Dept: PLASTIC SURGERY | Facility: CLINIC | Age: 67
End: 2022-11-22

## 2022-11-22 NOTE — CONFIDENTIAL NOTE
Pt called to discuss C[facility] services. Tootie expressed interest in transitioning and wanted to know about how to start care with a gender affirming provider. Writer discussed hormone therapy, mental health provider options, and top surgery. Pt said they would like top surgery eventually. Writer to mail HRT providers and CGC program info to pt's house.

## 2022-11-28 ENCOUNTER — HOSPITAL ENCOUNTER (EMERGENCY)
Facility: HOSPITAL | Age: 67
Discharge: HOME | End: 2022-11-28
Attending: EMERGENCY MEDICINE | Admitting: EMERGENCY MEDICINE
Payer: MEDICARE

## 2022-11-28 VITALS
WEIGHT: 190 LBS | BODY MASS INDEX: 37.3 KG/M2 | OXYGEN SATURATION: 97 % | TEMPERATURE: 98.4 F | SYSTOLIC BLOOD PRESSURE: 166 MMHG | HEIGHT: 60 IN | RESPIRATION RATE: 20 BRPM | HEART RATE: 103 BPM | DIASTOLIC BLOOD PRESSURE: 79 MMHG

## 2022-11-28 DIAGNOSIS — F41.9 ANXIETY: ICD-10-CM

## 2022-11-28 PROCEDURE — 99284 EMERGENCY DEPT VISIT MOD MDM: CPT

## 2022-11-28 RX ORDER — LORAZEPAM 0.5 MG/1
0.5 TABLET ORAL EVERY 6 HOURS PRN
Qty: 10 TABLET | Refills: 0 | Status: SHIPPED | OUTPATIENT
Start: 2022-11-28 | End: 2022-12-08

## 2022-11-28 RX ORDER — HYDROXYZINE HYDROCHLORIDE 25 MG/1
25-50 TABLET, FILM COATED ORAL EVERY 6 HOURS PRN
Qty: 30 TABLET | Refills: 0 | Status: SHIPPED | OUTPATIENT
Start: 2022-11-28 | End: 2022-12-08

## 2022-11-28 NOTE — ED TRIAGE NOTES
"Patient presents here via [facility], patient doctor has started weaning patient off of clonazepam. Original dose was 0.5mg TID, she started to only take it twice a day and now is not taking any. Patient is visibly anxious, she states she has not been able to sleep. Patient states she just feels \"lousy\".     Triage Assessment     Row Name 11/28/22 0942       Triage Assessment (Adult)    Airway WDL WDL       Respiratory WDL    Respiratory WDL WDL       Skin Circulation/Temperature WDL    Skin Circulation/Temperature WDL WDL       Cardiac WDL    Cardiac WDL WDL       Peripheral/Neurovascular WDL    Peripheral Neurovascular WDL WDL       Cognitive/Neuro/Behavioral WDL    Cognitive/Neuro/Behavioral WDL WDL              "

## 2022-11-28 NOTE — ED PROVIDER NOTES
EMERGENCY DEPARTMENT ENCOUNTER      NAME: Tootie Marin  AGE: 67 year old female  YOB: 1955  MRN: [id number]  EVALUATION DATE & TIME: No admission date for patient encounter.    PCP: Sharita Augustine    ED PROVIDER: Guille Charles M.D.      Chief Complaint   Patient presents with     Anxiety       FINAL IMPRESSION:  Anxiety      ED COURSE & MEDICAL DECISION MAKING:    10:06 AM I met with the patient, obtained history, performed an initial exam, and discussed options and plan for diagnostics and treatment here in the ED.   10:27 AM Patient ready for discharge.    67 year old female presents to the Emergency Department for evaluation of anxiety symptoms.  Patient recently tapered off of longstanding clonazepam from clinic.  Presents with increasing insomnia and panic attack symptoms.  So severe today that she called EMS.  Symptoms have abated substantially by the time I am meeting her.  She is quite upset about being discontinued on her clonazepam but I did discuss with her  at length that  this regimen would not be a safe long-term plan for managing anxiety symptoms.  She does not have any other physical complaints   And is convinced that all of the symptoms are related to anxiety.  No acute depressive symptoms or suicidal ideation.  We discussed a plan for trial of hydroxyzine as well as limited short-term prescription for Ativan and she can continue her evaluation in clinic regarding long-term management of anxiety.     At the conclusion of the encounter I discussed the results of all of the tests and the disposition. The questions were answered. The patient or family acknowledged understanding and was agreeable with the care plan.       Medical Decision Making    History:    Supplemental history from: N/A    External Record(s) reviewed: Outpatient Record: Clinic notes and previous ED visit    Work Up:    Chart documentation includes differential considered and any EKGs or imaging interpreted by  provider.    In additional to work up documented, I considered the following work up: Considered EKG and lab testing however patient's symptoms seem strictly anxiety related    External consultation:    Discussion of management with another provider: See chart documentation, if applicable    Complicating factors:    Care impacted by chronic illness: Mental Health    Care affected by social determinants of health: N/A    Disposition considerations: Discharge. I prescribed additional prescription strength medication(s) as charted. I did not consider admission.         MEDICATIONS GIVEN IN THE EMERGENCY:  Medications - No data to display    NEW PRESCRIPTIONS STARTED AT TODAY'S ER VISIT  Discharge Medication List as of 11/28/2022 10:42 AM      START taking these medications    Details   hydrOXYzine (ATARAX) 25 MG tablet Take 1-2 tablets (25-50 mg) by mouth every 6 hours as needed for anxiety, Disp-30 tablet, R-0, Local Print      LORazepam (ATIVAN) 0.5 MG tablet Take 1 tablet (0.5 mg) by mouth every 6 hours as needed for anxiety, Disp-10 tablet, R-0, Local Print                =================================================================    HPI    Patient information was obtained from: Patient    Use of : N/A          Tootie Marin is a 67 year old female with a pertinent history of anxiety and panic disorder who presents to this ED via EMS for evaluation of anxiety.    Patient reports she has been on clonazepam for 37 years for her anxiety. She had originally been prescribed 0.5 mg three times per day. The doctor she had been seeing retired, so she met with a new psychiatrist who did not want to prescribe her clonazepam. The psychiatrist began weaning her off the medication, and completely took her off of it 6 days ago. Patient reports that she has not been able to sleep for 4 days and today it has felt like she couldn't breathe. She notes she has been weaned off of it once before and they had to put  her back on the medication. Patient denies plan for self-harm or to harm others. She endorses feeling better since calling EMS. Patient endorses chronically using a walker due to her bad hip. No other current complaints.      REVIEW OF SYSTEMS   All systems reviewed and negative except as noted in HPI.    PAST MEDICAL HISTORY:  Past Medical History:   Diagnosis Date     Anxiety      Chest wall trauma      Chronic prescription benzodiazepine use      Hypercholesteremia      Panic disorder        PAST SURGICAL HISTORY:  Past Surgical History:   Procedure Laterality Date     BIOPSY BREAST Right 1987    benign           CURRENT MEDICATIONS:    No current facility-administered medications for this encounter.     Current Outpatient Medications   Medication     hydrOXYzine (ATARAX) 25 MG tablet     LORazepam (ATIVAN) 0.5 MG tablet     acetaminophen (TYLENOL) 500 MG tablet     albuterol (PROAIR HFA/PROVENTIL HFA/VENTOLIN HFA) 108 (90 Base) MCG/ACT inhaler     clonazePAM (KLONOPIN) 0.5 MG tablet     clotrimazole (LOTRIMIN) 1 % external cream     FLUoxetine (PROZAC) 20 MG capsule     hydrochlorothiazide (HYDRODIURIL) 25 MG tablet     lovastatin (MEVACOR) 20 MG tablet     omeprazole (PRILOSEC) 20 MG DR capsule     Pediatric Multiple Vitamins (MULTIVITAMIN CHILDRENS PO)     potassium chloride ER (KLOR-CON M) 10 MEQ CR tablet     SYMBICORT 160-4.5 MCG/ACT Inhaler     vitamin C (ASCORBIC ACID) 100 MG tablet     vitamin D3 (CHOLECALCIFEROL) 50 mcg (2000 units) tablet         ALLERGIES:  Allergies   Allergen Reactions     Aspirin (Tartrazine Only) [Tartrazine] Unknown     Chicken [Chicken Protein] Unknown     Other Food Allergy Unknown     TURKEY       FAMILY HISTORY:  Family History   Problem Relation Age of Onset     Heart Disease Mother      Cancer Father      Cancer Brother        SOCIAL HISTORY:   Social History     Socioeconomic History     Marital status: Single   Tobacco Use     Smoking status: Never     Smokeless tobacco:  Never   Vaping Use     Vaping Use: Never used   Substance and Sexual Activity     Alcohol use: No     Drug use: No     Sexual activity: Not Currently   Social History Narrative    Single. No children. Worked odd jobs. Work related disability since 1999. Nonsmoker. Non alcohol drinker.       VITALS:  BP (!) 166/79   Pulse 103   Temp 98.4  F (36.9  C) (Oral)   Resp 20   Ht 1.524 m (5')   Wt 86.2 kg (190 lb)   SpO2 97%   BMI 37.11 kg/m      PHYSICAL EXAM    Constitutional: Well developed, Well nourished, NAD.  HENT: Normocephalic, Atraumatic. Neck Supple.  Eyes: EOMI, Conjunctiva normal.  Respiratory: Breathing comfortably on room air. Speaks full sentences easily. Lungs clear to ascultation.  Cardiovascular: Normal heart rate, Regular rhythm. No peripheral edema.  Abdomen: Soft,  Musculoskeletal: Good range of motion in all major joints. No major deformities noted.  Integument: Warm, Dry.  Neurologic: Alert & awake, Normal motor function, Normal sensory function, No focal deficits noted.   Psychiatric: Cooperative. Affect appropriate. Stated mood is anxious. Denies SI or HI.         I, Roxanne Hall, am serving as a scribe to document services personally performed by Dr. Guille Charles, based on my observation and the provider's statements to me. IGuille MD attest that Roxanne Hall is acting in a scribe capacity, has observed my performance of the services and has documented them in accordance with my direction.    Guille Charles M.D.  Emergency Medicine  [facility] EMERGENCY DEPARTMENT  [address] [place] [id number]  [phone]  Dept: [phone]     Guille Charles MD  11/28/22 3276

## 2022-11-28 NOTE — DISCHARGE INSTRUCTIONS
You were seen in the emergency department for anxiety symptoms.  We agree with your outpatient providers that long-term use of clonazepam is typically not recommended strategy for managing anxiety symptoms.  We do understand that it can be very difficult to taper off of these medications.  We are going to prescribe you a medication called hydroxyzine which you can use 25 to 50 mg up to 3 times a day, it is not habit-forming and is generally considered safer for management of anxiety.  We are also going to prescribe you a limited amount of Ativan you can use, try to reserve this for breakthrough anxiety symptoms and nighttime use only.  Long-term management of the symptoms will have to be a continued discussion between you and your outpatient providers.  Please call them as soon as possible to continue following up your symptoms

## 2022-12-03 ENCOUNTER — TELEPHONE (OUTPATIENT)
Dept: NURSING | Facility: CLINIC | Age: 67
End: 2022-12-03

## 2022-12-03 NOTE — TELEPHONE ENCOUNTER
Pt returning call back states she took some ativan she got from ED visit and has helped her. Relayed provider message to pt. States she still has 4 pills of lorazepam left,   States Ativan helps, currently feels fine, denies symptoms.   States hydroxyzine is not helping, makes her feel spacey like she is not all there. Does not like the feeling  Pt states she does not understand why she has to stop taking Klonopin  Would like call back on Monday 12/5      Abigail Navarro RN, BSN  12/3/2022 at 1:38 PM  [facility]

## 2022-12-05 NOTE — TELEPHONE ENCOUNTER
I am unable to call patient back as I am out of the office this week.     Please call back.     When patient saw psych, they felt that tapering off the benzodiazepines would be beneficial and I agree with their assessment. Benzodiazepines are not first line medications for managing anxiety.  I am not comfortable prescribing this medication long term .She would benefit from going back to psych for re-assessment and medications titration + setting up with  gender specific therapist. I had communicated to the patient when she first established with me and several times since then  that I don't generally prescribe benzos in my practice and the goal was ultimately taper off the meds or see psych for ongoing management. .     If patient is in disagreement with the care plan, they may benefit from seeing another provider.

## 2022-12-06 ENCOUNTER — TELEPHONE (OUTPATIENT)
Dept: PHARMACY | Facility: CLINIC | Age: 67
End: 2022-12-06

## 2022-12-06 ENCOUNTER — VIRTUAL VISIT (OUTPATIENT)
Dept: PHARMACY | Facility: CLINIC | Age: 67
End: 2022-12-06
Payer: COMMERCIAL

## 2022-12-06 ENCOUNTER — TELEPHONE (OUTPATIENT)
Dept: FAMILY MEDICINE | Facility: CLINIC | Age: 67
End: 2022-12-06

## 2022-12-06 DIAGNOSIS — F41.1 GAD (GENERALIZED ANXIETY DISORDER): Primary | ICD-10-CM

## 2022-12-06 PROCEDURE — 99607 MTMS BY PHARM ADDL 15 MIN: CPT | Mod: 93 | Performed by: PHARMACIST

## 2022-12-06 PROCEDURE — 99606 MTMS BY PHARM EST 15 MIN: CPT | Mod: 93 | Performed by: PHARMACIST

## 2022-12-06 RX ORDER — DESVENLAFAXINE 50 MG/1
50 TABLET, FILM COATED, EXTENDED RELEASE ORAL DAILY
Qty: 30 TABLET | Refills: 1 | Status: SHIPPED | OUTPATIENT
Start: 2022-12-06 | End: 2022-12-08 | Stop reason: SINTOL

## 2022-12-06 NOTE — TELEPHONE ENCOUNTER
"Patient left me multiple voice messages end of last week (while I was out) and over the weekend. They were given hydroxyzine in the ED, as they started having panic attacks while withdrawing from clonazepam. Patient has been trying to take hydroxyzine but doesn't feel that it's helping and says it makes them feel \"weird\" so would like something else.    Patient had been given a small supply of lorazepam weeks ago to help taper off clonazepam and patient admits on the calls that they never tried it when we asked them to, but took the full supply over the course of this weekend and felt it was very helpful, so patient is asking for a refill on it, as they're now out of that too.    It appears per chart they were given 10 lorazepam in the ED as well as the hydroxyzine.    I called and left patient a voicemail this morning. I have a phone visit scheduled with them this afternoon so will try at that time too.    Cindi Nieves PharmD, BCACP  Medication Therapy Management Provider  Phone: [phone]  [email or web address]    "

## 2022-12-06 NOTE — PROGRESS NOTES
"Medication Therapy Management (MTM) Encounter    ASSESSMENT:                            Medication Adherence/Access: No issues identified    Anxiety with Panic Attacks: Ideally SSRIs are used over SNRIs for anxiety/panic in patients over 65 due to more general study data available and less risk for hyponatremia. However, this author is fine with trialing an SNRI with close electrolyte monitoring.    I urged the patient to consider still following up with the gender clinic but she is no longer interested in this.    PLAN:                            1. Start taking Pristiq 50mg daily. You will need to have your electrolytes checked when you see Dr. Augustine in 3 weeks.    2. You can continue to use hydroxyzine as needed per instructions.    3. Go to the ED again if you again have worsening withdrawal/panic attacks. Please try get through withdrawal without taking more lorazepam or similar medications.    Follow-up: Return in 2 weeks (on 12/20/2022) for phone visit.    SUBJECTIVE/OBJECTIVE:                          Tootie Marin is a 67 year old female called for a follow-up visit.  Today's visit is a follow-up MTM visit from 11/15.     Reason for visit: follow up on panic attacks, as patient has now run out of benzodiazepines after multiple attempts to taper her off slowly.    Allergies/ADRs: Reviewed in chart  Past Medical History: Reviewed in chart  Tobacco: She reports that she has never smoked. She has never used smokeless tobacco.  Alcohol: not currently using    Medication Adherence/Access: no issues reported    Anxiety with Panic Attacks: Patient informs me today that she read Revelations 3:14-22 since our last visit, as well as Proverbs (Lean not on thine own understanding) and has decided that \"God took that away from me\" and she no longer identifies as transgender. She feels that being transgender is a sin. She did contact our gender clinic prior to this episode and received an information packet but is not " "pursuing anything with them.    Patient ran out of clonazepam on November 21st, then went to ED on the 28th as she was having waves of anxiety attacks, got hydroxyzine which makes her feel \"spacy\" but took it for 4 days. She also received and took 10 tablets of lorazepam over the weekend but they didn't last very long- maybe 3 hours- took the last one this morning. She reports she has not really slept for 12 days.     I did ask her about whether she also took lorazepam from her previous prescription and she says she had poured water into that and thrown it away so only took 10 tablets total this weekend.    She has no thoughts of hurting herself or others.    She reports she spoke to a friend this week who has panic attacks and told her that Pristiq really helped her. She is wondering if this would be an okay medication for her to try.    Last Comprehensive Metabolic Panel:  Sodium   Date Value Ref Range Status   09/12/2022 136 136 - 145 mmol/L Final     Potassium   Date Value Ref Range Status   09/12/2022 3.4 3.4 - 5.3 mmol/L Final   09/01/2022 3.2 (L) 3.4 - 5.3 mmol/L Final     Chloride   Date Value Ref Range Status   09/12/2022 96 (L) 98 - 107 mmol/L Final   09/01/2022 98 94 - 109 mmol/L Final     Carbon Dioxide (CO2)   Date Value Ref Range Status   09/12/2022 30 (H) 22 - 29 mmol/L Final   09/01/2022 28 20 - 32 mmol/L Final     Anion Gap   Date Value Ref Range Status   09/12/2022 10 7 - 15 mmol/L Final   09/01/2022 6 3 - 14 mmol/L Final     Glucose   Date Value Ref Range Status   09/12/2022 109 (H) 70 - 99 mg/dL Final   09/01/2022 100 (H) 70 - 99 mg/dL Final     Urea Nitrogen   Date Value Ref Range Status   09/12/2022 13.3 8.0 - 23.0 mg/dL Final   09/01/2022 8 7 - 30 mg/dL Final     Creatinine   Date Value Ref Range Status   09/12/2022 0.74 0.51 - 0.95 mg/dL Final     GFR Estimate   Date Value Ref Range Status   09/12/2022 88 >60 mL/min/1.73m2 Final     Comment:     Effective December 21, 2021 eGFRcr in adults is " calculated using the 2021 CKD-EPI creatinine equation which includes age and gender (Peyton et al., NEJ, DOI: 10.1056/EUOBba0899499)   05/04/2021 >60 >60 mL/min/1.73m2 Final     Calcium   Date Value Ref Range Status   09/12/2022 9.9 8.8 - 10.2 mg/dL Final     I spent 26 minutes with this patient today. All changes were made via collaborative practice agreement with Dr. Augustine. A copy of the visit note was provided to the patient's provider(s).    A summary of these recommendations was mailed to the patient.    Cindi Nieves, GaviotaD, BCACP  Medication Therapy Management Provider  Phone: [phone]  [email or web address]    Telemedicine Visit Details  Type of service:  Telephone visit  Start Time: 2:27 PM  End Time: 2:53 PM  Originating Location (pt. Location): Home     Distant Location (provider location):  On-site  Provider has received verbal consent for a visit from the patient? Yes     Medication Therapy Recommendations  Anxiety state    Current Medication: desvenlafaxine (PRISTIQ) 50 MG 24 hr tablet   Rationale: Synergistic therapy - Needs additional medication therapy - Indication   Recommendation: Start Medication   Status: Accepted per CPA

## 2022-12-06 NOTE — TELEPHONE ENCOUNTER
Patient calling to get a medication refill on medications attached.    desvenlafaxine (PRISTIQ) 50 MG 24 hr tablet    Needs a PA (Today requested by patient by 5 PM)

## 2022-12-06 NOTE — PATIENT INSTRUCTIONS
"Recommendations from today's MTM visit:                                                        1. Start taking Pristiq 50mg daily. You will need to have your electrolytes checked when you see Dr. Augustine in 3 weeks.    2. You can continue to use hydroxyzine as needed per instructions.    3. Go to the ED again if you again have worsening withdrawal/panic attacks. Please try get through withdrawal without taking more lorazepam or similar medications.    Follow-up: Return in 2 weeks (on 12/20/2022) for phone visit.    It was great speaking with you today.  I value your experience and would be very thankful for your time in providing feedback in our clinic survey. In the next few days, you may receive an email or text message from [employer] with a link to a survey related to your  clinical pharmacist.\"     To schedule another MTM appointment, please call the clinic directly or you may call the MTM scheduling line at [phone] or toll-free at [phone].     My Clinical Pharmacist's contact information:                                                      Please feel free to contact me with any questions or concerns you have.      Cindi Nieves PharmD, BCACP  Medication Therapy Management Provider  Phone: [phone]  [email or web address]    "

## 2022-12-07 NOTE — TELEPHONE ENCOUNTER
Refill of desvenlafaxine (PRISTIQ) 50 MG 24 hr tablet sent to pharmacy 12/6/2022  Will forward to PA team to initiate PA

## 2022-12-07 NOTE — TELEPHONE ENCOUNTER
Central Prior Authorization Team   Phone: [phone]      PA Initiation    Medication: desvenlafaxine (PRISTIQ) 50 MG 24 hr tablet - INITIATED  Insurance Company: [employer] - Phone [phone] Fax [phone]  Pharmacy Filling the Rx: [id number] - [place], [place] - [address] [place] [address] N AT [facility] [address]  Filling Pharmacy Phone: [phone]  Filling Pharmacy Fax:    Start Date: 12/7/2022

## 2022-12-08 ENCOUNTER — TELEPHONE (OUTPATIENT)
Dept: PHARMACY | Facility: CLINIC | Age: 67
End: 2022-12-08

## 2022-12-08 DIAGNOSIS — F41.1 GAD (GENERALIZED ANXIETY DISORDER): Primary | ICD-10-CM

## 2022-12-08 RX ORDER — HYDROXYZINE HYDROCHLORIDE 25 MG/1
25-50 TABLET, FILM COATED ORAL EVERY 6 HOURS PRN
Qty: 90 TABLET | Refills: 1 | Status: SHIPPED | OUTPATIENT
Start: 2022-12-08 | End: 2023-01-28

## 2022-12-08 NOTE — TELEPHONE ENCOUNTER
Prior Authorization Approval    Authorization Effective Date: 12/6/2022  Authorization Expiration Date: 12/7/2099  Medication: desvenlafaxine (PRISTIQ) 50 MG 24 hr tablet - PA APPROVED  Insurance Company: [employer] - Phone [phone] Fax [phone]    Which Pharmacy is filling the prescription (Not needed for infusion/clinic administered): [id number] - [place], [place] - [address] [place] [address] [address]  Pharmacy Notified: Yes  Patient Notified: Yes (pharmacy will notify patient when ready)

## 2022-12-08 NOTE — TELEPHONE ENCOUNTER
Called patient and informed her of the message below. Patient is going to call her insurance to see which places for psych is covered and schedule an appointment with them. She had no further questions at this time.

## 2022-12-08 NOTE — TELEPHONE ENCOUNTER
"Patient called this author and left a couple messages since Tuesday. She picked up the Pristiq for cash price ($170) because the insurance required a prior authorization and she didn't want to wait. She tried a dose of Pristiq and became very nauseous, couldn't sleep, for several hours.    Patient then tried hydroxyzine this morning and had \"a wave wash over me\" and she felt better, so she would like this refilled.    In reviewing her side effects with her, it appears she has also been continuing on fluoxetine 40mg daily, which is likely why she reacted strongly to the Pristiq- ideally we would have done a 7 day pause of fluoxetine before starting Pristiq. She is open to staying on the fluoxetine.    We will have her continue fluoxetine at 40mg daily and take hydroxyzine 25-50mg up to three times daily as needed (patient says she will be trying to take 25mg three times daily scheduled for now so that she doesn't have gaps). I encouraged her to use artificial tears and Miralax as needed for related side effects.    I encouraged her also to reach out to the other therapy offices on the letter I sent and she said she threw that letter out. I asked if I could send it again and she said yes.    I called the pharmacy and they already retro billed the Pristiq.    We will follow up in 2 weeks as scheduled.    Cindi Nieves PharmD, [facility]  Medication Therapy Management Provider  Phone: [phone]  [email or web address]        "

## 2022-12-20 ENCOUNTER — VIRTUAL VISIT (OUTPATIENT)
Dept: PHARMACY | Facility: CLINIC | Age: 67
End: 2022-12-20
Payer: COMMERCIAL

## 2022-12-20 DIAGNOSIS — F41.1 ANXIETY STATE: Primary | ICD-10-CM

## 2022-12-20 PROCEDURE — 99607 MTMS BY PHARM ADDL 15 MIN: CPT | Mod: 93 | Performed by: PHARMACIST

## 2022-12-20 PROCEDURE — 99606 MTMS BY PHARM EST 15 MIN: CPT | Mod: 93 | Performed by: PHARMACIST

## 2022-12-20 NOTE — PROGRESS NOTES
Medication Therapy Management (MTM) Encounter    ASSESSMENT:                            Medication Adherence/Access: No issues identified    Anxiety with Panic: Discussed with patient that ideally if she can tolerate it, she should take all 40mg fluoxetine in the morning to provide better chance of good sleep quality at night. We also discussed behavioral therapy again and I strongly encouraged her to reach out. I reviewed the list from Dr. Augustine of potential offices with her and though most were specific to gender/sex therapists, the [facility] office appears to have a wider breadth of options. I encouraged her to seek a trama informed therapist for her known and unknown childhood traumas (including her father's alcoholism).    PLAN:                            1. Fluoxetine can be stimulating. To sleep, I would encourage you to take both capsules (40mg) in the morning so that a little of the medication leaves by bedtime.    2. Please consider calling [facility] at [phone] to see if you can be seen by a trauma therapist who is covered through your insurance. If not covered, see if they can suggest other offices with trauma therapists.    Follow-up: Return in 2 weeks (on 1/3/2023) for phone visit.    SUBJECTIVE/OBJECTIVE:                          Tootie Marin is a 67 year old female called for a follow-up visit.  Today's visit is a follow-up MTM visit from 12/6.     Reason for visit: follow up on anxiety and panic.    Allergies/ADRs: Reviewed in chart  Past Medical History: Reviewed in chart  Tobacco: She reports that she has never smoked. She has never used smokeless tobacco.  Alcohol: not currently using    Medication Adherence/Access: no issues reported    Anxiety with Panic: Patient is taking fluoxetine 20mg twice daily (second dose at night) and hydroxyzine 25mg three times daily. She is sleeping 2 to 3 hours at a time for the past 3 to 4 days- a significant improvement as she had not been  sleeping well for the 2 weeks after stopping benzos. She is not having panic attacks at this time either.    She has not looked into finding a therapist. She still has the letter I re-sent with resources but has been disinterested in calling anyone because she figured they were all specific to gender/sex counseling.    Last Comprehensive Metabolic Panel:  Sodium   Date Value Ref Range Status   09/12/2022 136 136 - 145 mmol/L Final     Potassium   Date Value Ref Range Status   09/12/2022 3.4 3.4 - 5.3 mmol/L Final   09/01/2022 3.2 (L) 3.4 - 5.3 mmol/L Final     Chloride   Date Value Ref Range Status   09/12/2022 96 (L) 98 - 107 mmol/L Final   09/01/2022 98 94 - 109 mmol/L Final     Carbon Dioxide (CO2)   Date Value Ref Range Status   09/12/2022 30 (H) 22 - 29 mmol/L Final   09/01/2022 28 20 - 32 mmol/L Final     Anion Gap   Date Value Ref Range Status   09/12/2022 10 7 - 15 mmol/L Final   09/01/2022 6 3 - 14 mmol/L Final     Glucose   Date Value Ref Range Status   09/12/2022 109 (H) 70 - 99 mg/dL Final   09/01/2022 100 (H) 70 - 99 mg/dL Final     Urea Nitrogen   Date Value Ref Range Status   09/12/2022 13.3 8.0 - 23.0 mg/dL Final   09/01/2022 8 7 - 30 mg/dL Final     Creatinine   Date Value Ref Range Status   09/12/2022 0.74 0.51 - 0.95 mg/dL Final     GFR Estimate   Date Value Ref Range Status   09/12/2022 88 >60 mL/min/1.73m2 Final     Comment:     Effective December 21, 2021 eGFRcr in adults is calculated using the 2021 CKD-EPI creatinine equation which includes age and gender (Peyton et al., NEJM, DOI: 10.1056/NQDAqa2854136)   05/04/2021 >60 >60 mL/min/1.73m2 Final     Calcium   Date Value Ref Range Status   09/12/2022 9.9 8.8 - 10.2 mg/dL Final     I spent 25 minutes with this patient today. All changes were made via collaborative practice agreement with Dr. Augustine. A copy of the visit note was provided to the patient's provider(s).    A summary of these recommendations was mailed to the patient.    Cindi  Ezequiel, PharmD, BCACP  Medication Therapy Management Provider  Phone: [phone]  [email or web address]    Telemedicine Visit Details  Type of service:  Telephone visit  Start Time: 2:30 PM  End Time: 2:55 PM  Originating Location (pt. Location): Home     Distant Location (provider location):  On-site  Provider has received verbal consent for a visit from the patient? Yes     Medication Therapy Recommendations  Anxiety state    Current Medication: FLUoxetine (PROZAC) 20 MG capsule   Rationale: Does not understand instructions - Adherence - Adherence   Recommendation: Provide Education   Status: Accepted - no CPA Needed

## 2022-12-20 NOTE — PATIENT INSTRUCTIONS
"Recommendations from today's MTM visit:                                                       1. Fluoxetine can be stimulating. To sleep, I would encourage you to take both capsules (40mg) in the morning so that a little of the medication leaves by bedtime.    2. Please consider calling [facility] at [phone] to see if you can be seen by a trauma therapist who is covered through your insurance. If not covered, see if they can suggest other offices with trauma therapists.    Follow-up: Return in 2 weeks (on 1/3/2023) for phone visit.    It was great speaking with you today.  I value your experience and would be very thankful for your time in providing feedback in our clinic survey. In the next few days, you may receive an email or text message from [employer] with a link to a survey related to your  clinical pharmacist.\"     To schedule another MTM appointment, please call the clinic directly or you may call the MTM scheduling line at [phone] or toll-free at [phone].     My Clinical Pharmacist's contact information:                                                      Please feel free to contact me with any questions or concerns you have.      Cindi Nieves, Diana, BCACP  Medication Therapy Management Provider  Phone: [phone]  [email or web address]  "

## 2022-12-21 ENCOUNTER — OFFICE VISIT (OUTPATIENT)
Dept: FAMILY MEDICINE | Facility: CLINIC | Age: 67
End: 2022-12-21
Payer: MEDICARE

## 2022-12-21 VITALS
SYSTOLIC BLOOD PRESSURE: 134 MMHG | DIASTOLIC BLOOD PRESSURE: 62 MMHG | WEIGHT: 184 LBS | HEART RATE: 94 BPM | RESPIRATION RATE: 24 BRPM | HEIGHT: 61 IN | OXYGEN SATURATION: 98 % | BODY MASS INDEX: 34.74 KG/M2 | TEMPERATURE: 98.5 F

## 2022-12-21 DIAGNOSIS — Z87.898 HISTORY OF BENZODIAZEPINE USE: ICD-10-CM

## 2022-12-21 DIAGNOSIS — F41.1 GAD (GENERALIZED ANXIETY DISORDER): ICD-10-CM

## 2022-12-21 DIAGNOSIS — G25.81 RESTLESS LEG SYNDROME: ICD-10-CM

## 2022-12-21 DIAGNOSIS — F33.1 MODERATE EPISODE OF RECURRENT MAJOR DEPRESSIVE DISORDER (H): ICD-10-CM

## 2022-12-21 DIAGNOSIS — F64.9 GENDER DYSPHORIA: Primary | ICD-10-CM

## 2022-12-21 LAB
ANION GAP SERPL CALCULATED.3IONS-SCNC: 14 MMOL/L (ref 7–15)
BUN SERPL-MCNC: 10.6 MG/DL (ref 8–23)
CALCIUM SERPL-MCNC: 10.2 MG/DL (ref 8.8–10.2)
CHLORIDE SERPL-SCNC: 99 MMOL/L (ref 98–107)
CREAT SERPL-MCNC: 0.82 MG/DL (ref 0.51–0.95)
DEPRECATED HCO3 PLAS-SCNC: 27 MMOL/L (ref 22–29)
GFR SERPL CREATININE-BSD FRML MDRD: 78 ML/MIN/1.73M2
GLUCOSE SERPL-MCNC: 85 MG/DL (ref 70–99)
POTASSIUM SERPL-SCNC: 3.4 MMOL/L (ref 3.4–5.3)
SODIUM SERPL-SCNC: 140 MMOL/L (ref 136–145)

## 2022-12-21 PROCEDURE — 80048 BASIC METABOLIC PNL TOTAL CA: CPT | Performed by: STUDENT IN AN ORGANIZED HEALTH CARE EDUCATION/TRAINING PROGRAM

## 2022-12-21 PROCEDURE — 99214 OFFICE O/P EST MOD 30 MIN: CPT | Performed by: STUDENT IN AN ORGANIZED HEALTH CARE EDUCATION/TRAINING PROGRAM

## 2022-12-21 PROCEDURE — 36415 COLL VENOUS BLD VENIPUNCTURE: CPT | Performed by: STUDENT IN AN ORGANIZED HEALTH CARE EDUCATION/TRAINING PROGRAM

## 2022-12-21 RX ORDER — POTASSIUM CHLORIDE 750 MG/1
TABLET, EXTENDED RELEASE ORAL
COMMUNITY
Start: 2022-09-13 | End: 2023-02-14

## 2022-12-21 RX ORDER — PRAMIPEXOLE DIHYDROCHLORIDE 0.12 MG/1
0.12 TABLET ORAL 3 TIMES DAILY
Qty: 90 TABLET | Refills: 0 | Status: SHIPPED | OUTPATIENT
Start: 2022-12-21 | End: 2023-02-23

## 2022-12-21 RX ORDER — METRONIDAZOLE 500 MG/1
500 TABLET ORAL 2 TIMES DAILY
COMMUNITY
Start: 2022-09-15 | End: 2023-02-14

## 2022-12-21 RX ORDER — CHLORHEXIDINE GLUCONATE ORAL RINSE 1.2 MG/ML
SOLUTION DENTAL
COMMUNITY
Start: 2022-10-27

## 2022-12-21 RX ORDER — AMOXICILLIN 500 MG/1
500 CAPSULE ORAL 3 TIMES DAILY
COMMUNITY
Start: 2022-09-27 | End: 2022-12-21

## 2022-12-21 RX ORDER — PRAMIPEXOLE DIHYDROCHLORIDE 0.12 MG/1
0.12 TABLET ORAL 3 TIMES DAILY
Qty: 90 TABLET | Refills: 0 | Status: SHIPPED | OUTPATIENT
Start: 2022-12-21 | End: 2022-12-21

## 2022-12-21 ASSESSMENT — ANXIETY QUESTIONNAIRES
GAD7 TOTAL SCORE: 0
IF YOU CHECKED OFF ANY PROBLEMS ON THIS QUESTIONNAIRE, HOW DIFFICULT HAVE THESE PROBLEMS MADE IT FOR YOU TO DO YOUR WORK, TAKE CARE OF THINGS AT HOME, OR GET ALONG WITH OTHER PEOPLE: SOMEWHAT DIFFICULT
8. IF YOU CHECKED OFF ANY PROBLEMS, HOW DIFFICULT HAVE THESE MADE IT FOR YOU TO DO YOUR WORK, TAKE CARE OF THINGS AT HOME, OR GET ALONG WITH OTHER PEOPLE?: SOMEWHAT DIFFICULT
2. NOT BEING ABLE TO STOP OR CONTROL WORRYING: NOT AT ALL
1. FEELING NERVOUS, ANXIOUS, OR ON EDGE: NOT AT ALL
6. BECOMING EASILY ANNOYED OR IRRITABLE: NOT AT ALL
4. TROUBLE RELAXING: NOT AT ALL
GAD7 TOTAL SCORE: 0
3. WORRYING TOO MUCH ABOUT DIFFERENT THINGS: NOT AT ALL
5. BEING SO RESTLESS THAT IT IS HARD TO SIT STILL: NOT AT ALL
GAD7 TOTAL SCORE: 0
7. FEELING AFRAID AS IF SOMETHING AWFUL MIGHT HAPPEN: NOT AT ALL
7. FEELING AFRAID AS IF SOMETHING AWFUL MIGHT HAPPEN: NOT AT ALL

## 2022-12-21 ASSESSMENT — ASTHMA QUESTIONNAIRES
ACT_TOTALSCORE: 21
QUESTION_3 LAST FOUR WEEKS HOW OFTEN DID YOUR ASTHMA SYMPTOMS (WHEEZING, COUGHING, SHORTNESS OF BREATH, CHEST TIGHTNESS OR PAIN) WAKE YOU UP AT NIGHT OR EARLIER THAN USUAL IN THE MORNING: NOT AT ALL
ACT_TOTALSCORE: 21
QUESTION_5 LAST FOUR WEEKS HOW WOULD YOU RATE YOUR ASTHMA CONTROL: WELL CONTROLLED
QUESTION_4 LAST FOUR WEEKS HOW OFTEN HAVE YOU USED YOUR RESCUE INHALER OR NEBULIZER MEDICATION (SUCH AS ALBUTEROL): ONE OR TWO TIMES PER DAY
QUESTION_1 LAST FOUR WEEKS HOW MUCH OF THE TIME DID YOUR ASTHMA KEEP YOU FROM GETTING AS MUCH DONE AT WORK, SCHOOL OR AT HOME: NONE OF THE TIME
QUESTION_2 LAST FOUR WEEKS HOW OFTEN HAVE YOU HAD SHORTNESS OF BREATH: NOT AT ALL

## 2022-12-21 ASSESSMENT — PAIN SCALES - GENERAL: PAINLEVEL: NO PAIN (0)

## 2022-12-21 ASSESSMENT — PATIENT HEALTH QUESTIONNAIRE - PHQ9
SUM OF ALL RESPONSES TO PHQ QUESTIONS 1-9: 3
SUM OF ALL RESPONSES TO PHQ QUESTIONS 1-9: 3
10. IF YOU CHECKED OFF ANY PROBLEMS, HOW DIFFICULT HAVE THESE PROBLEMS MADE IT FOR YOU TO DO YOUR WORK, TAKE CARE OF THINGS AT HOME, OR GET ALONG WITH OTHER PEOPLE: SOMEWHAT DIFFICULT

## 2022-12-21 NOTE — PROGRESS NOTES
"  Assessment & Plan       ICD-10-CM    1. Gender dysphoria  F64.9       2. Restless leg syndrome  G25.81 Basic metabolic panel  (Ca, Cl, CO2, Creat, Gluc, K, Na, BUN)     pramipexole (MIRAPEX) 0.125 MG tablet      3. MALINDA (generalized anxiety disorder)  F41.1       4. History of benzodiazepine use  Z87.898       5. Moderate episode of recurrent major depressive disorder (H)  F33.1           Gender dysphoria:  Has spoken to [facility] regarding this issue recently.  Has been encouraged to pursue trauma and gender informed therapy.  Today, patient notes that she has felt like she has been living in the \"wrong body\" her whole life.  She has always felt like she was a \"man\".  Has been socially withdrawn and distant because she was never able to talk about this with other people.  Has come very close to doing top surgery x2 in the past but changed her mind at the last minute.  She is unsure why.  She does not think it matters much that she was struggling with gender dysphoria but now starting to think that it may have a big role to play in her mental health.  She is not sure if she wants to pursue any kind of surgical intervention or hormones at this point.  Mainly because she feels that she is \"too old\".  She does feel that therapy might be helpful.  I gave her the number for the therapist once again.    Benzodiazepine use:  RLS   Patient had been on chronic, long-term benzodiazepine for management of her anxiety for a long time.  Patient recently established with me and had agreed to undergo tapering of the benzodiazepines but was unable to taper off unsuccessfully. Was sent to see psych.  When psych saw her, they felt it would be beneficial for her to be off the benzodiazepines altogether and on long-term controller medications. Initially struggles coming off benzodiazepines altogether but has been working with a  fantastic provider from the [facility] team and she has now been off the benzodiazepines altogether for several weeks.  " "She was having panic attacks off of them initially but feels she is in a \"much better place\". Anixety is managed 'fine' with Atarax 3 times daily and fluoxetine.  Does not report any side effects.  Still having a lot of anxiety but does not want to change any of her medications today.  Notes that sleep has been very difficult since she came off the benzodiazepines.  She mainly attributes that to restless legs and twitching at night that keeps her awake.  She is only able to sleep 3 to 4 hours at a time before she wakes up.  Has been very fatigued.  Plan:  - Congratulated patient on coming off the benzodiazepines.  - Continue hydroxyzine 3 times daily and fluoxetine at current dose right now  - We will check BMP  - Will start pramipexole for her restless leg and follow-up in 2 to 3 months.        Return in about 2 months (around 2/21/2023) for Mood + RLS.    Sharita Augustine DO  [facility]    Scott Sommers is a 67 year old, presenting for the following health issues:  Recheck Medication (Follow up on medication/ lab )       Review of Systems   As per HPI       Objective    /62 (BP Location: Right arm, Patient Position: Sitting)   Pulse 94   Temp 98.5  F (36.9  C) (Oral)   Resp 24   Ht 1.549 m (5' 1\")   Wt 83.5 kg (184 lb)   SpO2 98%   BMI 34.77 kg/m    Body mass index is 34.77 kg/m .  Physical Exam   GENERAL: healthy, alert and no distress  MS: no gross musculoskeletal defects noted, no edema  PSYCH: mentation appears normal, affect anxious, tangential but redirectable, overall linear thought process, some insight.           "

## 2022-12-21 NOTE — LETTER
December 22, 2022      Tootiekaruna Marin  [address]  [place] [address]        Dear ,    We are writing to inform you of your test results.    Your test results fall within the expected range(s) or remain unchanged from previous results.  Please continue with current treatment plan.    Resulted Orders   Basic metabolic panel  (Ca, Cl, CO2, Creat, Gluc, K, Na, BUN)   Result Value Ref Range    Sodium 140 136 - 145 mmol/L    Potassium 3.4 3.4 - 5.3 mmol/L    Chloride 99 98 - 107 mmol/L    Carbon Dioxide (CO2) 27 22 - 29 mmol/L    Anion Gap 14 7 - 15 mmol/L    Urea Nitrogen 10.6 8.0 - 23.0 mg/dL    Creatinine 0.82 0.51 - 0.95 mg/dL    Calcium 10.2 8.8 - 10.2 mg/dL    Glucose 85 70 - 99 mg/dL    GFR Estimate 78 >60 mL/min/1.73m2      Comment:      Effective December 21, 2021 eGFRcr in adults is calculated using the 2021 CKD-EPI creatinine equation which includes age and gender ( et al., NEJM, DOI: 10.1056/VLTYpe7366664)       If you have any questions or concerns, please call the clinic at the number listed above.       Sincerely,      Sharita Augustine DO

## 2023-01-09 DIAGNOSIS — F41.1 GAD (GENERALIZED ANXIETY DISORDER): ICD-10-CM

## 2023-01-10 DIAGNOSIS — E78.00 PURE HYPERCHOLESTEROLEMIA: ICD-10-CM

## 2023-01-10 NOTE — TELEPHONE ENCOUNTER
Pharmacy requesting a refill of Trazodone. Refill denied. Per note of 11/7/22 patient stated she would not be returning to this clinic.     Melinda Garcia RN on 1/10/2023 at 1:34 PM

## 2023-01-11 DIAGNOSIS — I10 PRIMARY HYPERTENSION: ICD-10-CM

## 2023-01-11 DIAGNOSIS — K21.9 GASTROESOPHAGEAL REFLUX DISEASE WITHOUT ESOPHAGITIS: ICD-10-CM

## 2023-01-11 RX ORDER — LOVASTATIN 20 MG
20 TABLET ORAL AT BEDTIME
Qty: 90 TABLET | Refills: 0 | Status: SHIPPED | OUTPATIENT
Start: 2023-01-11 | End: 2023-04-27

## 2023-01-11 NOTE — TELEPHONE ENCOUNTER
Last fill 10/30/2022    Last seen 12/21/22    Return in about 2 months (around 2/21/2023) for Mood + RLS.    No upcoming appointment

## 2023-01-11 NOTE — TELEPHONE ENCOUNTER
"Routing refill request to provider for review/approval because:  Labs not current:  Need LDL lab in last 12 months    Last Written Prescription Date: 10/30/22  Last Fill Quantity: 90,  # refills: 0   Last office visit provider: 12/21/22    Requested Prescriptions   Pending Prescriptions Disp Refills     lovastatin (MEVACOR) 20 MG tablet 90 tablet 0     Sig: Take 1 tablet (20 mg) by mouth At Bedtime       Statins Protocol Failed - 1/10/2023 11:46 AM        Failed - LDL on file in past 12 months     Recent Labs   Lab Test 05/04/21  1426                Passed - No abnormal creatine kinase in past 12 months     No lab results found.             Passed - Recent (12 mo) or future (30 days) visit within the authorizing provider's specialty     Patient has had an office visit with the authorizing provider or a provider within the authorizing providers department within the previous 12 mos or has a future within next 30 days. See \"Patient Info\" tab in inbasket, or \"Choose Columns\" in Meds & Orders section of the refill encounter.              Passed - Medication is active on med list        Passed - Patient is age 18 or older        Passed - No active pregnancy on record        Passed - No positive pregnancy test in past 12 months             Sho Corbett RN 01/11/23 2:08 PM  "

## 2023-01-12 RX ORDER — POTASSIUM CHLORIDE 750 MG/1
10 TABLET, EXTENDED RELEASE ORAL DAILY
Qty: 30 TABLET | Refills: 0 | Status: SHIPPED | OUTPATIENT
Start: 2023-01-12 | End: 2023-01-16

## 2023-01-12 NOTE — TELEPHONE ENCOUNTER
"Omeprazole Last Written Prescription Date:  9/22/22  Last Fill Quantity: 90,  # refills: 0   Last office visit provider:  12/21/22     Requested Prescriptions   Pending Prescriptions Disp Refills     potassium chloride ER (KLOR-CON M) 10 MEQ CR tablet 180 tablet 0       Potassium Supplements Protocol Passed - 1/11/2023  8:48 AM        Passed - Recent (12 mo) or future (30 days) visit within the authorizing provider's department     Patient has had an office visit with the authorizing provider or a provider within the authorizing providers department within the previous 12 mos or has a future within next 30 days. See \"Patient Info\" tab in inbasket, or \"Choose Columns\" in Meds & Orders section of the refill encounter.              Passed - Medication is active on med list        Passed - Patient is age 18 or older        Passed - Normal serum potassium in past 12 months     Recent Labs   Lab Test 12/21/22  1219   POTASSIUM 3.4                       omeprazole (PRILOSEC) 20 MG DR capsule 90 capsule 0     Sig: Take 1 capsule (20 mg) by mouth daily       PPI Protocol Passed - 1/11/2023  8:48 AM        Passed - Not on Clopidogrel (unless Pantoprazole ordered)        Passed - No diagnosis of osteoporosis on record        Passed - Recent (12 mo) or future (30 days) visit within the authorizing provider's specialty     Patient has had an office visit with the authorizing provider or a provider within the authorizing providers department within the previous 12 mos or has a future within next 30 days. See \"Patient Info\" tab in inbasket, or \"Choose Columns\" in Meds & Orders section of the refill encounter.              Passed - Medication is active on med list        Passed - Patient is age 18 or older        Passed - No active pregnacy on record        Passed - No positive pregnancy test in past 12 months             Brenda Clemons RN 01/12/23 12:58 PM  "

## 2023-01-12 NOTE — TELEPHONE ENCOUNTER
"Routing refill request to provider for review/approval because:  Medication is reported/historical    Potassium Last Written Prescription Date:  5/27/22  Last Fill Quantity: 180,  # refills: 0   Last office visit provider:  12/21/22     Requested Prescriptions   Pending Prescriptions Disp Refills     potassium chloride ER (KLOR-CON M) 10 MEQ CR tablet 180 tablet 0       Potassium Supplements Protocol Passed - 1/11/2023  8:48 AM        Passed - Recent (12 mo) or future (30 days) visit within the authorizing provider's department     Patient has had an office visit with the authorizing provider or a provider within the authorizing providers department within the previous 12 mos or has a future within next 30 days. See \"Patient Info\" tab in inbasket, or \"Choose Columns\" in Meds & Orders section of the refill encounter.              Passed - Medication is active on med list        Passed - Patient is age 18 or older        Passed - Normal serum potassium in past 12 months     Recent Labs   Lab Test 12/21/22  1219   POTASSIUM 3.4                     Signed Prescriptions Disp Refills    omeprazole (PRILOSEC) 20 MG DR capsule 90 capsule 2     Sig: Take 1 capsule (20 mg) by mouth daily       PPI Protocol Passed - 1/11/2023  8:48 AM        Passed - Not on Clopidogrel (unless Pantoprazole ordered)        Passed - No diagnosis of osteoporosis on record        Passed - Recent (12 mo) or future (30 days) visit within the authorizing provider's specialty     Patient has had an office visit with the authorizing provider or a provider within the authorizing providers department within the previous 12 mos or has a future within next 30 days. See \"Patient Info\" tab in inHBCSet, or \"Choose Columns\" in Meds & Orders section of the refill encounter.              Passed - Medication is active on med list        Passed - Patient is age 18 or older        Passed - No active pregnacy on record        Passed - No positive pregnancy test in " past 12 months             Brenda Clemons RN 01/12/23 1:01 PM

## 2023-01-16 DIAGNOSIS — I10 PRIMARY HYPERTENSION: ICD-10-CM

## 2023-01-16 NOTE — TELEPHONE ENCOUNTER
FAX [facility] - 90 Day Prescription Request    Potassium CL Micro 10 mew ER Tabs    Message:  Patient is requesting authorization to dispense 90 day supply

## 2023-01-17 RX ORDER — POTASSIUM CHLORIDE 750 MG/1
10 TABLET, EXTENDED RELEASE ORAL DAILY
Qty: 90 TABLET | Refills: 1 | Status: SHIPPED | OUTPATIENT
Start: 2023-01-17 | End: 2023-08-18

## 2023-01-18 NOTE — TELEPHONE ENCOUNTER
Assumed cares.     Controlled Substance Refill Request  Medication:   Requested Prescriptions     Pending Prescriptions Disp Refills     clonazePAM (KLONOPIN) 0.5 MG tablet [Pharmacy Med Name: CLONAZEPAM 0.5MG TABLETS] 90 tablet      Sig: TAKE 1 TABLET(0.5 MG) BY MOUTH THREE TIMES DAILY     Date Last Fill: 9/18/19  Pharmacy: [facility]   Submit electronically to pharmacy  Controlled Substance Agreement on File:   Encounter-Level CSA Scan Date:    There are no encounter-level csa scan date.       Last office visit: Last office visit pertaining to requested medication was 9.6.19.

## 2023-01-27 DIAGNOSIS — F41.1 GAD (GENERALIZED ANXIETY DISORDER): ICD-10-CM

## 2023-01-28 RX ORDER — HYDROXYZINE HYDROCHLORIDE 25 MG/1
25-50 TABLET, FILM COATED ORAL EVERY 6 HOURS PRN
Qty: 90 TABLET | Refills: 6 | Status: SHIPPED | OUTPATIENT
Start: 2023-01-28 | End: 2023-02-23

## 2023-01-29 NOTE — TELEPHONE ENCOUNTER
"Last Written Prescription Date:  12/8/22  Last Fill Quantity: 90,  # refills: 1   Last office visit provider:  12/21/22     Requested Prescriptions   Pending Prescriptions Disp Refills     hydrOXYzine (ATARAX) 25 MG tablet 90 tablet 1     Sig: Take 1-2 tablets (25-50 mg) by mouth every 6 hours as needed for anxiety       Antihistamines Protocol Passed - 1/27/2023  4:01 PM        Passed - Recent (12 mo) or future (30 days) visit within the authorizing provider's specialty     Patient has had an office visit with the authorizing provider or a provider within the authorizing providers department within the previous 12 mos or has a future within next 30 days. See \"Patient Info\" tab in inbasket, or \"Choose Columns\" in Meds & Orders section of the refill encounter.              Passed - Patient is age 3 or older     Apply age and/or weight-based dosing for peds patients age 3 and older.    Forward request to provider for patients under the age of 3.          Passed - Medication is active on med list             Sona Culver RN 01/28/23 10:10 PM  "

## 2023-02-14 ENCOUNTER — TELEPHONE (OUTPATIENT)
Dept: PHARMACY | Facility: CLINIC | Age: 68
End: 2023-02-14
Payer: MEDICARE

## 2023-02-14 ENCOUNTER — OFFICE VISIT (OUTPATIENT)
Dept: FAMILY MEDICINE | Facility: CLINIC | Age: 68
End: 2023-02-14
Payer: MEDICARE

## 2023-02-14 VITALS
BODY MASS INDEX: 34.65 KG/M2 | OXYGEN SATURATION: 97 % | SYSTOLIC BLOOD PRESSURE: 120 MMHG | DIASTOLIC BLOOD PRESSURE: 70 MMHG | HEART RATE: 78 BPM | WEIGHT: 183.4 LBS

## 2023-02-14 DIAGNOSIS — F41.1 GAD (GENERALIZED ANXIETY DISORDER): ICD-10-CM

## 2023-02-14 DIAGNOSIS — F64.9 GENDER DYSPHORIA: ICD-10-CM

## 2023-02-14 DIAGNOSIS — F41.0 PANIC ATTACK: ICD-10-CM

## 2023-02-14 DIAGNOSIS — F33.1 MODERATE EPISODE OF RECURRENT MAJOR DEPRESSIVE DISORDER (H): Primary | ICD-10-CM

## 2023-02-14 PROCEDURE — 99214 OFFICE O/P EST MOD 30 MIN: CPT | Performed by: STUDENT IN AN ORGANIZED HEALTH CARE EDUCATION/TRAINING PROGRAM

## 2023-02-14 RX ORDER — CLONAZEPAM 0.5 MG/1
0.5 TABLET ORAL AT BEDTIME
Qty: 30 TABLET | Refills: 0 | Status: SHIPPED | OUTPATIENT
Start: 2023-02-14 | End: 2023-03-07

## 2023-02-14 ASSESSMENT — ANXIETY QUESTIONNAIRES
3. WORRYING TOO MUCH ABOUT DIFFERENT THINGS: SEVERAL DAYS
1. FEELING NERVOUS, ANXIOUS, OR ON EDGE: NEARLY EVERY DAY
7. FEELING AFRAID AS IF SOMETHING AWFUL MIGHT HAPPEN: NEARLY EVERY DAY
6. BECOMING EASILY ANNOYED OR IRRITABLE: SEVERAL DAYS
4. TROUBLE RELAXING: NEARLY EVERY DAY
5. BEING SO RESTLESS THAT IT IS HARD TO SIT STILL: SEVERAL DAYS
8. IF YOU CHECKED OFF ANY PROBLEMS, HOW DIFFICULT HAVE THESE MADE IT FOR YOU TO DO YOUR WORK, TAKE CARE OF THINGS AT HOME, OR GET ALONG WITH OTHER PEOPLE?: VERY DIFFICULT
GAD7 TOTAL SCORE: 15
IF YOU CHECKED OFF ANY PROBLEMS ON THIS QUESTIONNAIRE, HOW DIFFICULT HAVE THESE PROBLEMS MADE IT FOR YOU TO DO YOUR WORK, TAKE CARE OF THINGS AT HOME, OR GET ALONG WITH OTHER PEOPLE: VERY DIFFICULT
GAD7 TOTAL SCORE: 15
7. FEELING AFRAID AS IF SOMETHING AWFUL MIGHT HAPPEN: NEARLY EVERY DAY
2. NOT BEING ABLE TO STOP OR CONTROL WORRYING: NEARLY EVERY DAY
GAD7 TOTAL SCORE: 15

## 2023-02-14 ASSESSMENT — PATIENT HEALTH QUESTIONNAIRE - PHQ9
SUM OF ALL RESPONSES TO PHQ QUESTIONS 1-9: 13
10. IF YOU CHECKED OFF ANY PROBLEMS, HOW DIFFICULT HAVE THESE PROBLEMS MADE IT FOR YOU TO DO YOUR WORK, TAKE CARE OF THINGS AT HOME, OR GET ALONG WITH OTHER PEOPLE: VERY DIFFICULT
SUM OF ALL RESPONSES TO PHQ QUESTIONS 1-9: 13

## 2023-02-14 ASSESSMENT — ENCOUNTER SYMPTOMS: NERVOUS/ANXIOUS: 1

## 2023-02-14 NOTE — PROGRESS NOTES
Assessment & Plan       ICD-10-CM    1. Moderate episode of recurrent major depressive disorder (H)  F33.1 Adult Mental Health  Referral      2. MALINDA (generalized anxiety disorder)  F41.1 clonazePAM (KLONOPIN) 0.5 MG tablet     Adult Mental Health  Referral      3. Gender dysphoria  F64.9 Adult Mental Health  Referral      4. Panic attack  F41.0         Patient appears well.  Vitals are stable.  Physical exam is benign and I suspect that most of her physical findings are psychosomatic.  We have been unsuccessful at weaning patient off the clonazepam  It does appear that this is a significant part of her anxiety management  After prolonged discussion, we decided that we would reintroduce clonazepam at that time as she is struggling most with sleep.  This is with the caveat that she will continue on the fluoxetine and hydroxyzine and continue to work with [facility] to manage her medications to optimize underlying depression/anxiety and gender dysphoria.  Patient verbalized understanding  I will not increase her dose of clonazepam.  No early refills will be dispensed  I am still not comfortable managing her medications long-term.  I will rerefer her to psych and hopefully she will be able to see an alternate provider from the when she is the last time.  Referral to psych placed.  Also offered several times for her to find another PCP with whom she may be more comfortable or who has more experience managing these medications.  Patient declined for now.  Patient verbalized understanding.      31 minutes spent on the date of the encounter doing chart review, history and exam, documentation and further activities per the note    No follow-ups on file.    Sharita Augustine DO  [facility]    Scott Sommers is a 67 year old, presenting for the following health issues:  Anxiety (Ongoing/new it has gotten worse. )    Received message from [facility] this morning that patient was not  "doing well.  I spoke to her on the phone and it sounds like she was having severe panic attacks and difficulty leaving her apartment.  Told her to come in to be seen in person for weakness/muscle spasms.  Patient notes that she been doing \"okay\" on her fluoxetine and hydroxyzine 3 times daily.  Then about 1.5 weeks ago, she started to feel increased anxiety, some concerns for safety (generalized), anhedonia, could not sleep and started to experience nightmares.  Feels like she could not calm down and her whole body was twitching.  Has not been able to eat and food is making her nauseous.  Patient does not think that the current medications are working and she wants to go back on her clonazepam.  Has been unable to leave her apartment.  Has not been able to see friends.  She feels everything is \"weighing heavily on her.    Today, she brought in documentation from 1/22/2015  At that time, she was sent to [facility] and addiction care  Purpose of the visit was to check accuracy of her diagnosis so she can continue her Klonopin.  She has been taking Klonopin since she was 32 years old to manage panic disorder.  The conclusion of that visit was that patient's benefits from Klonopin outweighed any downsides.  It was recommended that she continue her Klonopin as it gave her significant relief.    A brief history for the patient:  Patient saw me for the first time to establish care in 2022.  At that time, she wanted me to continue her clonazepam but I do not generally prescribe or manage benzodiazepines in my practice.  This was explained to her and she was amenable to trialing coming off the medication.  We have been working with patient closely to have her come off her long-term clonazepam.  Process was somewhat prolonged and complex involving ED visits and close work with MTM for support..  Patient was ultimately able to transition to fluoxetine and hydroxyzine.  The last time I saw her, she seemed to be " doing relatively well but has not relapsed and is having what sounds like severe panic attacks.          Review of Systems   Psychiatric/Behavioral: The patient is nervous/anxious.          Objective    /70 (BP Location: Right arm, Patient Position: Sitting, Cuff Size: Adult Regular)   Pulse 78   Wt 83.2 kg (183 lb 6.4 oz)   SpO2 97%   BMI 34.65 kg/m    Body mass index is 34.65 kg/m .  Physical Exam   GENERAL: healthy, alert and no distress  NECK: no adenopathy, no asymmetry, masses, or scars and thyroid normal to palpation  RESP: lungs clear to auscultation - no rales, rhonchi or wheezes  CV: regular rate and rhythm, , no murmur, click or rub, no peripheral edema and peripheral pulses strong  MS: no gross musculoskeletal defects noted, no edema.  No obvious twitching.  Muscle strength 5/5.  PSYCH: mentation appears normal, affect extremely anxious, restless, tangential, difficult to redirect.  Nonlinear thought process.  Circular ruminating thoughts.  Is pacing the room a bit.

## 2023-02-14 NOTE — TELEPHONE ENCOUNTER
"Feels like she has been getting worse for 1.5 weeks.  No significant trigger/trauma.  Notes that she is now \"shaking all over\" every time she yawns and feels like there is \"something wrong with her body\" that wants to come out.  She does feel like this is all anxiety/panic attack related  Does not feel like the meds are working  She is not eating as much as she usually does.  Is feeling very nauseous.  No fevers, chills or nausea reported.  Feels like she \"really needs something for the anxiety\".  Nothing else has been working.  Is wondering if she needs to go to the ED for a 72-hour hold and possibly resume clonazepam.    Told patient to come in at 3 PM to be seen in person to further assess.    "

## 2023-02-14 NOTE — TELEPHONE ENCOUNTER
Patient LVM twice yesterday detailing new concern of waves of muscle rigidity all over her body on and off for the past week. She's wondering if maybe this is a new kind of panic attack and again is asking to go back on clonazepam, as she doesn't like her current psych meds anymore. She states she's not taking the pramipexole she was prescribed about a month ago but is going to try it to see if it helps.    I called her back and left a VM encouraging her to call me today.    Cindi Nieves, GaviotaD, [facility]  Medication Therapy Management Provider  Phone: [phone]  [email or web address]

## 2023-02-14 NOTE — TELEPHONE ENCOUNTER
Tootie called back. She says she is dealing with worry, restlessness, nightmares, but the worst is new muscle tension that keeps getting worse and is coming in waves every few minutes. She's so anxious she doesn't even feel she can leave her room right now. She is mostly unable to sleep, maybe getting an hour of restless sleep at a time.  Took pramiepexole with no change in symptoms.  Taking hydroxyzine 25mg three times daily.  Taking fluoxetine 20mg twice daily.  Has been off clonazepam for months now and is again asking that this be restarted so that she can have relief of her symptoms.    We discussed that this new rigidity could be a few different things, only one of which is in my scope- that it could be from long-term use of fluoxetine. As a start, I asked if she would temporarily drop her fluoxetine to 20mg daily and she is scared to do this, so it's unclear if she will.    I encouraged her to go to urgent care today. She is resistant as she doesn't feel confident she can leave her home right now. I suggested that I call an ambulance for her and she is not interested in that either, so says she will try to call a cab and get to urgent care today ([facility]).    She also asked whether she could be put on a 72 hour hold for inpatient management. She does not endorse thoughts of hurting herself or others at this time though.    I asked that she call a cab now to get to urgent care- she is going to try. I scheduled a phone visit for Thursday so I can follow up with her then.    Cindi Nieves, GaviotaD, BCACP  Medication Therapy Management Provider  Phone: [phone]  [email or web address]

## 2023-02-16 ENCOUNTER — VIRTUAL VISIT (OUTPATIENT)
Dept: PHARMACY | Facility: CLINIC | Age: 68
End: 2023-02-16
Payer: COMMERCIAL

## 2023-02-16 DIAGNOSIS — F41.1 GAD (GENERALIZED ANXIETY DISORDER): Primary | ICD-10-CM

## 2023-02-16 PROCEDURE — 99207 PR NO CHARGE LOS: CPT | Performed by: PHARMACIST

## 2023-02-16 NOTE — PROGRESS NOTES
Medication Therapy Management (MTM) Encounter    ASSESSMENT:                            Medication Adherence/Access: See below for considerations    Anxiety with Panic/Gender Dysphoria: Patient strongly encouraged to drop to 0.5mg clonazepam nightly as is prescribed. We discussed that Dr. Augustine has made it clear that she will not be giving early refills so taking extra, especially without trying to take it as prescribed, is not appropriate. We discussed that if 0.5mg nightly is not enough, she needs to address that with Dr. Augustine after trying to take it as prescribed.    I again reiterated the importance that patient establish care with behavioral health, either through our Comprehensive [facility] or through [facility], whom she has been referred to in the past. Patient states she is open to this and willing to call today.    PLAN:                            1. Call our Comprehensive Gender Program at [phone] to see if you can be seen by a therapist there. If not, contact [facility] at [phone].    2. Contractually, you are to take clonazepam 0.5mg once daily at bedtime as prescribed by Dr. Augustine. Do not take it more than once daily. Do not expect early refills. If you have concerns about this dosage after trying it, you need to address those concerns with Dr. Augustine so that you do not go into withdrawal again.    Follow-up: Return in 1 week (on 2/23/2023) for phone visit.    SUBJECTIVE/OBJECTIVE:                          Tootie Marin is a 67 year old female called for a follow-up visit.  Today's visit is a follow-up MTM visit from 12/20.     Reason for visit: Follow up on acute psych issues. Patient was sleeping when I called this morning at 11:30am but she called me back at 1pm.    Allergies/ADRs: Reviewed in chart  Past Medical History: Reviewed in chart  Tobacco: She reports that she has never smoked. She has never used smokeless tobacco.  Alcohol: not currently  "using    Medication Adherence/Access: see below    Anxiety with Panic/Gender Dysphoria: Takes fluoxetine 20mg twice daily, met with Dr. Augustine urgently on Tuesday due to waves of diffuse muscle tension so Dr. Augustine agreed to try going back to clonazepam 0.5mg once nightly. Patient states she took 3 clonazepam over the course of the first day, 2 the next day, and has taken one this morning already. She says she feels \"50 percent better.\" Stopped hydroxyzine altogether because she felt it was actually making her more anxious as she took more of it to combat the anxiety. Not taking pramipexole either. She is not interested in trying a lower dose of fluoxetine as advised to see if this helps with the rigidity.    She has another referral to psychiatry and is willing to follow through on getting a new psychiatrist. She has still not established with behavioral health. She did call our Gender Care clinic weeks ago but then threw away the packet when it came in the mail because she regretted making the call. She is now more comfortable with the idea of meeting with them or with another therapist to work on accepting and managing her gender dysphoria.    Last Comprehensive Metabolic Panel:  Sodium   Date Value Ref Range Status   12/21/2022 140 136 - 145 mmol/L Final     Potassium   Date Value Ref Range Status   12/21/2022 3.4 3.4 - 5.3 mmol/L Final   09/01/2022 3.2 (L) 3.4 - 5.3 mmol/L Final     Chloride   Date Value Ref Range Status   12/21/2022 99 98 - 107 mmol/L Final   09/01/2022 98 94 - 109 mmol/L Final     Carbon Dioxide (CO2)   Date Value Ref Range Status   12/21/2022 27 22 - 29 mmol/L Final   09/01/2022 28 20 - 32 mmol/L Final     Anion Gap   Date Value Ref Range Status   12/21/2022 14 7 - 15 mmol/L Final   09/01/2022 6 3 - 14 mmol/L Final     Glucose   Date Value Ref Range Status   12/21/2022 85 70 - 99 mg/dL Final   09/01/2022 100 (H) 70 - 99 mg/dL Final     Urea Nitrogen   Date Value Ref Range Status "   12/21/2022 10.6 8.0 - 23.0 mg/dL Final   09/01/2022 8 7 - 30 mg/dL Final     Creatinine   Date Value Ref Range Status   12/21/2022 0.82 0.51 - 0.95 mg/dL Final     GFR Estimate   Date Value Ref Range Status   12/21/2022 78 >60 mL/min/1.73m2 Final     Comment:     Effective December 21, 2021 eGFRcr in adults is calculated using the 2021 CKD-EPI creatinine equation which includes age and gender (Peyton et al., NEJ, DOI: 10.[facility]6/PIJMcr3447668)   05/04/2021 >60 >60 mL/min/1.73m2 Final     Calcium   Date Value Ref Range Status   12/21/2022 10.2 8.8 - 10.2 mg/dL Final     I spent 15 minutes with this patient today. All changes were made via collaborative practice agreement with Dr. Augustine. A copy of the visit note was provided to the patient's provider(s).    A summary of these recommendations was mailed to the patient.    Cindi Nieves, GaviotaD, BCACP  Medication Therapy Management Provider  Phone: [phone]  [email or web address]    Telemedicine Visit Details  Type of service:  Telephone visit  Start Time: 1:06 PM  End Time: 1:21 PM     Medication Therapy Recommendations  Anxiety state    Current Medication: clonazePAM (KLONOPIN) 0.5 MG tablet   Rationale: Frequency inappropriate - Dosage too high - Safety   Recommendation: Decrease Frequency   Status: Accepted - no CPA Needed

## 2023-02-16 NOTE — PATIENT INSTRUCTIONS
"Recommendations from today's MTM visit:                                                       1. Call our Comprehensive Gender Program at [phone] to see if you can be seen by a therapist there. If not, contact [facility] at [phone].    2. Contractually, you are to take clonazepam 0.5mg once daily at bedtime as prescribed by Dr. Augustine. Do not take it more than once daily. Do not expect early refills. If you have concerns about this dosage after trying it, you need to address those concerns with Dr. Augustine so that you do not go into withdrawal again.    Follow-up: Return in 1 week (on 2/23/2023) for phone visit.    It was great speaking with you today.  I value your experience and would be very thankful for your time in providing feedback in our clinic survey. In the next few days, you may receive an email or text message from [facility] with a link to a survey related to your  clinical pharmacist.\"     To schedule another MTM appointment, please call the clinic directly or you may call the MTM scheduling line at [phone] or toll-free at [phone].     My Clinical Pharmacist's contact information:                                                      Please feel free to contact me with any questions or concerns you have.      Cindi Nieves PharmD, [facility]  Medication Therapy Management Provider  Phone: [phone]  [email or web address]  "

## 2023-02-17 ENCOUNTER — TELEPHONE (OUTPATIENT)
Dept: PLASTIC SURGERY | Facility: CLINIC | Age: 68
End: 2023-02-17
Payer: MEDICARE

## 2023-02-17 NOTE — CONFIDENTIAL NOTE
"Writer [facility] re: follow up on Pt VM seeking \"therapy for gender.\" Unsure if they mean HRT or mental health. Mychart not active.     Pt called back later. They would like to speak to a gender knowledgeable therapist about their gender identity. Pt stated they prefer to have this list physically mailed to them. Writer is out of office next week and will send message to Robbie to do next week.   "

## 2023-02-20 DIAGNOSIS — F41.1 GAD (GENERALIZED ANXIETY DISORDER): ICD-10-CM

## 2023-02-20 RX ORDER — CLONAZEPAM 0.5 MG/1
0.5 TABLET ORAL AT BEDTIME
Qty: 30 TABLET | Refills: 0 | OUTPATIENT
Start: 2023-02-20

## 2023-02-20 NOTE — TELEPHONE ENCOUNTER
Call patient     This is in direct violation of our terms of agreement based on the last visit. No early refills will be provided. Patient is encouraged to set up with psych.     I gave her 30 days worth of meds on 2/14 and even if she was doing BID dosing, she is still out of meds much sooner than she should be. Am suspicious of inappropriate dosing/overdosing. Spoke to the writer who took the call and he noted that patient was 'high'      If patient is not amenable to the plan, she is welcome to switch to another provider as I am not sure if this is a therapeutic relationship.

## 2023-02-20 NOTE — TELEPHONE ENCOUNTER
Patient calling to request refill of attached Clonazepam 0.5 mg.  Stating she has increased it to 2 per day and has also stopped taking her Hydroxyzine 25 mg tablet.  Patient stating she is feeling much better on this combination and would like to stay with it.  Please adjust script if appropriate and send refill to pharmacy on file.

## 2023-02-20 NOTE — TELEPHONE ENCOUNTER
Called pt. Pt states that she does not understand why PCP wants her to continue medication that makes her unwell. Pt reiterated that she is taking 2tabs daily. Pt is scheduled for a VV in March to discuss medication with PCP. Writer advised that we may call her to r/s to an in-person. Pt does sound inebriated.       Routing to PCP for update.

## 2023-02-21 NOTE — TELEPHONE ENCOUNTER
Attempted to call patient. No one picked up     Please try calling patient again to clarify     Because she is in violation of the terms put forth in our last visit, I will not be refilling this medication moving forwards. I don't feel this medication is safe for her at this time.

## 2023-02-22 NOTE — TELEPHONE ENCOUNTER
"4:54 PM     Patient was able to call me back and we further clarified the situation    She is now noting that it was not the clonazepam but the hydroxyzine that was making her sick.  It was making her feel more paranoid, more anxious and she felt like she was \"psychotic\".  This is why she was \"off\" when she called on Monday.  She does acknowledge that she was taking 0.5 twice daily but now she is taking 0.25 twice daily and she will not be needing any early refills.  Has discontinued the hydroxyzine.    Did review with the patient that I need her to call psychiatry.  She says she has a number and she will call them.    Patient also needs updated CSA to be signed with me.  I will have nursing leave a copy up in the front for patient to sign.    "

## 2023-02-22 NOTE — TELEPHONE ENCOUNTER
Attempted to call patient once again today to get some clarification on reason for early refills that she had requested about 2 days ago.  No one picked up again today.  I left a voicemail.    Left voicemail for patient to call the office back again.    If patient calls back:  - Please clarify whether patient is truly taking medication twice a day rather than daily as prescribed  - Please clarify whether she actually needs a refill  - If patient is taking medication twice daily, advised her to go back down to daily as early refills will not be dispensed  - Please advise patient that she needs an updated controlled substance agreement with me for ongoing management of her medications

## 2023-02-23 ENCOUNTER — VIRTUAL VISIT (OUTPATIENT)
Dept: PHARMACY | Facility: CLINIC | Age: 68
End: 2023-02-23
Payer: COMMERCIAL

## 2023-02-23 DIAGNOSIS — F41.1 GAD (GENERALIZED ANXIETY DISORDER): Primary | ICD-10-CM

## 2023-02-23 PROCEDURE — 99207 PR NO CHARGE LOS: CPT | Performed by: PHARMACIST

## 2023-02-23 NOTE — PROGRESS NOTES
"Medication Therapy Management (MTM) Encounter    ASSESSMENT:                            Medication Adherence/Access: No issues identified    Gender Dysphoria with Anxiety/Panic: Current therapies appropriate. The majority of the visit today was spent encouraging the patient to follow up as planned both with the new psych NP and with the gender clinic in hopes of finding an appropriate behavioral health therapist through one of the two programs. Patient seems open to this at this time.    PLAN:                            1.See your NP on the 3rd as scheduled. Bring your bottle of clonazepam with you.    2. Follow up with the [facility] once you receive the packet. Try to establish care with a \"behavioral health\" therapist through either your NP or ideally the gender clinic.    Follow-up: Return in 2 weeks (on 3/9/2023) for phone visit. (per patient request)    SUBJECTIVE/OBJECTIVE:                          Tootie Marin is a 67 year old female called for a follow-up visit.  Today's visit is a follow-up MTM visit from 2/16.     Reason for visit: follow up on anxiety meds 2/2 unmanaged gender dsyphoria.    Allergies/ADRs: Reviewed in chart  Past Medical History: Reviewed in chart  Tobacco: She reports that she has never smoked. She has never used smokeless tobacco.  Alcohol: not currently using    Medication Adherence/Access: no issues reported    Gender Dysphoria with Anxiety/Panic: Taking fluoxetine 20mg twice daily and has resumed clonazepam 0.25mg twice daily (says she is now taking as directed, not more than 0.5mg daily). She feels that her symptoms are manageable at this time on these current doses and doesn't feel the need to retry hydroxyzine, which she wasn't tolerating. Will be seeing Sonya Huffman CNP on March 3rd to establish psychiatric care. This appears to be a trauma-informed NP but not a provider who specializes in gender dysphoria.    Patient is [employer] " and today is again feeling uncomfortable with the idea of doing anything mental or physical to reconcile her gender dysphoria (feels she is a man). She did reach out to the Gender clinic within our system as asked and will be receiving another packet in the mail.    Last Comprehensive Metabolic Panel:  Lab Results   Component Value Date     12/21/2022    POTASSIUM 3.4 12/21/2022    CHLORIDE 99 12/21/2022    CO2 27 12/21/2022    ANIONGAP 14 12/21/2022    GLC 85 12/21/2022    BUN 10.6 12/21/2022    CR 0.82 12/21/2022    GFRESTIMATED 78 12/21/2022    RIDGE 10.2 12/21/2022     I spent 30 minutes with this patient today. All changes were made via collaborative practice agreement with Dr. Augustine. A copy of the visit note was provided to the patient's provider(s).    A summary of these recommendations was mailed to the patient.    Cindi Nieves, Diana, BCACP  Medication Therapy Management Provider  Phone: [phone]  [email or web address]    Telemedicine Visit Details  Type of service:  Telephone visit  Start Time: 11:48 AM  End Time: 12:18 PM     Medication Therapy Recommendations  No medication therapy recommendations to display

## 2023-02-23 NOTE — PATIENT INSTRUCTIONS
"Recommendations from today's MTM visit:                                                       1.See your NP on the 3rd as scheduled. Bring your bottle of clonazepam with you.    2. Follow up with the [facility] once you receive the packet. Try to establish care with a \"behavioral health\" therapist through either your NP or ideally the gender clinic.    Follow-up: Return in 2 weeks (on 3/9/2023) for phone visit.    It was great speaking with you today.  I value your experience and would be very thankful for your time in providing feedback in our clinic survey. In the next few days, you may receive an email or text message from [facility] [place] with a link to a survey related to your  clinical pharmacist.\"     To schedule another MTM appointment, please call the clinic directly or you may call the MTM scheduling line at [phone] or toll-free at [phone].     My Clinical Pharmacist's contact information:                                                      Please feel free to contact me with any questions or concerns you have.      Cindi Nieves, Diana, BCACP  Medication Therapy Management Provider  Phone: [phone]  [email or web address]  "

## 2023-02-28 DIAGNOSIS — R05.3 CHRONIC COUGH: ICD-10-CM

## 2023-02-28 NOTE — TELEPHONE ENCOUNTER
"Routing refill request to provider for review/approval because:  Medication accidentally routed to [facility]    Last Written Prescription Date:  06/17/2022  Last Fill Quantity: 54g,  # refills: 0  Last office visit provider:   02/14/2023  Next appointment: 03/09/2023    Requested Prescriptions   Pending Prescriptions Disp Refills     albuterol (PROAIR HFA/PROVENTIL HFA/VENTOLIN HFA) 108 (90 Base) MCG/ACT inhaler [Pharmacy Med Name: ALBUTEROL HFA INH(200 [id number]] 54 g 0     Sig: INHALE 2 PUFFS BY MOUTH EVERY 6 HOURS AS NEEDED       Asthma Maintenance Inhalers - Anticholinergics Failed - 2/28/2023 11:01 AM        Failed - Recent (6 mo) or future (30 days) visit within the authorizing provider's specialty     Patient had office visit in the last 6 months or has a visit in the next 30 days with authorizing provider or within the authorizing provider's specialty.  See \"Patient Info\" tab in inbasket, or \"Choose Columns\" in Meds & Orders section of the refill encounter.            Passed - Patient is age 12 years or older        Passed - Asthma control assessment score within normal limits in last 6 months     Please review ACT score.           Passed - Medication is active on med list       Short-Acting Beta Agonist Inhalers Protocol  Failed - 2/28/2023 11:01 AM        Failed - Recent (6 mo) or future (30 days) visit within the authorizing provider's specialty     Patient had office visit in the last 6 months or has a visit in the next 30 days with authorizing provider or within the authorizing provider's specialty.  See \"Patient Info\" tab in inbasket, or \"Choose Columns\" in Meds & Orders section of the refill encounter.            Passed - Patient is age 12 or older        Passed - Asthma control assessment score within normal limits in last 6 months     Please review ACT score.           Passed - Medication is active on med list             Nelsy Nicholas RN 02/28/23 11:58 AM  "

## 2023-03-01 DIAGNOSIS — F41.1 GAD (GENERALIZED ANXIETY DISORDER): ICD-10-CM

## 2023-03-01 RX ORDER — ALBUTEROL SULFATE 90 UG/1
AEROSOL, METERED RESPIRATORY (INHALATION)
Qty: 54 G | Refills: 0 | Status: SHIPPED | OUTPATIENT
Start: 2023-03-01 | End: 2023-05-25

## 2023-03-01 NOTE — TELEPHONE ENCOUNTER
FAX [facility] Controlled Substance Prescription Refill Request    CLONAZPAM 0.5 mg tablet    Last Fill Date: 03/01/2023  Qty: 30

## 2023-03-02 RX ORDER — CLONAZEPAM 0.5 MG/1
0.5 TABLET ORAL AT BEDTIME
Qty: 30 TABLET | Refills: 0 | OUTPATIENT
Start: 2023-03-02

## 2023-03-06 DIAGNOSIS — F41.1 GAD (GENERALIZED ANXIETY DISORDER): ICD-10-CM

## 2023-03-06 NOTE — TELEPHONE ENCOUNTER
FYI - Status Update    Who is Calling: patient    Update: Took last clonazePAM this morning.  Per patient Dr. Augustine warned her of withdrawals and she does not want to go thru withdrawals.     Reports she increased dose but after she spoke with Dr Augustine she took medication correctly now has run out and needs refill.     Does caller want a call/response back: Yes     Okay to leave a detailed message?: Yes at Home number on file [phone] (home)

## 2023-03-07 RX ORDER — CLONAZEPAM 0.5 MG/1
0.5 TABLET ORAL AT BEDTIME
Qty: 30 TABLET | Refills: 0 | Status: SHIPPED | OUTPATIENT
Start: 2023-03-07 | End: 2023-03-14

## 2023-03-07 NOTE — TELEPHONE ENCOUNTER
Called patient back for clarification     As noted in the previous telephone note, patient had been initially doubling up on the dose ( despite express instructions to only take at bedtime) but then switched to 0.25 mg BID and told me she would have enough for the month. Today se is calling noting she is out of meds. She was supposed to see the new psych provider yesterday. She notes that the provider told her that, given her age, Clonazepam is not optimal. They can continue her on the clonazepam but that she has to sign an agreement to taper down in the future. Patient is unwilling to do that and would like to prescribe it for her as she has ran out today ( was doubling dose for a week).     Told patient, from the beginning, that I am not comfortable prescribing this medication long term, this is not an optimal medication and the plan was for psych manage her. I feel like we are at an impasse. Patient then becomes irate and notes, that the psych provider yesterday 'was a Confucianism and that's why they couldn't see eye to eye'. She says can't talk at a personal levels with a 'non-Catholic' and discuss her issues. Attempted to let her know that Sabianism doesn't have any bearing on the provider's comments. She say ' I knew it, I knew you weren't a Farhan follower either' and then proceeded to make several negative comments about Muslims.     I told the patient that these comments were inappropriate and personal . Patient has disregarded the care plan repeatedly and we are unable to agree on the best way to proceed.  I don't feel this relationship is therapeutic any more and I would like her to see another provider. I will dispense 30 days worth of medications with no refills. I am happy to see her up 90 days for any emergent needs but I will not be dispensing any additional benzos from hereon in .    Patient verbalized understanding.

## 2023-03-07 NOTE — TELEPHONE ENCOUNTER
I called patient this morning to clarify but no one picked up.  Left VM to call back.    The last time I spoke, she said she had enough to get through the month    If patient calls back, please let me know and I will talk to her

## 2023-03-07 NOTE — TELEPHONE ENCOUNTER
Patient Returning Call    Reason for call:  Returning Call to Clinic    Information relayed to patient:  Advised patient message will be sent to PCP, she is currently with a patient and can not be interrupted.

## 2023-03-09 ENCOUNTER — MEDICAL CORRESPONDENCE (OUTPATIENT)
Dept: HEALTH INFORMATION MANAGEMENT | Facility: CLINIC | Age: 68
End: 2023-03-09

## 2023-03-09 ENCOUNTER — VIRTUAL VISIT (OUTPATIENT)
Dept: PHARMACY | Facility: CLINIC | Age: 68
End: 2023-03-09
Payer: COMMERCIAL

## 2023-03-09 ENCOUNTER — TELEPHONE (OUTPATIENT)
Dept: FAMILY MEDICINE | Facility: CLINIC | Age: 68
End: 2023-03-09
Payer: MEDICARE

## 2023-03-09 ENCOUNTER — TELEPHONE (OUTPATIENT)
Dept: PHARMACY | Facility: CLINIC | Age: 68
End: 2023-03-09
Payer: MEDICARE

## 2023-03-09 DIAGNOSIS — Z53.9 ERRONEOUS ENCOUNTER--DISREGARD: Primary | ICD-10-CM

## 2023-03-09 NOTE — TELEPHONE ENCOUNTER
Reason for Call:  Appointment Request    Patient requesting this type of appt:  Re-establish care    Requested provider: Lizy    Reason patient unable to be scheduled: provider not taking new patients    When does patient want to be seen/preferred time: next available    Comments: Dr. Medel, patient is requesting to return to you. She says you were her pcp more than 8 years ago. She currently sees Dr. Augustine at [facility] but provider is not comfortable prescribing clonazepam 0.5 mg tablet 3 times a day - that's the reason she wants to transfer care. Are you able to take her back a patient? Please advise.     Okay to leave a detailed message?: Yes at Cell number on file:    Telephone Information:   Mobile [phone]       Call taken on 3/9/2023 at 1:07 PM by Lucille Navarro

## 2023-03-09 NOTE — TELEPHONE ENCOUNTER
I called patient for an MTM visit today. Between patient report and chart notes, it is clear that she is in a frustrating spot because she was signed an order for clonazepam 30 tabs/30 days on 2/14 and ran out 3/7. Despite an inappropriate interaction on the part of the patient with Dr. Augustine that day, Dr. Augustine authorized a final 30 tabs/30 day supply before dismissing the patient for her conduct. It is clear on the call today that the pharmacy has refused this early refill until 3/13, citing [facility] policy on controlled substances and the high likelihood of corrective action for the pharmacist.    Tootie became very upset and somewhat hostile on the phone in the process of discussing this, using swear words not directed at this or other care team members but at the situation in general.    I started to explain that I would still like to try and help her but she hung up at that point.    I waited 10 minutes, called the patient back to verify that she was safe. She did state that she has no intention of hurting herself or others. I encouraged her to try to establish care as soon as possible with a new provider- she states she will make calls tomorrow.     I told her I would give her some space to reach out to me when she's ready to talk again, but would send her a letter if we haven't spoken in 3 months- she's fine with this.    Cindi Nieves, GaviotaD, [facility]  Medication Therapy Management Provider  Phone: [phone]  [email or web address]

## 2023-03-13 NOTE — TELEPHONE ENCOUNTER
Please inform the patient that I am not able to take additional patients at this time, she may have difficulty finding primary care doctors who are willing to prescribe clonazepam at that level, so it may be best for her to follow-up with a mental health specialist for this concern.  Thanks.

## 2023-03-14 ENCOUNTER — TELEPHONE (OUTPATIENT)
Dept: PHARMACY | Facility: CLINIC | Age: 68
End: 2023-03-14
Payer: MEDICARE

## 2023-03-14 NOTE — TELEPHONE ENCOUNTER
Patient called and left me a  so I called back. She has been 8 days without clonazepam. She states that after her interaction with Dr. 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 She is taking 60mg Prozac daily (20mg three times daily) to compensate temporarily and feels this is going well. She has had no panic attacks. She says she is done with clonazepam.    She would like to go back to Dr. Augustine. She states she said things out loud that she should not have said and that she said those things because she was addicted. She states she understands that her behavior was wrong.    She thinks she is still scheduled for Tuesday, April 4th, with psychiatry and plans to go. She wants to go back to 40mg of Prozac and will discuss this with the psychiatrist.    She is not currently interested in a behavioral therapist as she is not currently willing to acknowledge the gender dysphoria.    She does not drink alcohol and I encouraged her to completely avoid use.    She has not picked up the order of clonazepam and is wondering if she should do so- started to explain rationale in terms of God testing her and I interrupted to say that under no circumstance should she pick it up. I offered to call and cancel the order at the pharmacy for her and she was fine with this so I took the liberty to do so.    We will follow up for a full visit in two weeks- I did encourage her to establish with a new PCP in that time.    Cindi Nieves, GaviotaD, BCACP  Medication Therapy Management Provider  Phone: [phone]  [email or web address]

## 2023-03-17 NOTE — TELEPHONE ENCOUNTER
Left detailed msg notifying [facility] is not taking additional patients at this time. If she has questions to call back.She does have an appt with Dr. Augustine on 03/22 to discuss med question.

## 2023-03-22 ENCOUNTER — VIRTUAL VISIT (OUTPATIENT)
Dept: FAMILY MEDICINE | Facility: CLINIC | Age: 68
End: 2023-03-22
Payer: MEDICARE

## 2023-03-22 DIAGNOSIS — Z91.199 NO-SHOW FOR APPOINTMENT: Primary | ICD-10-CM

## 2023-03-22 NOTE — PROGRESS NOTES
Nursing attempted to call x 2 but no one picked up     Patient no showed   This patient was a no show for this scheduled appointment.

## 2023-03-23 ENCOUNTER — VIRTUAL VISIT (OUTPATIENT)
Dept: PHARMACY | Facility: CLINIC | Age: 68
End: 2023-03-23
Payer: COMMERCIAL

## 2023-03-23 DIAGNOSIS — F41.1 GAD (GENERALIZED ANXIETY DISORDER): ICD-10-CM

## 2023-03-23 PROCEDURE — 99207 PR NO CHARGE LOS: CPT | Performed by: PHARMACIST

## 2023-03-23 NOTE — PATIENT INSTRUCTIONS
"Recommendations from today's MTM visit:                                                       1. Try to move fluoxetine to earlier in the day, taking 40mg at breakfast and 40 at lunch. Call your psychiatrist and ask for an updated prescription for fluoxetine.    2. Establish care with your provider as planned. Have the pharmacy reach out to the clinic for any refills.    3. Start doing exercise to help with your sleep and reduce risk for panic attacks. In-home low impact exercises are fine, as is going for a walk.    5. Consider reading The Body Keeps the Score to learn more about trauma and how it affects us.    6. Set up with [facility] to speak with someone. Their number is [phone].    Follow-up: Return in 4 weeks (on 4/20/2023) for phone visit.    It was great speaking with you today.  I value your experience and would be very thankful for your time in providing feedback in our clinic survey. In the next few days, you may receive an email or text message from [employer] with a link to a survey related to your  clinical pharmacist.\"     To schedule another MTM appointment, please call the clinic directly or you may call the MTM scheduling line at [phone] or toll-free at [phone].     My Clinical Pharmacist's contact information:                                                      Please feel free to contact me with any questions or concerns you have.      Cindi Nieves PharmD, [facility]  Medication Therapy Management Provider  Phone: [phone]  [email or web address]  "

## 2023-03-23 NOTE — PROGRESS NOTES
Medication Therapy Management (MTM) Encounter    ASSESSMENT:                            Medication Adherence/Access: See below for considerations    Gender Dysphoria/Anxiety with Panic: Current med therapy appropriate, though patient will need updated 80mg order from her psychiatrist for fluoxetine, and ideally the dosing should be moved up in the day to reduce insomnia. Majority of the visit today was spent explaining the purpose of behavioral health in coping with trauma (and explaining trauma) to the extent would be appropriate to get the patient signed on to get better information from a therapist. Medium to low intensity exercise also discussed as adjunct therapy and the patient admits she should start doing this again- I encouraged this over worrying about laundry at this time.    PLAN:                            1. Try to move fluoxetine to earlier in the day, taking 40mg at breakfast and 40 at lunch. Call your psychiatrist and ask for an updated prescription for fluoxetine.    2. Establish care with your provider as planned. Have the pharmacy reach out to the clinic for any refills.    3. Start doing exercise to help with your sleep and reduce risk for panic attacks. In-home low impact exercises are fine, as is going for a walk.    5. Consider reading The Body Keeps the Score to learn more about trauma and how it affects us.    6. Set up with [facility] to speak with someone. Their number is [phone].    Follow-up: Return in 4 weeks (on 4/20/2023) for phone visit.    SUBJECTIVE/OBJECTIVE:                          Tootie Marin is a 67 year old female called for a follow-up visit.  Today's visit is a follow-up MTM visit from 03/09.     Reason for visit: follow up on psych meds.    Allergies/ADRs: Reviewed in chart  Past Medical History: Reviewed in chart  Tobacco: She reports that she has never smoked. She has never used smokeless tobacco.  Alcohol: none    Medication Adherence/Access: see  below    Gender Dysphoria/Anxiety with Panic: Patient is continuing to see Sonya Huffman CNP for psychiatry. She will be seeing her again at the beginning of next month.    Patient is currently taking fluoxetine 80mg daily (20mg FOUR TIMES DAILY) as she was advised by her psychiatrist that she could go up to a max of 80mg if needed to manage her symptoms. She is not endorsing side effects but admits she only sleeps for 1.5 hours max at a time.    Patient has been off of clonazepam completely since 3/7. She was having a bad day on the 17th and so called her psychiatry office for help and was given clonazepam 0.25mg ODT tablets to be taken only if she has a panic attack. She picked them up a few days later when the pharmacy was able to get them in and was going to take one but decided against it- that she would like to continue to avoid taking it.    Patient has not established with behavioral health, citing that she does not want to talk to anyone about being transgender as she has put that behind her. We discussed seeing a more generalized trauma informed therapist and she assumed that was her psychiatrist- when I again explained the difference patient is open to trying to set up behavior health with [facility], where she was referred several times by Dr. Augustine and me.    Patient reports being almost completely sedentary and that she is not currently doing usual cares for herself like laundry- is hoping to shower today.    Last Comprehensive Metabolic Panel:  Lab Results   Component Value Date     12/21/2022    POTASSIUM 3.4 12/21/2022    CHLORIDE 99 12/21/2022    CO2 27 12/21/2022    ANIONGAP 14 12/21/2022    GLC 85 12/21/2022    BUN 10.6 12/21/2022    CR 0.82 12/21/2022    GFRESTIMATED 78 12/21/2022    RIDGE 10.2 12/21/2022     I spent 34 minutes with this patient today. I offer these suggestions for consideration by Dr. Latricia Cloud, whom this patient will be establishing care with. A copy of the  visit note was provided to the patient's provider(s).    A summary of these recommendations was mailed to the patient.    Cindi Nieves PharmD, BCACP  Medication Therapy Management Provider  Phone: [phone]  [email or web address]    Telemedicine Visit Details  Type of service:  Telephone visit  Start Time: 11:32 AM  End Time: 12:06 PM     Medication Therapy Recommendations  No medication therapy recommendations to display

## 2023-03-31 DIAGNOSIS — I10 ESSENTIAL HYPERTENSION: ICD-10-CM

## 2023-03-31 RX ORDER — HYDROCHLOROTHIAZIDE 25 MG/1
25 TABLET ORAL DAILY
Qty: 90 TABLET | Refills: 0 | Status: SHIPPED | OUTPATIENT
Start: 2023-03-31 | End: 2023-05-23

## 2023-04-07 ENCOUNTER — TELEPHONE (OUTPATIENT)
Dept: FAMILY MEDICINE | Facility: CLINIC | Age: 68
End: 2023-04-07
Payer: MEDICARE

## 2023-04-07 NOTE — TELEPHONE ENCOUNTER
Pharmacy calling to get a medication refill on medications attached    escitalopram (LEXAPRO) 20 MG tablet (Discontinued) 90 tablet

## 2023-04-13 ENCOUNTER — TELEPHONE (OUTPATIENT)
Dept: PHARMACY | Facility: CLINIC | Age: 68
End: 2023-04-13
Payer: MEDICARE

## 2023-04-13 NOTE — TELEPHONE ENCOUNTER
Patient left me a VM on Tuesday when I was out stating she was feeling unwell x4 days and considering inpatient treatment for her anxiety, as she was not doing well off of clonazepam. I called her back today and she reports also speaking with her psychiatrist, who authorized a 30 day prescription for 0.5mg clonazepam daily. The patient took 2 tablets yesterday and plans to take two today and then go back to once daily. She also found out that there was a therapist who used to come to her building a couple times a week and was thinking of looking into meeting with them but hasn't called to ask yet (she has not started therapy with any of the other suggested resources we have sent).    I explained to the patient that she needs to take her medications as directed, as again, she will likely go without clonazepam for at least two days at the end of this 30 day period. I also explained that I would like her to have scheduled with a /therapist by the time we are scheduled to meet next week, as she has repeatedly avoided this despite admitting it may be helpful. She is open to these suggestions. We will follow up next Thursday as scheduled.    Cindi Nieves, Diana, BCACP  Medication Therapy Management Provider  Phone: [phone]  [email or web address]

## 2023-04-20 ENCOUNTER — VIRTUAL VISIT (OUTPATIENT)
Dept: PHARMACY | Facility: CLINIC | Age: 68
End: 2023-04-20
Payer: COMMERCIAL

## 2023-04-20 DIAGNOSIS — F41.1 GAD (GENERALIZED ANXIETY DISORDER): Primary | ICD-10-CM

## 2023-04-20 PROCEDURE — 99207 PR NO CHARGE LOS: CPT | Performed by: PHARMACIST

## 2023-04-20 NOTE — PATIENT INSTRUCTIONS
"Recommendations from today's MTM visit:                                                       1. Call [facility], [phone], today. If they have no openings, call your insurance and ask to be scheduled with a psychologist or behavioral health specialist. I am happy to continue to meet with you, but am not going to be of help to you if you are unable to follow through on our co-developed care plan.    2. Follow up with psychiatry as planned.    Follow-up: Please call to schedule once you have reached out to behavioral health and scheduled with them.    It was great speaking with you today.  I value your experience and would be very thankful for your time in providing feedback in our clinic survey. In the next few days, you may receive an email or text message from [employer] with a link to a survey related to your  clinical pharmacist.\"     To schedule another MTM appointment, please call the clinic directly or you may call the MTM scheduling line at [phone] or toll-free at [phone].     My Clinical Pharmacist's contact information:                                                      Please feel free to contact me with any questions or concerns you have.      Cindi Nieves, Diana, BCACP  Medication Therapy Management Provider  Phone: [phone]  [email or web address]  "

## 2023-04-20 NOTE — PROGRESS NOTES
Medication Therapy Management (MTM) Encounter    ASSESSMENT:                            Medication Adherence/Access: See below for considerations    Anxiety with Panic: Patient was encouraged to follow up with psychiatry this afternoon as planned. I do not know logistics of inpatient treatment of individuals who do not express acute thoughts of hurting themselves so suggested patient contact nurse triage if needed for that. We discussed that I don't know that I am of use to the patient at this time as she has not followed through on my requests for her to take her medications as prescribed or to establish with behavioral health- we will hold off on follow-ups until she has at the very least reached out to behavioral health. She is open to this plan.    PLAN:                            1. Call [facility], [phone], today. If they have no openings, call your insurance and ask to be scheduled with a psychologist or behavioral health specialist. I am happy to continue to meet with you, but am not going to be of help to you if you are unable to follow through on our co-developed care plan.    2. Follow up with psychiatry as planned.    Follow-up: Please call to schedule once you have reached out to behavioral health and scheduled with them.    SUBJECTIVE/OBJECTIVE:                          Tootie Marin is a 67 year old female called for a follow-up visit.  Today's visit is a follow-up MTM visit from 03/23.     Reason for visit: follow up on anxiety meds.    Allergies/ADRs: Reviewed in chart  Past Medical History: Reviewed in chart  Tobacco: She reports that she has never smoked. She has never used smokeless tobacco.  Alcohol: not currently using    Medication Adherence/Access: see below    Anxiety with Panic: Patient is taking fluoxetine 80mg daily and clonazepam. Patient is meeting with her psychiatrist this afternoon at 1pm, as she started taking her clonazepam 0.5mg three times daily. The psychologist she  spoke of last visit is not contracted in her building anymore, and she has not established with any other behavioral health group as I expressed strongly she do last visit. Patient reports she is feeling much better now that she is back on three times daily clonazepam and she wishes to continue this, despite psychiatry only prescribing once daily dosing. She reports that she will present for inpatient treatment if her psychiatrist is unwilling to prescribe what she needs- she is wondering if she should do this at [facility]. She has no thoughts of hurting herself or others.    I spent 12 minutes with this patient today. I offer these suggestions for consideration by Dr. Cloud, with whom patient will be establishing care next month. A copy of the visit note was provided to the patient's provider(s).    A summary of these recommendations was mailed to the patient.    Cindi Nieves, GaviotaD, BCACP  Medication Therapy Management Provider  Phone: [phone]  [email or web address]    Telemedicine Visit Details  Type of service:  Telephone visit  Start Time: 11:31 AM  End Time: 11:43 AM     Medication Therapy Recommendations  No medication therapy recommendations to display

## 2023-04-26 DIAGNOSIS — E78.00 PURE HYPERCHOLESTEROLEMIA: ICD-10-CM

## 2023-04-26 NOTE — TELEPHONE ENCOUNTER
Refill request from pharmacy via fax for Lovastatin     Last appt: 02/14/23    Thank you,  Marcelo Stanford Jr., CMA on 4/26/2023 at 2:39 PM

## 2023-04-27 RX ORDER — LOVASTATIN 20 MG
20 TABLET ORAL AT BEDTIME
Qty: 90 TABLET | Refills: 0 | Status: SHIPPED | OUTPATIENT
Start: 2023-04-27 | End: 2023-05-23

## 2023-04-27 NOTE — TELEPHONE ENCOUNTER
"Routing refill request to provider for review/approval because:  Labs not current:  5/4/2021    Last Written Prescription Date:  1/23/2023  Last Fill Quantity: 90,  # refills: 0   Last office visit provider:  2/14/2023     Requested Prescriptions   Pending Prescriptions Disp Refills     lovastatin (MEVACOR) 20 MG tablet 90 tablet 0     Sig: Take 1 tablet (20 mg) by mouth At Bedtime       Statins Protocol Failed - 4/27/2023 10:18 AM        Failed - LDL on file in past 12 months     Recent Labs   Lab Test 05/04/21  1426                Passed - No abnormal creatine kinase in past 12 months     No lab results found.             Passed - Recent (12 mo) or future (30 days) visit within the authorizing provider's specialty     Patient has had an office visit with the authorizing provider or a provider within the authorizing providers department within the previous 12 mos or has a future within next 30 days. See \"Patient Info\" tab in inbasket, or \"Choose Columns\" in Meds & Orders section of the refill encounter.              Passed - Medication is active on med list        Passed - Patient is age 18 or older        Passed - No active pregnancy on record        Passed - No positive pregnancy test in past 12 months             Gisselle Huerta RN 04/27/23 10:19 AM  "

## 2023-05-23 ENCOUNTER — LAB (OUTPATIENT)
Dept: INTERNAL MEDICINE | Facility: CLINIC | Age: 68
End: 2023-05-23

## 2023-05-23 ENCOUNTER — OFFICE VISIT (OUTPATIENT)
Dept: INTERNAL MEDICINE | Facility: CLINIC | Age: 68
End: 2023-05-23
Payer: MEDICARE

## 2023-05-23 VITALS
SYSTOLIC BLOOD PRESSURE: 122 MMHG | TEMPERATURE: 98.1 F | BODY MASS INDEX: 35.68 KG/M2 | HEART RATE: 70 BPM | RESPIRATION RATE: 20 BRPM | DIASTOLIC BLOOD PRESSURE: 68 MMHG | OXYGEN SATURATION: 98 % | WEIGHT: 189 LBS | HEIGHT: 61 IN

## 2023-05-23 DIAGNOSIS — F41.1 ANXIETY STATE: ICD-10-CM

## 2023-05-23 DIAGNOSIS — Z00.00 ENCOUNTER FOR MEDICARE ANNUAL WELLNESS EXAM: Primary | ICD-10-CM

## 2023-05-23 DIAGNOSIS — L30.4 INTERTRIGO: ICD-10-CM

## 2023-05-23 DIAGNOSIS — B35.3 TINEA PEDIS OF BOTH FEET: ICD-10-CM

## 2023-05-23 DIAGNOSIS — Z12.11 COLON CANCER SCREENING: ICD-10-CM

## 2023-05-23 DIAGNOSIS — F41.0 PANIC DISORDER WITHOUT AGORAPHOBIA: ICD-10-CM

## 2023-05-23 DIAGNOSIS — E66.01 MORBID OBESITY (H): ICD-10-CM

## 2023-05-23 DIAGNOSIS — K21.9 GASTROESOPHAGEAL REFLUX DISEASE WITHOUT ESOPHAGITIS: ICD-10-CM

## 2023-05-23 DIAGNOSIS — F33.1 MODERATE EPISODE OF RECURRENT MAJOR DEPRESSIVE DISORDER (H): ICD-10-CM

## 2023-05-23 DIAGNOSIS — I10 ESSENTIAL HYPERTENSION: ICD-10-CM

## 2023-05-23 DIAGNOSIS — E78.00 PURE HYPERCHOLESTEROLEMIA: ICD-10-CM

## 2023-05-23 DIAGNOSIS — J45.40 MODERATE PERSISTENT ASTHMA WITHOUT COMPLICATION: ICD-10-CM

## 2023-05-23 DIAGNOSIS — E55.9 VITAMIN D DEFICIENCY: ICD-10-CM

## 2023-05-23 LAB
ALBUMIN SERPL BCG-MCNC: 4.5 G/DL (ref 3.5–5.2)
ALP SERPL-CCNC: 115 U/L (ref 35–104)
ALT SERPL W P-5'-P-CCNC: 30 U/L (ref 10–35)
ANION GAP SERPL CALCULATED.3IONS-SCNC: 14 MMOL/L (ref 7–15)
AST SERPL W P-5'-P-CCNC: 33 U/L (ref 10–35)
BASOPHILS # BLD AUTO: 0.1 10E3/UL (ref 0–0.2)
BASOPHILS NFR BLD AUTO: 1 %
BILIRUB SERPL-MCNC: 0.3 MG/DL
BUN SERPL-MCNC: 10.2 MG/DL (ref 8–23)
CALCIUM SERPL-MCNC: 10 MG/DL (ref 8.8–10.2)
CHLORIDE SERPL-SCNC: 98 MMOL/L (ref 98–107)
CHOLEST SERPL-MCNC: 180 MG/DL
CREAT SERPL-MCNC: 0.76 MG/DL (ref 0.51–0.95)
DEPRECATED HCO3 PLAS-SCNC: 25 MMOL/L (ref 22–29)
EOSINOPHIL # BLD AUTO: 0.1 10E3/UL (ref 0–0.7)
EOSINOPHIL NFR BLD AUTO: 1 %
ERYTHROCYTE [DISTWIDTH] IN BLOOD BY AUTOMATED COUNT: 14.3 % (ref 10–15)
GFR SERPL CREATININE-BSD FRML MDRD: 85 ML/MIN/1.73M2
GLUCOSE SERPL-MCNC: 83 MG/DL (ref 70–99)
HCT VFR BLD AUTO: 38.5 % (ref 35–47)
HDLC SERPL-MCNC: 73 MG/DL
HGB BLD-MCNC: 13 G/DL (ref 11.7–15.7)
IMM GRANULOCYTES # BLD: 0 10E3/UL
IMM GRANULOCYTES NFR BLD: 0 %
LDLC SERPL CALC-MCNC: 92 MG/DL
LYMPHOCYTES # BLD AUTO: 2.2 10E3/UL (ref 0.8–5.3)
LYMPHOCYTES NFR BLD AUTO: 25 %
MCH RBC QN AUTO: 30.2 PG (ref 26.5–33)
MCHC RBC AUTO-ENTMCNC: 33.8 G/DL (ref 31.5–36.5)
MCV RBC AUTO: 89 FL (ref 78–100)
MONOCYTES # BLD AUTO: 0.8 10E3/UL (ref 0–1.3)
MONOCYTES NFR BLD AUTO: 9 %
NEUTROPHILS # BLD AUTO: 5.8 10E3/UL (ref 1.6–8.3)
NEUTROPHILS NFR BLD AUTO: 64 %
NONHDLC SERPL-MCNC: 107 MG/DL
PLATELET # BLD AUTO: 388 10E3/UL (ref 150–450)
POTASSIUM SERPL-SCNC: 3.2 MMOL/L (ref 3.4–5.3)
PROT SERPL-MCNC: 7.1 G/DL (ref 6.4–8.3)
RBC # BLD AUTO: 4.31 10E6/UL (ref 3.8–5.2)
SODIUM SERPL-SCNC: 137 MMOL/L (ref 136–145)
TRIGL SERPL-MCNC: 77 MG/DL
WBC # BLD AUTO: 9.1 10E3/UL (ref 4–11)

## 2023-05-23 PROCEDURE — 82306 VITAMIN D 25 HYDROXY: CPT | Performed by: INTERNAL MEDICINE

## 2023-05-23 PROCEDURE — 80053 COMPREHEN METABOLIC PANEL: CPT | Performed by: INTERNAL MEDICINE

## 2023-05-23 PROCEDURE — 80061 LIPID PANEL: CPT | Performed by: INTERNAL MEDICINE

## 2023-05-23 PROCEDURE — 85025 COMPLETE CBC W/AUTO DIFF WBC: CPT | Performed by: INTERNAL MEDICINE

## 2023-05-23 PROCEDURE — 36415 COLL VENOUS BLD VENIPUNCTURE: CPT | Performed by: INTERNAL MEDICINE

## 2023-05-23 PROCEDURE — G0439 PPPS, SUBSEQ VISIT: HCPCS | Performed by: INTERNAL MEDICINE

## 2023-05-23 PROCEDURE — 99214 OFFICE O/P EST MOD 30 MIN: CPT | Mod: 25 | Performed by: INTERNAL MEDICINE

## 2023-05-23 RX ORDER — BUDESONIDE AND FORMOTEROL FUMARATE DIHYDRATE 160; 4.5 UG/1; UG/1
AEROSOL RESPIRATORY (INHALATION)
Qty: 10.2 G | Refills: 11 | Status: SHIPPED | OUTPATIENT
Start: 2023-05-23 | End: 2024-02-07

## 2023-05-23 RX ORDER — CLOTRIMAZOLE 1 %
CREAM (GRAM) TOPICAL 2 TIMES DAILY
Qty: 113 G | Refills: 4 | Status: SHIPPED | OUTPATIENT
Start: 2023-05-23 | End: 2024-07-03

## 2023-05-23 RX ORDER — HYDROCHLOROTHIAZIDE 25 MG/1
25 TABLET ORAL DAILY
Qty: 90 TABLET | Refills: 3 | Status: SHIPPED | OUTPATIENT
Start: 2023-05-23 | End: 2024-03-18

## 2023-05-23 RX ORDER — LOVASTATIN 20 MG
20 TABLET ORAL AT BEDTIME
Qty: 90 TABLET | Refills: 3 | Status: SHIPPED | OUTPATIENT
Start: 2023-05-23 | End: 2024-03-18

## 2023-05-23 ASSESSMENT — ENCOUNTER SYMPTOMS
FEVER: 0
HEMATOCHEZIA: 0
ARTHRALGIAS: 1
HEMATURIA: 0
NERVOUS/ANXIOUS: 0
PARESTHESIAS: 0
BREAST MASS: 0
DIZZINESS: 0
COUGH: 0
WEAKNESS: 1
PALPITATIONS: 0
JOINT SWELLING: 1
HEARTBURN: 0
DIARRHEA: 0
EYE PAIN: 0
ABDOMINAL PAIN: 0
NAUSEA: 0
MYALGIAS: 0
FREQUENCY: 0
DYSURIA: 0
CHILLS: 0
HEADACHES: 0
SHORTNESS OF BREATH: 0
CONSTIPATION: 0
SORE THROAT: 0

## 2023-05-23 ASSESSMENT — PATIENT HEALTH QUESTIONNAIRE - PHQ9
10. IF YOU CHECKED OFF ANY PROBLEMS, HOW DIFFICULT HAVE THESE PROBLEMS MADE IT FOR YOU TO DO YOUR WORK, TAKE CARE OF THINGS AT HOME, OR GET ALONG WITH OTHER PEOPLE: NOT DIFFICULT AT ALL
SUM OF ALL RESPONSES TO PHQ QUESTIONS 1-9: 0
SUM OF ALL RESPONSES TO PHQ QUESTIONS 1-9: 0

## 2023-05-23 ASSESSMENT — ACTIVITIES OF DAILY LIVING (ADL): CURRENT_FUNCTION: NO ASSISTANCE NEEDED

## 2023-05-23 ASSESSMENT — PAIN SCALES - GENERAL: PAINLEVEL: MODERATE PAIN (5)

## 2023-05-23 NOTE — PATIENT INSTRUCTIONS
For osteopenia: goal for calcium is [id number] mg daily (citrate or carbonate), for vitamin D3 2000 international units daily.     Try off of prilosec 20 mg daily. See if your heartburn comes back or not.     Get shingles vaccine at the pharmacy.     Start a fiber supplement, you can get metamucil at the pharmacy.     Use clotrimazole and aquafor under left breast.     Patient Education   Personalized Prevention Plan  You are due for the preventive services outlined below.  Your care team is available to assist you in scheduling these services.  If you have already completed any of these items, please share that information with your care team to update in your medical record.  Health Maintenance Due   Topic Date Due    Depression Action Plan  Never done    Colorectal Cancer Screening  Never done    Zoster (Shingles) Vaccine (2 of 2) 04/26/2016    COVID-19 Vaccine (5 - Moderna series) 07/11/2022    Flu Vaccine (1) 09/01/2022    Annual Wellness Visit  12/09/2022    ANNUAL REVIEW OF HM ORDERS  12/09/2022    Asthma Action Plan - yearly  04/26/2023    Asthma Control Test  06/21/2023     Your Health Risk Assessment indicates you feel you are not in good health    A healthy lifestyle helps keep the body fit and the mind alert. It helps protect you from disease, helps you fight disease, and helps prevent chronic disease (disease that doesn't go away) from getting worse. This is important as you get older and begin to notice twinges in muscles and joints and a decline in the strength and stamina you once took for granted. A healthy lifestyle includes good healthcare, good nutrition, weight control, recreation, and regular exercise. Avoid harmful substances and do what you can to keep safe. Another part of a healthy lifestyle is stay mentally active and socially involved.    Good healthcare   Have a wellness visit every year.   If you have new symptoms, let us know right away. Don't wait until the next checkup.   Take  medicines exactly as prescribed and keep your medicines in a safe place. Tell us if your medicine causes problems.   Healthy diet and weight control   Eat 3 or 4 small, nutritious, low-fat, high-fiber meals a day. Include a variety of fruits, vegetables, and whole-grain foods.   Make sure you get enough calcium in your diet. Calcium, vitamin D, and exercise help prevent osteoporosis (bone thinning).   If you live alone, try eating with others when you can. That way you get a good meal and have company while you eat it.   Try to keep a healthy weight. If you eat more calories than your body uses for energy, it will be stored as fat and you will gain weight.     Recreation   Recreation is not limited to sports and team events. It includes any activity that provides relaxation, interest, enjoyment, and exercise. Recreation provides an outlet for physical, mental, and social energy. It can give a sense of worth and achievement. It can help you stay healthy.    Mental Exercise and Social Involvement  Mental and emotional health is as important as physical health. Keep in touch with friends and family. Stay as active as possible. Continue to learn and challenge yourself.   Things you can do to stay mentally active are:  Learn something new, like a foreign language or musical instrument.   Play SCRABBLE or do crossword puzzles. If you cannot find people to play these games with you at home, you can play them with others on your computer through the Internet.   Join a games club--anything from card games to chess or checkers or lawn bowling.   Start a new hobby.   Go back to school.   Volunteer.   Read.   Keep up with world events.    Exercise for a Healthier Heart  You may wonder how you can improve the health of your heart. If you re thinking about exercise, you re on the right track. You don t need to become an athlete. But you do need a certain amount of brisk exercise to help strengthen your heart. If you have been  diagnosed with a heart condition, your healthcare provider may advise exercise to help your condition. To help make exercise a habit, choose safe, fun activities.      Exercise with a friend. When activity is fun, you're more likely to stick with it.     Before you start  Check with your healthcare provider before starting an exercise program. This is especially important if you haven't been active for a while. It's also important if you have a long-term (chronic) health problem such as heart disease, diabetes, or obesity. Also check with your provider if you're at high risk for having these problems.   Why exercise?  Exercising regularly offers many healthy rewards. It can help you do all of these:   Improve your blood cholesterol level to help prevent further heart trouble.  Lower your blood pressure to help prevent a stroke or heart attack.  Control diabetes or reduce your risk of getting this disease.  Improve your heart and lung function.  Reach and stay at a healthy weight.  Make your muscles stronger so you can stay active.  Prevent falls and fractures by slowing the loss of bone mass (osteoporosis).  Manage stress better.  Improve your sense of self and your body image.  Exercise tips    Ease into your routine. Set small goals. Then build on them. Talk with your healthcare provider first before starting an exercise routine if you're not sure what your activity level should be.  Exercise on most days. Aim for a total of at least 150 minutes (2 hours and 30 minutes) or more of moderate-intensity aerobic activity each week. You could also do 75 minutes (1 hour and 15 minutes) or more of vigorous-intensity aerobic activity each week. Or try for a combination of both. Moderate activity means that you breathe heavier and your heart rate increases, but you can still talk. Think about doing at least 30 minutes of moderate exercise, 5 times a week. It's OK to work up to the 30-minute period over time. Examples of  moderate-intensity activity are brisk walking, gardening, and water aerobics.  Step up your daily activity level.  Along with your exercise program, try being more active the whole day. Walk instead of drive. Or park further away so that you take more steps each day. Do more household tasks or yard work. You may not be able to meet the advised amount of physical activity. But doing some moderate- or vigorous-intensity aerobic activity can help reduce your risk for heart disease. Your healthcare provider can help you figure out what is best for you.  Choose 1 or more activities you enjoy.  Walking is one of the easiest things you can do. You can also try swimming, riding a bike, dancing, or taking an exercise class.    Call 911  Call 911 right away if any of these occur:   Chest pain that doesn't go away quickly with rest  New burning, tightness, pressure, or heaviness in your chest, neck, shoulders, back, or arms  Abnormal or severe shortness of breath  A very fast or irregular heartbeat (palpitations)  Fainting  When to call your healthcare provider  Call your healthcare provider if you have any of these:   Dizziness or lightheadedness  Mild shortness of breath or chest pain  Increased or new joint or muscle pain    [employer] last reviewed this educational content on 7/1/2022 2000-2022 [employer]. All rights reserved. This information is not intended as a substitute for professional medical care. Always follow your healthcare professional's instructions.          Understanding USDA MyPlate  The USDA has guidelines to help you make healthy food choices. These are called MyPlate. MyPlate shows the food groups that make up healthy meals using the image of a place setting. Before you eat, think about the healthiest choices for what to put on your plate or in your cup or bowl. To learn more about building a healthy plate, visit www.choosemyplate.gov.     The food groups  Fruits. Any fruit or 100% fruit  juice counts as part of the Fruit Group. Fruits may be fresh, canned, frozen, or dried, and may be whole, cut-up, or pureed. Make 1/2 of your plate fruits and vegetables.  Vegetables. Any vegetable or 100% vegetable juice counts as a member of the Vegetable Group. Vegetables may be fresh, frozen, canned, or dried. They can be served raw or cooked and may be whole, cut-up, or mashed. Make 1/2 of your plate fruits and vegetables.  Grains. All foods made from grains are part of the Grains Group. These include wheat, rice, oats, cornmeal, and barley. Grains are often used to make foods such as bread, pasta, oatmeal, cereal, tortillas, and grits. Grains should be no more than 1/4 of your plate. At least half of your grains should be whole grains.  Protein. This group includes meat, poultry, seafood, beans and peas, eggs, processed soy products (such as tofu), nuts (including nut butters), and seeds. Make protein choices no more than 1/4 of your plate. Meat and poultry choices should be lean or low fat.  Dairy. The Dairy Group includes all fluid milk products and foods made from milk that contain calcium, such as yogurt and cheese. (Foods that have little calcium, such as cream, butter, and cream cheese, are not part of this group.) Most dairy choices should be low-fat or fat-free.  Oils. Oils aren't a food group, but they do contain essential nutrients. However it's important to watch your intake of oils. These are fats that are liquid at room temperature. They include canola, corn, olive, soybean, vegetable, and sunflower oil. Foods that are mainly oil include mayonnaise, certain salad dressings, and soft margarines. You likely already get your daily oil allowance from the foods you eat.  Things to limit  Eating healthy also means limiting these things in your diet:  Salt (sodium). Many processed foods have a lot of sodium. To keep sodium intake down, eat fresh vegetables, meats, poultry, and seafood when possible.  Purchase low-sodium, reduced-sodium, or no-salt-added food products at the store. And don't add salt to your meals at home. Instead, season them with herbs and spices such as dill, oregano, cumin, and paprika. Or try adding flavor with lemon or lime zest and juice.  Saturated fat. Saturated fats are most often found in animal products such as beef, pork, and chicken. They are often solid at room temperature, such as butter. To reduce your saturated fat intake, choose leaner cuts of meat and poultry. And try healthier cooking methods such as grilling, broiling, roasting, or baking. For a simple lower-fat swap, use plain nonfat yogurt instead of mayonnaise when making potato salad or macaroni salad.  Added sugars. These are sugars added to foods. They are in foods such as ice cream, candy, soda, fruit drinks, sports drinks, energy drinks, cookies, pastries, jams, and syrups. Cut down on added sugars by sharing sweet treats with a family member or friend. You can also choose fruit for dessert, and drink water or other unsweetened beverages.  [employer] last reviewed this educational content on 6/1/2020 2000-2022 [employer]. All rights reserved. This information is not intended as a substitute for professional medical care. Always follow your healthcare professional's instructions.          Signs of Hearing Loss  Hearing loss is a problem shared by many people. In fact, it's one of the most common health problems, particularly as people age. Most people aged 65 and older have some hearing loss. By age 80, almost everyone does. Hearing loss often occurs slowly over the years. So, you may not realize your hearing has gotten worse.   When sudden hearing loss occurs, it's important to contact your healthcare provider right away. Your provider will do a medical exam and a hearing exam as soon as possible. This is to help find the cause and type of your sudden hearing loss. Based on your diagnosis, your  healthcare provider will discuss possible treatments.      Hearing much better with one ear can be a sign of hearing loss.     Have your hearing checked  Call your healthcare provider if you:   Have to strain to hear normal conversation  Have to watch other people s faces very carefully to follow what they re saying  Need to ask people to repeat what they ve said  Often misunderstand what people are saying  Turn the volume of the television or radio up so high that others complain  Feel that people are mumbling when they re talking to you  Find that the effort to hear leaves you feeling tired and irritated  Notice, when using the phone, that you hear better with one ear than the other  [employer] last reviewed this educational content on 6/1/2022 2000-2022 [employer]. All rights reserved. This information is not intended as a substitute for professional medical care. Always follow your healthcare professional's instructions.

## 2023-05-23 NOTE — PROGRESS NOTES
"SUBJECTIVE:   Tootie is a 67 year old who presents for Preventive Visit.      5/23/2023     1:22 PM   Additional Questions   Roomed by BARNEY Beavers   Accompanied by n/a   Patient has been advised of split billing requirements and indicates understanding: Yes  Are you in the first 12 months of your Medicare coverage?  No    Healthy Habits:     In general, how would you rate your overall health?  Fair    Frequency of exercise:  1 day/week    Duration of exercise:  15-30 minutes    Do you usually eat at least 4 servings of fruit and vegetables a day, include whole grains    & fiber and avoid regularly eating high fat or \"junk\" foods?  No    Taking medications regularly:  Yes    Medication side effects:  None    Ability to successfully perform activities of daily living:  No assistance needed    Home Safety:  No safety concerns identified    Hearing Impairment:  Difficulty following a conversation in a noisy restaurant or crowded room, feel that people are mumbling or not speaking clearly, need to ask people to speak up or repeat themselves, difficulty understanding soft or whispered speech and difficulty understanding speech on the telephone    In the past 6 months, have you been bothered by leaking of urine?  No    In general, how would you rate your overall mental or emotional health?  Good      PHQ-2 Total Score: 0    Additional concerns today:  No    Anxiety / Gender dysphoria - Depression and panic attacks: Following at [facility]. Sonya Huffman NP and Marielena (therapist). Next visit is scheduled 6/21/23  - Clonazepam 0.5 BID, she says this was increased because she couldn't function, couldn't sleep (for 12 weeks), whole body not the same.   - Fluoxetine 60 mg daily     HLD: Lovastatin 20     GERD: Prilosec 20 mg daily.     HTN: HCTZ 25 mg. BP today 122/68. Not checking at home.     Asthma: Symbicort 160-4.5 twice daily, albuterol PRN. Albuterol maybe once a day.     Osteopenia: Last DEXA 12/2021 showed " lowest T score of -2.4 and left femoral neck.  She is taking vitamin D3. Had stopped for a little while but just restarted 2000 international units.  Due for repeat DEXA in December. Not much exercise right now.     R hip pain: R leg is shorter than the other, making hip hurt. Takes APAP PRN, sometimes helpful.     Obesity: Plans to start Atkins diet again soon.     Declines COVID booster today.    Have you ever done Advance Care Planning? (For example, a Health Directive, POLST, or a discussion with a medical provider or your loved ones about your wishes): No, advance care planning information given to patient to review.  Patient declined advance care planning discussion at this time.     Fall risk  Fallen 2 or more times in the past year?: No  Any fall with injury in the past year?: No  click delete button to remove this line now  Cognitive Screening   1) Repeat 3 items (Leader, Season, Table)    2) Clock draw:   NORMAL  3) 3 item recall:   Recalls 2 objects   Results: ABNORMAL clock, 1-2 items recalled: PROBABLE COGNITIVE IMPAIRMENT, **INFORM PROVIDER**    Mini-CogTM Copyright S Rodríguez. Licensed by the author for use in [facility]; reprinted with permission (latonia@[facility]). All rights reserved.      Reviewed and updated as needed this visit by clinical staff   Tobacco  Allergies  Meds  Problems  Med Hx  Surg Hx  Fam Hx          Reviewed and updated as needed this visit by Provider   Tobacco  Allergies  Meds  Problems  Med Hx  Surg Hx  Fam Hx         Social History     Tobacco Use     Smoking status: Never     Smokeless tobacco: Never   Vaping Use     Vaping status: Never Used   Substance Use Topics     Alcohol use: No           5/23/2023     1:12 PM   Alcohol Use   Prescreen: >3 drinks/day or >7 drinks/week? Not Applicable     Do you have a current opioid prescription? No  Do you use any other controlled substances or medications that are not prescribed by a provider? None    Current  providers sharing in care for this patient include:   Patient Care Team:  Latricia Cloud MD as PCP - General (Internal Medicine)  Cindi Nieves RPH as Pharmacist (Pharmacist)  Sharita Augustine DO as Assigned PCP  Cindi Nieves RPH as Assigned MTM Pharmacist  Bonny Menard NP as Assigned Behavioral Health Provider    The following health maintenance items are reviewed in Epic and correct as of today:  Health Maintenance   Topic Date Due     DEPRESSION ACTION PLAN  Never done     COLORECTAL CANCER SCREENING  Never done     ZOSTER IMMUNIZATION (2 of 2) 04/26/2016     COVID-19 Vaccine (5 - Moderna series) 07/11/2022     INFLUENZA VACCINE (1) 09/01/2022     ANNUAL REVIEW OF HM ORDERS  12/09/2022     ASTHMA ACTION PLAN  04/26/2023     ASTHMA CONTROL TEST  06/21/2023     PHQ-9  11/23/2023     MAMMO SCREENING  12/16/2023     MEDICARE ANNUAL WELLNESS VISIT  05/23/2024     FALL RISK ASSESSMENT  05/23/2024     ADVANCE CARE PLANNING  04/18/2027     LIPID  05/23/2028     DTAP/TDAP/TD IMMUNIZATION (3 - Td or Tdap) 05/16/2032     DEXA  12/16/2036     HEPATITIS C SCREENING  Completed     Pneumococcal Vaccine: 65+ Years  Completed     IPV IMMUNIZATION  Aged Out     MENINGITIS IMMUNIZATION  Aged Out     FHS-7:       12/16/2021     2:44 PM 4/18/2022     2:33 PM 5/23/2023     1:14 PM   Breast CA Risk Assessment (FHS-7)   Did any of your first-degree relatives have breast or ovarian cancer? No No Yes   Did any of your relatives have bilateral breast cancer? No Unknown Unknown   Did any man in your family have breast cancer? No No No   Did any woman in your family have breast and ovarian cancer? No No No   Did any woman in your family have breast cancer before age 50 y? No No No   Do you have 2 or more relatives with breast and/or ovarian cancer? No Unknown Yes   Do you have 2 or more relatives with breast and/or bowel cancer? No Unknown Unknown     Mammogram Screening: Recommended mammography every  "1-2 years with patient discussion and risk factor consideration  Pertinent mammograms are reviewed under the imaging tab.    Review of Systems   Constitutional: Negative for chills and fever.   HENT: Positive for hearing loss. Negative for congestion, ear pain and sore throat.    Eyes: Positive for visual disturbance. Negative for pain.   Respiratory: Negative for cough and shortness of breath.    Cardiovascular: Negative for chest pain, palpitations and peripheral edema.   Gastrointestinal: Negative for abdominal pain, constipation, diarrhea, heartburn, hematochezia and nausea.   Breasts:  Negative for tenderness, breast mass and discharge.   Genitourinary: Negative for dysuria, frequency, genital sores, hematuria, pelvic pain, urgency, vaginal bleeding and vaginal discharge.   Musculoskeletal: Positive for arthralgias and joint swelling. Negative for myalgias.   Skin: Negative for rash.   Neurological: Positive for weakness. Negative for dizziness, headaches and paresthesias.   Psychiatric/Behavioral: Negative for mood changes. The patient is not nervous/anxious.      OBJECTIVE:   /68 (BP Location: Right arm, Patient Position: Sitting, Cuff Size: Adult Regular)   Pulse 70   Temp 98.1  F (36.7  C) (Oral)   Resp 20   Ht 1.549 m (5' 1\")   Wt 85.7 kg (189 lb)   SpO2 98%   BMI 35.71 kg/m   Estimated body mass index is 35.71 kg/m  as calculated from the following:    Height as of this encounter: 1.549 m (5' 1\").    Weight as of this encounter: 85.7 kg (189 lb).  Physical Exam  GENERAL APPEARANCE: obese, alert and no distress  EYES: Eyes grossly normal to inspection and conjunctivae and sclerae normal  HENT:  nose and mouth without ulcers or lesions, oropharynx clear and oral mucous membranes moist  NECK: no adenopathy, no asymmetry, masses, or scars and thyroid normal to palpation  RESP: lungs clear to auscultation - no rales, rhonchi or wheezes  CV: regular rate and rhythm, normal S1 S2, no S3 or S4, no " murmur, click or rub, no peripheral edema and peripheral pulses strong  ABDOMEN: soft, nontender, no hepatosplenomegaly, no masses and bowel sounds normal  MS: no musculoskeletal defects are noted and gait is age appropriate without ataxia  SKIN: area of erythema underneath L breast with few satellite lesions   NEURO: Normal strength and tone, sensory exam grossly normal, mentation intact and speech normal  PSYCH: mentation appears normal and affect normal/bright    Diagnostic Test Results:  Labs reviewed in Epic  Results for orders placed or performed in visit on 05/23/23   Comprehensive metabolic panel     Status: Abnormal   Result Value Ref Range    Sodium 137 136 - 145 mmol/L    Potassium 3.2 (L) 3.4 - 5.3 mmol/L    Chloride 98 98 - 107 mmol/L    Carbon Dioxide (CO2) 25 22 - 29 mmol/L    Anion Gap 14 7 - 15 mmol/L    Urea Nitrogen 10.2 8.0 - 23.0 mg/dL    Creatinine 0.76 0.51 - 0.95 mg/dL    Calcium 10.0 8.8 - 10.2 mg/dL    Glucose 83 70 - 99 mg/dL    Alkaline Phosphatase 115 (H) 35 - 104 U/L    AST 33 10 - 35 U/L    ALT 30 10 - 35 U/L    Protein Total 7.1 6.4 - 8.3 g/dL    Albumin 4.5 3.5 - 5.2 g/dL    Bilirubin Total 0.3 <=1.2 mg/dL    GFR Estimate 85 >60 mL/min/1.73m2   Vitamin D Deficiency     Status: Abnormal   Result Value Ref Range    Vitamin D, Total (25-Hydroxy) 19 (L) 20 - 75 ug/L    Narrative    Season, race, dietary intake, and treatment affect the concentration of 25-hydroxy-Vitamin D. Values may decrease during winter months and increase during summer months. Values 20-29 ug/L may indicate Vitamin D insufficiency and values <20 ug/L may indicate Vitamin D deficiency.    Vitamin D determination is routinely performed by an immunoassay specific for 25 hydroxyvitamin D3.  If an individual is on vitamin D2(ergocalciferol) supplementation, please specify 25 OH vitamin D2 and D3 level determination by LCMSMS test VITD23.     Lipid Profile (Chol, Trig, HDL, LDL calc)     Status: Normal   Result Value Ref  Range    Cholesterol 180 <200 mg/dL    Triglycerides 77 <150 mg/dL    Direct Measure HDL 73 >=50 mg/dL    LDL Cholesterol Calculated 92 <=100 mg/dL    Non HDL Cholesterol 107 <130 mg/dL    Narrative    Cholesterol  Desirable:  <200 mg/dL    Triglycerides  Normal:  Less than 150 mg/dL  Borderline High:  150-199 mg/dL  High:  200-499 mg/dL  Very High:  Greater than or equal to 500 mg/dL    Direct Measure HDL  Female:  Greater than or equal to 50 mg/dL   Male:  Greater than or equal to 40 mg/dL    LDL Cholesterol  Desirable:  <100mg/dL  Above Desirable:  100-129 mg/dL   Borderline High:  130-159 mg/dL   High:  160-189 mg/dL   Very High:  >= 190 mg/dL    Non HDL Cholesterol  Desirable:  130 mg/dL  Above Desirable:  130-159 mg/dL  Borderline High:  160-189 mg/dL  High:  190-219 mg/dL  Very High:  Greater than or equal to 220 mg/dL   CBC with platelets and differential     Status: None   Result Value Ref Range    WBC Count 9.1 4.0 - 11.0 10e3/uL    RBC Count 4.31 3.80 - 5.20 10e6/uL    Hemoglobin 13.0 11.7 - 15.7 g/dL    Hematocrit 38.5 35.0 - 47.0 %    MCV 89 78 - 100 fL    MCH 30.2 26.5 - 33.0 pg    MCHC 33.8 31.5 - 36.5 g/dL    RDW 14.3 10.0 - 15.0 %    Platelet Count 388 150 - 450 10e3/uL    % Neutrophils 64 %    % Lymphocytes 25 %    % Monocytes 9 %    % Eosinophils 1 %    % Basophils 1 %    % Immature Granulocytes 0 %    Absolute Neutrophils 5.8 1.6 - 8.3 10e3/uL    Absolute Lymphocytes 2.2 0.8 - 5.3 10e3/uL    Absolute Monocytes 0.8 0.0 - 1.3 10e3/uL    Absolute Eosinophils 0.1 0.0 - 0.7 10e3/uL    Absolute Basophils 0.1 0.0 - 0.2 10e3/uL    Absolute Immature Granulocytes 0.0 <=0.4 10e3/uL   CBC with platelets and differential     Status: None    Narrative    The following orders were created for panel order CBC with platelets and differential.  Procedure                               Abnormality         Status                     ---------                               -----------         ------                      CBC with platelets and d...[[id number]]                      Final result                 Please view results for these tests on the individual orders.       ASSESSMENT / PLAN:     Problem List Items Addressed This Visit        Respiratory    Asthma     Well controlled with Symbicort 160-4.5 twice daily, albuterol PRN.         Relevant Medications    SYMBICORT 160-4.5 MCG/ACT Inhaler       Digestive    Gastroesophageal reflux disease without esophagitis     Well controlled with lovastatin 20 mg daily         Relevant Medications    omeprazole (PRILOSEC) 20 MG DR capsule    Morbid obesity (H)     BMI 35.71 today. Associated with HTN, HLD, GERD.   - She plans to work on Casenet diet   - Encouraged healthy lifestyle changes with slow weight loss that is more sustainable long term         Relevant Orders    PRIMARY CARE FOLLOW-UP SCHEDULING       Endocrine    Hypercholesterolemia     Well controlled with lovastatin 20 mg daily.   - LDL today 92         Relevant Medications    lovastatin (MEVACOR) 20 MG tablet    Other Relevant Orders    Lipid Profile (Chol, Trig, HDL, LDL calc) (Completed)       Circulatory    Essential hypertension     Well controlled on current regimen   - Continue hydrochlorothiazide 25 mg daily         Relevant Medications    hydrochlorothiazide (HYDRODIURIL) 25 MG tablet    Other Relevant Orders    PRIMARY CARE FOLLOW-UP SCHEDULING       Musculoskeletal and Integumentary    Tinea pedis of both feet     Gets intermittent flares.   - Refill for clotrimazole cream provided         Relevant Medications    clotrimazole (LOTRIMIN) 1 % external cream    SYMBICORT 160-4.5 MCG/ACT Inhaler    Intertrigo     Patient has quite pendulous breasts and does not wear a bra. Intertrigo noted under L breast.   - Instructed patient to keep area as dry as possible  - Can use clotrimazole cream on rash         Relevant Medications    clotrimazole (LOTRIMIN) 1 % external cream    SYMBICORT 160-4.5 MCG/ACT Inhaler        "Behavioral    Panic Disorder Without Agoraphobia     Follows with psychiatry at [facility], current providers are Sonya Huffman NP and Marielena (therapist). They are managing medications.   - current regimen is fluoxetine 60 mg daily and clonazepam 0.5 mg BID         Moderate episode of recurrent major depressive disorder (H)     Follows with psychiatry.   - current regimen is fluoxetine 60 mg daily            Other    Anxiety     Managed by psychiatry. Meds per panic disorder.          Encounter for Medicare annual wellness exam - Primary     Doing well today. Labs updated.   - Mammogram due 12/2023, DEXA repeat due 12/2023  - Cologuard ordered  - Declines COVID booster         Relevant Orders    PRIMARY CARE FOLLOW-UP SCHEDULING    Comprehensive metabolic panel (Completed)    CBC with platelets and differential (Completed)   Other Visit Diagnoses     Vitamin D deficiency        Relevant Orders    Vitamin D Deficiency (Completed)    Colon cancer screening        Relevant Orders    COLOGUARD(EXACT SCIENCES)          Patient has been advised of split billing requirements and indicates understanding: Yes      COUNSELING:  Reviewed preventive health counseling, as reflected in patient instructions  Special attention given to:       Regular exercise       Healthy diet/nutrition       Fall risk prevention      BMI:   Estimated body mass index is 35.71 kg/m  as calculated from the following:    Height as of this encounter: 1.549 m (5' 1\").    Weight as of this encounter: 85.7 kg (189 lb).   Weight management plan: Discussed healthy diet and exercise guidelines      She reports that she has never smoked. She has never used smokeless tobacco.      Appropriate preventive services were discussed with this patient, including applicable screening as appropriate for cardiovascular disease, diabetes, osteopenia/osteoporosis, and glaucoma.  As appropriate for age/gender, discussed screening for colorectal cancer, prostate " cancer, breast cancer, and cervical cancer. Checklist reviewing preventive services available has been given to the patient.    Reviewed patients plan of care and provided an AVS. The Basic Care Plan (routine screening as documented in Health Maintenance) for Tootie meets the Care Plan requirement. This Care Plan has been established and reviewed with the Patient.      Latricia Cloud MD  [facility]    Identified Health Risks:    I have reviewed Opioid Use Disorder and Substance Use Disorder risk factors and made any needed referrals.     Answers for HPI/ROS submitted by the patient on 5/23/2023  If you checked off any problems, how difficult have these problems made it for you to do your work, take care of things at home, or get along with other people?: Not difficult at all  PHQ9 TOTAL SCORE: 0        The patient was provided with suggestions to help her develop a healthy physical lifestyle.  She is at risk for lack of exercise and has been provided with information to increase physical activity for the benefit of her well-being.  The patient was counseled and encouraged to consider modifying their diet and eating habits. She was provided with information on recommended healthy diet options.  The patient was provided with written information regarding signs of hearing loss.

## 2023-05-24 DIAGNOSIS — R05.3 CHRONIC COUGH: ICD-10-CM

## 2023-05-24 LAB — DEPRECATED CALCIDIOL+CALCIFEROL SERPL-MC: 19 UG/L (ref 20–75)

## 2023-05-25 ENCOUNTER — TELEPHONE (OUTPATIENT)
Dept: INTERNAL MEDICINE | Facility: CLINIC | Age: 68
End: 2023-05-25
Payer: MEDICARE

## 2023-05-25 PROBLEM — Z00.00 ENCOUNTER FOR MEDICARE ANNUAL WELLNESS EXAM: Status: ACTIVE | Noted: 2023-05-25

## 2023-05-25 PROBLEM — B35.3 TINEA PEDIS OF BOTH FEET: Status: ACTIVE | Noted: 2023-05-25

## 2023-05-25 PROBLEM — L30.4 INTERTRIGO: Status: ACTIVE | Noted: 2023-05-25

## 2023-05-25 RX ORDER — ALBUTEROL SULFATE 90 UG/1
2 AEROSOL, METERED RESPIRATORY (INHALATION) EVERY 6 HOURS PRN
Qty: 54 G | Refills: 1 | Status: SHIPPED | OUTPATIENT
Start: 2023-05-25 | End: 2024-01-16

## 2023-05-25 NOTE — ASSESSMENT & PLAN NOTE
Patient has quite pendulous breasts and does not wear a bra. Intertrigo noted under L breast.   - Instructed patient to keep area as dry as possible  - Can use clotrimazole cream on rash

## 2023-05-25 NOTE — ASSESSMENT & PLAN NOTE
BMI 35.71 today. Associated with HTN, HLD, GERD.   - She plans to work on Atkins diet   - Encouraged healthy lifestyle changes with slow weight loss that is more sustainable long term

## 2023-05-25 NOTE — TELEPHONE ENCOUNTER
"Last Written Prescription Date:  3/1/23  Last Fill Quantity: 54 g,  # refills: 0   Last office visit provider:  5/23/23     Requested Prescriptions   Pending Prescriptions Disp Refills     albuterol (PROAIR HFA/PROVENTIL HFA/VENTOLIN HFA) 108 (90 Base) MCG/ACT inhaler 54 g 0     Sig: Inhale 2 puffs into the lungs every 6 hours as needed       Asthma Maintenance Inhalers - Anticholinergics Failed - 5/24/2023  2:28 PM        Failed - Recent (6 mo) or future (30 days) visit within the authorizing provider's specialty     Patient had office visit in the last 6 months or has a visit in the next 30 days with authorizing provider or within the authorizing provider's specialty.  See \"Patient Info\" tab in inbasket, or \"Choose Columns\" in Meds & Orders section of the refill encounter.            Passed - Patient is age 12 years or older        Passed - Asthma control assessment score within normal limits in last 6 months     Please review ACT score.           Passed - Medication is active on med list       Short-Acting Beta Agonist Inhalers Protocol  Failed - 5/24/2023  2:28 PM        Failed - Recent (6 mo) or future (30 days) visit within the authorizing provider's specialty     Patient had office visit in the last 6 months or has a visit in the next 30 days with authorizing provider or within the authorizing provider's specialty.  See \"Patient Info\" tab in inbasket, or \"Choose Columns\" in Meds & Orders section of the refill encounter.            Passed - Patient is age 12 or older        Passed - Asthma control assessment score within normal limits in last 6 months     Please review ACT score.           Passed - Medication is active on med list             MOIZ LEDBETTER RN 05/25/23 11:52 AM  "

## 2023-05-25 NOTE — ASSESSMENT & PLAN NOTE
Doing well today. Labs updated.   - Mammogram due 12/2023, DEXA repeat due 12/2023  - Cologuard ordered  - Declines COVID booster

## 2023-05-25 NOTE — TELEPHONE ENCOUNTER
Patient Returning Call    Reason for call:  Returning Call to Clinic    Information relayed to patient:  Results message from Dr. Cloud read.    Patient has additional questions:  No

## 2023-05-25 NOTE — TELEPHONE ENCOUNTER
Called patient and left a generic message for her to return phone call. Patient needs to be informed of the results/message below.

## 2023-05-25 NOTE — TELEPHONE ENCOUNTER
----- Message from Latricia Cloud MD sent at 5/25/2023 10:32 AM CDT -----  Please call patient with lab results and this message, thanks!  --  Your vitamin D was slightly low, I recommend taking vitamin D3 2000 international units daily, you can buy this over the counter at any pharmacy.     Your kidney function is normal. Your potassium was a little bit low - try to increase foods high in potassium (fruits, tomatoes, potatoes, etc.) into your diet. Your liver function was almost all normal. One the numbers (alkaline phosphatase) was just slightly high. Looking back at your chart it has been around this level for at least the last 3 years, so that stability is reassuring and nothing more to worry about right now. We will continue to monitor with labs and will look further if it gets a lot higher in the future.     Your cholesterol looks great on your current dose of lovastatin. Blood counts were also normal.

## 2023-05-25 NOTE — RESULT ENCOUNTER NOTE
Please call patient with lab results and this message, thanks!  --  Your vitamin D was slightly low, I recommend taking vitamin D3 2000 international units daily, you can buy this over the counter at any pharmacy.     Your kidney function is normal. Your potassium was a little bit low - try to increase foods high in potassium (fruits, tomatoes, potatoes, etc.) into your diet. Your liver function was almost all normal. One the numbers (alkaline phosphatase) was just slightly high. Looking back at your chart it has been around this level for at least the last 3 years, so that stability is reassuring and nothing more to worry about right now. We will continue to monitor with labs and will look further if it gets a lot higher in the future.     Your cholesterol looks great on your current dose of lovastatin. Blood counts were also normal.

## 2023-05-25 NOTE — ASSESSMENT & PLAN NOTE
Follows with psychiatry at [facility], current providers are Sonya Huffman NP and Marielena (therapist). They are managing medications.   - current regimen is fluoxetine 60 mg daily and clonazepam 0.5 mg BID

## 2023-08-18 DIAGNOSIS — I10 PRIMARY HYPERTENSION: ICD-10-CM

## 2023-08-18 RX ORDER — POTASSIUM CHLORIDE 750 MG/1
10 TABLET, EXTENDED RELEASE ORAL DAILY
Qty: 90 TABLET | Refills: 1 | Status: SHIPPED | OUTPATIENT
Start: 2023-08-18 | End: 2024-01-16

## 2023-08-18 NOTE — TELEPHONE ENCOUNTER
"Routing refill request to provider for review/approval because:  Labs out of range:  last K+ 5/23/23 3.2    Last Written Prescription Date:  1/17/23  Last Fill Quantity: 90,  # refills: 1   Last office visit provider:  5/23/23     Requested Prescriptions   Pending Prescriptions Disp Refills    potassium chloride ER (KLOR-CON M) 10 MEQ CR tablet 90 tablet 1     Sig: Take 1 tablet (10 mEq) by mouth daily       Potassium Supplements Protocol Failed - 8/18/2023  7:45 AM        Failed - Normal serum potassium in past 12 months     Recent Labs   Lab Test 05/23/23  1440   POTASSIUM 3.2*                    Passed - Recent (12 mo) or future (30 days) visit within the authorizing provider's department     Patient has had an office visit with the authorizing provider or a provider within the authorizing providers department within the previous 12 mos or has a future within next 30 days. See \"Patient Info\" tab in inbasket, or \"Choose Columns\" in Meds & Orders section of the refill encounter.              Passed - Medication is active on med list        Passed - Patient is age 18 or older             MOIZ LEDBETTER RN 08/18/23 9:28 AM  "

## 2023-11-16 ENCOUNTER — PATIENT OUTREACH (OUTPATIENT)
Dept: CARE COORDINATION | Facility: CLINIC | Age: 68
End: 2023-11-16
Payer: MEDICARE

## 2023-11-28 ENCOUNTER — TELEPHONE (OUTPATIENT)
Dept: PHARMACY | Facility: CLINIC | Age: 68
End: 2023-11-28
Payer: MEDICARE

## 2023-11-28 NOTE — TELEPHONE ENCOUNTER
Called and left message to schedule MTM follow up appointment.    Cindi Nieves PharmD  Medication Therapy Management Provider  Phone:[phone]  Pager: [phone]

## 2023-12-14 ENCOUNTER — PATIENT OUTREACH (OUTPATIENT)
Dept: CARE COORDINATION | Facility: CLINIC | Age: 68
End: 2023-12-14
Payer: MEDICARE

## 2024-01-16 DIAGNOSIS — R05.3 CHRONIC COUGH: ICD-10-CM

## 2024-01-16 DIAGNOSIS — I10 PRIMARY HYPERTENSION: ICD-10-CM

## 2024-01-16 RX ORDER — ALBUTEROL SULFATE 90 UG/1
2 AEROSOL, METERED RESPIRATORY (INHALATION) EVERY 6 HOURS PRN
Qty: 54 G | Refills: 1 | Status: SHIPPED | OUTPATIENT
Start: 2024-01-16

## 2024-01-16 RX ORDER — POTASSIUM CHLORIDE 750 MG/1
10 TABLET, EXTENDED RELEASE ORAL DAILY
Qty: 90 TABLET | Refills: 1 | Status: SHIPPED | OUTPATIENT
Start: 2024-01-16 | End: 2024-07-03

## 2024-02-07 ENCOUNTER — NURSE TRIAGE (OUTPATIENT)
Dept: NURSING | Facility: CLINIC | Age: 69
End: 2024-02-07
Payer: MEDICARE

## 2024-02-07 DIAGNOSIS — J45.40 MODERATE PERSISTENT ASTHMA WITHOUT COMPLICATION: Primary | ICD-10-CM

## 2024-02-07 NOTE — TELEPHONE ENCOUNTER
Pt calling because her insurance will no longer cover her Symbicort inhaler.     Says the alternatives covered are: Advair HFA, Breoellipta, Dulera.     Would like a message sent to her PCP to send in a new prescription. Pharmacy is updated in [employer].    Ashley Tovar, RN, BSN  [facility]   Triage Nurse Advisor      Reason for Disposition   Caller has NON-URGENT medicine question about med that PCP prescribed and triager unable to answer question    Additional Information   Negative: New-onset or worsening symptoms, see that protocol (e.g., diarrhea, runny nose, sore throat)   Negative: Medicine question not related to refill or renewal   Negative: Caller (e.g., patient or pharmacist) requesting information about a new medicine   Negative: Caller requesting information unrelated to medicine   Negative: Prescription refill request for ESSENTIAL medicine (i.e., likelihood of harm to patient if not taken) and triager unable to refill per department policy   Negative: Prescription not at pharmacy and was prescribed by PCP recently  (Exception: triager has access to EMR and prescription is recorded there. Go to Home Care and confirm for pharmacy.)   Negative: Pharmacy calling with prescription questions and triager unable to answer question   Negative: Caller requesting a CONTROLLED substance prescription refill (e.g., narcotics, ADHD medicines)   Negative: Prescription refill request for NON-ESSENTIAL medicine (i.e., no harm to patient if med not taken) and triager unable to refill per department policy    Protocols used: Medication Refill and Renewal Call-A-[place]

## 2024-02-08 NOTE — TELEPHONE ENCOUNTER
Patient Returning Call    Reason for call:  Returning Call to Clinic    Information relayed to patient:  Relayed message from Dr. Cloud    Patient has additional questions:  No

## 2024-02-20 ENCOUNTER — HOSPITAL ENCOUNTER (OUTPATIENT)
Dept: GENERAL RADIOLOGY | Facility: HOSPITAL | Age: 69
Discharge: HOME | End: 2024-02-20
Attending: INTERNAL MEDICINE | Admitting: INTERNAL MEDICINE
Payer: MEDICARE

## 2024-02-20 ENCOUNTER — ORDERS ONLY (AUTO-RELEASED) (OUTPATIENT)
Dept: INTERNAL MEDICINE | Facility: CLINIC | Age: 69
End: 2024-02-20

## 2024-02-20 ENCOUNTER — OFFICE VISIT (OUTPATIENT)
Dept: INTERNAL MEDICINE | Facility: CLINIC | Age: 69
End: 2024-02-20
Payer: MEDICARE

## 2024-02-20 VITALS
HEIGHT: 61 IN | OXYGEN SATURATION: 97 % | DIASTOLIC BLOOD PRESSURE: 62 MMHG | HEART RATE: 78 BPM | BODY MASS INDEX: 33.89 KG/M2 | WEIGHT: 179.5 LBS | SYSTOLIC BLOOD PRESSURE: 124 MMHG | TEMPERATURE: 98.2 F | RESPIRATION RATE: 16 BRPM

## 2024-02-20 DIAGNOSIS — F33.1 MODERATE EPISODE OF RECURRENT MAJOR DEPRESSIVE DISORDER (H): ICD-10-CM

## 2024-02-20 DIAGNOSIS — J45.40 MODERATE PERSISTENT ASTHMA WITHOUT COMPLICATION: ICD-10-CM

## 2024-02-20 DIAGNOSIS — E55.9 VITAMIN D DEFICIENCY: ICD-10-CM

## 2024-02-20 DIAGNOSIS — F41.1 ANXIETY STATE: ICD-10-CM

## 2024-02-20 DIAGNOSIS — F41.0 PANIC DISORDER WITHOUT AGORAPHOBIA: ICD-10-CM

## 2024-02-20 DIAGNOSIS — M25.551 HIP PAIN, RIGHT: Primary | ICD-10-CM

## 2024-02-20 DIAGNOSIS — M25.551 HIP PAIN, RIGHT: ICD-10-CM

## 2024-02-20 DIAGNOSIS — M85.89 OSTEOPENIA OF MULTIPLE SITES: ICD-10-CM

## 2024-02-20 DIAGNOSIS — H91.93 BILATERAL HEARING LOSS, UNSPECIFIED HEARING LOSS TYPE: ICD-10-CM

## 2024-02-20 DIAGNOSIS — I10 ESSENTIAL HYPERTENSION: ICD-10-CM

## 2024-02-20 DIAGNOSIS — K21.9 GASTROESOPHAGEAL REFLUX DISEASE WITHOUT ESOPHAGITIS: ICD-10-CM

## 2024-02-20 DIAGNOSIS — Z12.11 SCREENING FOR COLON CANCER: ICD-10-CM

## 2024-02-20 DIAGNOSIS — E78.00 PURE HYPERCHOLESTEROLEMIA: ICD-10-CM

## 2024-02-20 DIAGNOSIS — E66.01 MORBID OBESITY (H): ICD-10-CM

## 2024-02-20 DIAGNOSIS — Z12.31 VISIT FOR SCREENING MAMMOGRAM: ICD-10-CM

## 2024-02-20 PROBLEM — F32.9 MAJOR DEPRESSION: Status: RESOLVED | Noted: 2017-10-18 | Resolved: 2024-02-20

## 2024-02-20 LAB
ANION GAP SERPL CALCULATED.3IONS-SCNC: 13 MMOL/L (ref 7–15)
BUN SERPL-MCNC: 12.7 MG/DL (ref 8–23)
CALCIUM SERPL-MCNC: 10 MG/DL (ref 8.8–10.2)
CHLORIDE SERPL-SCNC: 95 MMOL/L (ref 98–107)
CREAT SERPL-MCNC: 0.88 MG/DL (ref 0.51–0.95)
DEPRECATED HCO3 PLAS-SCNC: 28 MMOL/L (ref 22–29)
EGFRCR SERPLBLD CKD-EPI 2021: 71 ML/MIN/1.73M2
GLUCOSE SERPL-MCNC: 89 MG/DL (ref 70–99)
POTASSIUM SERPL-SCNC: 3.8 MMOL/L (ref 3.4–5.3)
SODIUM SERPL-SCNC: 136 MMOL/L (ref 135–145)
VIT D+METAB SERPL-MCNC: 29 NG/ML (ref 20–50)

## 2024-02-20 PROCEDURE — 82306 VITAMIN D 25 HYDROXY: CPT | Performed by: INTERNAL MEDICINE

## 2024-02-20 PROCEDURE — 80048 BASIC METABOLIC PNL TOTAL CA: CPT | Performed by: INTERNAL MEDICINE

## 2024-02-20 PROCEDURE — 73502 X-RAY EXAM HIP UNI 2-3 VIEWS: CPT

## 2024-02-20 PROCEDURE — 99214 OFFICE O/P EST MOD 30 MIN: CPT | Performed by: INTERNAL MEDICINE

## 2024-02-20 PROCEDURE — 36415 COLL VENOUS BLD VENIPUNCTURE: CPT | Performed by: INTERNAL MEDICINE

## 2024-02-20 RX ORDER — RESPIRATORY SYNCYTIAL VIRUS VACCINE 120MCG/0.5
0.5 KIT INTRAMUSCULAR ONCE
Qty: 1 EACH | Refills: 0 | Status: CANCELLED | OUTPATIENT
Start: 2024-02-20 | End: 2024-02-20

## 2024-02-20 ASSESSMENT — ANXIETY QUESTIONNAIRES
8. IF YOU CHECKED OFF ANY PROBLEMS, HOW DIFFICULT HAVE THESE MADE IT FOR YOU TO DO YOUR WORK, TAKE CARE OF THINGS AT HOME, OR GET ALONG WITH OTHER PEOPLE?: NOT DIFFICULT AT ALL
6. BECOMING EASILY ANNOYED OR IRRITABLE: NOT AT ALL
5. BEING SO RESTLESS THAT IT IS HARD TO SIT STILL: NOT AT ALL
IF YOU CHECKED OFF ANY PROBLEMS ON THIS QUESTIONNAIRE, HOW DIFFICULT HAVE THESE PROBLEMS MADE IT FOR YOU TO DO YOUR WORK, TAKE CARE OF THINGS AT HOME, OR GET ALONG WITH OTHER PEOPLE: NOT DIFFICULT AT ALL
7. FEELING AFRAID AS IF SOMETHING AWFUL MIGHT HAPPEN: NOT AT ALL
4. TROUBLE RELAXING: NOT AT ALL
7. FEELING AFRAID AS IF SOMETHING AWFUL MIGHT HAPPEN: NOT AT ALL
GAD7 TOTAL SCORE: 0
GAD7 TOTAL SCORE: 0
3. WORRYING TOO MUCH ABOUT DIFFERENT THINGS: NOT AT ALL
1. FEELING NERVOUS, ANXIOUS, OR ON EDGE: NOT AT ALL
2. NOT BEING ABLE TO STOP OR CONTROL WORRYING: NOT AT ALL
GAD7 TOTAL SCORE: 0

## 2024-02-20 ASSESSMENT — ASTHMA QUESTIONNAIRES
QUESTION_1 LAST FOUR WEEKS HOW MUCH OF THE TIME DID YOUR ASTHMA KEEP YOU FROM GETTING AS MUCH DONE AT WORK, SCHOOL OR AT HOME: NONE OF THE TIME
QUESTION_5 LAST FOUR WEEKS HOW WOULD YOU RATE YOUR ASTHMA CONTROL: COMPLETELY CONTROLLED
QUESTION_3 LAST FOUR WEEKS HOW OFTEN DID YOUR ASTHMA SYMPTOMS (WHEEZING, COUGHING, SHORTNESS OF BREATH, CHEST TIGHTNESS OR PAIN) WAKE YOU UP AT NIGHT OR EARLIER THAN USUAL IN THE MORNING: NOT AT ALL
ACT_TOTALSCORE: 22
QUESTION_4 LAST FOUR WEEKS HOW OFTEN HAVE YOU USED YOUR RESCUE INHALER OR NEBULIZER MEDICATION (SUCH AS ALBUTEROL): ONE OR TWO TIMES PER DAY
ACT_TOTALSCORE: 22
QUESTION_2 LAST FOUR WEEKS HOW OFTEN HAVE YOU HAD SHORTNESS OF BREATH: NOT AT ALL

## 2024-02-20 NOTE — ASSESSMENT & PLAN NOTE
Follows with psychiatry at [facility], current providers are Sonya Huffman NP and Marielena (therapist). They are managing medications.   - current regimen is fluoxetine 60 mg daily and clonazepam 0.5 mg BID  - Discussed with patient that I would not feel comfortable taking over her clonazepam prescription and she should continue to follow with her psychiatrist

## 2024-02-20 NOTE — LETTER
February 21, 2024      Tootie Marin  [address]  [place] [address]        Dear ,    We are writing to inform you of your test results.    Your kidney function and electrolytes were all normal. Your vitamin D was at the lower end of normal. Please work on taking your current vitamin D supplement every single day.    Dr. Cloud     Resulted Orders   Vitamin D Deficiency   Result Value Ref Range    Vitamin D, Total (25-Hydroxy) 29 20 - 50 ng/mL      Comment:      optimum levels    Narrative    Season, race, dietary intake, and treatment affect the concentration of 25-hydroxy-Vitamin D. Values may decrease during winter months and increase during summer months.    Vitamin D determination is routinely performed by an immunoassay specific for 25 hydroxyvitamin D3.  If an individual is on vitamin D2(ergocalciferol) supplementation, please specify 25 OH vitamin D2 and D3 level determination by LCMSMS test VITD23.     Basic metabolic panel   Result Value Ref Range    Sodium 136 135 - 145 mmol/L      Comment:      Reference intervals for this test were updated on 09/26/2023 to more accurately reflect our healthy population. There may be differences in the flagging of prior results with similar values performed with this method. Interpretation of those prior results can be made in the context of the updated reference intervals.     Potassium 3.8 3.4 - 5.3 mmol/L    Chloride 95 (L) 98 - 107 mmol/L    Carbon Dioxide (CO2) 28 22 - 29 mmol/L    Anion Gap 13 7 - 15 mmol/L    Urea Nitrogen 12.7 8.0 - 23.0 mg/dL    Creatinine 0.88 0.51 - 0.95 mg/dL    GFR Estimate 71 >60 mL/min/1.73m2    Calcium 10.0 8.8 - 10.2 mg/dL    Glucose 89 70 - 99 mg/dL       If you have any questions or concerns, please call the clinic at the number listed above.       Sincerely,      Latricia Cloud MD

## 2024-02-20 NOTE — ASSESSMENT & PLAN NOTE
Last DEXA 12/2021 showed lowest T score of -2.4 and left femoral neck  - Repeat DEXA due, ordered today   - Encouraged her to work on remembering to take her calcium and vitamin D supplements daily along with increasing walking for weightbearing exercise

## 2024-02-20 NOTE — ASSESSMENT & PLAN NOTE
She has noticed worsening bilateral hearing over the last year.  Would like to get her hearing checked.  Audiology referral placed today.

## 2024-02-20 NOTE — ASSESSMENT & PLAN NOTE
Right hip pain that is worse with flexion and internal rotation is suspicious for osteoarthritis.  -Will start with right hip x-ray today  -Likely refer to PT, she is wary of any steroid injections

## 2024-02-20 NOTE — ASSESSMENT & PLAN NOTE
Was able to lose 10 pounds since her last visit with the Atkins diet.  Associated with HTN, HLD, GERD.   -Encouraged continued work on diet changes and increasing her exercise

## 2024-02-20 NOTE — ASSESSMENT & PLAN NOTE
Follows with psychiatry.   - current regimen is fluoxetine 60 mg daily  -Instructed her to continue following with psychiatry

## 2024-02-20 NOTE — ASSESSMENT & PLAN NOTE
Vitamin D has been low in the past, she sometimes forgets to take her supplement daily.  -Will recheck vitamin D level today

## 2024-02-20 NOTE — PATIENT INSTRUCTIONS
Work on remembering to take calcium and vitamin D daily.     Work on increasing walking, which helps with weight bearing exercise to strengthen your bones.     We will continue all your medications.     You will get a call to schedule mammogram and bone density scan - have these done before I see you in 6 months. We will get do right hip x-ray today.     You will get cologuard kit mailed to your house.

## 2024-02-20 NOTE — PROGRESS NOTES
Assessment & Plan   Problem List Items Addressed This Visit          Nervous and Auditory    Hip pain, right - Primary     Right hip pain that is worse with flexion and internal rotation is suspicious for osteoarthritis.  -Will start with right hip x-ray today  -Likely refer to PT, she is wary of any steroid injections         Relevant Orders    XR Hip Right 2-3 Views    Bilateral hearing loss, unspecified hearing loss type     She has noticed worsening bilateral hearing over the last year.  Would like to get her hearing checked.  Audiology referral placed today.         Relevant Orders    Adult Audiology  Referral       Respiratory    Asthma     Had to switch Symbicort to Dulera per insurance formulary.  Breathing is well-controlled.  -Continue Dulera and albuterol as needed            Digestive    Gastroesophageal reflux disease without esophagitis     Well controlled with lovastatin 20 mg daily         Morbid obesity (H)     Was able to lose 10 pounds since her last visit with the [employer] diet.  Associated with HTN, HLD, GERD.   -Encouraged continued work on diet changes and increasing her exercise         Vitamin D deficiency     Vitamin D has been low in the past, she sometimes forgets to take her supplement daily.  -Will recheck vitamin D level today         Relevant Orders    Vitamin D Deficiency       Endocrine    Hypercholesterolemia     Well controlled with lovastatin 20 mg daily.   - LDL 5/2023 92            Circulatory    Essential hypertension     Well controlled on current regimen   - Continue hydrochlorothiazide 25 mg daily         Relevant Orders    Basic metabolic panel       Musculoskeletal and Integumentary    Osteopenia of multiple sites     Last DEXA 12/2021 showed lowest T score of -2.4 and left femoral neck  - Repeat DEXA due, ordered today   - Encouraged her to work on remembering to take her calcium and vitamin D supplements daily along with increasing walking for weightbearing  "exercise         Relevant Orders    DX Hip/Pelvis/Spine       Behavioral    Panic disorder without agoraphobia     Follows with psychiatry at [facility], current providers are Sonya Huffman NP and Marielena (therapist). They are managing medications.   - current regimen is fluoxetine 60 mg daily and clonazepam 0.5 mg BID  - Discussed with patient that I would not feel comfortable taking over her clonazepam prescription and she should continue to follow with her psychiatrist         Moderate episode of recurrent major depressive disorder (H)     Follows with psychiatry.   - current regimen is fluoxetine 60 mg daily  -Instructed her to continue following with psychiatry            Other    Anxiety     Managed by psychiatry. Meds per panic disorder.           Other Visit Diagnoses       Screening for colon cancer        Relevant Orders    COLOGUARD([employer])    Visit for screening mammogram        Relevant Orders    MA Screen Bilateral w/Yasmani             Ordering of each unique test  Prescription drug management  I spent a total of 30 minutes on the day of the visit.   Time spent by me doing chart review, history and exam, documentation and further activities per the note     BMI  Estimated body mass index is 33.92 kg/m  as calculated from the following:    Height as of this encounter: 1.549 m (5' 1\").    Weight as of this encounter: 81.4 kg (179 lb 8 oz).   Weight management plan: Discussed healthy diet and exercise guidelines    FUTURE APPOINTMENTS:       - Follow-up visit in 6 months for AWYAMIL Chavez   Tootie is a 68 year old, presenting for the following health issues:  Follow Up        2/20/2024     1:40 PM   Additional Questions   Roomed by Roseann     History of Present Illness     Reason for visit: Follow up     Tootie is here for general follow-up.  She is overall doing okay.  We reviewed her history and current issues as below.    Anxiety / Gender dysphoria / Depression / Panic attacks: " "Following at [facility]. Sonya Huffman, NP and Marielena (therapist). Wonders about having me prescribe medications instead of going there as it is expensive. Current regimen is fluoxetine 60 mg daily and clonazepam 0.5 mg BID.      HLD: Lovastatin 20 mg. LDL 92 (5/2023).      GERD: Prilosec 20 mg daily.      HTN: HCTZ 25 mg. BP today 124/62.      Asthma: Regimen was Symbicort 160-4.5 twice daily, albuterol PRN. Had to switch controlled per insurance formularly - dulera sent in earlier this month. Working fine.      Osteopenia: Last DEXA 12/2021 showed lowest T score of -2.4 and left femoral neck.  She is taking vitamin D3. Had stopped for a little while but just restarted 2000 international units.  Was due for repeat DEXA in December.   - Working on remembering to take vitamins daily   - Stopped walking in last 2 months because she was doing more reading     R hip pain: Tells me R leg is shorter than the other, making hip hurt. Hip has been more painful recently, can't cross legs or walk without increasing pain.      Obesity: Weight down 20 lbs with Atkins diet. Then says she started reading more and using phone and has gained 10 lbs back.   Wt Readings from Last 4 Encounters:   02/20/24 81.4 kg (179 lb 8 oz)   05/23/23 85.7 kg (189 lb)   02/14/23 83.2 kg (183 lb 6.4 oz)   12/21/22 83.5 kg (184 lb)     She eats 0-1 servings of fruits and vegetables daily.She consumes 1 sweetened beverage(s) daily.She exercises with enough effort to increase her heart rate 9 or less minutes per day.  She exercises with enough effort to increase her heart rate 3 or less days per week.   She is taking medications regularly.        Review of Systems  Constitutional, neuro, ENT, endocrine, pulmonary, cardiac, gastrointestinal, genitourinary, musculoskeletal, integument and psychiatric systems are negative, except as otherwise noted.      Objective    /62   Pulse 78   Temp 98.2  F (36.8  C) (Oral)   Resp 16   Ht 1.549 m (5' 1\") "   Wt 81.4 kg (179 lb 8 oz)   SpO2 97%   BMI 33.92 kg/m    Body mass index is 33.92 kg/m .  Physical Exam   GENERAL: alert and no distress  EYES: Eyes grossly normal to inspection, PERRL and conjunctivae and sclerae normal  HENT: nose and mouth without ulcers or lesions  RESP: lungs clear to auscultation - no rales, rhonchi or wheezes  CV: regular rate and rhythm, normal S1 S2, no S3 or S4, no murmur, click or rub, no peripheral edema  ABDOMEN: soft, nontender  MS: R hip pain increased with flexion and internal rotation, TTP in lateral hip as well.   SKIN: no suspicious lesions or rashes  NEURO: Normal strength and tone, mentation intact and speech normal  PSYCH: mentation appears normal, affect normal/bright    No results found for this or any previous visit (from the past 24 hour(s)).        Signed Electronically by: Latricia Cloud MD

## 2024-02-20 NOTE — ASSESSMENT & PLAN NOTE
Had to switch Symbicort to Dulera per insurance formulary.  Breathing is well-controlled.  -Continue Dulera and albuterol as needed

## 2024-03-08 ENCOUNTER — OFFICE VISIT (OUTPATIENT)
Dept: FAMILY MEDICINE | Facility: CLINIC | Age: 69
End: 2024-03-08
Payer: MEDICARE

## 2024-03-08 VITALS
WEIGHT: 179 LBS | TEMPERATURE: 97.8 F | OXYGEN SATURATION: 98 % | SYSTOLIC BLOOD PRESSURE: 120 MMHG | HEART RATE: 58 BPM | RESPIRATION RATE: 16 BRPM | BODY MASS INDEX: 33.82 KG/M2 | DIASTOLIC BLOOD PRESSURE: 78 MMHG

## 2024-03-08 DIAGNOSIS — J45.40 MODERATE PERSISTENT ASTHMA WITHOUT COMPLICATION: ICD-10-CM

## 2024-03-08 DIAGNOSIS — J18.9 PNEUMONIA OF RIGHT LOWER LOBE DUE TO INFECTIOUS ORGANISM: Primary | ICD-10-CM

## 2024-03-08 PROCEDURE — 99214 OFFICE O/P EST MOD 30 MIN: CPT | Performed by: STUDENT IN AN ORGANIZED HEALTH CARE EDUCATION/TRAINING PROGRAM

## 2024-03-08 RX ORDER — GUAIFENESIN 600 MG/1
1200 TABLET, EXTENDED RELEASE ORAL 2 TIMES DAILY PRN
Qty: 30 TABLET | Refills: 0 | Status: SHIPPED | OUTPATIENT
Start: 2024-03-08 | End: 2024-08-20

## 2024-03-08 NOTE — PROGRESS NOTES
Assessment & Plan     Pneumonia of right lower lobe due to infectious organism  Moderate persistent asthma without complication  Patient presents to urgent care with congestion, fevers and productive cough with progressive fatigue. She has rhonchi in the RLL and given fevers and age >65 advised treating for pneumonia with Augmentin and Mucinex as needed to help cough up mucus. Advised to closely monitor and if symptoms are progressively worsening or not improving to return to clinic.   - amoxicillin-clavulanate (AUGMENTIN) 875-125 MG tablet  Dispense: 14 tablet; Refill: 0  - guaiFENesin (MUCINEX) 600 MG 12 hr tablet  Dispense: 30 tablet; Refill: 0       No follow-ups on file.    PEG Aguirre CNP  [facility]    Scott Sommers is a 68 year old female who presents to clinic today for the following health issues:  Chief Complaint   Patient presents with    Fatigue     Weakness, fatigue, chest congestion x 5 days      HPI    Patient Active Problem List   Diagnosis    Hypercholesterolemia    Anxiety    Panic disorder without agoraphobia    Ptosis Of Eyelid    Lipoma Of The Adipose Tissue    Menopause    Asthma    Essential hypertension    Gastroesophageal reflux disease without esophagitis    Chronic prescription benzodiazepine use    Moderate episode of recurrent major depressive disorder (H)    Morbid obesity (H)    Encounter for Medicare annual wellness exam    Tinea pedis of both feet    Intertrigo    Osteopenia of multiple sites    Hip pain, right    Bilateral hearing loss, unspecified hearing loss type    Vitamin D deficiency     Current Outpatient Medications   Medication    acetaminophen (TYLENOL) 500 MG tablet    albuterol (PROAIR HFA/PROVENTIL HFA/VENTOLIN HFA) 108 (90 Base) MCG/ACT inhaler    amoxicillin-clavulanate (AUGMENTIN) 875-125 MG tablet    chlorhexidine (PERIDEX) 0.12 % solution    CLONAZEPAM ODT PO    clotrimazole (LOTRIMIN) 1 % external cream    FLUoxetine  (PROZAC) 20 MG capsule    guaiFENesin (MUCINEX) 600 MG 12 hr tablet    hydrochlorothiazide (HYDRODIURIL) 25 MG tablet    lovastatin (MEVACOR) 20 MG tablet    mometasone-formoterol (DULERA) 200-5 MCG/ACT inhaler    omeprazole (PRILOSEC) 20 MG DR capsule    Pediatric Multiple Vitamins (MULTIVITAMIN CHILDRENS PO)    potassium chloride ER (KLOR-CON M) 10 MEQ CR tablet    vitamin C (ASCORBIC ACID) 100 MG tablet    vitamin D3 (CHOLECALCIFEROL) 50 mcg (2000 units) tablet     No current facility-administered medications for this visit.       Review of Systems  Constitutional, HEENT, cardiovascular, pulmonary, GI, , musculoskeletal, neuro, skin, endocrine and psych systems are negative, except as otherwise noted.      Objective    /78   Pulse 58   Temp 97.8  F (36.6  C)   Resp 16   Wt 81.2 kg (179 lb)   SpO2 98%   BMI 33.82 kg/m    Physical Exam   GENERAL: alert and no distress  EYES: Eyes grossly normal to inspection, PERRL and conjunctivae and sclerae normal  HENT: ear canals and TM's normal, nose and mouth without ulcers or lesions  NECK: no adenopathy, no asymmetry, masses, or scars  RESP: rhonchi R lower posterior, no wheezing, remainder of lungs clear  CV: regular rate and rhythm, normal S1 S2, no S3 or S4, no murmur, click or rub, no peripheral edema  MS: no gross musculoskeletal defects noted, no edema  SKIN: no suspicious lesions or rashes  NEURO: Normal strength and tone, mentation intact and speech normal

## 2024-03-08 NOTE — PATIENT INSTRUCTIONS
Start Augmentin for antibiotic to treat suspected pneumonia. Take Augmentin twice daily for 7 days.    Use Mucinex (guaifenesin) twice daily as needed to help cough up mucus.    Follow up if symptoms persist or worsen.      Carola Chaney, CNP

## 2024-03-18 DIAGNOSIS — E78.00 PURE HYPERCHOLESTEROLEMIA: ICD-10-CM

## 2024-03-18 DIAGNOSIS — I10 ESSENTIAL HYPERTENSION: ICD-10-CM

## 2024-03-18 RX ORDER — HYDROCHLOROTHIAZIDE 25 MG/1
25 TABLET ORAL DAILY
Qty: 90 TABLET | Refills: 0 | Status: SHIPPED | OUTPATIENT
Start: 2024-03-18 | End: 2024-05-21

## 2024-03-18 RX ORDER — LOVASTATIN 20 MG
20 TABLET ORAL AT BEDTIME
Qty: 90 TABLET | Refills: 0 | Status: SHIPPED | OUTPATIENT
Start: 2024-03-18 | End: 2024-06-03

## 2024-03-18 NOTE — TELEPHONE ENCOUNTER
Refill request for the napoleon'd up medications.       Marcelo Stanford Jr., CMA on 3/18/2024 at 12:38 PM

## 2024-03-29 NOTE — TELEPHONE ENCOUNTER
Incoming FAX Prescription Refill  Request    Hydrochlorothiazide 25 mg tablet    Escitalopram 20 mg tablets - Per Epic pop up window error message - medication has been discontinued for alternative unable to Eliazar Up           
"Routing refill request to provider for review/approval because:  A break in medication    Last Written Prescription Date:  4/18/2022  Last Fill Quantity: 90,  # refills: 0   Last office visit provider:  3/22/2023    Requested Prescriptions   Pending Prescriptions Disp Refills     hydrochlorothiazide (HYDRODIURIL) 25 MG tablet 90 tablet 0     Sig: Take 1 tablet (25 mg) by mouth daily       Diuretics (Including Combos) Protocol Passed - 3/31/2023  9:36 AM        Passed - Blood pressure under 140/90 in past 12 months     BP Readings from Last 3 Encounters:   02/14/23 120/70   12/21/22 134/62   11/28/22 (!) 166/79                 Passed - Recent (12 mo) or future (30 days) visit within the authorizing provider's specialty     Patient has had an office visit with the authorizing provider or a provider within the authorizing providers department within the previous 12 mos or has a future within next 30 days. See \"Patient Info\" tab in inbasket, or \"Choose Columns\" in Meds & Orders section of the refill encounter.              Passed - Medication is active on med list        Passed - Patient is age 18 or older        Passed - No active pregancy on record        Passed - Normal serum creatinine on file in past 12 months     Recent Labs   Lab Test 12/21/22  1219   CR 0.82              Passed - Normal serum potassium on file in past 12 months     Recent Labs   Lab Test 12/21/22  1219   POTASSIUM 3.4                    Passed - Normal serum sodium on file in past 12 months     Recent Labs   Lab Test 12/21/22  1219                 Passed - No positive pregnancy test in past 12 months             Teri Knapp RN 03/31/23 3:21 PM  "
n/a

## 2024-04-04 ENCOUNTER — TELEPHONE (OUTPATIENT)
Dept: INTERNAL MEDICINE | Facility: CLINIC | Age: 69
End: 2024-04-04

## 2024-04-04 ENCOUNTER — ANCILLARY PROCEDURE (OUTPATIENT)
Dept: MAMMOGRAPHY | Facility: CLINIC | Age: 69
End: 2024-04-04
Attending: INTERNAL MEDICINE
Payer: MEDICARE

## 2024-04-04 ENCOUNTER — ANCILLARY PROCEDURE (OUTPATIENT)
Dept: BONE DENSITY | Facility: CLINIC | Age: 69
End: 2024-04-04
Attending: INTERNAL MEDICINE
Payer: MEDICARE

## 2024-04-04 DIAGNOSIS — M85.89 OSTEOPENIA OF MULTIPLE SITES: ICD-10-CM

## 2024-04-04 DIAGNOSIS — Z12.31 VISIT FOR SCREENING MAMMOGRAM: ICD-10-CM

## 2024-04-04 PROCEDURE — 77080 DXA BONE DENSITY AXIAL: CPT | Mod: TC

## 2024-04-04 PROCEDURE — 77067 SCR MAMMO BI INCL CAD: CPT | Mod: TC | Performed by: RADIOLOGY

## 2024-04-04 NOTE — TELEPHONE ENCOUNTER
LMTCB    ----- Message from Latricia Cloud MD sent at 4/4/2024  3:39 PM CDT -----  Please call patient with results:     DEXA scan unfortunately shows progression to osteoporosis.I would recommend starting treatment with oral fosamax. If she would like to talk about this first, we can set up a virtual visit. Approval required okay.

## 2024-04-04 NOTE — LETTER
April 9, 2024      Tootie Marin  [address]  [place] [address]        Dear Tootie,     We attempted to reach you via phone. We would like to look at scheduling you an earlier appointment to review your DEXA results via video. Please call us at [phone] to schedule.       Sincerely,        Latricia Cloud MD

## 2024-04-04 NOTE — LETTER
"April 5, 2024      Tootie Marin  [address]  [place] [address]        Dear ,    We are writing to inform you of your test results.    \"DEXA scan unfortunately shows progression to osteoporosis.I would recommend starting treatment with oral fosamax. If she would like to talk about this first, we can set up a virtual visit.\" Dr. Cloud.     Please call the clinic at [phone] if you are agreeable to start fosamax or would like an appointment to discuss first.     Resulted Orders   DX Hip/Pelvis/Spine    Narrative    EXAM: DX TBS AXIAL  LOCATION: [facility]  DATE: 4/4/2024    INDICATION: BMD screening, follow-up exam.  DEMOGRAPHICS: Age- 68 years. Gender- Female. Menopausal status- Postmenopausal.  COMPARISON: 12/16/2021.  TECHNIQUE: Dual-energy x-ray absorptiometry (DXA) performed with routine technique. Trabecular bone score (TBS) analysis performed.    FINDINGS:    DXA RESULTS  -Lumbar Spine: L1-L4: BMD: 1.119 g/cm2. T-score: -0.5. Z-score: 0.6. Degenerative change may artifactually increase BMD.  -RIGHT Hip Total: BMD: 0.732 g/cm2. T-score: -2.2. Z-score: -1.2.  -RIGHT Hip Femoral neck: BMD: 0.739 g/cm2. T-score: -2.1. Z-score: -0.9.  -LEFT Hip Total: BMD: 0.779 g/cm2. T-score: -1.8. Z-score: -0.8.  -LEFT Hip Femoral neck: BMD: 0.658 g/cm2. T-score: -2.7. Z-score: -1.5.    WHO T-SCORE CRITERIA  -Normal: T score at or above -1 SD  -Osteopenia: T score between -1 and -2.5 SD  -Osteoporosis: T score at or below -2.5 SD    The World Health Organization (WHO) criteria is applicable to perimenopausal females, postmenopausal females, and men aged 50 years or older.    TBS RESULTS  -Lumbar Spine L1-L4: TBS: 1.114. TBS T-score: -3.7.TBS Z-score: -1.6.    The TBS is a DXA derived measurement for fracture risk assessment, and reflects the structural condition of the bone microarchitecture. It can be used to adjust WHO Fracture Risk Assessment Tool (FRAX) probability of " fracture in postmenopausal women and   older men. The calculated probabilities of fracture have been shown to be more accurate when computed with the TBS.    INTERVAL CHANGE  -There has been a 1.8% increase in lumbar spine BMD. This indicates significant change based on 95% confidence interval.  -There has been a 7.8% decrease in the right hip BMD. This indicates significant change based on 95% confidence interval.    FRACTURE RISK  -The FRAX risk calculator is not applicable due to osteoporosis.    RECOMMENDATIONS  The patient's BMD is consistent with osteoporosis, and he/she is at increased fracture risk. If not currently being treated for low BMD, this would merit treatment according to the Bone Health and Osteoporosis Foundation.      Impression    IMPRESSION: OSTEOPOROSIS. T score meets the WHO criteria for osteoporosis at one or more measured sites. The risk of osteoporotic fracture increases approximately two-fold for each standard deviation decrease in T-score.        If you have any questions or concerns, please call the clinic at the number listed above.       Sincerely,  [facility]

## 2024-04-05 ENCOUNTER — NURSE TRIAGE (OUTPATIENT)
Dept: NURSING | Facility: CLINIC | Age: 69
End: 2024-04-05
Payer: MEDICARE

## 2024-04-05 NOTE — TELEPHONE ENCOUNTER
Patient returned call and message relayed to patient regarding DEXA scan results.  Patient would like to schedule a VV with Dr. Cloud to talk about the plan going forward.  Patient was transferred to the  to make the VV as suggested.  No triage.

## 2024-04-05 NOTE — TELEPHONE ENCOUNTER
Patient is returning a call to the clinic.  No triage.  See other telephone encounter for more information.    Reason for Disposition   Health information question, no triage required and triager able to answer question    Additional Information   Negative: Nursing judgment   Negative: Nursing judgment   Negative: Nursing judgment   Negative: Requesting lab results and adult stable (no new symptoms, not worsening)   Negative: Requesting referral to a specialist   Negative: Questions about durable medical equipment ordered and triager unable to answer   Negative: Requesting regular office appointment and adult stable (no new symptoms, not worsening)    Protocols used: Information Only Call - No Triage-A-OH

## 2024-04-08 NOTE — TELEPHONE ENCOUNTER
LMTCB      When pt calls back, please assist in scheduling VV to go over DEXA results and future plans. See PCP's note below for more information.       Thank you,  Marcelo Stanford Jr., CMA on 4/8/2024 at 3:00 PM

## 2024-04-09 NOTE — TELEPHONE ENCOUNTER
Left message for patient to  call back. Please relay message from Dr. Cloud. Ok to override approval slot.     Multiple attempts-letter sent via mail.

## 2024-05-21 ENCOUNTER — VIRTUAL VISIT (OUTPATIENT)
Dept: INTERNAL MEDICINE | Facility: CLINIC | Age: 69
End: 2024-05-21
Payer: MEDICARE

## 2024-05-21 DIAGNOSIS — I10 ESSENTIAL HYPERTENSION: ICD-10-CM

## 2024-05-21 DIAGNOSIS — M81.0 AGE-RELATED OSTEOPOROSIS WITHOUT CURRENT PATHOLOGICAL FRACTURE: Primary | ICD-10-CM

## 2024-05-21 DIAGNOSIS — K21.9 GASTROESOPHAGEAL REFLUX DISEASE WITHOUT ESOPHAGITIS: ICD-10-CM

## 2024-05-21 PROCEDURE — 99443 PR PHYSICIAN TELEPHONE EVALUATION 21-30 MIN: CPT | Mod: 93 | Performed by: INTERNAL MEDICINE

## 2024-05-21 RX ORDER — HYDROCHLOROTHIAZIDE 25 MG/1
25 TABLET ORAL DAILY
Qty: 90 TABLET | Refills: 1 | Status: SHIPPED | OUTPATIENT
Start: 2024-05-21 | End: 2024-08-20

## 2024-05-21 NOTE — PROGRESS NOTES
Tootie is a 68 year old who is being evaluated via a billable telephone visit.    What phone number would you like to be contacted at? [phone]  How would you like to obtain your AVS? Mail a copy  Originating Location (pt. Location): Home    Distant Location (provider location):  On-site    Assessment & Plan   Problem List Items Addressed This Visit          Digestive    Gastroesophageal reflux disease without esophagitis     Well controlled with omeprazole 20 mg daily. Refill provided today.          Relevant Medications    omeprazole (PRILOSEC) 20 MG DR capsule       Circulatory    Essential hypertension     Was well-controlled at recent visit.  Will continue hydrochlorothiazide 25 mg daily, refill provided today.         Relevant Medications    hydrochlorothiazide (HYDRODIURIL) 25 MG tablet       Musculoskeletal and Integumentary    Age-related osteoporosis without current pathological fracture - Primary     New diagnosis on DEXA scan 4/2024 with lowest T-score of -2.7 in her left femoral neck.  We had a long discussion regarding osteoporosis and treatment options today.  We reviewed bisphosphonates, their mechanism of action, common side effects and black box warnings for jaw osteonecrosis and atypical femoral fracture.  We also reviewed optimal calcium and vitamin D supplements to take and importance of weightbearing exercise.  -After discussion, she opts to not use bisphosphonate right now  -Instructed she needs to be getting 1200 mg daily of calcium and at least [id number] international units vitamin D3  -Also instructed to increase weight bearing exercise in the form of walking  -Will plan to repeat DEXA in one year without treatment               FUTURE APPOINTMENTS:       - Follow-up visit in August as scheduled      Subjective   Tootie is a 68 year old, presenting for the following health issues:  Dexa Results        5/21/2024     5:07 PM   Additional Questions   Roomed by SHARMIN Mac  "    Here to discuss new diagnosis of osteoporosis.     DEXA 4/2024 new osteoporosis with lowest T score of -2.7 in her L hip femoral neck.     Not currently taking calcium. Has 400 mg supplements but not taking consistently. Has D3 2000 international units daily, taking most days.     Not getting a lot of walking.     No hx of kidney stones.     Unsure about treatment.       Review of Systems  Constitutional, neuro, ENT, endocrine, pulmonary, cardiac, gastrointestinal, genitourinary, musculoskeletal, integument and psychiatric systems are negative, except as otherwise noted.      Objective    Vitals - Patient Reported  Weight (Patient Reported): 83.9 kg (185 lb)  Height (Patient Reported): 154.9 cm (5' 1\")  BMI (Based on Pt Reported Ht/Wt): 34.96  Pain Score: Moderate Pain (4)  Pain Loc: Hip      Vitals:  No vitals were obtained today due to virtual visit.    Physical Exam   General: Alert and no distress //Respiratory: No audible wheeze, cough, or shortness of breath // Psychiatric:  Appropriate affect, tone, and pace of words      DEXA Scan 4/4/24  EXAM: DX TBS AXIAL  LOCATION: [facility]  DATE: 4/4/2024     INDICATION: BMD screening, follow-up exam.  DEMOGRAPHICS: Age- 68 years. Gender- Female. Menopausal status- Postmenopausal.  COMPARISON: 12/16/2021.  TECHNIQUE: Dual-energy x-ray absorptiometry (DXA) performed with routine technique. Trabecular bone score (TBS) analysis performed.     FINDINGS:     DXA RESULTS  -Lumbar Spine: L1-L4: BMD: 1.119 g/cm2. T-score: -0.5. Z-score: 0.6. Degenerative change may artifactually increase BMD.  -RIGHT Hip Total: BMD: 0.732 g/cm2. T-score: -2.2. Z-score: -1.2.  -RIGHT Hip Femoral neck: BMD: 0.739 g/cm2. T-score: -2.1. Z-score: -0.9.  -LEFT Hip Total: BMD: 0.779 g/cm2. T-score: -1.8. Z-score: -0.8.  -LEFT Hip Femoral neck: BMD: 0.658 g/cm2. T-score: -2.7. Z-score: -1.5.     WHO T-SCORE CRITERIA  -Normal: T score at or above -1 SD  -Osteopenia: T score " between -1 and -2.5 SD  -Osteoporosis: T score at or below -2.5 SD     The World Health Organization (WHO) criteria is applicable to perimenopausal females, postmenopausal females, and men aged 50 years or older.     TBS RESULTS  -Lumbar Spine L1-L4: TBS: 1.114. TBS T-score: -3.7.TBS Z-score: -1.6.     The TBS is a DXA derived measurement for fracture risk assessment, and reflects the structural condition of the bone microarchitecture. It can be used to adjust WHO Fracture Risk Assessment Tool (FRAX) probability of fracture in postmenopausal women and   older men. The calculated probabilities of fracture have been shown to be more accurate when computed with the TBS.     INTERVAL CHANGE  -There has been a 1.8% increase in lumbar spine BMD. This indicates significant change based on 95% confidence interval.  -There has been a 7.8% decrease in the right hip BMD. This indicates significant change based on 95% confidence interval.     FRACTURE RISK  -The FRAX risk calculator is not applicable due to osteoporosis.     RECOMMENDATIONS  The patient's BMD is consistent with osteoporosis, and he/she is at increased fracture risk. If not currently being treated for low BMD, this would merit treatment according to the Bone Health and Osteoporosis Foundation.                                                                      IMPRESSION: OSTEOPOROSIS. T score meets the WHO criteria for osteoporosis at one or more measured sites. The risk of osteoporotic fracture increases approximately two-fold for each standard deviation decrease in T-score.         Phone call duration: 23 minutes  Signed Electronically by: Latricia Cloud MD

## 2024-05-21 NOTE — PATIENT INSTRUCTIONS
Need to be taking 1200 mg daily of calcium and 2000 international units vitamin D3.     [email or web address].foundation/sites/iofbonehealth/files/[email or web address]/Calcium%20Rich%20Food%20List_3007.pdf

## 2024-05-22 PROBLEM — M81.0 AGE-RELATED OSTEOPOROSIS WITHOUT CURRENT PATHOLOGICAL FRACTURE: Status: ACTIVE | Noted: 2024-05-22

## 2024-05-22 NOTE — ASSESSMENT & PLAN NOTE
New diagnosis on DEXA scan 4/2024 with lowest T-score of -2.7 in her left femoral neck.  We had a long discussion regarding osteoporosis and treatment options today.  We reviewed bisphosphonates, their mechanism of action, common side effects and black box warnings for jaw osteonecrosis and atypical femoral fracture.  We also reviewed optimal calcium and vitamin D supplements to take and importance of weightbearing exercise.  -After discussion, she opts to not use bisphosphonate right now  -Instructed she needs to be getting 1200 mg daily of calcium and at least 0234-4500 international units vitamin D3  -Also instructed to increase weight bearing exercise in the form of walking  -Will plan to repeat DEXA in one year without treatment

## 2024-05-22 NOTE — ASSESSMENT & PLAN NOTE
Was well-controlled at recent visit.  Will continue hydrochlorothiazide 25 mg daily, refill provided today.

## 2024-06-03 DIAGNOSIS — E78.00 PURE HYPERCHOLESTEROLEMIA: ICD-10-CM

## 2024-06-03 RX ORDER — LOVASTATIN 20 MG
20 TABLET ORAL AT BEDTIME
Qty: 90 TABLET | Refills: 0 | Status: SHIPPED | OUTPATIENT
Start: 2024-06-03 | End: 2024-08-20

## 2024-06-03 NOTE — TELEPHONE ENCOUNTER
Medication Question or Refill        What medication are you calling about (include dose and sig)?:     lovastatin (MEVACOR) 20 MG tablet 90 tablet 0 3/18/2024 -- No   Sig - Route: Take 1 tablet (20 mg) by mouth at bedtime - Oral   Sent to pharmacy as: Lovastatin 20 MG Oral Tablet (MEVACOR)       Preferred Pharmacy:  [id number] - [place], [place] - [address] [place] [address] & [address] [place] [address]  Phone: [phone] Fax: [phone]    Controlled Substance Agreement on file:   CSA -- Patient Level:    CSA: None found at the patient level.       Who prescribed the medication?: PCP    Do you need a refill? Yes    Do you have any questions or concerns?  No      Okay to leave a detailed message?: No at Cell number on file:    Telephone Information:   Mobile [phone]

## 2024-07-03 DIAGNOSIS — B35.3 TINEA PEDIS OF BOTH FEET: ICD-10-CM

## 2024-07-03 DIAGNOSIS — I10 PRIMARY HYPERTENSION: ICD-10-CM

## 2024-07-03 RX ORDER — POTASSIUM CHLORIDE 750 MG/1
10 TABLET, EXTENDED RELEASE ORAL DAILY
Qty: 90 TABLET | Refills: 1 | Status: SHIPPED | OUTPATIENT
Start: 2024-07-03 | End: 2024-08-20

## 2024-07-04 RX ORDER — CLOTRIMAZOLE 1 %
CREAM (GRAM) TOPICAL 2 TIMES DAILY
Qty: 113 G | Refills: 1 | Status: SHIPPED | OUTPATIENT
Start: 2024-07-04

## 2024-07-22 DIAGNOSIS — J45.40 MODERATE PERSISTENT ASTHMA WITHOUT COMPLICATION: ICD-10-CM

## 2024-08-14 ENCOUNTER — TELEPHONE (OUTPATIENT)
Dept: PHARMACY | Facility: CLINIC | Age: 69
End: 2024-08-14
Payer: MEDICARE

## 2024-08-14 NOTE — TELEPHONE ENCOUNTER
Patient called me to ask about a previous med she was on. Currently there is a clonazepam shortage and so she wanted to retry something with her psychiatrist that we had tried in the past- wondering the name of the one she was taking three times daily to help reduce her need for clonazepam.    In chart review, it looks like the most likely med is hydroxyzine. I gave her that info and encouraged her to follow up with psych. I did let her know I'd be happy to meet with her again in the future if she wishes.    Cindi Nieves, GaviotaD, BannerCP  Medication Therapy Management Provider  Phone: [phone]  [email or web address]

## 2024-08-20 ENCOUNTER — OFFICE VISIT (OUTPATIENT)
Dept: INTERNAL MEDICINE | Facility: CLINIC | Age: 69
End: 2024-08-20
Payer: MEDICARE

## 2024-08-20 ENCOUNTER — ORDERS ONLY (AUTO-RELEASED) (OUTPATIENT)
Dept: INTERNAL MEDICINE | Facility: CLINIC | Age: 69
End: 2024-08-20

## 2024-08-20 VITALS
SYSTOLIC BLOOD PRESSURE: 114 MMHG | WEIGHT: 186 LBS | TEMPERATURE: 98.9 F | BODY MASS INDEX: 35.12 KG/M2 | HEIGHT: 61 IN | DIASTOLIC BLOOD PRESSURE: 66 MMHG | HEART RATE: 71 BPM | OXYGEN SATURATION: 97 % | RESPIRATION RATE: 16 BRPM

## 2024-08-20 DIAGNOSIS — Z12.11 SCREEN FOR COLON CANCER: ICD-10-CM

## 2024-08-20 DIAGNOSIS — I10 ESSENTIAL HYPERTENSION: ICD-10-CM

## 2024-08-20 DIAGNOSIS — Z00.00 ENCOUNTER FOR MEDICARE ANNUAL WELLNESS EXAM: Primary | ICD-10-CM

## 2024-08-20 DIAGNOSIS — M16.11 PRIMARY OSTEOARTHRITIS OF RIGHT HIP: ICD-10-CM

## 2024-08-20 DIAGNOSIS — R73.09 ELEVATED GLUCOSE: ICD-10-CM

## 2024-08-20 DIAGNOSIS — M81.0 AGE-RELATED OSTEOPOROSIS WITHOUT CURRENT PATHOLOGICAL FRACTURE: ICD-10-CM

## 2024-08-20 DIAGNOSIS — K59.04 CHRONIC IDIOPATHIC CONSTIPATION: ICD-10-CM

## 2024-08-20 DIAGNOSIS — F33.1 MODERATE EPISODE OF RECURRENT MAJOR DEPRESSIVE DISORDER (H): ICD-10-CM

## 2024-08-20 DIAGNOSIS — J45.40 MODERATE PERSISTENT ASTHMA WITHOUT COMPLICATION: ICD-10-CM

## 2024-08-20 DIAGNOSIS — E66.01 MORBID OBESITY (H): ICD-10-CM

## 2024-08-20 DIAGNOSIS — K21.9 GASTROESOPHAGEAL REFLUX DISEASE WITHOUT ESOPHAGITIS: ICD-10-CM

## 2024-08-20 DIAGNOSIS — F41.1 ANXIETY STATE: ICD-10-CM

## 2024-08-20 DIAGNOSIS — E78.00 PURE HYPERCHOLESTEROLEMIA: ICD-10-CM

## 2024-08-20 DIAGNOSIS — F41.0 PANIC DISORDER WITHOUT AGORAPHOBIA: ICD-10-CM

## 2024-08-20 PROBLEM — M85.89 OSTEOPENIA OF MULTIPLE SITES: Status: RESOLVED | Noted: 2024-02-20 | Resolved: 2024-08-20

## 2024-08-20 LAB
ALBUMIN SERPL BCG-MCNC: 4.4 G/DL (ref 3.5–5.2)
ALP SERPL-CCNC: 121 U/L (ref 40–150)
ALT SERPL W P-5'-P-CCNC: 61 U/L (ref 0–50)
ANION GAP SERPL CALCULATED.3IONS-SCNC: 13 MMOL/L (ref 7–15)
AST SERPL W P-5'-P-CCNC: 32 U/L (ref 0–45)
BILIRUB SERPL-MCNC: 0.5 MG/DL
BUN SERPL-MCNC: 12.3 MG/DL (ref 8–23)
CALCIUM SERPL-MCNC: 9.9 MG/DL (ref 8.8–10.4)
CHLORIDE SERPL-SCNC: 97 MMOL/L (ref 98–107)
CHOLEST SERPL-MCNC: 188 MG/DL
CREAT SERPL-MCNC: 0.84 MG/DL (ref 0.51–0.95)
EGFRCR SERPLBLD CKD-EPI 2021: 75 ML/MIN/1.73M2
ERYTHROCYTE [DISTWIDTH] IN BLOOD BY AUTOMATED COUNT: 13.6 % (ref 10–15)
FASTING STATUS PATIENT QL REPORTED: NO
FASTING STATUS PATIENT QL REPORTED: NO
GLUCOSE SERPL-MCNC: 89 MG/DL (ref 70–99)
HBA1C MFR BLD: 5.6 % (ref 0–5.6)
HCO3 SERPL-SCNC: 27 MMOL/L (ref 22–29)
HCT VFR BLD AUTO: 40.1 % (ref 35–47)
HDLC SERPL-MCNC: 71 MG/DL
HGB BLD-MCNC: 13.1 G/DL (ref 11.7–15.7)
LDLC SERPL CALC-MCNC: 106 MG/DL
MCH RBC QN AUTO: 29.7 PG (ref 26.5–33)
MCHC RBC AUTO-ENTMCNC: 32.7 G/DL (ref 31.5–36.5)
MCV RBC AUTO: 91 FL (ref 78–100)
NONHDLC SERPL-MCNC: 117 MG/DL
PLATELET # BLD AUTO: 391 10E3/UL (ref 150–450)
POTASSIUM SERPL-SCNC: 3.6 MMOL/L (ref 3.4–5.3)
PROT SERPL-MCNC: 7.4 G/DL (ref 6.4–8.3)
RBC # BLD AUTO: 4.41 10E6/UL (ref 3.8–5.2)
SODIUM SERPL-SCNC: 137 MMOL/L (ref 135–145)
TRIGL SERPL-MCNC: 56 MG/DL
VIT D+METAB SERPL-MCNC: 27 NG/ML (ref 20–50)
WBC # BLD AUTO: 11.3 10E3/UL (ref 4–11)

## 2024-08-20 PROCEDURE — 83036 HEMOGLOBIN GLYCOSYLATED A1C: CPT | Performed by: INTERNAL MEDICINE

## 2024-08-20 PROCEDURE — 82306 VITAMIN D 25 HYDROXY: CPT | Performed by: INTERNAL MEDICINE

## 2024-08-20 PROCEDURE — 80061 LIPID PANEL: CPT | Performed by: INTERNAL MEDICINE

## 2024-08-20 PROCEDURE — 99214 OFFICE O/P EST MOD 30 MIN: CPT | Mod: 25 | Performed by: INTERNAL MEDICINE

## 2024-08-20 PROCEDURE — 36415 COLL VENOUS BLD VENIPUNCTURE: CPT | Performed by: INTERNAL MEDICINE

## 2024-08-20 PROCEDURE — 80053 COMPREHEN METABOLIC PANEL: CPT | Performed by: INTERNAL MEDICINE

## 2024-08-20 PROCEDURE — 85027 COMPLETE CBC AUTOMATED: CPT | Performed by: INTERNAL MEDICINE

## 2024-08-20 PROCEDURE — G0439 PPPS, SUBSEQ VISIT: HCPCS | Performed by: INTERNAL MEDICINE

## 2024-08-20 RX ORDER — LOVASTATIN 20 MG
20 TABLET ORAL AT BEDTIME
Qty: 90 TABLET | Refills: 3 | Status: SHIPPED | OUTPATIENT
Start: 2024-08-20

## 2024-08-20 RX ORDER — OMEGA-3 FATTY ACIDS/FISH OIL 300-1000MG
200 CAPSULE ORAL EVERY 4 HOURS PRN
COMMUNITY

## 2024-08-20 RX ORDER — HYDROCHLOROTHIAZIDE 25 MG/1
25 TABLET ORAL DAILY
Qty: 90 TABLET | Refills: 3 | Status: SHIPPED | OUTPATIENT
Start: 2024-08-20

## 2024-08-20 RX ORDER — POTASSIUM CHLORIDE 750 MG/1
10 TABLET, EXTENDED RELEASE ORAL DAILY
Qty: 90 TABLET | Refills: 3 | Status: SHIPPED | OUTPATIENT
Start: 2024-08-20

## 2024-08-20 RX ORDER — POLYETHYLENE GLYCOL 3350 17 G/17G
1 POWDER, FOR SOLUTION ORAL DAILY PRN
Qty: 850 G | Refills: 3 | Status: SHIPPED | OUTPATIENT
Start: 2024-08-20

## 2024-08-20 SDOH — HEALTH STABILITY: PHYSICAL HEALTH: ON AVERAGE, HOW MANY MINUTES DO YOU ENGAGE IN EXERCISE AT THIS LEVEL?: 60 MIN

## 2024-08-20 SDOH — HEALTH STABILITY: PHYSICAL HEALTH: ON AVERAGE, HOW MANY DAYS PER WEEK DO YOU ENGAGE IN MODERATE TO STRENUOUS EXERCISE (LIKE A BRISK WALK)?: 2 DAYS

## 2024-08-20 ASSESSMENT — ANXIETY QUESTIONNAIRES
8. IF YOU CHECKED OFF ANY PROBLEMS, HOW DIFFICULT HAVE THESE MADE IT FOR YOU TO DO YOUR WORK, TAKE CARE OF THINGS AT HOME, OR GET ALONG WITH OTHER PEOPLE?: NOT DIFFICULT AT ALL
7. FEELING AFRAID AS IF SOMETHING AWFUL MIGHT HAPPEN: NOT AT ALL
1. FEELING NERVOUS, ANXIOUS, OR ON EDGE: NOT AT ALL
GAD7 TOTAL SCORE: 0
3. WORRYING TOO MUCH ABOUT DIFFERENT THINGS: NOT AT ALL
4. TROUBLE RELAXING: NOT AT ALL
7. FEELING AFRAID AS IF SOMETHING AWFUL MIGHT HAPPEN: NOT AT ALL
GAD7 TOTAL SCORE: 0
5. BEING SO RESTLESS THAT IT IS HARD TO SIT STILL: NOT AT ALL
6. BECOMING EASILY ANNOYED OR IRRITABLE: NOT AT ALL
GAD7 TOTAL SCORE: 0
2. NOT BEING ABLE TO STOP OR CONTROL WORRYING: NOT AT ALL
IF YOU CHECKED OFF ANY PROBLEMS ON THIS QUESTIONNAIRE, HOW DIFFICULT HAVE THESE PROBLEMS MADE IT FOR YOU TO DO YOUR WORK, TAKE CARE OF THINGS AT HOME, OR GET ALONG WITH OTHER PEOPLE: NOT DIFFICULT AT ALL

## 2024-08-20 ASSESSMENT — ASTHMA QUESTIONNAIRES
QUESTION_2 LAST FOUR WEEKS HOW OFTEN HAVE YOU HAD SHORTNESS OF BREATH: NOT AT ALL
QUESTION_5 LAST FOUR WEEKS HOW WOULD YOU RATE YOUR ASTHMA CONTROL: COMPLETELY CONTROLLED
QUESTION_3 LAST FOUR WEEKS HOW OFTEN DID YOUR ASTHMA SYMPTOMS (WHEEZING, COUGHING, SHORTNESS OF BREATH, CHEST TIGHTNESS OR PAIN) WAKE YOU UP AT NIGHT OR EARLIER THAN USUAL IN THE MORNING: NOT AT ALL
ACT_TOTALSCORE: 22
ACT_TOTALSCORE: 22
QUESTION_1 LAST FOUR WEEKS HOW MUCH OF THE TIME DID YOUR ASTHMA KEEP YOU FROM GETTING AS MUCH DONE AT WORK, SCHOOL OR AT HOME: NONE OF THE TIME
QUESTION_4 LAST FOUR WEEKS HOW OFTEN HAVE YOU USED YOUR RESCUE INHALER OR NEBULIZER MEDICATION (SUCH AS ALBUTEROL): ONE OR TWO TIMES PER DAY

## 2024-08-20 ASSESSMENT — SOCIAL DETERMINANTS OF HEALTH (SDOH): HOW OFTEN DO YOU GET TOGETHER WITH FRIENDS OR RELATIVES?: MORE THAN THREE TIMES A WEEK

## 2024-08-20 NOTE — ASSESSMENT & PLAN NOTE
On bowel movement every 4 to 5 days describes as [place] stool type I or II.  -Discussed importance of increasing hydration, increasing movement and fiber in diet  -Recommend adding MiraLAX, can start with every other day, increase to daily if not having BM at least every 2 days

## 2024-08-20 NOTE — ASSESSMENT & PLAN NOTE
New diagnosis on DEXA scan 4/2024 with lowest T-score of -2.7 in her left femoral neck.  We had a long discussion regarding osteoporosis and treatment options in May.   -Still opts against bisphosphonate right now  -Instructed she needs to be getting 1200 mg daily of calcium and at least 1427-8891 international units vitamin D3  -Also instructed to increase weight bearing exercise in the form of walking  -Will plan to repeat DEXA in one year without treatment

## 2024-08-20 NOTE — ASSESSMENT & PLAN NOTE
Follows with psychiatry at [facility], current providers are Sonya Huffman NP and Marielena (therapist). They are managing medications.   - current regimen is fluoxetine 80 mg daily and clonazepam 0.5 mg TID PRN

## 2024-08-20 NOTE — PATIENT INSTRUCTIONS
Would recommend shingles, RSV, COVID and flu vaccine at the pharmacy.     Please let us know if you don't receive the cologuard box.     For constipation: increasing exercise, getting enough water, more fiber in diet.   - Add miralax one capful every other day in 8 oz of water. If not having bowel movements at least every 2 days, increase to daily.     Patient Education   Preventive Care Advice   This is general advice given by our system to help you stay healthy. However, your care team may have specific advice just for you. Please talk to your care team about your preventive care needs.  Nutrition  Eat 5 or more servings of fruits and vegetables each day.  Try wheat bread, brown rice and whole grain pasta (instead of white bread, rice, and pasta).  Get enough calcium and vitamin D. Check the label on foods and aim for 100% of the RDA (recommended daily allowance).  Lifestyle  Exercise at least 150 minutes each week  (30 minutes a day, 5 days a week).  Do muscle strengthening activities 2 days a week. These help control your weight and prevent disease.  No smoking.  Wear sunscreen to prevent skin cancer.  Have a dental exam and cleaning every 6 months.  Yearly exams  See your health care team every year to talk about:  Any changes in your health.  Any medicines your care team has prescribed.  Preventive care, family planning, and ways to prevent chronic diseases.  Shots (vaccines)   HPV shots (up to age 26), if you've never had them before.  Hepatitis B shots (up to age 59), if you've never had them before.  COVID-19 shot: Get this shot when it's due.  Flu shot: Get a flu shot every year.  Tetanus shot: Get a tetanus shot every 10 years.  Pneumococcal, hepatitis A, and RSV shots: Ask your care team if you need these based on your risk.  Shingles shot (for age 50 and up)  General health tests  Diabetes screening:  Starting at age 35, Get screened for diabetes at least every 3 years.  If you are younger than age 35,  ask your care team if you should be screened for diabetes.  Cholesterol test: At age 39, start having a cholesterol test every 5 years, or more often if advised.  Bone density scan (DEXA): At age 50, ask your care team if you should have this scan for osteoporosis (brittle bones).  Hepatitis C: Get tested at least once in your life.  STIs (sexually transmitted infections)  Before age 24: Ask your care team if you should be screened for STIs.  After age 24: Get screened for STIs if you're at risk. You are at risk for STIs (including HIV) if:  You are sexually active with more than one person.  You don't use condoms every time.  You or a partner was diagnosed with a sexually transmitted infection.  If you are at risk for HIV, ask about PrEP medicine to prevent HIV.  Get tested for HIV at least once in your life, whether you are at risk for HIV or not.  Cancer screening tests  Cervical cancer screening: If you have a cervix, begin getting regular cervical cancer screening tests starting at age 21.  Breast cancer scan (mammogram): If you've ever had breasts, begin having regular mammograms starting at age 40. This is a scan to check for breast cancer.  Colon cancer screening: It is important to start screening for colon cancer at age 45.  Have a colonoscopy test every 10 years (or more often if you're at risk) Or, ask your provider about stool tests like a FIT test every year or Cologuard test every 3 years.  To learn more about your testing options, visit:   .  For help making a decision, visit:   https://bit.ly/zl86435.  Prostate cancer screening test: If you have a prostate, ask your care team if a prostate cancer screening test (PSA) at age 55 is right for you.  Lung cancer screening: If you are a current or former smoker ages 50 to 80, ask your care team if ongoing lung cancer screenings are right for you.  For informational purposes only. Not to replace the advice of your health care provider. Copyright   2023  [facility]. All rights reserved. Clinically reviewed by the [facility] Transitions Program. [employer] [id number] - REV 01/24.

## 2024-08-20 NOTE — ASSESSMENT & PLAN NOTE
Well controlled with omeprazole 20 mg daily. Had flare in symptoms trying to stop in the past, not interested today. We did discuss risks of long term PPI use, she prefers to keep medication.   - Continue omeprazole 20 mg daily

## 2024-08-20 NOTE — ASSESSMENT & PLAN NOTE
Moderate degenerative changes on XR 2/2024. I did order PT. She says she is doing therapy at home and it is helpful.    - Did print off [employer] hip strengthening program today   - Given OA, reasonable to see PT/OT about possibility of electric scooter to have in stores, referral placed today

## 2024-08-20 NOTE — ASSESSMENT & PLAN NOTE
Doing well today. Labs updated.   - Mammogram BIRADS1 4/2024  - Cologuard ordered  - Declines all vaccines today (recommended shingles, flu, COVID and RSV)

## 2024-08-20 NOTE — PROGRESS NOTES
Preventive Care Visit  [facility] Beata Cloud MD, Internal Medicine  Aug 20, 2024      Assessment & Plan   Problem List Items Addressed This Visit          Respiratory    Asthma     Well-controlled with Dulera.  She is using her albuterol daily out of habit.  -Continue Dulera daily, discussed only use albuterol as needed            Digestive    Gastroesophageal reflux disease without esophagitis     Well controlled with omeprazole 20 mg daily. Had flare in symptoms trying to stop in the past, not interested today. We did discuss risks of long term PPI use, she prefers to keep medication.   - Continue omeprazole 20 mg daily          Relevant Medications    polyethylene glycol (MIRALAX) 17 GM/Dose powder    Morbid obesity (H)     Weight stable. Encouraged continued work on increasing exercise and healthy diet.          Relevant Orders    Hemoglobin A1c    Electric Wheelchair Order for DME with OT or PT Referral    Chronic idiopathic constipation     On bowel movement every 4 to 5 days describes as Carbon stool type I or II.  -Discussed importance of increasing hydration, increasing movement and fiber in diet  -Recommend adding MiraLAX, can start with every other day, increase to daily if not having BM at least every 2 days         Relevant Medications    polyethylene glycol (MIRALAX) 17 GM/Dose powder       Endocrine    Hypercholesterolemia     Well controlled with lovastatin 20 mg daily.   - LDL 5/2023 92; repeat lipids today          Relevant Medications    lovastatin (MEVACOR) 20 MG tablet    Other Relevant Orders    Lipid panel reflex to direct LDL Fasting       Circulatory    Essential hypertension     Was well-controlled on current regimen.   -  Will continue hydrochlorothiazide 25 mg daily, refill provided today.         Relevant Medications    hydrochlorothiazide (HYDRODIURIL) 25 MG tablet    potassium chloride jsosue ER (KLOR-CON M10) 10 MEQ CR tablet        Musculoskeletal and Integumentary    Age-related osteoporosis without current pathological fracture     New diagnosis on DEXA scan 4/2024 with lowest T-score of -2.7 in her left femoral neck.  We had a long discussion regarding osteoporosis and treatment options in May.   -Still opts against bisphosphonate right now  -Instructed she needs to be getting 1200 mg daily of calcium and at least [id number] international units vitamin D3  -Also instructed to increase weight bearing exercise in the form of walking  -Will plan to repeat DEXA in one year without treatment          Relevant Medications    ibuprofen (ADVIL) 200 MG capsule    Other Relevant Orders    Vitamin D Deficiency    Primary osteoarthritis of right hip     Moderate degenerative changes on XR 2/2024. I did order PT. She says she is doing therapy at home and it is helpful.    - Did print off [facility] hip strengthening program today   - Given OA, reasonable to see PT/OT about possibility of electric scooter to have in stores, referral placed today          Relevant Medications    ibuprofen (ADVIL) 200 MG capsule    Other Relevant Orders    Electric Wheelchair Order for DME with OT or PT Referral       Behavioral    Panic disorder without agoraphobia     Follows with psychiatry at [facility], current providers are Sonya Huffman NP and Marielena (therapist). They are managing medications.   - current regimen is fluoxetine 80 mg daily and clonazepam 0.5 mg TID PRN         Moderate episode of recurrent major depressive disorder (H)     Follows with psychiatry.   - Current regimen is fluoxetine 60 mg daily            Other    Anxiety     Managed by psychiatry. Meds per panic disorder. Increased recently due to  change.          Encounter for Medicare annual wellness exam - Primary     Doing well today. Labs updated.   - Mammogram BIRADS1 4/2024  - Cologuard ordered  - Declines all vaccines today (recommended shingles, flu, COVID and RSV)         Relevant  "Orders    Comprehensive metabolic panel    CBC with platelets    Hemoglobin A1c     Other Visit Diagnoses       Elevated glucose        Relevant Orders    Hemoglobin A1c    Screen for colon cancer        Relevant Orders    JADE([employer])             Patient has been advised of split billing requirements and indicates understanding: Yes       BMI  Estimated body mass index is 35.28 kg/m  as calculated from the following:    Height as of this encounter: 1.546 m (5' 0.88\").    Weight as of this encounter: 84.4 kg (186 lb).   Weight management plan: Discussed healthy diet and exercise guidelines    Counseling  Appropriate preventive services were addressed with this patient via screening, questionnaire, or discussion as appropriate for fall prevention, nutrition, physical activity, Tobacco-use cessation, social engagement, weight loss and cognition.  Checklist reviewing preventive services available has been given to the patient.  Reviewed patient's diet, addressing concerns and/or questions.   She is at risk for lack of exercise and has been provided with information to increase physical activity for the benefit of her well-being.   Updated plan of care.  Patient reported difficulty with activities of daily living were addressed today.The patient was provided with written information regarding signs of hearing loss.     FUTURE APPOINTMENTS:       - Follow-up visit in 6 months       Scott Sommers is a 68 year old, presenting for the following:  Wellness Visit (AWV)        8/20/2024     1:03 PM   Additional Questions   Roomed by Jada Dobbins   Accompanied by N/A     Health Care Directive  Patient does not have a Health Care Directive or Living Will: Discussed advance care planning with patient; information given to patient to review.    CHERIE Sommers is here for AWV. Overall, is doing well, no big changes since last visit. Wondering about electric scooter to use at stores, struggles b/c of R hip OA. "     Osteoporosis: New 4/2024, with lowest T-score of -2.7 in her left femoral neck. Discussed 5/21, she opted against bisphosphonate.   - working to remember to take vitamin D and calcium daily  - Doing more exercises at home for strength    HLD: Lovastatin 20 mg. LDL 92 (5/2023).      GERD: Prilosec 20 mg daily. Had flare in symptoms with stopping in the past.      HTN: HCTZ 25 mg. BP today 114/66.     Asthma: Dulera and albuterol PRN. Tells me she uses the albuterol every day out of habit.     Constipation: every 3-4 days. Slightly better with Fig nouton bars. Felt that miralax wasn't helpful in the past but also wasn't using very consistently b/c of cost.     Anxiety / Gender dysphoria / Depression / Panic attacks: Following at [facility]. Sonya Huffman, SARABJIT and Marielena (therapist). Wonders about having me prescribe medications instead of going there as it is expensive. Current regimen is fluoxetine 80 mg daily and clonazepam 0.5 mg TID PRN.   - Increased clonazepam and fluoxetine recently b/c of increased panic, found to be supply issue with different       Obesity: Weight stable.   Wt Readings from Last 4 Encounters:   08/20/24 84.4 kg (186 lb)   03/08/24 81.2 kg (179 lb)   02/20/24 81.4 kg (179 lb 8 oz)   05/23/23 85.7 kg (189 lb)     R hip pain: Moderate degenerative changes on XR 2/2024. I did order PT. She says she is doing therapy at home and it is helpful.      HCM: BIRADS1 4/4/24. Cologuard ordered last May.         8/20/2024   General Health   How would you rate your overall physical health? (!) FAIR   Feel stress (tense, anxious, or unable to sleep) Not at all          8/20/2024   Nutrition   Diet: Regular (no restrictions)          8/20/2024   Exercise   Days per week of moderate/strenous exercise 2 days   Average minutes spent exercising at this level 60 min          8/20/2024   Social Factors   Frequency of gathering with friends or relatives More than three times a week   Worry food  won't last until get money to buy more No   Food not last or not have enough money for food? No   Do you have housing? (Housing is defined as stable permanent housing and does not include staying ouside in a car, in a tent, in an abandoned building, in an overnight shelter, or couch-surfing.) Yes   Are you worried about losing your housing? No   Lack of transportation? No   Unable to get utilities (heat,electricity)? No            8/20/2024   Fall Risk   Fallen 2 or more times in the past year? No    No   Trouble with walking or balance? Yes    Yes   Reason Gait Speed Test Not Completed Patient does not tolerate an upright or standing position (e.g. wheelchair)       Multiple values from one day are sorted in reverse-chronological order          8/20/2024   Activities of Daily Living- Home Safety   Needs help with the following daily activites Transportation   Safety concerns in the home None of the above            8/20/2024   Dental   Dentist two times every year? Yes            8/20/2024   Hearing Screening   Hearing concerns? (!) TROUBLE UNDERSTANDING SOFT OR WHISPERED SPEECH.    (!) TROUBLE UNDERSTANDING SPEECH ON THE TELEPHONE       Multiple values from one day are sorted in reverse-chronological order         8/20/2024   Driving Risk Screening   Patient/family members have concerns about driving No            8/20/2024   General Alertness/Fatigue Screening   Have you been more tired than usual lately? No            8/20/2024   Urinary Incontinence Screening   Bothered by leaking urine in past 6 months No            8/20/2024   TB Screening   Were you born outside of the [place]? No          Today's PHQ-9 Score:       8/20/2024     1:06 PM   PHQ-9 SCORE   PHQ-9 Total Score MyChart 0   PHQ-9 Total Score 0         8/20/2024   Substance Use   Alcohol more than 3/day or more than 7/wk No   Do you have a current opioid prescription? No   How severe/bad is pain from 1 to 10? 4/10   Do you use any other substances  recreationally? No      Social History     Tobacco Use    Smoking status: Never    Smokeless tobacco: Never   Vaping Use    Vaping status: Never Used   Substance Use Topics    Alcohol use: No    Drug use: No         4/4/2024   LAST FHS-7 RESULTS   1st degree relative breast or ovarian cancer No   Any relative bilateral breast cancer No   Any male have breast cancer No   Any ONE woman have BOTH breast AND ovarian cancer No   Any woman with breast cancer before 50yrs No   2 or more relatives with breast AND/OR ovarian cancer No   2 or more relatives with breast AND/OR bowel cancer No      Mammogram Screening - Mammogram every 1-2 years updated in Health Maintenance based on mutual decision making      History of abnormal Pap smear: No - age 65 or older with adequate negative prior screening test results (3 consecutive negative cytology results, 2 consecutive negative cotesting results, or 2 consecutive negative HrHPV test results within 10 years, with the most recent test occurring within the recommended screening interval for the test used)       ASCVD Risk   The 10-year ASCVD risk score (Denton KHOURY, et al., 2019) is: 7.3%    Values used to calculate the score:      Age: 68 years      Sex: Female      Is Non- : No      Diabetic: No      Tobacco smoker: No      Systolic Blood Pressure: 114 mmHg      Is BP treated: Yes      HDL Cholesterol: 73 mg/dL      Total Cholesterol: 180 mg/dL    Reviewed and updated as needed this visit by Provider   Tobacco  Allergies   Problems  Med Hx  Surg Hx  Fam Hx   Sexual   Activity          Past Medical History:   Diagnosis Date    Anxiety     Chest wall trauma     Chronic prescription benzodiazepine use     Hypercholesteremia     Osteopenia of multiple sites 02/20/2024    Panic disorder      Past Surgical History:   Procedure Laterality Date    BIOPSY BREAST Right 1987    benign     Current providers sharing in care for this patient  "include:  Patient Care Team:  Latricia Cloud MD as PCP - General (Internal Medicine)  Cindi Nieves RPH as Pharmacist (Pharmacist)  Cindi Nieves RPH as Assigned [place] Pharmacist  Brenda Nixon AuD as Audiologist (Audiology)  Latricia Cloud MD as Assigned PCP    The following health maintenance items are reviewed in Epic and correct as of today:  Health Maintenance   Topic Date Due    COLORECTAL CANCER SCREENING  Never done    RSV VACCINE (Pregnancy & 60+) (1 - 1-dose 60+ series) Never done    ZOSTER IMMUNIZATION (2 of 2) 04/26/2016    ANNUAL REVIEW OF HM ORDERS  12/09/2022    ASTHMA ACTION PLAN  04/26/2023    COVID-19 Vaccine (5 - 2023-24 season) 09/01/2023    LIPID  05/23/2024    INFLUENZA VACCINE (1) 09/01/2024    ASTHMA CONTROL TEST  02/20/2025    PHQ-9  02/20/2025    MEDICARE ANNUAL WELLNESS VISIT  08/20/2025    FALL RISK ASSESSMENT  08/20/2025    MAMMO SCREENING  04/04/2026    GLUCOSE  02/20/2027    ADVANCE CARE PLANNING  08/20/2029    DTAP/TDAP/TD IMMUNIZATION (3 - Td or Tdap) 05/16/2032    DEXA  04/04/2039    HEPATITIS C SCREENING  Completed    Pneumococcal Vaccine: 65+ Years  Completed    DEPRESSION ACTION PLAN  Addressed    IPV IMMUNIZATION  Aged Out    HPV IMMUNIZATION  Aged Out    MENINGITIS IMMUNIZATION  Aged Out    RSV MONOCLONAL ANTIBODY  Aged Out     Review of Systems  Constitutional, neuro, ENT, endocrine, pulmonary, cardiac, gastrointestinal, genitourinary, musculoskeletal, integument and psychiatric systems are negative, except as otherwise noted.     Objective    Exam  /66   Pulse 71   Temp 98.9  F (37.2  C) (Oral)   Resp 16   Ht 1.546 m (5' 0.88\")   Wt 84.4 kg (186 lb)   SpO2 97%   BMI 35.28 kg/m     Estimated body mass index is 35.28 kg/m  as calculated from the following:    Height as of this encounter: 1.546 m (5' 0.88\").    Weight as of this encounter: 84.4 kg (186 lb).    Physical Exam  GENERAL: alert and no distress  EYES: " Eyes grossly normal to inspection and conjunctivae and sclerae normal  HENT: ear canals and TM's normal, nose and mouth without ulcers or lesions. +poor dentition   NECK: no adenopathy, no asymmetry, masses, or scars  RESP: lungs clear to auscultation - no rales, rhonchi or wheezes  CV: regular rate and rhythm, normal S1 S2, no S3 or S4, no murmur, click or rub, no peripheral edema  ABDOMEN: soft, nontender and bowel sounds normal  MS: no gross musculoskeletal defects noted, no edema  SKIN: no suspicious lesions or rashes on exposed skin   NEURO: No focal deficits, mentation intact and speech normal  PSYCH: mentation appears normal, affect normal/bright        8/20/2024   Mini Cog   Clock Draw Score 2 Normal   3 Item Recall 3 objects recalled   Mini Cog Total Score 5               Signed Electronically by: Latricia Cloud MD    Answers submitted by the patient for this visit:  Patient Health Questionnaire (Submitted on 8/20/2024)  If you checked off any problems, how difficult have these problems made it for you to do your work, take care of things at home, or get along with other people?: Not difficult at all  PHQ9 TOTAL SCORE: 0  MALINDA-7 (Submitted on 8/20/2024)  MALINDA 7 TOTAL SCORE: 0

## 2024-08-20 NOTE — LETTER
August 22, 2024      Tootie Marin  [address]  [place] [address]        Dear ,    We are writing to inform you of your test results.    Tootie,     Your kidney function and electrolytes were normal.  Liver enzymes were almost all normal, very mild elevation of your ALT which is a liver enzyme.  We will plan to repeat this when I see you in 6 months, nothing needed prior to that.     Cholesterol levels look okay.     Your blood counts are normal.  Do not worry about the white blood cell count being 0.3 above the range for normal.  Your vitamin D level is good.  Your A1c was in the normal range.     Please let me know if you have questions,     Resulted Orders   Comprehensive metabolic panel   Result Value Ref Range    Sodium 137 135 - 145 mmol/L    Potassium 3.6 3.4 - 5.3 mmol/L    Carbon Dioxide (CO2) 27 22 - 29 mmol/L    Anion Gap 13 7 - 15 mmol/L    Urea Nitrogen 12.3 8.0 - 23.0 mg/dL    Creatinine 0.84 0.51 - 0.95 mg/dL    GFR Estimate 75 >60 mL/min/1.73m2      Comment:      eGFR calculated using 2021 CKD-EPI equation.    Calcium 9.9 8.8 - 10.4 mg/dL      Comment:      Reference intervals for this test were updated on 7/16/2024 to reflect our healthy population more accurately. There may be differences in the flagging of prior results with similar values performed with this method. Those prior results can be interpreted in the context of the updated reference intervals.    Chloride 97 (L) 98 - 107 mmol/L    Glucose 89 70 - 99 mg/dL    Alkaline Phosphatase 121 40 - 150 U/L    AST 32 0 - 45 U/L    ALT 61 (H) 0 - 50 U/L    Protein Total 7.4 6.4 - 8.3 g/dL    Albumin 4.4 3.5 - 5.2 g/dL    Bilirubin Total 0.5 <=1.2 mg/dL    Patient Fasting > 8hrs? No    CBC with platelets   Result Value Ref Range    WBC Count 11.3 (H) 4.0 - 11.0 10e3/uL    RBC Count 4.41 3.80 - 5.20 10e6/uL    Hemoglobin 13.1 11.7 - 15.7 g/dL    Hematocrit 40.1 35.0 - 47.0 %    MCV 91 78 - 100 fL    MCH 29.7 26.5 - 33.0  pg    MCHC 32.7 31.5 - 36.5 g/dL    RDW 13.6 10.0 - 15.0 %    Platelet Count 391 150 - 450 10e3/uL   Lipid panel reflex to direct LDL Fasting   Result Value Ref Range    Cholesterol 188 <200 mg/dL    Triglycerides 56 <150 mg/dL    Direct Measure HDL 71 >=50 mg/dL    LDL Cholesterol Calculated 106 (H) <=100 mg/dL    Non HDL Cholesterol 117 <130 mg/dL    Patient Fasting > 8hrs? No     Narrative    Cholesterol  Desirable:  <200 mg/dL    Triglycerides  Normal:  Less than 150 mg/dL  Borderline High:  150-199 mg/dL  High:  200-499 mg/dL  Very High:  Greater than or equal to 500 mg/dL    Direct Measure HDL  Female:  Greater than or equal to 50 mg/dL   Male:  Greater than or equal to 40 mg/dL    LDL Cholesterol  Desirable:  <100mg/dL  Above Desirable:  100-129 mg/dL   Borderline High:  130-159 mg/dL   High:  160-189 mg/dL   Very High:  >= 190 mg/dL    Non HDL Cholesterol  Desirable:  130 mg/dL  Above Desirable:  130-159 mg/dL  Borderline High:  160-189 mg/dL  High:  190-219 mg/dL  Very High:  Greater than or equal to 220 mg/dL   Hemoglobin A1c   Result Value Ref Range    Hemoglobin A1C 5.6 0.0 - 5.6 %      Comment:      Normal <5.7%   Prediabetes 5.7-6.4%    Diabetes 6.5% or higher     Note: Adopted from ADA consensus guidelines.   Vitamin D Deficiency   Result Value Ref Range    Vitamin D, Total (25-Hydroxy) 27 20 - 50 ng/mL      Comment:      optimum levels    Narrative    Season, race, dietary intake, and treatment affect the concentration of 25-hydroxy-Vitamin D. Values may decrease during winter months and increase during summer months.    Vitamin D determination is routinely performed by an immunoassay specific for 25 hydroxyvitamin D3.  If an individual is on vitamin D2(ergocalciferol) supplementation, please specify 25 OH vitamin D2 and D3 level determination by LCMSMS test [id number].         If you have any questions or concerns, please call the clinic at the number listed above.       Sincerely,      Latricia Cota  MD Pedro Luis

## 2024-08-20 NOTE — ASSESSMENT & PLAN NOTE
Was well-controlled on current regimen.   -  Will continue hydrochlorothiazide 25 mg daily, refill provided today.

## 2024-08-20 NOTE — ASSESSMENT & PLAN NOTE
Well-controlled with Dulera.  She is using her albuterol daily out of habit.  -Continue Dulera daily, discussed only use albuterol as needed

## 2024-09-19 ENCOUNTER — THERAPY VISIT (OUTPATIENT)
Dept: OCCUPATIONAL THERAPY | Facility: CLINIC | Age: 69
End: 2024-09-19
Attending: INTERNAL MEDICINE
Payer: MEDICARE

## 2024-09-19 DIAGNOSIS — Z74.09 IMPAIRED MOBILITY AND ADLS: Primary | ICD-10-CM

## 2024-09-19 DIAGNOSIS — Z78.9 IMPAIRED MOBILITY AND ADLS: Primary | ICD-10-CM

## 2024-09-19 DIAGNOSIS — E66.01 MORBID OBESITY (H): ICD-10-CM

## 2024-09-19 DIAGNOSIS — M16.11 PRIMARY OSTEOARTHRITIS OF RIGHT HIP: ICD-10-CM

## 2024-09-19 PROCEDURE — 97542 WHEELCHAIR MNGMENT TRAINING: CPT | Mod: GO | Performed by: OCCUPATIONAL THERAPIST

## 2024-09-19 NOTE — PROGRESS NOTES
SEATING AND WHEELED MOBILITY ASSESSMENT    [facility]  Occupational Therapy   Date of service: September 19, 2024    Referring provider:   Latricia Cloud MD     Order Date 8/202/4  Onset Date: 8/20/24    Order Details: rachel medina    Funding:Medicare/[employer]    Vendor: [employer]    Height/Weight: 5 / 186    Medical diagnosis:   Nervous and Auditory  Hip pain, right  Bilateral hearing loss, unspecified hearing loss type     Respiratory  Asthma     Digestive  Gastroesophageal reflux disease without esophagitis  Morbid obesity (H)  Vitamin D deficiency  Chronic idiopathic constipation     Endocrine  Hypercholesterolemia     Circulatory  Essential hypertension     Musculoskeletal and Integumentary  Tinea pedis of both feet  Intertrigo  Age-related osteoporosis without current pathological fracture  Primary osteoarthritis of right hip     Behavioral  Panic disorder without agoraphobia  Moderate episode of recurrent major depressive disorder (H)     Other  Anxiety  Ptosis Of Eyelid  Lipoma Of The Adipose Tissue  Menopause  Chronic prescription benzodiazepine use  Encounter for Medicare annual wellness exam  Patient concerns/goals: power mobility    Living environment:  Apartment    Living environment barriers:  Ramp(s) present      Current assistance/living environment:  Lives alone    Community Mobility:  Transportation: Transportation Services     Current mobility equipment:  4 wheeled walker with seat    Fall Risk Screen:   Has the patient fallen 2 or more times in the last year? Yes      Has the patient fallen and had an injury in the past year? No      Is the patient a fall risk? Yes, department fall risk interventions implemented    ADL: ind with walker    IADL: ind    Evaluation     Pain assessment:  B Hips 3-10/10  increasing with increased time walking and weightbearing    Cognition:  wnl    Vision:  glasses    Hearing: wnl    Fatigue:  Reported Problems:  very limited, no greater than 1 block tolerated    Balance:  Unsupported Sitting Balance: Uses UE for Balance  Sitting Balance in Chair: Uses UE for Balance  Standing Balance: Uses UE for Balance    Ambulation:  Level of [place]: Independent  Assistive Device(s): Walker (front wheeled)    Coordination:  WNL    Sensation:  Sensory Deficits Reported: occasional in hands and feet    Upper Extremities  UE ROM: WFL  UE Strength: UE Strength WFL  Dominance: Right    Pelvis  Anterior/Posterior Pelvis Position: Posterior Tilt    Trunk:  Anterior/Posterior Trunk Position: Increased Thoracic Kyphosis    Lower Extremities:  LE ROM: WFL  LE Strength:      Impairments:  Fatigue  Muscle atrophy  Coordination  Balance  Pain    Treatment diagnosis:  Impaired mobility  Impaired activities of daily living     Recommendations/Plan of care:  Patient would benefit from interventions to enhance safety and independence.  Occupational therapy intervention for  wheelchair management.    Goals:   Target date:   Patient, family and/or caregiver will verbalize/demonstrate understanding of compensatory methods /equipment to enhance functional independence and safety.    Educational assessment/barriers to learning:  No barriers noted    Treatment provided this date:   Wheelchair management, 30 minutes   Determined need for power scooter due to limited funcitonal mobility secondary to hip pain.  Safe and independent trial in clinic.    P[employer] go go elite traveler Power Operated Vehicle / Scooter -  This device is being requested for this patient with mobility impairments to allow her to be able to complete all of her mobility related activities of daily living in a safe fashion, without risk of injury from falling, and in a reasonable time frame. She demonstrated during the home evaluation that she was able to transfer to/from the requested scooter, operate the [email or web address] steering system,  able to maintain postural stability and position while operating the POV, and operate the on/off mechanism and the speed dial appropriately and safely. They are very willing and physically / cognitively able to use the recommended equipment to assist with mobility related activities of daily living and general mobility. There is a mobility limitation that cannot be sufficiently and safely resolved by the use of an appropriately fitted cane or walker and they do not have sufficient upper extremity function to self-propel an optimally-configured manual wheelchair in their home to perform MRADLs during a typical day due to limitations in strength, endurance, range of motion, and coordination. This equipment is reasonable and necessary with reference to accepted standards of medical practice and treatment of this patient's condition and is not being recommended as a convenience item. Without this recommended equipment, she is highly likely to sustain injuries from falls, develop pressure sores or postural compensation, and/or be bed confined, which those costs far exceed the cost of the requested equipment.    This therapist has no financial connection to the equipment being requested or vendor being used.  I have read and concur with the above recommendations.  Physician Printed Name __________________________________________  Physician Signature  _____________________________________________  Date of Signature ______________________________  Physician Phone  ______________________________      Response to treatment/recommendations: receptive    Goal attainment:  All goals met    Risks and benefits of evaluation/treatment have been explained.  Patient, family and/or caregiver are in agreement with Plan of Care.     Timed Code Treatment Minutes: 30  Total Treatment Time (sum of timed and untimed services): 30    Electronically signed by:  Prachi JOHNSON, ATP      Occupational Therapist, Assistive Technology  Professional  [phone]      fax: [phone]      [email or web address], [address]  [place]   [address]  September 19, 2024

## 2024-10-08 ENCOUNTER — TELEPHONE (OUTPATIENT)
Dept: PHARMACY | Facility: CLINIC | Age: 69
End: 2024-10-08
Payer: MEDICARE

## 2024-10-08 NOTE — TELEPHONE ENCOUNTER
Patient called and LVM to update me that she is hoping to work with her psych NP (Nasreen) to reduce Fluoxetine from 80mg to 60mg daily. She is also still dealing with clonazepam shortages, possibly alternating the regular tablets with the ODTs.    I called her back and LVM.    Cindi Nieves, PharmD, [facility]  Medication Therapy Management Provider  Phone: [phone]  [email or web address]

## 2024-10-16 ENCOUNTER — TELEPHONE (OUTPATIENT)
Dept: INTERNAL MEDICINE | Facility: CLINIC | Age: 69
End: 2024-10-16
Payer: MEDICARE

## 2024-10-16 NOTE — TELEPHONE ENCOUNTER
General Call    Contacts       Contact Date/Time Type Contact Phone/Fax    10/16/2024 01:44 PM CDT Phone (Incoming) Tootie Marin (Self) [phone] (M)          Reason for Call:  Pt has talked to Merlene Mccann to ok for electric scooter called .  Pt says [employer] is going to use this supply for scooter.    Needs to be asked to ok    Contact Danika Altman [phone] - medical supply        Okay to leave a detailed message?: Yes at Cell number on file:    Telephone Information:   Mobile [phone]    Call Danika at [employer] on [address] to ok scoooter

## 2024-10-23 NOTE — TELEPHONE ENCOUNTER
Patient is following up on scooter request.     [facility] provided patient update that they have not heard anything from clinic.   Patient is aware that PCP is out of office on medical leave

## 2024-10-23 NOTE — PROGRESS NOTES
Medication Therapy Management (MTM) Encounter    ASSESSMENT:                            Medication Adherence/Access: No issues identified.    Hypertension   /Hypokalemia  Blood pressure goal of less than 140/90 in clinic.  We discussed that diuretics can cause dry mouth, so if this persists, she may benefit from switching to an alternative blood pressure agent like amlodipine, coming off of the potassium as well.     Hyperlipidemia   Stable.     Asthma   Stable.     GERD    /Constipation  Stable.  Ongoing PPI use likely appropriate while patient is taking NSAIDs for pain.     Mental Health   Anxiety  Plan in place with psychiatry.  Long-term benzodiazepine use not ideal, but please see previous notes detailing ongoing struggle getting this patient off of benzodiazepines.     Bone Health   Osteoporosis:   Discussed again with patient that patient would likely benefit from bisphosphonate therapy.  She again respectfully declines therapy, so I encouraged her to look at the [facility] website for more information to see if she would be interested in the future.  I also reiterated importance of 1500 mg of calcium daily from all sources.     Arthritis  Stable.  Continue to monitor kidney function.    Supplements   Stable.    PLAN:                            Make sure you're getting at least 1500mg of calcium daily, including from diet.  No blood pressure changes today, but if the dry mouth continues, we should consider switching to a different blood pressure med that wouldn't dry you out next visit.    Follow-up: Return in 15 weeks (on 2/6/2025) for phone visit.    SUBJECTIVE/OBJECTIVE:                          Tootie Marin is a 69 year old female seen for a follow-up visit.     Reason for visit: Medication review.    Allergies/ADRs: Reviewed in chart  Past Medical History: Reviewed in chart  Tobacco: She reports that she has never smoked. She has never used smokeless tobacco.  Alcohol: none  Caffeine: Diet Pepsi, 2  per day  Medication Adherence/Access: Per patient, misses medication 0 time(s) per week.     Hypertension   /Hypokalemia    Hydrochlorothiazide 25mg daily  Potassium Chloride ER 10mEq daily  Patient reports no current medication side effects  the following medication side effects: Morning dry mouth, which she reports has been an issue for about a month.  Patient does not self-monitor blood pressure.       Hyperlipidemia     Lovastatin 20mg daily  Patient reports no significant myalgias or other side effects.     Asthma     Dulera twice daily  Albuterol MDI as needed (Uses once daily, patient says this is just a habit)  Patient reports no current medication side effects.      Patient reports the following symptoms: none. Has not had bad SOB in 6 or 7 years     GERD    /Constipation    Omeprazole 20 mg once daily   Miralax as needed (not currently needing)  Patient feels that current regimen is effective.     Mental Health   Anxiety    Still seeing psychiatry- Nasreen Arnett [phone]  Fluoxetine 60mg once daily  Clonazepam ODT 0.5mg (PAR brand) twice daily- prefers TEVA when she can have it, feels it works better.   Patient reports no current medication side effects.  Current symptoms include: Feels her mood is fine overall.     Bone Health   Osteoporosis:     No DMARDs- patient not interested today  Calcium (stopped the past two weeks- will restart)  Vitamin D 2000 international unit(s) daily  Patient is not experiencing side effects.  DEXA History: Lowest T score of -2.7 in 4/2024  Patient is getting  0-1 servings calcium in her diet .  Risk factors: post-menopausal  chronic PPI use     Arthritis    Acetaminophen (rare)  Ibuprofen 400mg once daily, typically  Feels the ibuprofen is working well- acetaminophen doesn't work.  As stated above, not having heartburn or other stomach issues.    Supplements     Multivitamin daily  Vitamin C 500mg daily  Vitamin D as above  No reported issues at this time.       I spent 28 minutes with this patient today. All changes were made via collaborative practice agreement with Latricia Cloud MD. A copy of the visit note was provided to the patient's provider(s).    A summary of these recommendations was mailed to the patient.    Gaviota BuenoD, [facility]  Medication Therapy Management Provider  Phone: [phone]  [email or web address]    Telemedicine Visit Details  The patient's medications can be safely assessed via a telemedicine encounter.  Type of service:  Telephone visit  Originating Location (pt. Location): Home    Distant Location (provider location):  On-site  Start Time: 11:56 AM  End Time: 12:24 PM     Medication Therapy Recommendations  No medication therapy recommendations to display

## 2024-10-24 ENCOUNTER — VIRTUAL VISIT (OUTPATIENT)
Dept: PHARMACY | Facility: CLINIC | Age: 69
End: 2024-10-24
Payer: COMMERCIAL

## 2024-10-24 DIAGNOSIS — M16.11 PRIMARY OSTEOARTHRITIS OF RIGHT HIP: ICD-10-CM

## 2024-10-24 DIAGNOSIS — Z78.9 TAKES DIETARY SUPPLEMENTS: ICD-10-CM

## 2024-10-24 DIAGNOSIS — M81.0 AGE-RELATED OSTEOPOROSIS WITHOUT CURRENT PATHOLOGICAL FRACTURE: ICD-10-CM

## 2024-10-24 DIAGNOSIS — K21.9 GERD WITHOUT ESOPHAGITIS: ICD-10-CM

## 2024-10-24 DIAGNOSIS — F41.1 GAD (GENERALIZED ANXIETY DISORDER): ICD-10-CM

## 2024-10-24 DIAGNOSIS — E78.2 MIXED HYPERLIPIDEMIA: ICD-10-CM

## 2024-10-24 DIAGNOSIS — J45.40 MODERATE PERSISTENT ASTHMA WITHOUT COMPLICATION: ICD-10-CM

## 2024-10-24 DIAGNOSIS — I10 HTN (HYPERTENSION), BENIGN: Primary | ICD-10-CM

## 2024-10-24 PROCEDURE — 99207 PR NO CHARGE LOS: CPT | Mod: 93 | Performed by: PHARMACIST

## 2024-10-24 NOTE — PATIENT INSTRUCTIONS
"Recommendations from today's MTM visit:                                                       Make sure you're getting at least 1500mg of calcium daily, including from diet.  No blood pressure changes today, but if the dry mouth continues, we should consider switching to a different blood pressure med that wouldn't dry you out next visit.    Follow-up: Return in 15 weeks (on 2/6/2025) for phone visit.    It was great speaking with you today.  I value your experience and would be very thankful for your time in providing feedback in our clinic survey. In the next few days, you may receive an email or text message from [employer] with a link to a survey related to your  clinical pharmacist.\"     To schedule another MTM appointment, please call the clinic directly or you may call the MTM scheduling line at [phone] or toll-free at [phone].     My Clinical Pharmacist's contact information:                                                      Please feel free to contact me with any questions or concerns you have.      Cindi Nieves, Diana, BCACP  Medication Therapy Management Provider  Phone: [phone]  [email or web address]    "

## 2024-11-15 DIAGNOSIS — R05.3 CHRONIC COUGH: ICD-10-CM

## 2024-11-15 RX ORDER — ALBUTEROL SULFATE 90 UG/1
2 INHALANT RESPIRATORY (INHALATION) EVERY 6 HOURS PRN
Qty: 54 G | Refills: 0 | Status: SHIPPED | OUTPATIENT
Start: 2024-11-15

## 2024-11-15 NOTE — TELEPHONE ENCOUNTER
FAX Prescription Refill Request    Last Visit with PCP: 08/20/24 AWV  Next Visit with a PCP:  02/26/25      albuterol (PROAIR HFA/PROVENTIL HFA/VENTOLIN HFA) 108 (90 Base) MCG/ACT inhaler

## 2024-12-05 DIAGNOSIS — J45.40 MODERATE PERSISTENT ASTHMA WITHOUT COMPLICATION: ICD-10-CM

## 2025-01-06 ENCOUNTER — PATIENT OUTREACH (OUTPATIENT)
Dept: GERIATRIC MEDICINE | Facility: CLINIC | Age: 70
End: 2025-01-06
Payer: MEDICARE

## 2025-01-06 NOTE — PROGRESS NOTES
[facility] Care Coordination Contact    Member became effective with  Partners on 1/1/2025 with [facility].  Previous Health Plan: [employer] MSC+  Previous Care System: [facility]  Previous care coordinators name and number: Luh West  Waiver Type: N/A  UTF received: No: Requested on 1/6/2025  Mailed welcome letter and Mailed [employer] Leave Behind document  Address/Phone discrepancy: N/A  [place] and fully prepped for visit as [facility].    Ashley Kearney  Care Management Specialist  [facility]  [phone]

## 2025-01-06 NOTE — LETTER
January 6, 2025    Important [employer] Information    TOOTIE MARINO  [address]  [place] [address]  A Partner in Your Care  Dear Tootie,  Thank you for choosing [employer] for your health plan coverage! I am excited to welcome you as a member of [employer] .  My name is Inez Dela Cruz RN and I will be working with you as your Care Coordinator. [employer] partners with [employer] to provide members with Care Coordination services.  As your Care Coordinator, I can:  Work with you to create a Care Plan to keep you healthy and safe  Help you make appointments to see health care providers   Support you and your family in making health care decisions  Find community services that may interest you  Identify health benefits you are eligible for  What happens next?  To get started, I will call you. I ll ask you a few questions about your health and schedule a time to meet. You will have a chance to ask me questions, too.  Questions?  Call me at [phone] Monday-Friday between 8am and 5pm. TTY: [email or web address]. I look forward to speaking with you soon.  Sincerely,    Inez Dela Cruz RN  [phone]  [email or web address]    cc: member records                                                                                        _

## 2025-01-13 ENCOUNTER — TELEPHONE (OUTPATIENT)
Dept: INTERNAL MEDICINE | Facility: CLINIC | Age: 70
End: 2025-01-13
Payer: MEDICARE

## 2025-01-13 NOTE — TELEPHONE ENCOUNTER
Re-received 7 element form from [facility]. Needs to be filled out correctly and signed.    Placed in PCP's inbox    Haydee Lance MA on 1/13/2025 at 4:40 PM

## 2025-01-15 NOTE — TELEPHONE ENCOUNTER
Form faxed and copied. One to records, one to provider's reserve bin.    Haydee Lance MA on 1/15/2025 at 4:50 PM

## 2025-01-30 ENCOUNTER — PATIENT OUTREACH (OUTPATIENT)
Dept: GERIATRIC MEDICINE | Facility: CLINIC | Age: 70
End: 2025-01-30
Payer: MEDICARE

## 2025-02-05 NOTE — PROGRESS NOTES
[facility] Care Coordination Contact      [facility] Refusal Telephone Assessment    Member refused home visit HRA on 1-30-25 (reason: I don't want to be rude, but I don't need your services right now.).    ER visits: No  Hospitalizations: No  Health concerns: no  Falls/Injuries: No  ADL/IADL Dependencies:        Member currently receiving the following home care services:     Member currently receiving the following community resources:    Informal support(s):      Advanced Care Planning discussion, complete code section.    Health Plan sponsored benefits: [facility]: Shared information regarding [employer] Program. Reviewed preventative health screening and health plan supplemental benefits/incentives. Reviewed medication disposal form.    Follow-Up Plan: Member informed of future contact, plan to f/u with member with a 6 month telephone assessment and offer a home visit.  Contact information shared with member and family, encouraged member to call with any questions or concerns at any time.    This CC note routed to PCP, Latricia Cloud.    Inez Dela Cruz RN  [facility]  [phone]

## 2025-02-05 NOTE — PROGRESS NOTES
Addendum: Amlodipine and gabapentin approved per Dr. Cloud. LVM for patient.    Cindi Nieves, PharmD, BCACP  Medication Therapy Management Provider  Phone: [phone]  [email or web address]     Medication Therapy Management (MTM) Encounter    ASSESSMENT:                            Medication Adherence/Access: No issues identified.      Hypertension   /Hypokalemia  Due to on going side effects of dry mouth, it would be appropriate to switch from hydrochlorothiazide to amlodipine. This would eliminate the need for potassium supplementation. Patient is open to me discussing this with her PCP.     Hyperlipidemia   Stable.      Asthma   Since the patient is not experiencing shortness of breath it would be beneficial to from decrease her use of Albuterol to as needed since she is having tremors that could be a side effect.      GERD    /Constipation  Patient would benefit from restarting Miralax as needed for constipation.     Mental Health   Anxiety  Patient can consider decreasing fluoxetine dose due to tremors she is experiencing.     Bone Health   Osteoporosis:   Due to patient's DEXA readings, it would be appropriate to start bisphosphonate therapy for osteoporosis. This recommendation has been discussed previously and patient stated she would talk to her doctor about this.     Arthritis/Pain:   Patient has been experiencing on going back pain for three weeks. It would be appropriate to start gabapentin for a short course to see if this helps with the pain.     Supplements   Stable.     Tinea Pedis:   Stable.     PLAN:                            I will ask Dr. Cloud about switching hydrochlorothiazide to amlodipine for hypertension (and stopping potassium).  I will talk to Dr. Cloud about using gabapentin for a short course to see if that helps with pain.   Try to limit use of Albuterol inhaler to see if tremor/shakes improve.  Start taking Miralax one half capful every other day for constipation. You can  "increase this to a half capful daily if you are still not having easy bowel movements after a week.   Talk to Nasreen about decreasing fluoxetine dose due to tremor if albuterol reduction doesn't help.  Talk to Dr. Cloud about alendronate or other options for bone health at your next visit.    Follow-up: 3/25 at 3:30    SUBJECTIVE/OBJECTIVE:                          Tootie Marin is a 69 year old female seen for a follow-up visit.       Reason for visit: Med recheck. Of note, patient has a  [facility] coordinator- Inez Dela Cruz [phone].    Allergies/ADRs: Reviewed in chart  Past Medical History: Reviewed in chart  Tobacco: She reports that she has never smoked. She has never used smokeless tobacco.  Alcohol: none  THC/CBD: None  Caffeine: Drinks 3 cans of Diet Coke per day  Medication Adherence/Access: Per patient, misses medication 0 time(s) per week.  Has a care coordinator: Inez Dela Cruz  #[phone]    Hypertension   /Hypokalemia    Hydrochlorothiazide 25mg daily   Potassium Chloride ER 10mEq daily   the following medication side effects: Morning dry mouth, which she reports has been an issue but she drinks water throughout the day. She feels \"shaky\" which occurs more with pain.  Patient does not self-monitor blood pressure.    She states that she does not want to change any medications until she sees Dr. Cloud (appointment on 2/26)     Hyperlipidemia     Lovastatin 20mg daily  The 10-year ASCVD risk score ([place] KHOURY, et al., 2019) is: 8.4%    Values used to calculate the score:      Age: 69 years      Sex: Female      Is Non- : No      Diabetic: No      Tobacco smoker: No      Systolic Blood Pressure: 114 mmHg      Is BP treated: Yes      HDL Cholesterol: 71 mg/dL      Total Cholesterol: 188 mg/dL   Patient reports no significant myalgias or other side effects.     Asthma     Dulera twice daily  Albuterol MDI as needed (Uses 2 puff once daily in the middle of the day, patient " says this is just a habit)  Patient reports no current medication side effects.      Patient reports the following symptoms: none. Has not had bad SOB in years.      GERD    /Constipation    Omeprazole 20 mg once daily   Miralax as needed (not currently taking)   She states that constipation has been a problem recently and knows she should restart Miralax.      Mental Health   Anxiety    Still seeing psychiatry- Nasreen  [facility] [address] [phone]  Fluoxetine 60mg once daily (40mg in the morning, then 20 mg in the evening)   Clonazepam ODT 0.5mg (PAR brand) three daily- prefers TEVA when she can have it, feels it works better.   She is not using hydroxyzine.  Patient reports no current medication side effects - besides tremors   Current symptoms include: Feels her mood has improved.     Bone Health   Osteoporosis:     Calcium citrate  2 tablets daily  Vitamin D 2000 international unit(s) daily  Patient is not experiencing side effects.  DEXA History: Lowest T score of -2.7 in 4/2024  Have discussed options such as alendronate or Reclast previously and was not interested- too worried about side effects such as atypical fracture.  Patient is getting  0-1 servings calcium in her diet.  Risk factors: post-menopausal  chronic PPI use     Arthritis/Pain:     Acetaminophen (rare)  Ibuprofen 400mg once daily, typically  Feels the ibuprofen is working well- acetaminophen doesn't work.  As stated above, not having heartburn or other stomach issues.  She has had pain in her back the last 3 weeks and is getting worse, has been difficult to move or sit. She prefers to lay and feels that this helps the pain.    Supplements     Multivitamin (Flinstones) daily  Probiotic 1 capsule daily   Magnesium oxide 400 mg daily   Vitamin C 500mg daily  Vitamin D as above  No reported issues at this time.      Tinea Pedis:     Clotrimazole 1% cream twice daily as needed (not using currently)   No concerns    I spent 30 minutes with this  patient today. All changes were made via collaborative practice agreement with Latricia Cloud MD.     A summary of these recommendations was mailed to the patient.    Cindi Nieves, PharmD, BCACP  Medication Therapy Management Provider  Phone: [phone]  [email or web address]      Jenna Butt  Pharmacy Student   [facility]      Telemedicine Visit Details  The patient's medications can be safely assessed via a telemedicine encounter.  Type of service:  Telephone visit  Originating Location (pt. Location): Home    Distant Location (provider location):  On-site  Start Time: 12:01 PM  End Time: 12:30 PM     Medication Therapy Recommendations  Benign essential hypertension   1 Current Medication: hydrochlorothiazide (HYDRODIURIL) 25 MG tablet   Current Medication Sig: Take 1 tablet (25 mg) by mouth daily   Rationale: Undesirable effect - Adverse medication event - Safety   Recommendation: Change Medication - amLODIPine 5 MG tablet   Status: Contact Provider - Awaiting Response   Identified Date: 2/6/2025         Chronic idiopathic constipation   1 Rationale: Untreated condition - Needs additional medication therapy - Indication   Recommendation: Start Medication - polyethylene glycol 17 GM/Dose powder   Status: Patient Agreed - Adherence/Education   Identified Date: 2/6/2025 Completed Date: 2/6/2025

## 2025-02-06 ENCOUNTER — VIRTUAL VISIT (OUTPATIENT)
Dept: PHARMACY | Facility: CLINIC | Age: 70
End: 2025-02-06
Payer: COMMERCIAL

## 2025-02-06 DIAGNOSIS — J45.909 ASTHMA: ICD-10-CM

## 2025-02-06 DIAGNOSIS — J45.909 MODERATE ASTHMA WITHOUT COMPLICATION, UNSPECIFIED WHETHER PERSISTENT: ICD-10-CM

## 2025-02-06 DIAGNOSIS — K21.9 GASTROESOPHAGEAL REFLUX DISEASE WITHOUT ESOPHAGITIS: ICD-10-CM

## 2025-02-06 DIAGNOSIS — J45.909 ASTHMA, UNSPECIFIED ASTHMA SEVERITY, UNSPECIFIED WHETHER COMPLICATED, UNSPECIFIED WHETHER PERSISTENT: ICD-10-CM

## 2025-02-06 DIAGNOSIS — I10 BENIGN ESSENTIAL HYPERTENSION: Primary | ICD-10-CM

## 2025-02-06 DIAGNOSIS — B35.3 TINEA PEDIS OF BOTH FEET: ICD-10-CM

## 2025-02-06 DIAGNOSIS — M81.0 OSTEOPOROSIS, UNSPECIFIED OSTEOPOROSIS TYPE, UNSPECIFIED PATHOLOGICAL FRACTURE PRESENCE: ICD-10-CM

## 2025-02-06 DIAGNOSIS — E78.5 HYPERLIPIDEMIA LDL GOAL <100: ICD-10-CM

## 2025-02-06 DIAGNOSIS — M19.90 ARTHRITIS: ICD-10-CM

## 2025-02-06 DIAGNOSIS — Z78.9 TAKES DIETARY SUPPLEMENTS: ICD-10-CM

## 2025-02-06 RX ORDER — AMLODIPINE BESYLATE 5 MG/1
5 TABLET ORAL DAILY
Qty: 30 TABLET | Refills: 1 | Status: SHIPPED | OUTPATIENT
Start: 2025-02-06

## 2025-02-06 RX ORDER — CLONAZEPAM 0.5 MG/1
0.5 TABLET ORAL 3 TIMES DAILY PRN
COMMUNITY
Start: 2025-01-20

## 2025-02-06 RX ORDER — MAGNESIUM OXIDE 400 MG/1
400 TABLET ORAL DAILY
COMMUNITY

## 2025-02-06 RX ORDER — L.ACID/L.CASEI/B.BIF/B.LON/FOS 2B CELL-50
1 CAPSULE ORAL DAILY
COMMUNITY

## 2025-02-06 RX ORDER — FLUOXETINE HYDROCHLORIDE 40 MG/1
80 CAPSULE ORAL DAILY
COMMUNITY
Start: 2024-09-20

## 2025-02-06 RX ORDER — GABAPENTIN 100 MG/1
100 CAPSULE ORAL 3 TIMES DAILY
Qty: 180 CAPSULE | Refills: 0 | Status: SHIPPED | OUTPATIENT
Start: 2025-02-06

## 2025-02-06 NOTE — PATIENT INSTRUCTIONS
"Recommendations from today's MTM visit:                                                       I will ask Dr. Cloud about switching hydrochlorothiazide to amlodipine for hypertension (and stopping potassium).  I will talk to Dr. Cloud about using gabapentin for a short course to see if that helps with pain.   Try to limit use of Albuterol inhaler to see if tremor/shakes improve.  Start taking Miralax one half capful every other day for constipation. You can increase this to a half capful daily if you are still not having easy bowel movements after a week.   Talk to Nasreen about decreasing fluoxetine dose due to tremor if albuterol reduction doesn't help.  Talk to Dr. Cloud about alendronate or other options for bone health at your next visit.    Follow-up: 3/25 at 3:30    It was great speaking with you today.  I value your experience and would be very thankful for your time in providing feedback in our clinic survey. In the next few days, you may receive an email or text message from [employer] with a link to a survey related to your  clinical pharmacist.\"     To schedule another MTM appointment, please call the clinic directly or you may call the MTM scheduling line at [phone] or toll-free at [phone].     My Clinical Pharmacist's contact information:                                                      Please feel free to contact me with any questions or concerns you have.      Cindi Nieves PharmD, [facility]  Medication Therapy Management Provider  Phone: [phone]  [email or web address]   "

## 2025-02-06 NOTE — Clinical Note
Tootie Persaud Dr. is going to see you soon for her back and hip pain but I'm wondering if I could get her some gabapentin before then- maybe starting at 100mg twice daily and allowing her to go up to 300mg twice daily? Also let me know if I can switch her hydrochlorothiazide and potassium to amlodipine 5mg before the visit. Thanks for your time.

## 2025-02-18 ENCOUNTER — TELEPHONE (OUTPATIENT)
Dept: INTERNAL MEDICINE | Facility: CLINIC | Age: 70
End: 2025-02-18
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
## 2025-02-18 NOTE — TELEPHONE ENCOUNTER
Patient calling.    She wants an order for a hospital bed and a lift chair.She had a fall and now she can't walk well. She has an appointment with PCP next week.    She wants it sent to [facility]. Contact there is Julia at [phone].

## 2025-02-20 ENCOUNTER — TELEPHONE (OUTPATIENT)
Dept: PHARMACY | Facility: CLINIC | Age: 70
End: 2025-02-20

## 2025-02-20 DIAGNOSIS — I10 ESSENTIAL HYPERTENSION: ICD-10-CM

## 2025-02-20 RX ORDER — POTASSIUM CHLORIDE 750 MG/1
10 TABLET, EXTENDED RELEASE ORAL DAILY
Qty: 90 TABLET | Refills: 3 | Status: SHIPPED | OUTPATIENT
Start: 2025-02-20

## 2025-02-20 RX ORDER — HYDROCHLOROTHIAZIDE 25 MG/1
25 TABLET ORAL DAILY
Qty: 90 TABLET | Refills: 3 | Status: SHIPPED | OUTPATIENT
Start: 2025-02-20

## 2025-02-20 NOTE — TELEPHONE ENCOUNTER
Patient called today with acute worsening back pain and is also having severe swelling in her legs. We discussed that both the gabapentin and the amlodipine that we added last visit can cause swelling, so we should back off on at least one of them.    As her pain is so bad right now, we will switch her amlodipine back to hydrochlorothiazide and potassium and let her continue the gabapentin (with dose escalation as listed). She is open to this. Orders sent per CPA with Dr. Cloud.    Cindi Nieves, PharmD, [facility]  Medication Therapy Management Provider  Phone: [phone]  [email or web address]

## 2025-02-24 ENCOUNTER — TELEPHONE (OUTPATIENT)
Dept: INTERNAL MEDICINE | Facility: CLINIC | Age: 70
End: 2025-02-24

## 2025-02-24 NOTE — TELEPHONE ENCOUNTER
Received 2 separate forms from [facility] for Hospital Bed and Lift Chair to be filled out and faxed with progress notes.     Placed in PCP's inbox.    Haydee Lance MA on 2/24/2025 at 11:23 AM

## 2025-02-25 ENCOUNTER — TELEPHONE (OUTPATIENT)
Dept: INTERNAL MEDICINE | Facility: CLINIC | Age: 70
End: 2025-02-25
Payer: COMMERCIAL

## 2025-02-25 NOTE — TELEPHONE ENCOUNTER
FYI - Status Update    Who is Calling: patient    Update: Patient states she is at [facility] & wanted to let the team know, she will ne unable to make the appmnt for tomorrow so she cancelled. She states she broke her third lumbar (something in her back) & just wanted to let her team know.    Does caller want a call/response back: No

## 2025-02-25 NOTE — TELEPHONE ENCOUNTER
Thanks for update. See if she can have inpatient team re-order the hospital bed and lift chair for her. I had gotten note back from insurance that we needed to have updated notes with need, which I was going to do tomorrow. May be easiest/best if she can get from in patient team prior to discharge.

## 2025-02-26 NOTE — TELEPHONE ENCOUNTER
Patient returned call.     Relayed Dr. Cloud's note. Patient states Sukhwinder told her that they have everything and nothing further is needed. She requests writer contact Sukhwinder. Contacted Sukhwinder at number provided by patient ([phone]). Sukhwinder confirms that notes are needed. Informed Sukhwinder that since patient is in the hospital, we have asked that she communicate with the inpatient team to have hospital bed and lift chair reordered. Sukhwinder verbalized understanding.     Updated patient, recommended she speak with the nurses caring for her regarding the request for a hospital bed and lift chair order upon discharge, so they can assist with coordinating. Patient verbalized understanding.     ROCÍO Osborne   [facility]

## 2025-02-28 ENCOUNTER — LAB REQUISITION (OUTPATIENT)
Dept: LAB | Facility: CLINIC | Age: 70
End: 2025-02-28
Payer: COMMERCIAL

## 2025-02-28 DIAGNOSIS — E87.6 HYPOKALEMIA: ICD-10-CM

## 2025-02-28 DIAGNOSIS — R60.9 EDEMA, UNSPECIFIED: ICD-10-CM

## 2025-02-28 PROBLEM — R60.0 BILATERAL LOWER EXTREMITY EDEMA: Status: ACTIVE | Noted: 2025-02-25

## 2025-02-28 PROBLEM — S32.030A CLOSED COMPRESSION FRACTURE OF THIRD LUMBAR VERTEBRA (H): Status: ACTIVE | Noted: 2025-02-25

## 2025-02-28 PROBLEM — J45.50 SEVERE PERSISTENT ASTHMA, UNSPECIFIED WHETHER COMPLICATED (H): Status: ACTIVE | Noted: 2025-02-28

## 2025-03-03 ENCOUNTER — TRANSITIONAL CARE UNIT VISIT (OUTPATIENT)
Dept: GERIATRICS | Facility: CLINIC | Age: 70
End: 2025-03-03
Payer: COMMERCIAL

## 2025-03-03 VITALS
OXYGEN SATURATION: 97 % | RESPIRATION RATE: 18 BRPM | DIASTOLIC BLOOD PRESSURE: 62 MMHG | WEIGHT: 186 LBS | HEIGHT: 60 IN | HEART RATE: 84 BPM | SYSTOLIC BLOOD PRESSURE: 118 MMHG | BODY MASS INDEX: 36.52 KG/M2 | TEMPERATURE: 98.9 F

## 2025-03-03 DIAGNOSIS — B35.3 TINEA PEDIS OF BOTH FEET: ICD-10-CM

## 2025-03-03 DIAGNOSIS — S32.030A CLOSED COMPRESSION FRACTURE OF L3 VERTEBRA, INITIAL ENCOUNTER (H): ICD-10-CM

## 2025-03-03 DIAGNOSIS — F41.1 ANXIETY STATE: ICD-10-CM

## 2025-03-03 DIAGNOSIS — M80.032D PATHOLOGICAL FRACTURE OF LEFT RADIUS DUE TO OSTEOPOROSIS WITH ROUTINE HEALING, UNSPECIFIED OSTEOPOROSIS TYPE, SUBSEQUENT ENCOUNTER: Primary | ICD-10-CM

## 2025-03-03 DIAGNOSIS — F41.0 PANIC DISORDER WITHOUT AGORAPHOBIA: ICD-10-CM

## 2025-03-03 DIAGNOSIS — I10 ESSENTIAL HYPERTENSION: ICD-10-CM

## 2025-03-03 DIAGNOSIS — E87.6 ACUTE HYPOKALEMIA: ICD-10-CM

## 2025-03-03 LAB
ANION GAP SERPL CALCULATED.3IONS-SCNC: 13 MMOL/L (ref 7–15)
BUN SERPL-MCNC: 12.4 MG/DL (ref 8–23)
CALCIUM SERPL-MCNC: 9.3 MG/DL (ref 8.8–10.4)
CHLORIDE SERPL-SCNC: 105 MMOL/L (ref 98–107)
CREAT SERPL-MCNC: 0.75 MG/DL (ref 0.51–0.95)
EGFRCR SERPLBLD CKD-EPI 2021: 86 ML/MIN/1.73M2
GLUCOSE SERPL-MCNC: 92 MG/DL (ref 70–99)
HCO3 SERPL-SCNC: 23 MMOL/L (ref 22–29)
POTASSIUM SERPL-SCNC: 3.9 MMOL/L (ref 3.4–5.3)
SODIUM SERPL-SCNC: 141 MMOL/L (ref 135–145)

## 2025-03-03 PROCEDURE — P9603 ONE-WAY ALLOW PRORATED MILES: HCPCS | Mod: ORL | Performed by: NURSE PRACTITIONER

## 2025-03-03 PROCEDURE — 80048 BASIC METABOLIC PNL TOTAL CA: CPT | Mod: ORL | Performed by: NURSE PRACTITIONER

## 2025-03-03 PROCEDURE — 36415 COLL VENOUS BLD VENIPUNCTURE: CPT | Mod: ORL | Performed by: NURSE PRACTITIONER

## 2025-03-03 PROCEDURE — 99309 SBSQ NF CARE MODERATE MDM 30: CPT | Performed by: NURSE PRACTITIONER

## 2025-03-03 NOTE — PROGRESS NOTES
[facility] GERIATRICS    PRIMARY CARE PROVIDER AND CLINIC:  Latricia Cloud MD, [address] [address]  Chief Complaint   Patient presents with    KAYLINECK      [place] Medical Record Number:  [id number]  Place of Service where encounter took place:  [facility] ([facility]) [[id number]]    Tootie Marin  is a 69 year old  (1955), admitted to the above facility from  [facility] . Hospital stay 2/25/2025 through 2/27/2025..       Patient seen today for follow up NP visit in TCU:  1. Pathological fracture of left radius due to osteoporosis with routine healing, unspecified osteoporosis type, subsequent encounter    2. Anxiety    3. Acute hypokalemia    4. Panic disorder without agoraphobia    5. Closed compression fracture of L3 vertebra, initial encounter (H)    6. Essential hypertension    7. Tinea pedis of both feet          HPI:    S/p compression fracture and right radius fracture. Right arm in sling, NWB. Chronic anxiety, on scheduled TID meds.  Denies HA, chest pain, palpitations, dizziness.  No troubles breathing, no SOB, no Concerns with urination, mild complaint of constipation. Start senna. Eating and drinking okay. Sleeping okay. Working with therapies. Pain controlled   rash on both legs improving with clotrimazole   Lives at senior living apartments, ambulates with a walker or wheelchair.      CODE STATUS/ADVANCE DIRECTIVES DISCUSSION:  Full Code  CPR/Full code   ALLERGIES:   Allergies   Allergen Reactions    Aspirin (Tartrazine Only) [Tartrazine] Unknown    Chicken Meat (Diagnostic) GI Disturbance    Chicken [Chicken Protein] Unknown    Other Food Allergy Unknown     TURKEY      PAST MEDICAL HISTORY:   Past Medical History:   Diagnosis Date    Anxiety     Chest wall trauma     Chronic prescription benzodiazepine use     Hypercholesteremia     Osteopenia of multiple sites 02/20/2024    Panic disorder       PAST SURGICAL HISTORY:   has a past  surgical history that includes Biopsy breast (Right, 1987).  FAMILY HISTORY: family history includes Cancer in her brother and father; Heart Disease in her mother.  SOCIAL HISTORY:   reports that she has never smoked. She has never used smokeless tobacco. She reports that she does not drink alcohol and does not use drugs.  Patient's living condition: lives alone    Post Discharge Medication Reconciliation Status:   MED REC REQUIRED  Post Medication Reconciliation Status: discharge medications reconciled, continue medications without change       Current Outpatient Medications   Medication Sig Dispense Refill    acetaminophen (TYLENOL) 500 MG tablet Take 1-2 tablets (500-1,000 mg) by mouth every 6 hours as needed for mild pain 100 tablet 3    albuterol (PROAIR HFA/PROVENTIL HFA/VENTOLIN HFA) 108 (90 Base) MCG/ACT inhaler Inhale 2 puffs into the lungs every 6 hours as needed. 54 g 0    clonazePAM (KLONOPIN) 0.5 MG tablet Take 1 tablet (0.5 mg) by mouth 3 times daily. 60 tablet 1    FLUoxetine (PROZAC) 20 MG capsule Take 20 mg by mouth daily. 60 capsule 1    FLUoxetine (PROZAC) 40 MG capsule Take 40 mg by mouth daily.      gabapentin (NEURONTIN) 100 MG capsule Take 1 capsule (100 mg) by mouth 3 times daily. Increase to 200mg three times daily after 3-5 days if tolerating side effects and needing more pain relief. 180 capsule 0    Lidocaine (LIDOCARE) 4 % Patch Place 1 patch onto the skin every 24 hours. To prevent lidocaine toxicity, patient should be patch free for 12 hrs daily.      lovastatin (MEVACOR) 20 MG tablet Take 1 tablet (20 mg) by mouth at bedtime 90 tablet 3    mometasone-formoterol (DULERA) 200-5 MCG/ACT inhaler Inhale 2 puffs into the lungs 2 times daily. 13 g 3    nystatin (MYCOSTATIN) 801888 UNIT/GM external powder Apply topically.      omeprazole (PRILOSEC) 20 MG DR capsule Take 1 capsule (20 mg) by mouth daily 90 capsule 3    potassium chloride jossue ER (KLOR-CON M10) 10 MEQ CR tablet Take 1 tablet  (10 mEq) by mouth daily. (Patient taking differently: Take 40 mEq by mouth daily.) 90 tablet 3     No current facility-administered medications for this visit.       ROS:  10 point ROS of systems including Constitutional, Eyes, Respiratory, Cardiovascular, Gastroenterology, Genitourinary, Integumentary, Musculoskeletal, Psychiatric were all negative except for pertinent positives noted in my HPI.    Vitals:  /62   Pulse 84   Temp 98.9  F (37.2  C)   Resp 18   Ht 1.524 m (5')   Wt 84.4 kg (186 lb)   SpO2 97%   BMI 36.33 kg/m    Exam:  GENERAL APPEARANCE:  Alert, in no distress, oriented, cooperative. Walking with therapies.   ENT:  Mouth and posterior oropharynx normal, moist mucous membranes, several teeth missing.  EYES:  EOM, conjunctivae, lids, pupils and irises normal  NECK:  No adenopathy,masses or thyromegaly  RESP:  respiratory effort and palpation of chest normal, lungs clear to auscultation , no respiratory distress  CV:  Palpation and auscultation of heart done , regular rate and rhythm, no murmur, rub, or gallop, +2 and +3 edema in bilateral lower extremities, normal capillary refill bilaterally.  GI/ABDOMEN:  normal bowel sounds, soft, nontender, no hepatosplenomegaly or other masses  M/S:   moves extremities spontaneously. Sling to left arm  SKIN:  Erythematous rash to bilateral lower extremities with satellite macules. No warmth or drainage with the rash.   NEURO:   intact   PSYCH:  oriented X 4, normal insight, judgement and memory, affect and mood normal.      Lab/Diagnostic data:  Recent labs in [facility] reviewed by me today.     Last Comprehensive Metabolic Panel:  Lab Results   Component Value Date     03/03/2025    POTASSIUM 3.9 03/03/2025    CHLORIDE 105 03/03/2025    CO2 23 03/03/2025    ANIONGAP 13 03/03/2025    GLC 92 03/03/2025    BUN 12.4 03/03/2025    CR 0.75 03/03/2025    GFRESTIMATED 86 03/03/2025    RIDGE 9.3 03/03/2025       Lab Results   Component Value Date    WBC 11.3  "08/20/2024     Lab Results   Component Value Date    RBC 4.41 08/20/2024     Lab Results   Component Value Date    HGB 13.1 08/20/2024     Lab Results   Component Value Date    HCT 40.1 08/20/2024     No components found for: \"MCT\"  Lab Results   Component Value Date    MCV 91 08/20/2024     Lab Results   Component Value Date    MCH 29.7 08/20/2024     Lab Results   Component Value Date    MCHC 32.7 08/20/2024     Lab Results   Component Value Date    RDW 13.6 08/20/2024     Lab Results   Component Value Date     08/20/2024       ASSESSMENT/PLAN:    Acute / Subacute L3 Compression Fracture   Osteoporosis   Acute Low Back Pain   Mechanical Fall 2/13   Generalized Weakness   Describes several months of low back pain with history of compression fracture diagnosed at OSH at unclear location. Fell on 2/13, tripped getting out of the shower. Increased low back pain, generalized weakness since then. CT lumbar spine w/o IV Cont on admission with superior endplate compression fracture at L3 with 20-30% anterior vertebral height loss. Neurologically intact aside from pain and limited ROM with R hip flexion.   - NSGY consulted  - Neuro checks q4h   - MR lumbar spine ordered   - Plan for Coreline brace when OOB or HOB >30 degrees   - PT / OT consults, okay to get up once Coreline brace delivered   - Continue PTA calcium + vitamin D   - Scheduled tylenol, resume low dose gabapentin 100 mg TID, lidocaine patch, PRN oxycodone     Bilateral Lower Extremity Edema   New pitting lower extremity over the past 2-3 weeks. Denies shortness of breath, chest pain, lightheadedness or dizziness. Had switched from HCTZ to amlodipine at beginning of Feb, called back to clinic reporting edema 2/20 and had switched back recently. BNP 17 (may be falsely low in setting of obesity), albumin 3.4, UA unremarkable. Does have reduced mobility but given symmetric edema held off on duplex US.   - Start Lasix 20 mg  - Start using tubigrip " compression stockings daily.  -  BMP stable 3/3/25 as above  -daily weights : 186 pounds today    Hypokalemia   K 2.6, Mag 1.8 on admission. Was previously on long term potassium supplement with use of HCTZ that she had stopped earlier in the month. Denies N/V/D, somewhat poor appetite due to fatigue.   - Currently on Potassium supplementation 40 meq daily.  - BMP 3/3/25 stable as above    Hyponatremia   Na 130, may be in setting of poor appetite.   - Encourage PO, recheck in AM   - BMP stable     Hypertension   Had recently switched from PTA HCTZ + potassium to amlodipine at beginning of Feb. Called back on 2/20 with reports of increased lower extremity and was told stop amlodipine at that time and restart HCTZ + potassium which patient tells me she has done.   - BP within normal limits on admission. Agree with stopping amlodipine give lower extremity edema   - Hold PTA HCTZ for now, would resume if elevated BP and improving potassium  - Started on Lasix 20 mg.     Asthma   - Continue Dulera + PRN albuterol     GERD   - Continue PTA PPI     Constipation   - PRN bowel regimen, would schedule if requiring frequent opioids     Anxiety   - Continue PTA fluoxetine, PCP has been considering decreasing dose due to tremors  - Continue PTA clonazepam 0.5 mg TID    Cutaneous Candidiasis  - Continue Clotrimazole 1% topically to bilateral lower extremities for 1 week, then we will recheck.             Electronically signed by:  Diane Bhatt CNP       Total time greater than 35 minutes reviewing chart in EPIC and PCC, medications, labs, vitals. Discussing with social work, physical therapy, occupational therapy and nursing.

## 2025-03-03 NOTE — LETTER
3/3/2025      Tootie Marin  [address]  [place] [address]        [facility] GERIATRICS    PRIMARY CARE PROVIDER AND CLINIC:  Latricia Cloud MD, [address] [facility] [address]  Chief Complaint   Patient presents with     RECHECK      [facility] Medical Record Number:  [id number]  Place of Service where encounter took place:  [facility] ([facility]) [[id number]]    Tootie Marin  is a 69 year old  (1955), admitted to the above facility from  [facility] . Hospital stay 2/25/2025 through 2/27/2025..       Patient seen today for follow up NP visit in TCU:  1. Pathological fracture of left radius due to osteoporosis with routine healing, unspecified osteoporosis type, subsequent encounter    2. Anxiety    3. Acute hypokalemia    4. Panic disorder without agoraphobia    5. Closed compression fracture of L3 vertebra, initial encounter (H)    6. Essential hypertension    7. Tinea pedis of both feet          HPI:    S/p compression fracture and right radius fracture. Right arm in sling, NWB. Chronic anxiety, on scheduled TID meds.  Denies HA, chest pain, palpitations, dizziness.  No troubles breathing, no SOB, no Concerns with urination, mild complaint of constipation. Start senna. Eating and drinking okay. Sleeping okay. Working with therapies. Pain controlled   rash on both legs improving with clotrimazole   Lives at senior living apartments, ambulates with a walker or wheelchair.      CODE STATUS/ADVANCE DIRECTIVES DISCUSSION:  Full Code  CPR/Full code   ALLERGIES:   Allergies   Allergen Reactions     Aspirin (Tartrazine Only) [Tartrazine] Unknown     Chicken Meat (Diagnostic) GI Disturbance     Chicken [Chicken Protein] Unknown     Other Food Allergy Unknown     TURKEY      PAST MEDICAL HISTORY:   Past Medical History:   Diagnosis Date     Anxiety      Chest wall trauma      Chronic prescription benzodiazepine use      Hypercholesteremia       Osteopenia of multiple sites 02/20/2024     Panic disorder       PAST SURGICAL HISTORY:   has a past surgical history that includes Biopsy breast (Right, 1987).  FAMILY HISTORY: family history includes Cancer in her brother and father; Heart Disease in her mother.  SOCIAL HISTORY:   reports that she has never smoked. She has never used smokeless tobacco. She reports that she does not drink alcohol and does not use drugs.  Patient's living condition: lives alone    Post Discharge Medication Reconciliation Status:   MED REC REQUIRED  Post Medication Reconciliation Status: discharge medications reconciled, continue medications without change       Current Outpatient Medications   Medication Sig Dispense Refill     acetaminophen (TYLENOL) 500 MG tablet Take 1-2 tablets (500-1,000 mg) by mouth every 6 hours as needed for mild pain 100 tablet 3     albuterol (PROAIR HFA/PROVENTIL HFA/VENTOLIN HFA) 108 (90 Base) MCG/ACT inhaler Inhale 2 puffs into the lungs every 6 hours as needed. 54 g 0     clonazePAM (KLONOPIN) 0.5 MG tablet Take 1 tablet (0.5 mg) by mouth 3 times daily. 60 tablet 1     FLUoxetine (PROZAC) 20 MG capsule Take 20 mg by mouth daily. 60 capsule 1     FLUoxetine (PROZAC) 40 MG capsule Take 40 mg by mouth daily.       gabapentin (NEURONTIN) 100 MG capsule Take 1 capsule (100 mg) by mouth 3 times daily. Increase to 200mg three times daily after 3-5 days if tolerating side effects and needing more pain relief. 180 capsule 0     Lidocaine (LIDOCARE) 4 % Patch Place 1 patch onto the skin every 24 hours. To prevent lidocaine toxicity, patient should be patch free for 12 hrs daily.       lovastatin (MEVACOR) 20 MG tablet Take 1 tablet (20 mg) by mouth at bedtime 90 tablet 3     mometasone-formoterol (DULERA) 200-5 MCG/ACT inhaler Inhale 2 puffs into the lungs 2 times daily. 13 g 3     nystatin (MYCOSTATIN) 644080 UNIT/GM external powder Apply topically.       omeprazole (PRILOSEC) 20 MG DR capsule Take 1 capsule  (20 mg) by mouth daily 90 capsule 3     potassium chloride jossue ER (KLOR-CON M10) 10 MEQ CR tablet Take 1 tablet (10 mEq) by mouth daily. (Patient taking differently: Take 40 mEq by mouth daily.) 90 tablet 3     No current facility-administered medications for this visit.       ROS:  10 point ROS of systems including Constitutional, Eyes, Respiratory, Cardiovascular, Gastroenterology, Genitourinary, Integumentary, Musculoskeletal, Psychiatric were all negative except for pertinent positives noted in my HPI.    Vitals:  /62   Pulse 84   Temp 98.9  F (37.2  C)   Resp 18   Ht 1.524 m (5')   Wt 84.4 kg (186 lb)   SpO2 97%   BMI 36.33 kg/m    Exam:  GENERAL APPEARANCE:  Alert, in no distress, oriented, cooperative. Walking with therapies.   ENT:  Mouth and posterior oropharynx normal, moist mucous membranes, several teeth missing.  EYES:  EOM, conjunctivae, lids, pupils and irises normal  NECK:  No adenopathy,masses or thyromegaly  RESP:  respiratory effort and palpation of chest normal, lungs clear to auscultation , no respiratory distress  CV:  Palpation and auscultation of heart done , regular rate and rhythm, no murmur, rub, or gallop, +2 and +3 edema in bilateral lower extremities, normal capillary refill bilaterally.  GI/ABDOMEN:  normal bowel sounds, soft, nontender, no hepatosplenomegaly or other masses  M/S:   moves extremities spontaneously. Sling to left arm  SKIN:  Erythematous rash to bilateral lower extremities with satellite macules. No warmth or drainage with the rash.   NEURO:   intact   PSYCH:  oriented X 4, normal insight, judgement and memory, affect and mood normal.      Lab/Diagnostic data:  Recent labs in [facility] reviewed by me today.     Last Comprehensive Metabolic Panel:  Lab Results   Component Value Date     03/03/2025    POTASSIUM 3.9 03/03/2025    CHLORIDE 105 03/03/2025    CO2 23 03/03/2025    ANIONGAP 13 03/03/2025    GLC 92 03/03/2025    BUN 12.4 03/03/2025    CR 0.75  "03/03/2025    GFRESTIMATED 86 03/03/2025    RIDGE 9.3 03/03/2025       Lab Results   Component Value Date    WBC 11.3 08/20/2024     Lab Results   Component Value Date    RBC 4.41 08/20/2024     Lab Results   Component Value Date    HGB 13.1 08/20/2024     Lab Results   Component Value Date    HCT 40.1 08/20/2024     No components found for: \"MCT\"  Lab Results   Component Value Date    MCV 91 08/20/2024     Lab Results   Component Value Date    MCH 29.7 08/20/2024     Lab Results   Component Value Date    MCHC 32.7 08/20/2024     Lab Results   Component Value Date    RDW 13.6 08/20/2024     Lab Results   Component Value Date     08/20/2024       ASSESSMENT/PLAN:    Acute / Subacute L3 Compression Fracture   Osteoporosis   Acute Low Back Pain   Mechanical Fall 2/13   Generalized Weakness   Describes several months of low back pain with history of compression fracture diagnosed at OSH at unclear location. Fell on 2/13, tripped getting out of the shower. Increased low back pain, generalized weakness since then. CT lumbar spine w/o IV Cont on admission with superior endplate compression fracture at L3 with 20-30% anterior vertebral height loss. Neurologically intact aside from pain and limited ROM with R hip flexion.   - NSGY consulted  - Neuro checks q4h   - MR lumbar spine ordered   - Plan for Coreline brace when OOB or HOB >30 degrees   - PT / OT consults, okay to get up once Coreline brace delivered   - Continue PTA calcium + vitamin D   - Scheduled tylenol, resume low dose gabapentin 100 mg TID, lidocaine patch, PRN oxycodone     Bilateral Lower Extremity Edema   New pitting lower extremity over the past 2-3 weeks. Denies shortness of breath, chest pain, lightheadedness or dizziness. Had switched from HCTZ to amlodipine at beginning of Feb, called back to clinic reporting edema 2/20 and had switched back recently. BNP 17 (may be falsely low in setting of obesity), albumin 3.4, UA unremarkable. Does have " reduced mobility but given symmetric edema held off on duplex US.   - Start Lasix 20 mg  - Start using tubigrip compression stockings daily.  -  BMP stable 3/3/25 as above  -daily weights : 186 pounds today    Hypokalemia   K 2.6, Mag 1.8 on admission. Was previously on long term potassium supplement with use of HCTZ that she had stopped earlier in the month. Denies N/V/D, somewhat poor appetite due to fatigue.   - Currently on Potassium supplementation 40 meq daily.  - BMP 3/3/25 stable as above    Hyponatremia   Na 130, may be in setting of poor appetite.   - Encourage PO, recheck in AM   - BMP stable     Hypertension   Had recently switched from PTA HCTZ + potassium to amlodipine at beginning of Feb. Called back on 2/20 with reports of increased lower extremity and was told stop amlodipine at that time and restart HCTZ + potassium which patient tells me she has done.   - BP within normal limits on admission. Agree with stopping amlodipine give lower extremity edema   - Hold PTA HCTZ for now, would resume if elevated BP and improving potassium  - Started on Lasix 20 mg.     Asthma   - Continue Dulera + PRN albuterol     GERD   - Continue PTA PPI     Constipation   - PRN bowel regimen, would schedule if requiring frequent opioids     Anxiety   - Continue PTA fluoxetine, PCP has been considering decreasing dose due to tremors  - Continue PTA clonazepam 0.5 mg TID    Cutaneous Candidiasis  - Continue Clotrimazole 1% topically to bilateral lower extremities for 1 week, then we will recheck.             Electronically signed by:  Diane Bhatt CNP       Total time greater than 35 minutes reviewing chart in EPIC and PCC, medications, labs, vitals. Discussing with social work, physical therapy, occupational therapy and nursing.             Sincerely,        Diane Bhatt CNP    Electronically signed

## 2025-03-06 ENCOUNTER — TELEPHONE (OUTPATIENT)
Dept: INTERNAL MEDICINE | Facility: CLINIC | Age: 70
End: 2025-03-06

## 2025-03-10 ENCOUNTER — TRANSITIONAL CARE UNIT VISIT (OUTPATIENT)
Dept: GERIATRICS | Facility: CLINIC | Age: 70
End: 2025-03-10
Payer: COMMERCIAL

## 2025-03-10 VITALS
BODY MASS INDEX: 38.51 KG/M2 | TEMPERATURE: 98.9 F | RESPIRATION RATE: 18 BRPM | HEART RATE: 78 BPM | HEIGHT: 59 IN | OXYGEN SATURATION: 97 % | WEIGHT: 191 LBS | SYSTOLIC BLOOD PRESSURE: 134 MMHG | DIASTOLIC BLOOD PRESSURE: 63 MMHG

## 2025-03-10 DIAGNOSIS — I10 ESSENTIAL HYPERTENSION: ICD-10-CM

## 2025-03-10 DIAGNOSIS — J45.50 SEVERE PERSISTENT ASTHMA, UNSPECIFIED WHETHER COMPLICATED (H): ICD-10-CM

## 2025-03-10 DIAGNOSIS — K21.9 GASTROESOPHAGEAL REFLUX DISEASE WITHOUT ESOPHAGITIS: ICD-10-CM

## 2025-03-10 DIAGNOSIS — M80.032D PATHOLOGICAL FRACTURE OF LEFT RADIUS DUE TO OSTEOPOROSIS WITH ROUTINE HEALING, UNSPECIFIED OSTEOPOROSIS TYPE, SUBSEQUENT ENCOUNTER: ICD-10-CM

## 2025-03-10 DIAGNOSIS — B35.3 TINEA PEDIS OF BOTH FEET: ICD-10-CM

## 2025-03-10 DIAGNOSIS — F41.1 ANXIETY STATE: ICD-10-CM

## 2025-03-10 DIAGNOSIS — E87.6 ACUTE HYPOKALEMIA: ICD-10-CM

## 2025-03-10 DIAGNOSIS — S32.030A CLOSED COMPRESSION FRACTURE OF L3 VERTEBRA, INITIAL ENCOUNTER (H): Primary | ICD-10-CM

## 2025-03-10 DIAGNOSIS — R60.0 BILATERAL LOWER EXTREMITY EDEMA: ICD-10-CM

## 2025-03-10 PROBLEM — Z99.3 WHEELCHAIR DEPENDENCE: Status: ACTIVE | Noted: 2025-03-05

## 2025-03-10 PROCEDURE — 99310 SBSQ NF CARE HIGH MDM 45: CPT | Performed by: NURSE PRACTITIONER

## 2025-03-10 NOTE — PROGRESS NOTES
[facility] GERIATRICS    PRIMARY CARE PROVIDER AND CLINIC:  Latricia Cloud MD, [address] [facility] [address]  Chief Complaint   Patient presents with    KAYLINECK      [facility] Medical Record Number:  [id number]  Place of Service where encounter took place:  [facility] ([facility]) [[id number]]    Tootie Marin  is a 69 year old  (1955), admitted to the above facility from  [facility] . Hospital stay 2/25/2025 through 2/27/2025..       Patient seen today for follow up NP visit in TCU:  1. Closed compression fracture of L3 vertebra, initial encounter (H)    2. Pathological fracture of left radius due to osteoporosis with routine healing, unspecified osteoporosis type, subsequent encounter    3. Gastroesophageal reflux disease without esophagitis    4. Anxiety    5. Acute hypokalemia    6. Tinea pedis of both feet    7. Bilateral lower extremity edema    8. Essential hypertension    9. Severe persistent asthma, unspecified whether complicated (H)          HPI:    S/p compression fracture and right radius fracture. Right arm in sling, NWB. Chronic anxiety, on scheduled TID meds.  Denies HA, chest pain, palpitations, dizziness.  No troubles breathing, no SOB, no Concerns with urination, mild complaint of constipation. Continue senna. Eating and drinking okay. Sleeping okay. Working with therapies. Pain controlled   rash on both legs, clotrimazole not helping. Started Triam due to itching.   Lives at senior living apartments, ambulates with a walker or wheelchair.      CODE STATUS/ADVANCE DIRECTIVES DISCUSSION:  Full Code  CPR/Full code   ALLERGIES:   Allergies   Allergen Reactions    Aspirin (Tartrazine Only) [Tartrazine] Unknown    Chicken Meat (Diagnostic) GI Disturbance    Chicken [Chicken Protein] Unknown    Other Food Allergy Unknown     TURKEY      PAST MEDICAL HISTORY:   Past Medical History:   Diagnosis Date    Anxiety     Chest wall trauma     Chronic  prescription benzodiazepine use     Hypercholesteremia     Osteopenia of multiple sites 02/20/2024    Panic disorder       PAST SURGICAL HISTORY:   has a past surgical history that includes Biopsy breast (Right, 1987).  FAMILY HISTORY: family history includes Cancer in her brother and father; Heart Disease in her mother.  SOCIAL HISTORY:   reports that she has never smoked. She has never used smokeless tobacco. She reports that she does not drink alcohol and does not use drugs.  Patient's living condition: lives alone    Post Discharge Medication Reconciliation Status:   MED REC REQUIRED  Post Medication Reconciliation Status: discharge medications reconciled, continue medications without change       Current Outpatient Medications   Medication Sig Dispense Refill    acetaminophen (TYLENOL) 500 MG tablet Take 1-2 tablets (500-1,000 mg) by mouth every 6 hours as needed for mild pain 100 tablet 3    albuterol (PROAIR HFA/PROVENTIL HFA/VENTOLIN HFA) 108 (90 Base) MCG/ACT inhaler Inhale 2 puffs into the lungs every 6 hours as needed. 54 g 0    clonazePAM (KLONOPIN) 0.5 MG tablet Take 1 tablet (0.5 mg) by mouth 3 times daily. 60 tablet 0    FLUoxetine (PROZAC) 20 MG capsule Take 20 mg by mouth daily. 60 capsule 1    FLUoxetine (PROZAC) 40 MG capsule Take 40 mg by mouth daily.      gabapentin (NEURONTIN) 100 MG capsule Take 1 capsule (100 mg) by mouth 3 times daily. Increase to 200mg three times daily after 3-5 days if tolerating side effects and needing more pain relief. 180 capsule 0    Lidocaine (LIDOCARE) 4 % Patch Place 1 patch onto the skin every 24 hours. To prevent lidocaine toxicity, patient should be patch free for 12 hrs daily.      lovastatin (MEVACOR) 20 MG tablet Take 1 tablet (20 mg) by mouth at bedtime 90 tablet 3    mometasone-formoterol (DULERA) 200-5 MCG/ACT inhaler Inhale 2 puffs into the lungs 2 times daily. 13 g 3    nystatin (MYCOSTATIN) 096943 UNIT/GM external powder Apply topically.       "omeprazole (PRILOSEC) 20 MG DR capsule Take 1 capsule (20 mg) by mouth daily 90 capsule 3    potassium chloride jossue ER (KLOR-CON M10) 10 MEQ CR tablet Take 1 tablet (10 mEq) by mouth daily. (Patient taking differently: Take 40 mEq by mouth daily.) 90 tablet 3     No current facility-administered medications for this visit.       ROS:  10 point ROS of systems including Constitutional, Eyes, Respiratory, Cardiovascular, Gastroenterology, Genitourinary, Integumentary, Musculoskeletal, Psychiatric were all negative except for pertinent positives noted in my HPI.    Vitals:  /63   Pulse 78   Temp 98.9  F (37.2  C)   Resp 18   Ht 1.499 m (4' 11\")   Wt 86.6 kg (191 lb)   SpO2 97%   BMI 38.58 kg/m    Exam:  GENERAL APPEARANCE:  Alert, in no distress, oriented, cooperative. Walking with therapies.   ENT:  Mouth and posterior oropharynx normal, moist mucous membranes, several teeth missing.  EYES:  EOM, conjunctivae, lids, pupils and irises normal  NECK:  No adenopathy,masses or thyromegaly  RESP:  respiratory effort and palpation of chest normal, lungs clear to auscultation , no respiratory distress  CV:  Palpation and auscultation of heart done , regular rate and rhythm, no murmur, rub, or gallop, +2 edema in bilateral lower extremities, normal capillary refill bilaterally.  GI/ABDOMEN:  normal bowel sounds, soft, nontender, no hepatosplenomegaly or other masses  M/S:   moves extremities spontaneously. Sling to left arm  SKIN:  Erythematous rash to bilateral lower extremities with satellite macules. No warmth or drainage with the rash. Triam helping.   NEURO:   intact   PSYCH:  oriented X 4, normal insight, judgement and memory, affect and mood normal.      Lab/Diagnostic data:  Recent labs in [facility] reviewed by me today.     Last Comprehensive Metabolic Panel:  Lab Results   Component Value Date     03/03/2025    POTASSIUM 3.9 03/03/2025    CHLORIDE 105 03/03/2025    CO2 23 03/03/2025    ANIONGAP 13 " "03/03/2025    GLC 92 03/03/2025    BUN 12.4 03/03/2025    CR 0.75 03/03/2025    GFRESTIMATED 86 03/03/2025    RIDGE 9.3 03/03/2025       Lab Results   Component Value Date    WBC 11.3 08/20/2024     Lab Results   Component Value Date    RBC 4.41 08/20/2024     Lab Results   Component Value Date    HGB 13.1 08/20/2024     Lab Results   Component Value Date    HCT 40.1 08/20/2024     No components found for: \"MCT\"  Lab Results   Component Value Date    MCV 91 08/20/2024     Lab Results   Component Value Date    MCH 29.7 08/20/2024     Lab Results   Component Value Date    MCHC 32.7 08/20/2024     Lab Results   Component Value Date    RDW 13.6 08/20/2024     Lab Results   Component Value Date     08/20/2024       ASSESSMENT/PLAN:    Acute / Subacute L3 Compression Fracture   Osteoporosis   Acute Low Back Pain   Mechanical Fall 2/13   Generalized Weakness   Describes several months of low back pain with history of compression fracture diagnosed at OSH at unclear location. Fell on 2/13, tripped getting out of the shower. Increased low back pain, generalized weakness since then. CT lumbar spine w/o IV Cont on admission with superior endplate compression fracture at L3 with 20-30% anterior vertebral height loss. Neurologically intact aside from pain and limited ROM with R hip flexion.   - NSGY consulted  - Neuro checks q4h   - MR lumbar spine ordered   - Plan for Coreline brace when OOB or HOB >30 degrees   - PT / OT consults, okay to get up once Coreline brace delivered   - Continue PTA calcium + vitamin D   - Scheduled tylenol, resume low dose gabapentin 100 mg TID, lidocaine patch, PRN oxycodone     Bilateral Lower Extremity Edema   New pitting lower extremity over the past 2-3 weeks. Denies shortness of breath, chest pain, lightheadedness or dizziness. Had switched from HCTZ to amlodipine at beginning of Feb, called back to clinic reporting edema 2/20 and had switched back recently. BNP 17 (may be falsely low in " setting of obesity), albumin 3.4, UA unremarkable. Does have reduced mobility but given symmetric edema held off on duplex US.   - Start Lasix 20 mg, will increase to 40mg  - Continue tubigrip compression stockings daily.  -  BMP recheck 3/17  -daily weights : 186 pounds today> 191 today.     Hypokalemia   K 2.6, Mag 1.8 on admission. Was previously on long term potassium supplement with use of HCTZ that she had stopped earlier in the month. Denies N/V/D, somewhat poor appetite due to fatigue.   - Currently on Potassium supplementation 40 meq daily.  - BMP ordered    Hyponatremia   Na 130, may be in setting of poor appetite.   - Encourage PO, recheck in AM   - [facility] stable     Hypertension   Had recently switched from PTA HCTZ + potassium to amlodipine at beginning of Feb. Called back on 2/20 with reports of increased lower extremity and was told stop amlodipine at that time and restart HCTZ + potassium which patient tells me she has done.   - BP within normal limits on admission. Agree with stopping amlodipine give lower extremity edema   - Hold PTA HCTZ for now, would resume if elevated BP and improving potassium  - Started on Lasix 20 mg, increase lasix to 40mg.     Asthma   - Continue Dulera + PRN albuterol     GERD   - Continue PTA PPI     Constipation   - PRN bowel regimen, would schedule if requiring frequent opioids     Anxiety   - Continue PTA fluoxetine, PCP has been considering decreasing dose due to tremors  - Continue PTA clonazepam 0.5 mg TID    Cutaneous Candidiasis  - Continue Clotrimazole, not really helping.  Started Triam.             Electronically signed by:  Diane Bhatt CNP       Total time greater than 45 minutes reviewing chart in EPIC and PCC, medications, labs, vitals. Discussing with social work, physical therapy, occupational therapy and nursing.

## 2025-03-10 NOTE — LETTER
3/10/2025      Tootie Marin  [address]  [place] [address]        [facility] GERIATRICS    PRIMARY CARE PROVIDER AND CLINIC:  Latricia Cloud MD, [address] [facility] [address]  Chief Complaint   Patient presents with     RECHECK      [facility] Record Number:  [id number]  Place of Service where encounter took place:  [facility] ([facility]) [[id number]]    Tootie Marin  is a 69 year old  (1955), admitted to the above facility from  [facility] . Hospital stay 2/25/2025 through 2/27/2025..       Patient seen today for follow up NP visit in TCU:  1. Closed compression fracture of L3 vertebra, initial encounter (H)    2. Pathological fracture of left radius due to osteoporosis with routine healing, unspecified osteoporosis type, subsequent encounter    3. Gastroesophageal reflux disease without esophagitis    4. Anxiety    5. Acute hypokalemia    6. Tinea pedis of both feet    7. Bilateral lower extremity edema    8. Essential hypertension    9. Severe persistent asthma, unspecified whether complicated (H)          HPI:    S/p compression fracture and right radius fracture. Right arm in sling, NWB. Chronic anxiety, on scheduled TID meds.  Denies HA, chest pain, palpitations, dizziness.  No troubles breathing, no SOB, no Concerns with urination, mild complaint of constipation. Continue senna. Eating and drinking okay. Sleeping okay. Working with therapies. Pain controlled   rash on both legs, clotrimazole not helping. Started Triam due to itching.   Lives at senior living apartments, ambulates with a walker or wheelchair.      CODE STATUS/ADVANCE DIRECTIVES DISCUSSION:  Full Code  CPR/Full code   ALLERGIES:   Allergies   Allergen Reactions     Aspirin (Tartrazine Only) [Tartrazine] Unknown     Chicken Meat (Diagnostic) GI Disturbance     Chicken [Chicken Protein] Unknown     Other Food Allergy Unknown     TURKEY      PAST MEDICAL HISTORY:    Past Medical History:   Diagnosis Date     Anxiety      Chest wall trauma      Chronic prescription benzodiazepine use      Hypercholesteremia      Osteopenia of multiple sites 02/20/2024     Panic disorder       PAST SURGICAL HISTORY:   has a past surgical history that includes Biopsy breast (Right, 1987).  FAMILY HISTORY: family history includes Cancer in her brother and father; Heart Disease in her mother.  SOCIAL HISTORY:   reports that she has never smoked. She has never used smokeless tobacco. She reports that she does not drink alcohol and does not use drugs.  Patient's living condition: lives alone    Post Discharge Medication Reconciliation Status:   MED REC REQUIRED  Post Medication Reconciliation Status: discharge medications reconciled, continue medications without change       Current Outpatient Medications   Medication Sig Dispense Refill     acetaminophen (TYLENOL) 500 MG tablet Take 1-2 tablets (500-1,000 mg) by mouth every 6 hours as needed for mild pain 100 tablet 3     albuterol (PROAIR HFA/PROVENTIL HFA/VENTOLIN HFA) 108 (90 Base) MCG/ACT inhaler Inhale 2 puffs into the lungs every 6 hours as needed. 54 g 0     clonazePAM (KLONOPIN) 0.5 MG tablet Take 1 tablet (0.5 mg) by mouth 3 times daily. 60 tablet 0     FLUoxetine (PROZAC) 20 MG capsule Take 20 mg by mouth daily. 60 capsule 1     FLUoxetine (PROZAC) 40 MG capsule Take 40 mg by mouth daily.       gabapentin (NEURONTIN) 100 MG capsule Take 1 capsule (100 mg) by mouth 3 times daily. Increase to 200mg three times daily after 3-5 days if tolerating side effects and needing more pain relief. 180 capsule 0     Lidocaine (LIDOCARE) 4 % Patch Place 1 patch onto the skin every 24 hours. To prevent lidocaine toxicity, patient should be patch free for 12 hrs daily.       lovastatin (MEVACOR) 20 MG tablet Take 1 tablet (20 mg) by mouth at bedtime 90 tablet 3     mometasone-formoterol (DULERA) 200-5 MCG/ACT inhaler Inhale 2 puffs into the lungs 2 times  "daily. 13 g 3     nystatin (MYCOSTATIN) 644200 UNIT/GM external powder Apply topically.       omeprazole (PRILOSEC) 20 MG DR capsule Take 1 capsule (20 mg) by mouth daily 90 capsule 3     potassium chloride jossue ER (KLOR-CON M10) 10 MEQ CR tablet Take 1 tablet (10 mEq) by mouth daily. (Patient taking differently: Take 40 mEq by mouth daily.) 90 tablet 3     No current facility-administered medications for this visit.       ROS:  10 point ROS of systems including Constitutional, Eyes, Respiratory, Cardiovascular, Gastroenterology, Genitourinary, Integumentary, Musculoskeletal, Psychiatric were all negative except for pertinent positives noted in my HPI.    Vitals:  /63   Pulse 78   Temp 98.9  F (37.2  C)   Resp 18   Ht 1.499 m (4' 11\")   Wt 86.6 kg (191 lb)   SpO2 97%   BMI 38.58 kg/m    Exam:  GENERAL APPEARANCE:  Alert, in no distress, oriented, cooperative. Walking with therapies.   ENT:  Mouth and posterior oropharynx normal, moist mucous membranes, several teeth missing.  EYES:  EOM, conjunctivae, lids, pupils and irises normal  NECK:  No adenopathy,masses or thyromegaly  RESP:  respiratory effort and palpation of chest normal, lungs clear to auscultation , no respiratory distress  CV:  Palpation and auscultation of heart done , regular rate and rhythm, no murmur, rub, or gallop, +2 edema in bilateral lower extremities, normal capillary refill bilaterally.  GI/ABDOMEN:  normal bowel sounds, soft, nontender, no hepatosplenomegaly or other masses  M/S:   moves extremities spontaneously. Sling to left arm  SKIN:  Erythematous rash to bilateral lower extremities with satellite macules. No warmth or drainage with the rash. Triam helping.   NEURO:   intact   PSYCH:  oriented X 4, normal insight, judgement and memory, affect and mood normal.      Lab/Diagnostic data:  Recent labs in [facility] reviewed by me today.     Last Comprehensive Metabolic Panel:  Lab Results   Component Value Date     03/03/2025 " "   POTASSIUM 3.9 03/03/2025    CHLORIDE 105 03/03/2025    CO2 23 03/03/2025    ANIONGAP 13 03/03/2025    GLC 92 03/03/2025    BUN 12.4 03/03/2025    CR 0.75 03/03/2025    GFRESTIMATED 86 03/03/2025    RIDGE 9.3 03/03/2025       Lab Results   Component Value Date    WBC 11.3 08/20/2024     Lab Results   Component Value Date    RBC 4.41 08/20/2024     Lab Results   Component Value Date    HGB 13.1 08/20/2024     Lab Results   Component Value Date    HCT 40.1 08/20/2024     No components found for: \"MCT\"  Lab Results   Component Value Date    MCV 91 08/20/2024     Lab Results   Component Value Date    MCH 29.7 08/20/2024     Lab Results   Component Value Date    MCHC 32.7 08/20/2024     Lab Results   Component Value Date    RDW 13.6 08/20/2024     Lab Results   Component Value Date     08/20/2024       ASSESSMENT/PLAN:    Acute / Subacute L3 Compression Fracture   Osteoporosis   Acute Low Back Pain   Mechanical Fall 2/13   Generalized Weakness   Describes several months of low back pain with history of compression fracture diagnosed at OSH at unclear location. Fell on 2/13, tripped getting out of the shower. Increased low back pain, generalized weakness since then. CT lumbar spine w/o IV Cont on admission with superior endplate compression fracture at L3 with 20-30% anterior vertebral height loss. Neurologically intact aside from pain and limited ROM with R hip flexion.   - NSGY consulted  - Neuro checks q4h   - MR lumbar spine ordered   - Plan for Coreline brace when OOB or HOB >30 degrees   - PT / OT consults, okay to get up once Coreline brace delivered   - Continue PTA calcium + vitamin D   - Scheduled tylenol, resume low dose gabapentin 100 mg TID, lidocaine patch, PRN oxycodone     Bilateral Lower Extremity Edema   New pitting lower extremity over the past 2-3 weeks. Denies shortness of breath, chest pain, lightheadedness or dizziness. Had switched from HCTZ to amlodipine at beginning of Feb, called back to " clinic reporting edema 2/20 and had switched back recently. BNP 17 (may be falsely low in setting of obesity), albumin 3.4, UA unremarkable. Does have reduced mobility but given symmetric edema held off on duplex US.   - Start Lasix 20 mg, will increase to 40mg  - Continue tubigrip compression stockings daily.  -  BMP recheck 3/17  -daily weights : 186 pounds today> 191 today.     Hypokalemia   K 2.6, Mag 1.8 on admission. Was previously on long term potassium supplement with use of HCTZ that she had stopped earlier in the month. Denies N/V/D, somewhat poor appetite due to fatigue.   - Currently on Potassium supplementation 40 meq daily.  - BMP ordered    Hyponatremia   Na 130, may be in setting of poor appetite.   - Encourage PO, recheck in AM   - BMP stable     Hypertension   Had recently switched from PTA HCTZ + potassium to amlodipine at beginning of Feb. Called back on 2/20 with reports of increased lower extremity and was told stop amlodipine at that time and restart HCTZ + potassium which patient tells me she has done.   - BP within normal limits on admission. Agree with stopping amlodipine give lower extremity edema   - Hold PTA HCTZ for now, would resume if elevated BP and improving potassium  - Started on Lasix 20 mg, increase lasix to 40mg.     Asthma   - Continue Dulera + PRN albuterol     GERD   - Continue PTA PPI     Constipation   - PRN bowel regimen, would schedule if requiring frequent opioids     Anxiety   - Continue PTA fluoxetine, PCP has been considering decreasing dose due to tremors  - Continue PTA clonazepam 0.5 mg TID    Cutaneous Candidiasis  - Continue Clotrimazole, not really helping.  Started Triam.             Electronically signed by:  Diane Bhatt CNP       Total time greater than 45 minutes reviewing chart in EPIC and PCC, medications, labs, vitals. Discussing with social work, physical therapy, occupational therapy and nursing.             Sincerely,        Diane Bhatt  CNP    Electronically signed

## 2025-03-13 ENCOUNTER — LAB REQUISITION (OUTPATIENT)
Dept: LAB | Facility: CLINIC | Age: 70
End: 2025-03-13
Payer: COMMERCIAL

## 2025-03-13 DIAGNOSIS — R60.9 EDEMA, UNSPECIFIED: ICD-10-CM

## 2025-03-17 LAB
ANION GAP SERPL CALCULATED.3IONS-SCNC: 15 MMOL/L (ref 7–15)
BUN SERPL-MCNC: 8.6 MG/DL (ref 8–23)
CALCIUM SERPL-MCNC: 9.6 MG/DL (ref 8.8–10.4)
CHLORIDE SERPL-SCNC: 101 MMOL/L (ref 98–107)
CREAT SERPL-MCNC: 0.77 MG/DL (ref 0.51–0.95)
EGFRCR SERPLBLD CKD-EPI 2021: 83 ML/MIN/1.73M2
GLUCOSE SERPL-MCNC: 96 MG/DL (ref 70–99)
HCO3 SERPL-SCNC: 21 MMOL/L (ref 22–29)
POTASSIUM SERPL-SCNC: 3.8 MMOL/L (ref 3.4–5.3)
SODIUM SERPL-SCNC: 137 MMOL/L (ref 135–145)

## 2025-03-17 PROCEDURE — 80048 BASIC METABOLIC PNL TOTAL CA: CPT | Mod: ORL | Performed by: NURSE PRACTITIONER

## 2025-03-17 PROCEDURE — 36415 COLL VENOUS BLD VENIPUNCTURE: CPT | Mod: ORL | Performed by: NURSE PRACTITIONER

## 2025-03-17 PROCEDURE — P9603 ONE-WAY ALLOW PRORATED MILES: HCPCS | Mod: ORL | Performed by: NURSE PRACTITIONER

## 2025-03-19 ENCOUNTER — TRANSITIONAL CARE UNIT VISIT (OUTPATIENT)
Dept: GERIATRICS | Facility: CLINIC | Age: 70
End: 2025-03-19
Payer: COMMERCIAL

## 2025-03-19 VITALS
WEIGHT: 185 LBS | OXYGEN SATURATION: 97 % | HEIGHT: 59 IN | TEMPERATURE: 99.1 F | RESPIRATION RATE: 18 BRPM | DIASTOLIC BLOOD PRESSURE: 63 MMHG | BODY MASS INDEX: 37.29 KG/M2 | SYSTOLIC BLOOD PRESSURE: 125 MMHG | HEART RATE: 81 BPM

## 2025-03-19 DIAGNOSIS — R60.0 BILATERAL LOWER EXTREMITY EDEMA: ICD-10-CM

## 2025-03-19 DIAGNOSIS — F41.1 ANXIETY STATE: ICD-10-CM

## 2025-03-19 DIAGNOSIS — E87.6 ACUTE HYPOKALEMIA: ICD-10-CM

## 2025-03-19 DIAGNOSIS — S32.030A CLOSED COMPRESSION FRACTURE OF L3 VERTEBRA, INITIAL ENCOUNTER (H): ICD-10-CM

## 2025-03-19 DIAGNOSIS — M80.032D PATHOLOGICAL FRACTURE OF LEFT RADIUS DUE TO OSTEOPOROSIS WITH ROUTINE HEALING, UNSPECIFIED OSTEOPOROSIS TYPE, SUBSEQUENT ENCOUNTER: Primary | ICD-10-CM

## 2025-03-19 DIAGNOSIS — M81.0 AGE-RELATED OSTEOPOROSIS WITHOUT CURRENT PATHOLOGICAL FRACTURE: ICD-10-CM

## 2025-03-19 DIAGNOSIS — E66.01 MORBID OBESITY (H): ICD-10-CM

## 2025-03-19 DIAGNOSIS — K21.9 GASTROESOPHAGEAL REFLUX DISEASE WITHOUT ESOPHAGITIS: ICD-10-CM

## 2025-03-19 PROCEDURE — 99310 SBSQ NF CARE HIGH MDM 45: CPT | Performed by: NURSE PRACTITIONER

## 2025-03-19 NOTE — PROGRESS NOTES
[facility] GERIATRICS    PRIMARY CARE PROVIDER AND CLINIC:  Latricia Cloud MD, [address] [facility] [address]  Chief Complaint   Patient presents with    ZOILA      [place] Medical Record Number:  [id number]  Place of Service where encounter took place:  [facility] ([facility]) [[id number]]    Tootie Marin  is a 69 year old  (1955), admitted to the above facility from  [facility] . Hospital stay 2/25/2025 through 2/27/2025..       Patient seen today for follow up NP visit in TCU:  1. Pathological fracture of left radius due to osteoporosis with routine healing, unspecified osteoporosis type, subsequent encounter    2. Closed compression fracture of L3 vertebra, initial encounter (H)    3. Gastroesophageal reflux disease without esophagitis    4. Anxiety    5. Bilateral lower extremity edema    6. Morbid obesity (H)    7. Acute hypokalemia    8. Age-related osteoporosis without current pathological fracture          HPI:    S/p compression fracture and right radius fracture. Right arm in sling, NWB. Chronic anxiety, on scheduled TID meds.  Denies HA, chest pain, palpitations, dizziness.  No troubles breathing, no SOB, no Concerns with urination, mild complaint of constipation. Continue senna. Eating and drinking okay. Sleeping okay. Working with therapies. Pain controlled   Weights improved to 185 today with increase lasix. Labs reviewed   Lives at senior living apartments, ambulates with a walker or wheelchair.  Requesting Mechanical lift chair and hospital bed. Called medical Rep Ali: [phone] to confirm okay for these. LVM. Discussed with facility, therapy reports patient does not qualify so will have to follow up with PCP on discharge.       CODE STATUS/ADVANCE DIRECTIVES DISCUSSION:  Full Code  CPR/Full code   ALLERGIES:   Allergies   Allergen Reactions    Aspirin (Tartrazine Only) [Tartrazine] Unknown    Chicken Meat (Diagnostic) GI Disturbance     Chicken [Chicken Protein] Unknown    Other Food Allergy Unknown     TURKEY      PAST MEDICAL HISTORY:   Past Medical History:   Diagnosis Date    Anxiety     Chest wall trauma     Chronic prescription benzodiazepine use     Hypercholesteremia     Osteopenia of multiple sites 02/20/2024    Panic disorder       PAST SURGICAL HISTORY:   has a past surgical history that includes Biopsy breast (Right, 1987).  FAMILY HISTORY: family history includes Cancer in her brother and father; Heart Disease in her mother.  SOCIAL HISTORY:   reports that she has never smoked. She has never used smokeless tobacco. She reports that she does not drink alcohol and does not use drugs.  Patient's living condition: lives alone    Post Discharge Medication Reconciliation Status:   MED REC REQUIRED  Post Medication Reconciliation Status: discharge medications reconciled, continue medications without change       Current Outpatient Medications   Medication Sig Dispense Refill    acetaminophen (TYLENOL) 500 MG tablet Take 1-2 tablets (500-1,000 mg) by mouth every 6 hours as needed for mild pain 100 tablet 3    albuterol (PROAIR HFA/PROVENTIL HFA/VENTOLIN HFA) 108 (90 Base) MCG/ACT inhaler Inhale 2 puffs into the lungs every 6 hours as needed. 54 g 0    clonazePAM (KLONOPIN) 0.5 MG tablet Take 1 tablet (0.5 mg) by mouth 3 times daily. 60 tablet 0    FLUoxetine (PROZAC) 20 MG capsule Take 20 mg by mouth daily. 60 capsule 1    FLUoxetine (PROZAC) 40 MG capsule Take 40 mg by mouth daily.      gabapentin (NEURONTIN) 100 MG capsule Take 1 capsule (100 mg) by mouth 3 times daily. Increase to 200mg three times daily after 3-5 days if tolerating side effects and needing more pain relief. 180 capsule 0    Lidocaine (LIDOCARE) 4 % Patch Place 1 patch onto the skin every 24 hours. To prevent lidocaine toxicity, patient should be patch free for 12 hrs daily.      lovastatin (MEVACOR) 20 MG tablet Take 1 tablet (20 mg) by mouth at bedtime 90 tablet 3     "mometasone-formoterol (DULERA) 200-5 MCG/ACT inhaler Inhale 2 puffs into the lungs 2 times daily. 13 g 3    nystatin (MYCOSTATIN) 015085 UNIT/GM external powder Apply topically.      omeprazole (PRILOSEC) 20 MG DR capsule Take 1 capsule (20 mg) by mouth daily 90 capsule 3    potassium chloride jossue ER (KLOR-CON M10) 10 MEQ CR tablet Take 1 tablet (10 mEq) by mouth daily. (Patient taking differently: Take 40 mEq by mouth daily.) 90 tablet 3     No current facility-administered medications for this visit.       ROS:  10 point ROS of systems including Constitutional, Eyes, Respiratory, Cardiovascular, Gastroenterology, Genitourinary, Integumentary, Musculoskeletal, Psychiatric were all negative except for pertinent positives noted in my HPI.    Vitals:  /63   Pulse 81   Temp 99.1  F (37.3  C)   Resp 18   Ht 1.499 m (4' 11\")   Wt 83.9 kg (185 lb)   SpO2 97%   BMI 37.37 kg/m    Exam:  GENERAL APPEARANCE:  Alert, in no distress, oriented, cooperative. Sitting up in chair  ENT:  Mouth and posterior oropharynx normal, moist mucous membranes, several teeth missing.  EYES:  EOM, conjunctivae, lids, pupils and irises normal  NECK:  No adenopathy,masses or thyromegaly  RESP:  respiratory effort and palpation of chest normal, lungs clear to auscultation , no respiratory distress  CV:  Palpation and auscultation of heart done , regular rate and rhythm, no murmur, rub, or gallop, +2 edema in bilateral lower extremities, improved today  GI/ABDOMEN:  normal bowel sounds, soft, nontender, no hepatosplenomegaly or other masses  M/S:   moves extremities spontaneously. Sling to left arm  SKIN:  Erythematous rash to bilateral lower extremities- improved   NEURO:   intact   PSYCH:  oriented X 4, normal insight, judgement and memory, affect and mood normal.      Lab/Diagnostic data:  Recent labs in [facility] reviewed by me today.     Last Comprehensive Metabolic Panel:  Lab Results   Component Value Date     03/17/2025    " "POTASSIUM 3.8 03/17/2025    CHLORIDE 101 03/17/2025    CO2 21 (L) 03/17/2025    ANIONGAP 15 03/17/2025    GLC 96 03/17/2025    BUN 8.6 03/17/2025    CR 0.77 03/17/2025    GFRESTIMATED 83 03/17/2025    RIDGE 9.6 03/17/2025       Lab Results   Component Value Date    WBC 11.3 08/20/2024     Lab Results   Component Value Date    RBC 4.41 08/20/2024     Lab Results   Component Value Date    HGB 13.1 08/20/2024     Lab Results   Component Value Date    HCT 40.1 08/20/2024     No components found for: \"MCT\"  Lab Results   Component Value Date    MCV 91 08/20/2024     Lab Results   Component Value Date    MCH 29.7 08/20/2024     Lab Results   Component Value Date    MCHC 32.7 08/20/2024     Lab Results   Component Value Date    RDW 13.6 08/20/2024     Lab Results   Component Value Date     08/20/2024       ASSESSMENT/PLAN:    Acute / Subacute L3 Compression Fracture   Osteoporosis   Acute Low Back Pain   Mechanical Fall 2/13   Generalized Weakness   Describes several months of low back pain with history of compression fracture diagnosed at OSH at unclear location. Fell on 2/13, tripped getting out of the shower. Increased low back pain, generalized weakness since then. CT lumbar spine w/o IV Cont on admission with superior endplate compression fracture at L3 with 20-30% anterior vertebral height loss. Neurologically intact aside from pain and limited ROM with R hip flexion.   - NSGY consulted  - Neuro checks q4h   - MR lumbar spine ordered   - Plan for Coreline brace when OOB or HOB >30 degrees   - PT / OT consults, okay to get up once Coreline brace delivered   - Continue PTA calcium + vitamin D   - Scheduled tylenol, resume low dose gabapentin 100 mg TID, lidocaine patch, PRN oxycodone     Bilateral Lower Extremity Edema   New pitting lower extremity over the past 2-3 weeks. Denies shortness of breath, chest pain, lightheadedness or dizziness. Had switched from HCTZ to amlodipine at beginning of Feb, called back to " clinic reporting edema 2/20 and had switched back recently. BNP 17 (may be falsely low in setting of obesity), albumin 3.4, UA unremarkable. Does have reduced mobility but given symmetric edema held off on duplex US.   - Increased lasix to 40mg  - Continue tubigrip compression stockings daily.  -  BMP recheck 3/17: reviewed and stable as above.   -daily weights : 186 pounds today> 191 >187.4 today>185.      Hypokalemia   K 2.6, Mag 1.8 on admission. Was previously on long term potassium supplement with use of HCTZ that she had stopped earlier in the month. Denies N/V/D, somewhat poor appetite due to fatigue.   - Currently on Potassium supplementation 40 meq daily.  - BMP stable     Hyponatremia   Na 130, may be in setting of poor appetite.   - Encourage PO, recheck in AM   - BMP stable     Hypertension   Had recently switched from PTA HCTZ + potassium to amlodipine at beginning of Feb. Called back on 2/20 with reports of increased lower extremity and was told stop amlodipine at that time and restart HCTZ + potassium which patient tells me she has done.   - BP within normal limits on admission. Agree with stopping amlodipine give lower extremity edema   - Hold PTA HCTZ for now, would resume if elevated BP and improving potassium  - Started on Lasix 20 mg, increase lasix to 40mg. Weights improved: 190>185 today    Asthma   - Continue Dulera + PRN albuterol     GERD   - Continue PTA PPI     Constipation   - PRN bowel regimen, would schedule if requiring frequent opioids     Anxiety   - Continue PTA fluoxetine, PCP has been considering decreasing dose due to tremors  - Continue PTA clonazepam 0.5 mg TID    Cutaneous Candidiasis  - Resolved, monitor           Electronically signed by:  Diane Bhatt CNP       Total time greater than 45 minutes reviewing chart in EPIC and PCC, medications, labs, vitals. Discussing with social work, physical therapy, occupational therapy and nursing.

## 2025-03-19 NOTE — LETTER
3/19/2025      Tootie Marin  [address]  [place] [address]        [facility] GERIATRICS    PRIMARY CARE PROVIDER AND CLINIC:  Latricia Cloud MD, [address] [facility] [address]  Chief Complaint   Patient presents with     RECHECK      [facility] Medical Record Number:  [id number]  Place of Service where encounter took place:  [facility] ([facility]) [[id number]]    Tootie Marin  is a 69 year old  (1955), admitted to the above facility from  [facility] . Hospital stay 2/25/2025 through 2/27/2025..       Patient seen today for follow up NP visit in TCU:  1. Pathological fracture of left radius due to osteoporosis with routine healing, unspecified osteoporosis type, subsequent encounter    2. Closed compression fracture of L3 vertebra, initial encounter (H)    3. Gastroesophageal reflux disease without esophagitis    4. Anxiety    5. Bilateral lower extremity edema    6. Morbid obesity (H)    7. Acute hypokalemia    8. Age-related osteoporosis without current pathological fracture          HPI:    S/p compression fracture and right radius fracture. Right arm in sling, NWB. Chronic anxiety, on scheduled TID meds.  Denies HA, chest pain, palpitations, dizziness.  No troubles breathing, no SOB, no Concerns with urination, mild complaint of constipation. Continue senna. Eating and drinking okay. Sleeping okay. Working with therapies. Pain controlled   Weights improved to 185 today with increase lasix. Labs reviewed   Lives at senior living apartments, ambulates with a walker or wheelchair.  Requesting Mechanical lift chair and hospital bed. Called medical Rep Ali: [phone] to confirm okay for these. LVM. Discussed with facility, therapy reports patient does not qualify so will have to follow up with PCP on discharge.       CODE STATUS/ADVANCE DIRECTIVES DISCUSSION:  Full Code  CPR/Full code   ALLERGIES:   Allergies   Allergen Reactions      Aspirin (Tartrazine Only) [Tartrazine] Unknown     Chicken Meat (Diagnostic) GI Disturbance     Chicken [Chicken Protein] Unknown     Other Food Allergy Unknown     TURKEY      PAST MEDICAL HISTORY:   Past Medical History:   Diagnosis Date     Anxiety      Chest wall trauma      Chronic prescription benzodiazepine use      Hypercholesteremia      Osteopenia of multiple sites 02/20/2024     Panic disorder       PAST SURGICAL HISTORY:   has a past surgical history that includes Biopsy breast (Right, 1987).  FAMILY HISTORY: family history includes Cancer in her brother and father; Heart Disease in her mother.  SOCIAL HISTORY:   reports that she has never smoked. She has never used smokeless tobacco. She reports that she does not drink alcohol and does not use drugs.  Patient's living condition: lives alone    Post Discharge Medication Reconciliation Status:   MED REC REQUIRED  Post Medication Reconciliation Status: discharge medications reconciled, continue medications without change       Current Outpatient Medications   Medication Sig Dispense Refill     acetaminophen (TYLENOL) 500 MG tablet Take 1-2 tablets (500-1,000 mg) by mouth every 6 hours as needed for mild pain 100 tablet 3     albuterol (PROAIR HFA/PROVENTIL HFA/VENTOLIN HFA) 108 (90 Base) MCG/ACT inhaler Inhale 2 puffs into the lungs every 6 hours as needed. 54 g 0     clonazePAM (KLONOPIN) 0.5 MG tablet Take 1 tablet (0.5 mg) by mouth 3 times daily. 60 tablet 0     FLUoxetine (PROZAC) 20 MG capsule Take 20 mg by mouth daily. 60 capsule 1     FLUoxetine (PROZAC) 40 MG capsule Take 40 mg by mouth daily.       gabapentin (NEURONTIN) 100 MG capsule Take 1 capsule (100 mg) by mouth 3 times daily. Increase to 200mg three times daily after 3-5 days if tolerating side effects and needing more pain relief. 180 capsule 0     Lidocaine (LIDOCARE) 4 % Patch Place 1 patch onto the skin every 24 hours. To prevent lidocaine toxicity, patient should be patch free for 12  "hrs daily.       lovastatin (MEVACOR) 20 MG tablet Take 1 tablet (20 mg) by mouth at bedtime 90 tablet 3     mometasone-formoterol (DULERA) 200-5 MCG/ACT inhaler Inhale 2 puffs into the lungs 2 times daily. 13 g 3     nystatin (MYCOSTATIN) 603326 UNIT/GM external powder Apply topically.       omeprazole (PRILOSEC) 20 MG DR capsule Take 1 capsule (20 mg) by mouth daily 90 capsule 3     potassium chloride jossue ER (KLOR-CON M10) 10 MEQ CR tablet Take 1 tablet (10 mEq) by mouth daily. (Patient taking differently: Take 40 mEq by mouth daily.) 90 tablet 3     No current facility-administered medications for this visit.       ROS:  10 point ROS of systems including Constitutional, Eyes, Respiratory, Cardiovascular, Gastroenterology, Genitourinary, Integumentary, Musculoskeletal, Psychiatric were all negative except for pertinent positives noted in my HPI.    Vitals:  /63   Pulse 81   Temp 99.1  F (37.3  C)   Resp 18   Ht 1.499 m (4' 11\")   Wt 83.9 kg (185 lb)   SpO2 97%   BMI 37.37 kg/m    Exam:  GENERAL APPEARANCE:  Alert, in no distress, oriented, cooperative. Sitting up in chair  ENT:  Mouth and posterior oropharynx normal, moist mucous membranes, several teeth missing.  EYES:  EOM, conjunctivae, lids, pupils and irises normal  NECK:  No adenopathy,masses or thyromegaly  RESP:  respiratory effort and palpation of chest normal, lungs clear to auscultation , no respiratory distress  CV:  Palpation and auscultation of heart done , regular rate and rhythm, no murmur, rub, or gallop, +2 edema in bilateral lower extremities, improved today  GI/ABDOMEN:  normal bowel sounds, soft, nontender, no hepatosplenomegaly or other masses  M/S:   moves extremities spontaneously. Sling to left arm  SKIN:  Erythematous rash to bilateral lower extremities- improved   NEURO:   intact   PSYCH:  oriented X 4, normal insight, judgement and memory, affect and mood normal.      Lab/Diagnostic data:  Recent labs in [facility] reviewed by " "me today.     Last Comprehensive Metabolic Panel:  Lab Results   Component Value Date     03/17/2025    POTASSIUM 3.8 03/17/2025    CHLORIDE 101 03/17/2025    CO2 21 (L) 03/17/2025    ANIONGAP 15 03/17/2025    GLC 96 03/17/2025    BUN 8.6 03/17/2025    CR 0.77 03/17/2025    GFRESTIMATED 83 03/17/2025    RIDGE 9.6 03/17/2025       Lab Results   Component Value Date    WBC 11.3 08/20/2024     Lab Results   Component Value Date    RBC 4.41 08/20/2024     Lab Results   Component Value Date    HGB 13.1 08/20/2024     Lab Results   Component Value Date    HCT 40.1 08/20/2024     No components found for: \"MCT\"  Lab Results   Component Value Date    MCV 91 08/20/2024     Lab Results   Component Value Date    MCH 29.7 08/20/2024     Lab Results   Component Value Date    MCHC 32.7 08/20/2024     Lab Results   Component Value Date    RDW 13.6 08/20/2024     Lab Results   Component Value Date     08/20/2024       ASSESSMENT/PLAN:    Acute / Subacute L3 Compression Fracture   Osteoporosis   Acute Low Back Pain   Mechanical Fall 2/13   Generalized Weakness   Describes several months of low back pain with history of compression fracture diagnosed at OSH at unclear location. Fell on 2/13, tripped getting out of the shower. Increased low back pain, generalized weakness since then. CT lumbar spine w/o IV Cont on admission with superior endplate compression fracture at L3 with 20-30% anterior vertebral height loss. Neurologically intact aside from pain and limited ROM with R hip flexion.   - NSGY consulted  - Neuro checks q4h   - MR lumbar spine ordered   - Plan for Coreline brace when OOB or HOB >30 degrees   - PT / OT consults, okay to get up once Coreline brace delivered   - Continue PTA calcium + vitamin D   - Scheduled tylenol, resume low dose gabapentin 100 mg TID, lidocaine patch, PRN oxycodone     Bilateral Lower Extremity Edema   New pitting lower extremity over the past 2-3 weeks. Denies shortness of breath, chest " pain, lightheadedness or dizziness. Had switched from HCTZ to amlodipine at beginning of Feb, called back to clinic reporting edema 2/20 and had switched back recently. BNP 17 (may be falsely low in setting of obesity), albumin 3.4, UA unremarkable. Does have reduced mobility but given symmetric edema held off on duplex US.   - Increased lasix to 40mg  - Continue tubigrip compression stockings daily.  -  BMP recheck 3/17: reviewed and stable as above.   -daily weights : 186 pounds today> 191 >187.4 today>185.      Hypokalemia   K 2.6, Mag 1.8 on admission. Was previously on long term potassium supplement with use of HCTZ that she had stopped earlier in the month. Denies N/V/D, somewhat poor appetite due to fatigue.   - Currently on Potassium supplementation 40 meq daily.  - BMP stable     Hyponatremia   Na 130, may be in setting of poor appetite.   - Encourage PO, recheck in AM   - BMP stable     Hypertension   Had recently switched from PTA HCTZ + potassium to amlodipine at beginning of Feb. Called back on 2/20 with reports of increased lower extremity and was told stop amlodipine at that time and restart HCTZ + potassium which patient tells me she has done.   - BP within normal limits on admission. Agree with stopping amlodipine give lower extremity edema   - Hold PTA HCTZ for now, would resume if elevated BP and improving potassium  - Started on Lasix 20 mg, increase lasix to 40mg. Weights improved: 190>185 today    Asthma   - Continue Dulera + PRN albuterol     GERD   - Continue PTA PPI     Constipation   - PRN bowel regimen, would schedule if requiring frequent opioids     Anxiety   - Continue PTA fluoxetine, PCP has been considering decreasing dose due to tremors  - Continue PTA clonazepam 0.5 mg TID    Cutaneous Candidiasis  - Resolved, monitor           Electronically signed by:  Diane Bhatt CNP       Total time greater than 45 minutes reviewing chart in EPIC and PCC, medications, labs, vitals. Discussing  with social work, physical therapy, occupational therapy and nursing.             Sincerely,        Diane Bhatt CNP    Electronically signed

## 2025-03-20 ENCOUNTER — TELEPHONE (OUTPATIENT)
Dept: INTERNAL MEDICINE | Facility: CLINIC | Age: 70
End: 2025-03-20
Payer: COMMERCIAL

## 2025-03-20 NOTE — TELEPHONE ENCOUNTER
Patient calling, she is still admitted at the hospital, still unknown when she will be getting discharged.     Patient stated that when she leaves the hospital she will need to get some things from the Medical supply store, she stated for her PCP to contact Julia Tr[facility] there to figure out what patient will need. [phone]

## 2025-03-24 ENCOUNTER — TRANSITIONAL CARE UNIT VISIT (OUTPATIENT)
Dept: GERIATRICS | Facility: CLINIC | Age: 70
End: 2025-03-24
Payer: COMMERCIAL

## 2025-03-24 VITALS
RESPIRATION RATE: 18 BRPM | HEIGHT: 59 IN | DIASTOLIC BLOOD PRESSURE: 63 MMHG | TEMPERATURE: 99.2 F | OXYGEN SATURATION: 98 % | BODY MASS INDEX: 37.5 KG/M2 | HEART RATE: 75 BPM | WEIGHT: 186 LBS | SYSTOLIC BLOOD PRESSURE: 122 MMHG

## 2025-03-24 DIAGNOSIS — M80.032D PATHOLOGICAL FRACTURE OF LEFT RADIUS DUE TO OSTEOPOROSIS WITH ROUTINE HEALING, UNSPECIFIED OSTEOPOROSIS TYPE, SUBSEQUENT ENCOUNTER: Primary | ICD-10-CM

## 2025-03-24 DIAGNOSIS — R60.0 BILATERAL LOWER EXTREMITY EDEMA: ICD-10-CM

## 2025-03-24 DIAGNOSIS — S32.030A CLOSED COMPRESSION FRACTURE OF L3 VERTEBRA, INITIAL ENCOUNTER (H): ICD-10-CM

## 2025-03-24 DIAGNOSIS — Z99.3 WHEELCHAIR DEPENDENCE: ICD-10-CM

## 2025-03-24 DIAGNOSIS — F41.1 ANXIETY STATE: ICD-10-CM

## 2025-03-24 DIAGNOSIS — J45.50 SEVERE PERSISTENT ASTHMA, UNSPECIFIED WHETHER COMPLICATED (H): ICD-10-CM

## 2025-03-24 DIAGNOSIS — K21.9 GASTROESOPHAGEAL REFLUX DISEASE WITHOUT ESOPHAGITIS: ICD-10-CM

## 2025-03-24 DIAGNOSIS — E87.6 ACUTE HYPOKALEMIA: ICD-10-CM

## 2025-03-24 DIAGNOSIS — E66.01 MORBID OBESITY (H): ICD-10-CM

## 2025-03-24 DIAGNOSIS — M81.0 AGE-RELATED OSTEOPOROSIS WITHOUT CURRENT PATHOLOGICAL FRACTURE: ICD-10-CM

## 2025-03-24 PROCEDURE — 99310 SBSQ NF CARE HIGH MDM 45: CPT | Performed by: NURSE PRACTITIONER

## 2025-03-24 NOTE — PROGRESS NOTES
[facility] GERIATRICS    PRIMARY CARE PROVIDER AND CLINIC:  Latricia Cloud MD, [address] [facility] [address]  Chief Complaint   Patient presents with    KAYLINECK      [facility] Medical Record Number:  [id number]  Place of Service where encounter took place:  [facility] ([facility]) [[id number]]    Tootie Marin  is a 69 year old  (1955), admitted to the above facility from  [facility] . Hospital stay 2/25/2025 through 2/27/2025..       Patient seen today for follow up NP visit in TCU:  1. Pathological fracture of left radius due to osteoporosis with routine healing, unspecified osteoporosis type, subsequent encounter    2. Closed compression fracture of L3 vertebra, initial encounter (H)    3. Bilateral lower extremity edema    4. Wheelchair dependence    5. Gastroesophageal reflux disease without esophagitis    6. Morbid obesity (H)    7. Age-related osteoporosis without current pathological fracture    8. Anxiety    9. Acute hypokalemia    10. Severe persistent asthma, unspecified whether complicated (H)          HPI:    S/p compression fracture and right radius fracture. Right arm in sling, NWB. Chronic anxiety, on scheduled TID meds. Doing well, offers no concerns today.   Denies HA, chest pain, palpitations, dizziness.  No troubles breathing, no SOB, no Concerns with urination, no constipation. Continue senna. Eating and drinking okay. Sleeping okay. Working with therapies. Pain controlled   Weights improved to 185 >186.4 pounds today with increase lasix. Labs reviewed   Lives at senior living apartments, ambulates with a walker or wheelchair. Will need WC on discharge.         CODE STATUS/ADVANCE DIRECTIVES DISCUSSION:  Full Code  CPR/Full code   ALLERGIES:   Allergies   Allergen Reactions    Aspirin (Tartrazine Only) [Tartrazine] Unknown    Chicken Meat (Diagnostic) GI Disturbance    Chicken [Chicken Protein] Unknown    Other Food Allergy Unknown     TURKEY       PAST MEDICAL HISTORY:   Past Medical History:   Diagnosis Date    Anxiety     Chest wall trauma     Chronic prescription benzodiazepine use     Hypercholesteremia     Osteopenia of multiple sites 02/20/2024    Panic disorder       PAST SURGICAL HISTORY:   has a past surgical history that includes Biopsy breast (Right, 1987).  FAMILY HISTORY: family history includes Cancer in her brother and father; Heart Disease in her mother.  SOCIAL HISTORY:   reports that she has never smoked. She has never used smokeless tobacco. She reports that she does not drink alcohol and does not use drugs.  Patient's living condition: lives alone    Post Discharge Medication Reconciliation Status:   MED REC REQUIRED  Post Medication Reconciliation Status: discharge medications reconciled, continue medications without change       Current Outpatient Medications   Medication Sig Dispense Refill    acetaminophen (TYLENOL) 500 MG tablet Take 1-2 tablets (500-1,000 mg) by mouth every 6 hours as needed for mild pain 100 tablet 3    albuterol (PROAIR HFA/PROVENTIL HFA/VENTOLIN HFA) 108 (90 Base) MCG/ACT inhaler Inhale 2 puffs into the lungs every 6 hours as needed. 54 g 0    clonazePAM (KLONOPIN) 0.5 MG tablet Take 1 tablet (0.5 mg) by mouth 3 times daily. 60 tablet 0    FLUoxetine (PROZAC) 20 MG capsule Take 20 mg by mouth daily. 60 capsule 1    FLUoxetine (PROZAC) 40 MG capsule Take 40 mg by mouth daily.      gabapentin (NEURONTIN) 100 MG capsule Take 1 capsule (100 mg) by mouth 3 times daily. Increase to 200mg three times daily after 3-5 days if tolerating side effects and needing more pain relief. 180 capsule 0    Lidocaine (LIDOCARE) 4 % Patch Place 1 patch onto the skin every 24 hours. To prevent lidocaine toxicity, patient should be patch free for 12 hrs daily.      lovastatin (MEVACOR) 20 MG tablet Take 1 tablet (20 mg) by mouth at bedtime 90 tablet 3    mometasone-formoterol (DULERA) 200-5 MCG/ACT inhaler Inhale 2 puffs into the  "lungs 2 times daily. 13 g 3    nystatin (MYCOSTATIN) 600859 UNIT/GM external powder Apply topically.      omeprazole (PRILOSEC) 20 MG DR capsule Take 1 capsule (20 mg) by mouth daily 90 capsule 3    potassium chloride jossue ER (KLOR-CON M10) 10 MEQ CR tablet Take 1 tablet (10 mEq) by mouth daily. (Patient taking differently: Take 40 mEq by mouth daily.) 90 tablet 3     No current facility-administered medications for this visit.       ROS:  10 point ROS of systems including Constitutional, Eyes, Respiratory, Cardiovascular, Gastroenterology, Genitourinary, Integumentary, Musculoskeletal, Psychiatric were all negative except for pertinent positives noted in my HPI.    Vitals:  /63   Pulse 75   Temp 99.2  F (37.3  C)   Resp 18   Ht 1.499 m (4' 11\")   Wt 84.4 kg (186 lb)   SpO2 98%   BMI 37.57 kg/m    Exam:  GENERAL APPEARANCE:  Alert, in no distress, oriented, cooperative. Sitting up in chair  ENT:  Mouth and posterior oropharynx normal, moist mucous membranes, several teeth missing.  EYES:  EOM, conjunctivae, lids, pupils and irises normal  NECK:  No adenopathy,masses or thyromegaly  RESP:  respiratory effort and palpation of chest normal, lungs clear to auscultation , no respiratory distress  CV:  Palpation and auscultation of heart done , regular rate and rhythm, no murmur, rub, or gallop, +2 edema in bilateral lower extremities  GI/ABDOMEN:  normal bowel sounds, soft, nontender, no hepatosplenomegaly or other masses  M/S:   moves extremities spontaneously. Cast to left arm   SKIN:  no rash to exposed skin  NEURO:   intact   PSYCH:  oriented X 4, normal insight, judgement and memory, affect and mood normal.      Lab/Diagnostic data:  Recent labs in [facility] reviewed by me today.     Last Comprehensive Metabolic Panel:  Lab Results   Component Value Date     03/17/2025    POTASSIUM 3.8 03/17/2025    CHLORIDE 101 03/17/2025    CO2 21 (L) 03/17/2025    ANIONGAP 15 03/17/2025    GLC 96 03/17/2025    BUN " "8.6 03/17/2025    CR 0.77 03/17/2025    GFRESTIMATED 83 03/17/2025    RIDGE 9.6 03/17/2025       Lab Results   Component Value Date    WBC 11.3 08/20/2024     Lab Results   Component Value Date    RBC 4.41 08/20/2024     Lab Results   Component Value Date    HGB 13.1 08/20/2024     Lab Results   Component Value Date    HCT 40.1 08/20/2024     No components found for: \"MCT\"  Lab Results   Component Value Date    MCV 91 08/20/2024     Lab Results   Component Value Date    MCH 29.7 08/20/2024     Lab Results   Component Value Date    MCHC 32.7 08/20/2024     Lab Results   Component Value Date    RDW 13.6 08/20/2024     Lab Results   Component Value Date     08/20/2024       ASSESSMENT/PLAN:    Acute / Subacute L3 Compression Fracture   Osteoporosis   Acute Low Back Pain   Mechanical Fall 2/13   Generalized Weakness   Describes several months of low back pain with history of compression fracture diagnosed at OSH at unclear location. Fell on 2/13, tripped getting out of the shower. Increased low back pain, generalized weakness since then. CT lumbar spine w/o IV Cont on admission with superior endplate compression fracture at L3 with 20-30% anterior vertebral height loss. Neurologically intact aside from pain and limited ROM with R hip flexion.   - NSGY consulted  - Neuro checks q4h   - MR lumbar spine ordered   - Plan for Coreline brace when OOB or HOB >30 degrees   - PT / OT consults, okay to get up once Coreline brace delivered   - Continue PTA calcium + vitamin D   - Scheduled tylenol, resume low dose gabapentin 100 mg TID, lidocaine patch, PRN oxycodone   - WC ordered for discharge.     Bilateral Lower Extremity Edema   New pitting lower extremity over the past 2-3 weeks. Denies shortness of breath, chest pain, lightheadedness or dizziness. Had switched from HCTZ to amlodipine at beginning of Feb, called back to clinic reporting edema 2/20 and had switched back recently. BNP 17 (may be falsely low in setting of " "obesity), albumin 3.4, UA unremarkable. Does have reduced mobility but given symmetric edema held off on duplex US.   - Increased lasix to 40mg  - Continue tubigrip compression stockings daily.  -  BMP recheck 3/17: reviewed and stable as above.   -daily weights : 186 pounds today> 191 >187.4 >185>186.      Hypokalemia   K 2.6, Mag 1.8 on admission. Was previously on long term potassium supplement with use of HCTZ that she had stopped earlier in the month. Denies N/V/D, somewhat poor appetite due to fatigue.   - Currently on Potassium supplementation 40 meq daily.  - BMP stable     Hyponatremia   Na 130, may be in setting of poor appetite.   - Encourage PO, recheck in AM   - BMP stable     Hypertension   Had recently switched from PTA HCTZ + potassium to amlodipine at beginning of Feb. Called back on 2/20 with reports of increased lower extremity and was told stop amlodipine at that time and restart HCTZ + potassium which patient tells me she has done.   - BP within normal limits on admission. Agree with stopping amlodipine give lower extremity edema   - Hold PTA HCTZ for now, would resume if elevated BP and improving potassium  - Started on Lasix 20 mg, increase lasix to 40mg. Weights improved: 190>185 today    Asthma   - Continue Dulera + PRN albuterol     GERD   - Continue PTA PPI     Constipation   - PRN bowel regimen, would schedule if requiring frequent opioids     Anxiety   - Continue PTA fluoxetine, PCP has been considering decreasing dose due to tremors  - Continue PTA clonazepam 0.5 mg TID    Cutaneous Candidiasis  - Resolved, monitor           Electronically signed by:  Diane Bhatt CNP       Total time greater than 45 minutes reviewing chart in EPIC and PCC, medications, labs, vitals. Discussing with social work, physical therapy, occupational therapy and nursing.       Wheelchair Documentation    1 Prince height WC 20W x 16\" with Adjustable height, detachable armrest, with seat and back cushion. With " standard bilateral footrests.   Height: 5 feet   Weight: 186 pounds    Patient has mobility limitation that significantly impairs her ability to participate in mobility-related activities of daily living (MRADL's) due to   1. Pathological fracture of left radius due to osteoporosis with routine healing, unspecified osteoporosis type, subsequent encounter    2. Closed compression fracture of L3 vertebra, initial encounter (H)    3. Bilateral lower extremity edema    4. Wheelchair dependence    5. Gastroesophageal reflux disease without esophagitis    6. Morbid obesity (H)    7. Age-related osteoporosis without current pathological fracture    8. Anxiety    9. Acute hypokalemia    10. Severe persistent asthma, unspecified whether complicated (H)          Mobility limitation cannot be sufficiently and safely resolved by use of a cane,walker or crutches due to as above    Patient or caregiver are able to safely use the manual wheelchair    Patient's functional mobility deficit can be sufficiently resolved by use of a manual wheelchair    Patient's home provides adequate access between rooms, maneuvering space and surfaces for use of the manual wheelchair.    The use of a manual wheelchair will significantly improve the ability to participate in MRADL's and the patient will use it on a regular basis in the home.     Patient has not expressed an unwillingness to use the manual wheelchair that is provided in the home    Patient needs dylon height wheelchair due to short stature and needs to place feet on the ground for propulsion    Accessories:  Height adjustable arms: uses WC over 2 hours per day.       Length of need: 99 months      Electronically signed by  PEG Addison, GNP/ANP-BC

## 2025-03-24 NOTE — LETTER
3/24/2025      Tootie Marin  [address]  [place] [address]        [facility] GERIATRICS    PRIMARY CARE PROVIDER AND CLINIC:  Latricia Cloud MD, [address] [facility] [address]  Chief Complaint   Patient presents with     RECHECK      [place] Medical Record Number:  [id number]  Place of Service where encounter took place:  [facility] ([facility]) [[id number]]    Tootie Mairn  is a 69 year old  (1955), admitted to the above facility from  [facility] . Hospital stay 2/25/2025 through 2/27/2025..       Patient seen today for follow up NP visit in TCU:  1. Pathological fracture of left radius due to osteoporosis with routine healing, unspecified osteoporosis type, subsequent encounter    2. Closed compression fracture of L3 vertebra, initial encounter (H)    3. Bilateral lower extremity edema    4. Wheelchair dependence    5. Gastroesophageal reflux disease without esophagitis    6. Morbid obesity (H)    7. Age-related osteoporosis without current pathological fracture    8. Anxiety    9. Acute hypokalemia    10. Severe persistent asthma, unspecified whether complicated (H)          HPI:    S/p compression fracture and right radius fracture. Right arm in sling, NWB. Chronic anxiety, on scheduled TID meds. Doing well, offers no concerns today.   Denies HA, chest pain, palpitations, dizziness.  No troubles breathing, no SOB, no Concerns with urination, no constipation. Continue senna. Eating and drinking okay. Sleeping okay. Working with therapies. Pain controlled   Weights improved to 185 >186.4 pounds today with increase lasix. Labs reviewed   Lives at senior living apartments, ambulates with a walker or wheelchair. Will need WC on discharge.         CODE STATUS/ADVANCE DIRECTIVES DISCUSSION:  Full Code  CPR/Full code   ALLERGIES:   Allergies   Allergen Reactions     Aspirin (Tartrazine Only) [Tartrazine] Unknown     Chicken Meat (Diagnostic)  GI Disturbance     Chicken [Chicken Protein] Unknown     Other Food Allergy Unknown     TURKEY      PAST MEDICAL HISTORY:   Past Medical History:   Diagnosis Date     Anxiety      Chest wall trauma      Chronic prescription benzodiazepine use      Hypercholesteremia      Osteopenia of multiple sites 02/20/2024     Panic disorder       PAST SURGICAL HISTORY:   has a past surgical history that includes Biopsy breast (Right, 1987).  FAMILY HISTORY: family history includes Cancer in her brother and father; Heart Disease in her mother.  SOCIAL HISTORY:   reports that she has never smoked. She has never used smokeless tobacco. She reports that she does not drink alcohol and does not use drugs.  Patient's living condition: lives alone    Post Discharge Medication Reconciliation Status:   MED REC REQUIRED  Post Medication Reconciliation Status: discharge medications reconciled, continue medications without change       Current Outpatient Medications   Medication Sig Dispense Refill     acetaminophen (TYLENOL) 500 MG tablet Take 1-2 tablets (500-1,000 mg) by mouth every 6 hours as needed for mild pain 100 tablet 3     albuterol (PROAIR HFA/PROVENTIL HFA/VENTOLIN HFA) 108 (90 Base) MCG/ACT inhaler Inhale 2 puffs into the lungs every 6 hours as needed. 54 g 0     clonazePAM (KLONOPIN) 0.5 MG tablet Take 1 tablet (0.5 mg) by mouth 3 times daily. 60 tablet 0     FLUoxetine (PROZAC) 20 MG capsule Take 20 mg by mouth daily. 60 capsule 1     FLUoxetine (PROZAC) 40 MG capsule Take 40 mg by mouth daily.       gabapentin (NEURONTIN) 100 MG capsule Take 1 capsule (100 mg) by mouth 3 times daily. Increase to 200mg three times daily after 3-5 days if tolerating side effects and needing more pain relief. 180 capsule 0     Lidocaine (LIDOCARE) 4 % Patch Place 1 patch onto the skin every 24 hours. To prevent lidocaine toxicity, patient should be patch free for 12 hrs daily.       lovastatin (MEVACOR) 20 MG tablet Take 1 tablet (20 mg) by  "mouth at bedtime 90 tablet 3     mometasone-formoterol (DULERA) 200-5 MCG/ACT inhaler Inhale 2 puffs into the lungs 2 times daily. 13 g 3     nystatin (MYCOSTATIN) 945155 UNIT/GM external powder Apply topically.       omeprazole (PRILOSEC) 20 MG DR capsule Take 1 capsule (20 mg) by mouth daily 90 capsule 3     potassium chloride jossue ER (KLOR-CON M10) 10 MEQ CR tablet Take 1 tablet (10 mEq) by mouth daily. (Patient taking differently: Take 40 mEq by mouth daily.) 90 tablet 3     No current facility-administered medications for this visit.       ROS:  10 point ROS of systems including Constitutional, Eyes, Respiratory, Cardiovascular, Gastroenterology, Genitourinary, Integumentary, Musculoskeletal, Psychiatric were all negative except for pertinent positives noted in my HPI.    Vitals:  /63   Pulse 75   Temp 99.2  F (37.3  C)   Resp 18   Ht 1.499 m (4' 11\")   Wt 84.4 kg (186 lb)   SpO2 98%   BMI 37.57 kg/m    Exam:  GENERAL APPEARANCE:  Alert, in no distress, oriented, cooperative. Sitting up in chair  ENT:  Mouth and posterior oropharynx normal, moist mucous membranes, several teeth missing.  EYES:  EOM, conjunctivae, lids, pupils and irises normal  NECK:  No adenopathy,masses or thyromegaly  RESP:  respiratory effort and palpation of chest normal, lungs clear to auscultation , no respiratory distress  CV:  Palpation and auscultation of heart done , regular rate and rhythm, no murmur, rub, or gallop, +2 edema in bilateral lower extremities  GI/ABDOMEN:  normal bowel sounds, soft, nontender, no hepatosplenomegaly or other masses  M/S:   moves extremities spontaneously. Cast to left arm   SKIN:  no rash to exposed skin  NEURO:   intact   PSYCH:  oriented X 4, normal insight, judgement and memory, affect and mood normal.      Lab/Diagnostic data:  Recent labs in [facility] reviewed by me today.     Last Comprehensive Metabolic Panel:  Lab Results   Component Value Date     03/17/2025    POTASSIUM 3.8 " "03/17/2025    CHLORIDE 101 03/17/2025    CO2 21 (L) 03/17/2025    ANIONGAP 15 03/17/2025    GLC 96 03/17/2025    BUN 8.6 03/17/2025    CR 0.77 03/17/2025    GFRESTIMATED 83 03/17/2025    RIDGE 9.6 03/17/2025       Lab Results   Component Value Date    WBC 11.3 08/20/2024     Lab Results   Component Value Date    RBC 4.41 08/20/2024     Lab Results   Component Value Date    HGB 13.1 08/20/2024     Lab Results   Component Value Date    HCT 40.1 08/20/2024     No components found for: \"MCT\"  Lab Results   Component Value Date    MCV 91 08/20/2024     Lab Results   Component Value Date    MCH 29.7 08/20/2024     Lab Results   Component Value Date    MCHC 32.7 08/20/2024     Lab Results   Component Value Date    RDW 13.6 08/20/2024     Lab Results   Component Value Date     08/20/2024       ASSESSMENT/PLAN:    Acute / Subacute L3 Compression Fracture   Osteoporosis   Acute Low Back Pain   Mechanical Fall 2/13   Generalized Weakness   Describes several months of low back pain with history of compression fracture diagnosed at OSH at unclear location. Fell on 2/13, tripped getting out of the shower. Increased low back pain, generalized weakness since then. CT lumbar spine w/o IV Cont on admission with superior endplate compression fracture at L3 with 20-30% anterior vertebral height loss. Neurologically intact aside from pain and limited ROM with R hip flexion.   - NSGY consulted  - Neuro checks q4h   - MR lumbar spine ordered   - Plan for Coreline brace when OOB or HOB >30 degrees   - PT / OT consults, okay to get up once Coreline brace delivered   - Continue PTA calcium + vitamin D   - Scheduled tylenol, resume low dose gabapentin 100 mg TID, lidocaine patch, PRN oxycodone   - WC ordered for discharge.     Bilateral Lower Extremity Edema   New pitting lower extremity over the past 2-3 weeks. Denies shortness of breath, chest pain, lightheadedness or dizziness. Had switched from HCTZ to amlodipine at beginning of Feb, " called back to clinic reporting edema 2/20 and had switched back recently. BNP 17 (may be falsely low in setting of obesity), albumin 3.4, UA unremarkable. Does have reduced mobility but given symmetric edema held off on duplex US.   - Increased lasix to 40mg  - Continue tubigrip compression stockings daily.  -  BMP recheck 3/17: reviewed and stable as above.   -daily weights : 186 pounds today> 191 >187.4 >185>186.      Hypokalemia   K 2.6, Mag 1.8 on admission. Was previously on long term potassium supplement with use of HCTZ that she had stopped earlier in the month. Denies N/V/D, somewhat poor appetite due to fatigue.   - Currently on Potassium supplementation 40 meq daily.  - BMP stable     Hyponatremia   Na 130, may be in setting of poor appetite.   - Encourage PO, recheck in AM   - BMP stable     Hypertension   Had recently switched from PTA HCTZ + potassium to amlodipine at beginning of Feb. Called back on 2/20 with reports of increased lower extremity and was told stop amlodipine at that time and restart HCTZ + potassium which patient tells me she has done.   - BP within normal limits on admission. Agree with stopping amlodipine give lower extremity edema   - Hold PTA HCTZ for now, would resume if elevated BP and improving potassium  - Started on Lasix 20 mg, increase lasix to 40mg. Weights improved: 190>185 today    Asthma   - Continue Dulera + PRN albuterol     GERD   - Continue PTA PPI     Constipation   - PRN bowel regimen, would schedule if requiring frequent opioids     Anxiety   - Continue PTA fluoxetine, PCP has been considering decreasing dose due to tremors  - Continue PTA clonazepam 0.5 mg TID    Cutaneous Candidiasis  - Resolved, monitor           Electronically signed by:  Diane Bhatt CNP       Total time greater than 45 minutes reviewing chart in EPIC and PCC, medications, labs, vitals. Discussing with social work, physical therapy, occupational therapy and nursing.       Wheelchair  "Documentation    1 Prince height WC 20W x 16\" with Adjustable height, detachable armrest, with seat and back cushion. With standard bilateral footrests.   Height: 5 feet   Weight: 186 pounds    Patient has mobility limitation that significantly impairs her ability to participate in mobility-related activities of daily living (MRADL's) due to   1. Pathological fracture of left radius due to osteoporosis with routine healing, unspecified osteoporosis type, subsequent encounter    2. Closed compression fracture of L3 vertebra, initial encounter (H)    3. Bilateral lower extremity edema    4. Wheelchair dependence    5. Gastroesophageal reflux disease without esophagitis    6. Morbid obesity (H)    7. Age-related osteoporosis without current pathological fracture    8. Anxiety    9. Acute hypokalemia    10. Severe persistent asthma, unspecified whether complicated (H)          Mobility limitation cannot be sufficiently and safely resolved by use of a cane,walker or crutches due to as above    Patient or caregiver are able to safely use the manual wheelchair    Patient's functional mobility deficit can be sufficiently resolved by use of a manual wheelchair    Patient's home provides adequate access between rooms, maneuvering space and surfaces for use of the manual wheelchair.    The use of a manual wheelchair will significantly improve the ability to participate in MRADL's and the patient will use it on a regular basis in the home.     Patient has not expressed an unwillingness to use the manual wheelchair that is provided in the home    Patient needs prince height wheelchair due to short stature and needs to place feet on the ground for propulsion    Accessories:  Height adjustable arms: uses WC over 2 hours per day.       Length of need: 99 months      Electronically signed by  PEG Addison, GNP/ANP-BC      Sincerely,        Diane Bhatt CNP    Electronically signed  "

## 2025-03-25 ENCOUNTER — LAB REQUISITION (OUTPATIENT)
Dept: LAB | Facility: CLINIC | Age: 70
End: 2025-03-25
Payer: COMMERCIAL

## 2025-03-25 DIAGNOSIS — B97.4 RESPIRATORY SYNCYTIAL VIRUS AS THE CAUSE OF DISEASES CLASSIFIED ELSEWHERE: ICD-10-CM

## 2025-03-25 LAB
FLUAV RNA SPEC QL NAA+PROBE: NEGATIVE
FLUBV RNA RESP QL NAA+PROBE: NEGATIVE
RSV RNA SPEC NAA+PROBE: NEGATIVE
SARS-COV-2 RNA RESP QL NAA+PROBE: NEGATIVE

## 2025-03-25 PROCEDURE — 87637 SARSCOV2&INF A&B&RSV AMP PRB: CPT | Mod: ORL | Performed by: NURSE PRACTITIONER

## 2025-04-01 ENCOUNTER — TRANSITIONAL CARE UNIT VISIT (OUTPATIENT)
Dept: GERIATRICS | Facility: CLINIC | Age: 70
End: 2025-04-01
Payer: COMMERCIAL

## 2025-04-01 DIAGNOSIS — J45.20 MILD INTERMITTENT ASTHMA WITHOUT COMPLICATION: ICD-10-CM

## 2025-04-01 DIAGNOSIS — F32.A DEPRESSION, UNSPECIFIED DEPRESSION TYPE: ICD-10-CM

## 2025-04-01 DIAGNOSIS — S62.102D CLOSED FRACTURE OF LEFT WRIST WITH ROUTINE HEALING, SUBSEQUENT ENCOUNTER: ICD-10-CM

## 2025-04-01 DIAGNOSIS — S32.030A CLOSED COMPRESSION FRACTURE OF L3 VERTEBRA, INITIAL ENCOUNTER (H): Primary | ICD-10-CM

## 2025-04-01 DIAGNOSIS — F41.1 ANXIETY STATE: ICD-10-CM

## 2025-04-01 DIAGNOSIS — I10 ESSENTIAL HYPERTENSION: ICD-10-CM

## 2025-04-01 PROCEDURE — 99305 1ST NF CARE MODERATE MDM 35: CPT | Performed by: FAMILY MEDICINE

## 2025-04-01 NOTE — LETTER
4/1/2025      Tootie Marin  [address]  [place] [address]        No notes on file      Sincerely,        Aida Hussein MD    Electronically signed

## 2025-04-02 ENCOUNTER — TRANSITIONAL CARE UNIT VISIT (OUTPATIENT)
Dept: GERIATRICS | Facility: CLINIC | Age: 70
End: 2025-04-02
Payer: COMMERCIAL

## 2025-04-02 VITALS
HEART RATE: 63 BPM | OXYGEN SATURATION: 98 % | TEMPERATURE: 98.7 F | HEIGHT: 59 IN | BODY MASS INDEX: 37.58 KG/M2 | WEIGHT: 186.4 LBS | RESPIRATION RATE: 18 BRPM | SYSTOLIC BLOOD PRESSURE: 137 MMHG | DIASTOLIC BLOOD PRESSURE: 70 MMHG

## 2025-04-02 VITALS
WEIGHT: 187 LBS | HEART RATE: 81 BPM | OXYGEN SATURATION: 97 % | SYSTOLIC BLOOD PRESSURE: 130 MMHG | TEMPERATURE: 98.5 F | DIASTOLIC BLOOD PRESSURE: 73 MMHG | RESPIRATION RATE: 18 BRPM | BODY MASS INDEX: 37.7 KG/M2 | HEIGHT: 59 IN

## 2025-04-02 DIAGNOSIS — E66.01 MORBID OBESITY (H): ICD-10-CM

## 2025-04-02 DIAGNOSIS — M81.0 AGE-RELATED OSTEOPOROSIS WITHOUT CURRENT PATHOLOGICAL FRACTURE: ICD-10-CM

## 2025-04-02 DIAGNOSIS — F41.1 ANXIETY STATE: ICD-10-CM

## 2025-04-02 DIAGNOSIS — K21.9 GASTROESOPHAGEAL REFLUX DISEASE WITHOUT ESOPHAGITIS: ICD-10-CM

## 2025-04-02 DIAGNOSIS — E87.6 ACUTE HYPOKALEMIA: ICD-10-CM

## 2025-04-02 DIAGNOSIS — J45.50 SEVERE PERSISTENT ASTHMA, UNSPECIFIED WHETHER COMPLICATED (H): ICD-10-CM

## 2025-04-02 DIAGNOSIS — Z99.3 WHEELCHAIR DEPENDENCE: ICD-10-CM

## 2025-04-02 DIAGNOSIS — S32.030A CLOSED COMPRESSION FRACTURE OF L3 VERTEBRA, INITIAL ENCOUNTER (H): Primary | ICD-10-CM

## 2025-04-02 DIAGNOSIS — R60.0 BILATERAL LOWER EXTREMITY EDEMA: ICD-10-CM

## 2025-04-02 DIAGNOSIS — M80.032D PATHOLOGICAL FRACTURE OF LEFT RADIUS DUE TO OSTEOPOROSIS WITH ROUTINE HEALING, UNSPECIFIED OSTEOPOROSIS TYPE, SUBSEQUENT ENCOUNTER: ICD-10-CM

## 2025-04-02 PROCEDURE — 99309 SBSQ NF CARE MODERATE MDM 30: CPT | Performed by: NURSE PRACTITIONER

## 2025-04-02 NOTE — LETTER
4/2/2025      Tootie Marin  [address]  [place] [address]        [facility] GERIATRICS    PRIMARY CARE PROVIDER AND CLINIC:  Latricia Cloud MD, [address] [facility] [address]  Chief Complaint   Patient presents with     RECHECK      [place] Medical Record Number:  [id number]  Place of Service where encounter took place:  [facility] ([facility]) [[id number]]    Tootie Marin  is a 69 year old  (1955), admitted to the above facility from  [facility] . Hospital stay 2/25/2025 through 2/27/2025..       Patient seen today for follow up NP visit in TCU:  1. Closed compression fracture of L3 vertebra, initial encounter (H)    2. Wheelchair dependence    3. Age-related osteoporosis without current pathological fracture    4. Bilateral lower extremity edema    5. Gastroesophageal reflux disease without esophagitis    6. Anxiety    7. Pathological fracture of left radius due to osteoporosis with routine healing, unspecified osteoporosis type, subsequent encounter    8. Morbid obesity (H)    9. Acute hypokalemia    10. Severe persistent asthma, unspecified whether complicated (H)          HPI:    S/p compression fracture and right radius fracture.   Completed therapies.   Right arm in sling, NWB. Chronic anxiety, on scheduled TID meds. Doing well, offers no concerns today.   Denies HA, chest pain, palpitations, dizziness.  No troubles breathing, no SOB, no Concerns with urination, no constipation. Continue senna. Eating and drinking okay. Sleeping okay. Working with therapies. Pain controlled   Weights improved to 185 >186.4>187.4 pounds today with increase lasix. Labs reviewed   Lives at senior living apartments, ambulates with a walker or wheelchair. Will need WC on discharge.         CODE STATUS/ADVANCE DIRECTIVES DISCUSSION:  Full Code  CPR/Full code   ALLERGIES:   Allergies   Allergen Reactions     Aspirin (Tartrazine Only) [Tartrazine] Unknown      Chicken Meat (Diagnostic) GI Disturbance     Chicken [Chicken Protein] Unknown     Other Food Allergy Unknown     TURKEY      PAST MEDICAL HISTORY:   Past Medical History:   Diagnosis Date     Anxiety      Chest wall trauma      Chronic prescription benzodiazepine use      Hypercholesteremia      Osteopenia of multiple sites 02/20/2024     Panic disorder       PAST SURGICAL HISTORY:   has a past surgical history that includes Biopsy breast (Right, 1987).  FAMILY HISTORY: family history includes Cancer in her brother and father; Heart Disease in her mother.  SOCIAL HISTORY:   reports that she has never smoked. She has never used smokeless tobacco. She reports that she does not drink alcohol and does not use drugs.  Patient's living condition: lives alone    Post Discharge Medication Reconciliation Status:   MED REC REQUIRED  Post Medication Reconciliation Status: discharge medications reconciled, continue medications without change       Current Outpatient Medications   Medication Sig Dispense Refill     acetaminophen (TYLENOL) 500 MG tablet Take 1-2 tablets (500-1,000 mg) by mouth every 6 hours as needed for mild pain 100 tablet 3     albuterol (PROAIR HFA/PROVENTIL HFA/VENTOLIN HFA) 108 (90 Base) MCG/ACT inhaler Inhale 2 puffs into the lungs every 6 hours as needed. 54 g 0     clonazePAM (KLONOPIN) 0.5 MG tablet Take 1 tablet (0.5 mg) by mouth 3 times daily. 60 tablet 0     FLUoxetine (PROZAC) 20 MG capsule Take 20 mg by mouth daily. 60 capsule 1     FLUoxetine (PROZAC) 40 MG capsule Take 40 mg by mouth daily.       gabapentin (NEURONTIN) 100 MG capsule Take 1 capsule (100 mg) by mouth 3 times daily. Increase to 200mg three times daily after 3-5 days if tolerating side effects and needing more pain relief. 180 capsule 0     Lidocaine (LIDOCARE) 4 % Patch Place 1 patch onto the skin every 24 hours. To prevent lidocaine toxicity, patient should be patch free for 12 hrs daily.       lovastatin (MEVACOR) 20 MG  "tablet Take 1 tablet (20 mg) by mouth at bedtime 90 tablet 3     mometasone-formoterol (DULERA) 200-5 MCG/ACT inhaler Inhale 2 puffs into the lungs 2 times daily. 13 g 3     nystatin (MYCOSTATIN) 482566 UNIT/GM external powder Apply topically.       omeprazole (PRILOSEC) 20 MG DR capsule Take 1 capsule (20 mg) by mouth daily 90 capsule 3     potassium chloride jossue ER (KLOR-CON M10) 10 MEQ CR tablet Take 1 tablet (10 mEq) by mouth daily. (Patient taking differently: Take 40 mEq by mouth daily.) 90 tablet 3     No current facility-administered medications for this visit.       ROS:  10 point ROS of systems including Constitutional, Eyes, Respiratory, Cardiovascular, Gastroenterology, Genitourinary, Integumentary, Musculoskeletal, Psychiatric were all negative except for pertinent positives noted in my HPI.    Vitals:  /73   Pulse 81   Temp 98.5  F (36.9  C)   Resp 18   Ht 1.499 m (4' 11\")   Wt 84.8 kg (187 lb)   SpO2 97%   BMI 37.77 kg/m    Exam:  GENERAL APPEARANCE:  Alert, in no distress, oriented, cooperative. Sitting up in chair  ENT:  Mouth and posterior oropharynx normal, moist mucous membranes, several teeth missing.  EYES:  EOM, conjunctivae, lids, pupils and irises normal  NECK:  No adenopathy,masses or thyromegaly  RESP:  respiratory effort and palpation of chest normal, lungs clear to auscultation , no respiratory distress  CV:  Palpation and auscultation of heart done , regular rate and rhythm, no murmur, rub, or gallop, +2 edema in bilateral lower extremities  GI/ABDOMEN:  normal bowel sounds, soft, nontender, no hepatosplenomegaly or other masses  M/S:   moves extremities spontaneously. Cast to left arm   SKIN:  no rash to exposed skin  NEURO:   intact   PSYCH:  oriented X 4, normal insight, judgement and memory, affect and mood normal.      Lab/Diagnostic data:  Recent labs in [facility] reviewed by me today.     Last Comprehensive Metabolic Panel:  Lab Results   Component Value Date     " "03/17/2025    POTASSIUM 3.8 03/17/2025    CHLORIDE 101 03/17/2025    CO2 21 (L) 03/17/2025    ANIONGAP 15 03/17/2025    GLC 96 03/17/2025    BUN 8.6 03/17/2025    CR 0.77 03/17/2025    GFRESTIMATED 83 03/17/2025    RIDGE 9.6 03/17/2025       Lab Results   Component Value Date    WBC 11.3 08/20/2024     Lab Results   Component Value Date    RBC 4.41 08/20/2024     Lab Results   Component Value Date    HGB 13.1 08/20/2024     Lab Results   Component Value Date    HCT 40.1 08/20/2024     No components found for: \"MCT\"  Lab Results   Component Value Date    MCV 91 08/20/2024     Lab Results   Component Value Date    MCH 29.7 08/20/2024     Lab Results   Component Value Date    MCHC 32.7 08/20/2024     Lab Results   Component Value Date    RDW 13.6 08/20/2024     Lab Results   Component Value Date     08/20/2024       ASSESSMENT/PLAN:    Acute / Subacute L3 Compression Fracture   Osteoporosis   Acute Low Back Pain   Mechanical Fall 2/13   Generalized Weakness   Describes several months of low back pain with history of compression fracture diagnosed at OSH at unclear location. Fell on 2/13, tripped getting out of the shower. Increased low back pain, generalized weakness since then. CT lumbar spine w/o IV Cont on admission with superior endplate compression fracture at L3 with 20-30% anterior vertebral height loss. Neurologically intact aside from pain and limited ROM with R hip flexion.   - NSGY consulted: appt on 4/9 for follow up  - Neuro checks q4h   - MR lumbar spine ordered   - Plan for Coreline brace when OOB or HOB >30 degrees   - PT / OT consults, okay to get up once Coreline brace delivered   - Continue PTA calcium + vitamin D   - Scheduled tylenol, resume low dose gabapentin 100 mg TID, lidocaine patch, PRN oxycodone   - WC ordered for discharge.     Bilateral Lower Extremity Edema   New pitting lower extremity over the past 2-3 weeks. Denies shortness of breath, chest pain, lightheadedness or dizziness. " Had switched from HCTZ to amlodipine at beginning of Feb, called back to clinic reporting edema 2/20 and had switched back recently. BNP 17 (may be falsely low in setting of obesity), albumin 3.4, UA unremarkable. Does have reduced mobility but given symmetric edema held off on duplex US.   - Increased lasix to 40mg  - Continue tubigrip compression stockings daily.  -  BMP recheck 3/17: reviewed and stable as above.   -daily weights : 186 pounds today> 191 >187.4 >185>186.      Hypokalemia   K 2.6, Mag 1.8 on admission. Was previously on long term potassium supplement with use of HCTZ that she had stopped earlier in the month. Denies N/V/D, somewhat poor appetite due to fatigue.   - Currently on Potassium supplementation 40 meq daily.  - BMP stable     Hyponatremia   Na 130, may be in setting of poor appetite.   - Encourage PO, recheck in AM   - BMP stable     Hypertension   Had recently switched from PTA HCTZ + potassium to amlodipine at beginning of Feb. Called back on 2/20 with reports of increased lower extremity and was told stop amlodipine at that time and restart HCTZ + potassium which patient tells me she has done.   - BP within normal limits on admission. Agree with stopping amlodipine give lower extremity edema   - Hold PTA HCTZ for now, would resume if elevated BP and improving potassium  - Started on Lasix 20 mg, increase lasix to 40mg. Weights improved: 190>185 today    Asthma   - Continue Dulera + PRN albuterol     GERD   - Continue PTA PPI     Constipation   - PRN bowel regimen, would schedule if requiring frequent opioids     Anxiety   - Continue PTA fluoxetine, PCP has been considering decreasing dose due to tremors  - Continue PTA clonazepam 0.5 mg TID    Cutaneous Candidiasis  - Resolved, monitor           Electronically signed by:  Diane Bhatt CNP       Total time greater than 25 minutes reviewing chart in EPIC and PCC, medications, labs, vitals. Discussing with social work, physical therapy,  "occupational therapy and nursing.       Wheelchair Documentation    1 Prince height WC 20W x 16\" with Adjustable height, detachable armrest, with seat and back cushion. With standard bilateral footrests.   Height: 5 feet   Weight: 186 pounds    Patient has mobility limitation that significantly impairs her ability to participate in mobility-related activities of daily living (MRADL's) due to   1. Closed compression fracture of L3 vertebra, initial encounter (H)    2. Wheelchair dependence    3. Age-related osteoporosis without current pathological fracture    4. Bilateral lower extremity edema    5. Gastroesophageal reflux disease without esophagitis    6. Anxiety    7. Pathological fracture of left radius due to osteoporosis with routine healing, unspecified osteoporosis type, subsequent encounter    8. Morbid obesity (H)    9. Acute hypokalemia    10. Severe persistent asthma, unspecified whether complicated (H)          Mobility limitation cannot be sufficiently and safely resolved by use of a cane,walker or crutches due to as above    Patient or caregiver are able to safely use the manual wheelchair    Patient's functional mobility deficit can be sufficiently resolved by use of a manual wheelchair    Patient's home provides adequate access between rooms, maneuvering space and surfaces for use of the manual wheelchair.    The use of a manual wheelchair will significantly improve the ability to participate in MRADL's and the patient will use it on a regular basis in the home.     Patient has not expressed an unwillingness to use the manual wheelchair that is provided in the home    Patient needs prince height wheelchair due to short stature and needs to place feet on the ground for propulsion    Accessories:  Height adjustable arms: uses WC over 2 hours per day.       Length of need: 99 months      Electronically signed by  PEG Addison, GNP/ANP-BC      Sincerely,        Diane Bhatt CNP    Electronically " signed

## 2025-04-02 NOTE — PROGRESS NOTES
[facility] GERIATRICS    PRIMARY CARE PROVIDER AND CLINIC:  Latricia Cloud MD, [address] [facility] [address]  Chief Complaint   Patient presents with    KAYLINECK      [place] Medical Record Number:  [id number]  Place of Service where encounter took place:  [facility] ([facility]) [[id number]]    Tootie Marin  is a 69 year old  (1955), admitted to the above facility from  [facility] . Hospital stay 2/25/2025 through 2/27/2025..       Patient seen today for follow up NP visit in TCU:  1. Closed compression fracture of L3 vertebra, initial encounter (H)    2. Wheelchair dependence    3. Age-related osteoporosis without current pathological fracture    4. Bilateral lower extremity edema    5. Gastroesophageal reflux disease without esophagitis    6. Anxiety    7. Pathological fracture of left radius due to osteoporosis with routine healing, unspecified osteoporosis type, subsequent encounter    8. Morbid obesity (H)    9. Acute hypokalemia    10. Severe persistent asthma, unspecified whether complicated (H)          HPI:    S/p compression fracture and right radius fracture.   Completed therapies.   Right arm in sling, NWB. Chronic anxiety, on scheduled TID meds. Doing well, offers no concerns today.   Denies HA, chest pain, palpitations, dizziness.  No troubles breathing, no SOB, no Concerns with urination, no constipation. Continue senna. Eating and drinking okay. Sleeping okay. Working with therapies. Pain controlled   Weights improved to 185 >186.4>187.4 pounds today with increase lasix. Labs reviewed   Lives at senior living apartments, ambulates with a walker or wheelchair. Will need WC on discharge.         CODE STATUS/ADVANCE DIRECTIVES DISCUSSION:  Full Code  CPR/Full code   ALLERGIES:   Allergies   Allergen Reactions    Aspirin (Tartrazine Only) [Tartrazine] Unknown    Chicken Meat (Diagnostic) GI Disturbance    Chicken [Chicken Protein] Unknown    Other  Food Allergy Unknown     TURKEY      PAST MEDICAL HISTORY:   Past Medical History:   Diagnosis Date    Anxiety     Chest wall trauma     Chronic prescription benzodiazepine use     Hypercholesteremia     Osteopenia of multiple sites 02/20/2024    Panic disorder       PAST SURGICAL HISTORY:   has a past surgical history that includes Biopsy breast (Right, 1987).  FAMILY HISTORY: family history includes Cancer in her brother and father; Heart Disease in her mother.  SOCIAL HISTORY:   reports that she has never smoked. She has never used smokeless tobacco. She reports that she does not drink alcohol and does not use drugs.  Patient's living condition: lives alone    Post Discharge Medication Reconciliation Status:   MED REC REQUIRED  Post Medication Reconciliation Status: discharge medications reconciled, continue medications without change       Current Outpatient Medications   Medication Sig Dispense Refill    acetaminophen (TYLENOL) 500 MG tablet Take 1-2 tablets (500-1,000 mg) by mouth every 6 hours as needed for mild pain 100 tablet 3    albuterol (PROAIR HFA/PROVENTIL HFA/VENTOLIN HFA) 108 (90 Base) MCG/ACT inhaler Inhale 2 puffs into the lungs every 6 hours as needed. 54 g 0    clonazePAM (KLONOPIN) 0.5 MG tablet Take 1 tablet (0.5 mg) by mouth 3 times daily. 60 tablet 0    FLUoxetine (PROZAC) 20 MG capsule Take 20 mg by mouth daily. 60 capsule 1    FLUoxetine (PROZAC) 40 MG capsule Take 40 mg by mouth daily.      gabapentin (NEURONTIN) 100 MG capsule Take 1 capsule (100 mg) by mouth 3 times daily. Increase to 200mg three times daily after 3-5 days if tolerating side effects and needing more pain relief. 180 capsule 0    Lidocaine (LIDOCARE) 4 % Patch Place 1 patch onto the skin every 24 hours. To prevent lidocaine toxicity, patient should be patch free for 12 hrs daily.      lovastatin (MEVACOR) 20 MG tablet Take 1 tablet (20 mg) by mouth at bedtime 90 tablet 3    mometasone-formoterol (DULERA) 200-5 MCG/ACT  "inhaler Inhale 2 puffs into the lungs 2 times daily. 13 g 3    nystatin (MYCOSTATIN) 680764 UNIT/GM external powder Apply topically.      omeprazole (PRILOSEC) 20 MG DR capsule Take 1 capsule (20 mg) by mouth daily 90 capsule 3    potassium chloride jossue ER (KLOR-CON M10) 10 MEQ CR tablet Take 1 tablet (10 mEq) by mouth daily. (Patient taking differently: Take 40 mEq by mouth daily.) 90 tablet 3     No current facility-administered medications for this visit.       ROS:  10 point ROS of systems including Constitutional, Eyes, Respiratory, Cardiovascular, Gastroenterology, Genitourinary, Integumentary, Musculoskeletal, Psychiatric were all negative except for pertinent positives noted in my HPI.    Vitals:  /73   Pulse 81   Temp 98.5  F (36.9  C)   Resp 18   Ht 1.499 m (4' 11\")   Wt 84.8 kg (187 lb)   SpO2 97%   BMI 37.77 kg/m    Exam:  GENERAL APPEARANCE:  Alert, in no distress, oriented, cooperative. Sitting up in chair  ENT:  Mouth and posterior oropharynx normal, moist mucous membranes, several teeth missing.  EYES:  EOM, conjunctivae, lids, pupils and irises normal  NECK:  No adenopathy,masses or thyromegaly  RESP:  respiratory effort and palpation of chest normal, lungs clear to auscultation , no respiratory distress  CV:  Palpation and auscultation of heart done , regular rate and rhythm, no murmur, rub, or gallop, +2 edema in bilateral lower extremities  GI/ABDOMEN:  normal bowel sounds, soft, nontender, no hepatosplenomegaly or other masses  M/S:   moves extremities spontaneously. Cast to left arm   SKIN:  no rash to exposed skin  NEURO:   intact   PSYCH:  oriented X 4, normal insight, judgement and memory, affect and mood normal.      Lab/Diagnostic data:  Recent labs in [facility] reviewed by me today.     Last Comprehensive Metabolic Panel:  Lab Results   Component Value Date     03/17/2025    POTASSIUM 3.8 03/17/2025    CHLORIDE 101 03/17/2025    CO2 21 (L) 03/17/2025    ANIONGAP 15 " "03/17/2025    GLC 96 03/17/2025    BUN 8.6 03/17/2025    CR 0.77 03/17/2025    GFRESTIMATED 83 03/17/2025    RIDGE 9.6 03/17/2025       Lab Results   Component Value Date    WBC 11.3 08/20/2024     Lab Results   Component Value Date    RBC 4.41 08/20/2024     Lab Results   Component Value Date    HGB 13.1 08/20/2024     Lab Results   Component Value Date    HCT 40.1 08/20/2024     No components found for: \"MCT\"  Lab Results   Component Value Date    MCV 91 08/20/2024     Lab Results   Component Value Date    MCH 29.7 08/20/2024     Lab Results   Component Value Date    MCHC 32.7 08/20/2024     Lab Results   Component Value Date    RDW 13.6 08/20/2024     Lab Results   Component Value Date     08/20/2024       ASSESSMENT/PLAN:    Acute / Subacute L3 Compression Fracture   Osteoporosis   Acute Low Back Pain   Mechanical Fall 2/13   Generalized Weakness   Describes several months of low back pain with history of compression fracture diagnosed at OSH at unclear location. Fell on 2/13, tripped getting out of the shower. Increased low back pain, generalized weakness since then. CT lumbar spine w/o IV Cont on admission with superior endplate compression fracture at L3 with 20-30% anterior vertebral height loss. Neurologically intact aside from pain and limited ROM with R hip flexion.   - NSGY consulted: appt on 4/9 for follow up  - Neuro checks q4h   - MR lumbar spine ordered   - Plan for Coreline brace when OOB or HOB >30 degrees   - PT / OT consults, okay to get up once Coreline brace delivered   - Continue PTA calcium + vitamin D   - Scheduled tylenol, resume low dose gabapentin 100 mg TID, lidocaine patch, PRN oxycodone   -  ordered for discharge.     Bilateral Lower Extremity Edema   New pitting lower extremity over the past 2-3 weeks. Denies shortness of breath, chest pain, lightheadedness or dizziness. Had switched from HCTZ to amlodipine at beginning of Feb, called back to clinic reporting edema 2/20 and had " "switched back recently. BNP 17 (may be falsely low in setting of obesity), albumin 3.4, UA unremarkable. Does have reduced mobility but given symmetric edema held off on duplex US.   - Increased lasix to 40mg  - Continue tubigrip compression stockings daily.  -  BMP recheck 3/17: reviewed and stable as above.   -daily weights : 186 pounds today> 191 >187.4 >185>186.      Hypokalemia   K 2.6, Mag 1.8 on admission. Was previously on long term potassium supplement with use of HCTZ that she had stopped earlier in the month. Denies N/V/D, somewhat poor appetite due to fatigue.   - Currently on Potassium supplementation 40 meq daily.  - BMP stable     Hyponatremia   Na 130, may be in setting of poor appetite.   - Encourage PO, recheck in AM   - BMP stable     Hypertension   Had recently switched from PTA HCTZ + potassium to amlodipine at beginning of Feb. Called back on 2/20 with reports of increased lower extremity and was told stop amlodipine at that time and restart HCTZ + potassium which patient tells me she has done.   - BP within normal limits on admission. Agree with stopping amlodipine give lower extremity edema   - Hold PTA HCTZ for now, would resume if elevated BP and improving potassium  - Started on Lasix 20 mg, increase lasix to 40mg. Weights improved: 190>185 today    Asthma   - Continue Dulera + PRN albuterol     GERD   - Continue PTA PPI     Constipation   - PRN bowel regimen, would schedule if requiring frequent opioids     Anxiety   - Continue PTA fluoxetine, PCP has been considering decreasing dose due to tremors  - Continue PTA clonazepam 0.5 mg TID    Cutaneous Candidiasis  - Resolved, monitor           Electronically signed by:  Diane Bhatt CNP       Total time greater than 25 minutes reviewing chart in EPIC and PCC, medications, labs, vitals. Discussing with social work, physical therapy, occupational therapy and nursing.       Wheelchair Documentation    1 Prince height WC 20W x 16\" with Adjustable " height, detachable armrest, with seat and back cushion. With standard bilateral footrests.   Height: 5 feet   Weight: 186 pounds    Patient has mobility limitation that significantly impairs her ability to participate in mobility-related activities of daily living (MRADL's) due to   1. Closed compression fracture of L3 vertebra, initial encounter (H)    2. Wheelchair dependence    3. Age-related osteoporosis without current pathological fracture    4. Bilateral lower extremity edema    5. Gastroesophageal reflux disease without esophagitis    6. Anxiety    7. Pathological fracture of left radius due to osteoporosis with routine healing, unspecified osteoporosis type, subsequent encounter    8. Morbid obesity (H)    9. Acute hypokalemia    10. Severe persistent asthma, unspecified whether complicated (H)          Mobility limitation cannot be sufficiently and safely resolved by use of a cane,walker or crutches due to as above    Patient or caregiver are able to safely use the manual wheelchair    Patient's functional mobility deficit can be sufficiently resolved by use of a manual wheelchair    Patient's home provides adequate access between rooms, maneuvering space and surfaces for use of the manual wheelchair.    The use of a manual wheelchair will significantly improve the ability to participate in MRADL's and the patient will use it on a regular basis in the home.     Patient has not expressed an unwillingness to use the manual wheelchair that is provided in the home    Patient needs dylon height wheelchair due to short stature and needs to place feet on the ground for propulsion    Accessories:  Height adjustable arms: uses WC over 2 hours per day.       Length of need: 99 months      Electronically signed by  PEG Addison, GNP/ANP-BC

## 2025-04-06 VITALS
OXYGEN SATURATION: 97 % | TEMPERATURE: 98.5 F | BODY MASS INDEX: 36.69 KG/M2 | HEIGHT: 59 IN | SYSTOLIC BLOOD PRESSURE: 147 MMHG | WEIGHT: 182 LBS | HEART RATE: 62 BPM | DIASTOLIC BLOOD PRESSURE: 78 MMHG | RESPIRATION RATE: 18 BRPM

## 2025-04-07 ENCOUNTER — TRANSITIONAL CARE UNIT VISIT (OUTPATIENT)
Dept: GERIATRICS | Facility: CLINIC | Age: 70
End: 2025-04-07
Payer: COMMERCIAL

## 2025-04-07 DIAGNOSIS — B35.3 TINEA PEDIS OF RIGHT FOOT: Primary | ICD-10-CM

## 2025-04-07 DIAGNOSIS — R60.0 BILATERAL LOWER EXTREMITY EDEMA: ICD-10-CM

## 2025-04-07 DIAGNOSIS — S32.030A CLOSED COMPRESSION FRACTURE OF L3 VERTEBRA, INITIAL ENCOUNTER (H): ICD-10-CM

## 2025-04-07 PROCEDURE — 99308 SBSQ NF CARE LOW MDM 20: CPT | Performed by: NURSE PRACTITIONER

## 2025-04-07 NOTE — PROGRESS NOTES
[facility] GERIATRICS    PRIMARY CARE PROVIDER AND CLINIC:  Latricia Cloud MD, [address] [facility] [facility] [address]  Chief Complaint   Patient presents with    ZOILA      [place] Medical Record Number:  [id number]  Place of Service where encounter took place:  [facility] ([facility]) [[id number]]    Tootie Marin  is a 69 year old  (1955), admitted to the above facility from  [facility] . Hospital stay 2/25/2025 through 2/27/2025..       Patient seen today for follow up NP visit in TCU:  1. Tinea pedis of right foot    2. Closed compression fracture of L3 vertebra, initial encounter (H)    3. Bilateral lower extremity edema          HPI:    Tinea to right foot, has used creams in the past with not great improvement. Will add oral fluconazole and topical cream.   S/p compression fracture and right radius fracture.   Completed therapies.   Right arm in sling, NWB. Chronic anxiety, on scheduled TID meds.   Denies HA, chest pain, palpitations, dizziness.  No troubles breathing, no SOB, no Concerns with urination, no constipation. Continue senna. Eating and drinking okay. Sleeping okay. Working with therapies. Pain controlled   Weights improved to 185 >186.4>187.4 pounds today with increase lasix. Labs reviewed   Lives at senior living apartments, ambulates with a walker or wheelchair.        CODE STATUS/ADVANCE DIRECTIVES DISCUSSION:  Full Code  CPR/Full code   ALLERGIES:   Allergies   Allergen Reactions    Aspirin (Tartrazine Only) [Tartrazine] Unknown    Chicken Meat (Diagnostic) GI Disturbance    Chicken [Chicken Protein] Unknown    Other Food Allergy Unknown     TURKEY      PAST MEDICAL HISTORY:   Past Medical History:   Diagnosis Date    Anxiety     Chest wall trauma     Chronic prescription benzodiazepine use     Hypercholesteremia     Osteopenia of multiple sites 02/20/2024    Panic disorder       PAST SURGICAL HISTORY:   has a past surgical history that includes  Biopsy breast (Right, 1987).  FAMILY HISTORY: family history includes Cancer in her brother and father; Heart Disease in her mother.  SOCIAL HISTORY:   reports that she has never smoked. She has never used smokeless tobacco. She reports that she does not drink alcohol and does not use drugs.  Patient's living condition: lives alone    Post Discharge Medication Reconciliation Status:   MED REC REQUIRED  Post Medication Reconciliation Status: discharge medications reconciled, continue medications without change       Current Outpatient Medications   Medication Sig Dispense Refill    acetaminophen (TYLENOL) 500 MG tablet Take 1-2 tablets (500-1,000 mg) by mouth every 6 hours as needed for mild pain 100 tablet 3    albuterol (PROAIR HFA/PROVENTIL HFA/VENTOLIN HFA) 108 (90 Base) MCG/ACT inhaler Inhale 2 puffs into the lungs every 6 hours as needed. 54 g 0    clonazePAM (KLONOPIN) 0.5 MG tablet Take 1 tablet (0.5 mg) by mouth 3 times daily. 60 tablet 0    FLUoxetine (PROZAC) 20 MG capsule Take 20 mg by mouth daily. 60 capsule 1    FLUoxetine (PROZAC) 40 MG capsule Take 40 mg by mouth daily.      gabapentin (NEURONTIN) 100 MG capsule Take 1 capsule (100 mg) by mouth 3 times daily. Increase to 200mg three times daily after 3-5 days if tolerating side effects and needing more pain relief. 180 capsule 0    Lidocaine (LIDOCARE) 4 % Patch Place 1 patch onto the skin every 24 hours. To prevent lidocaine toxicity, patient should be patch free for 12 hrs daily.      lovastatin (MEVACOR) 20 MG tablet Take 1 tablet (20 mg) by mouth at bedtime 90 tablet 3    mometasone-formoterol (DULERA) 200-5 MCG/ACT inhaler Inhale 2 puffs into the lungs 2 times daily. 13 g 3    nystatin (MYCOSTATIN) 478531 UNIT/GM external powder Apply topically.      omeprazole (PRILOSEC) 20 MG DR capsule Take 1 capsule (20 mg) by mouth daily 90 capsule 3    potassium chloride jossue ER (KLOR-CON M10) 10 MEQ CR tablet Take 1 tablet (10 mEq) by mouth daily. (Patient  "taking differently: Take 40 mEq by mouth daily.) 90 tablet 3     No current facility-administered medications for this visit.       ROS:  10 point ROS of systems including Constitutional, Eyes, Respiratory, Cardiovascular, Gastroenterology, Genitourinary, Integumentary, Musculoskeletal, Psychiatric were all negative except for pertinent positives noted in my HPI.    Vitals:  BP (!) 147/78   Pulse 62   Temp 98.5  F (36.9  C)   Resp 18   Ht 1.499 m (4' 11\")   Wt 82.6 kg (182 lb)   SpO2 97%   BMI 36.76 kg/m    Exam:  GENERAL APPEARANCE:  Alert, in no distress, oriented, cooperative. Sitting up in chair  ENT:  Mouth and posterior oropharynx normal, moist mucous membranes, several teeth missing.  EYES:  EOM, conjunctivae, lids, pupils and irises normal  NECK:  No adenopathy,masses or thyromegaly  RESP:  respiratory effort and palpation of chest normal, lungs clear to auscultation , no respiratory distress  CV:  Palpation and auscultation of heart done , regular rate and rhythm, no murmur, rub, or gallop, +2 edema in bilateral lower extremities  GI/ABDOMEN:  normal bowel sounds, soft, nontender, no hepatosplenomegaly or other masses  M/S:   moves extremities spontaneously. Cast to left arm   SKIN:  no rash to exposed skin  NEURO:   intact   PSYCH:  oriented X 4, normal insight, judgement and memory, affect and mood normal.      Lab/Diagnostic data:  Recent labs in [facility] reviewed by me today.     Last Comprehensive Metabolic Panel:  Lab Results   Component Value Date     03/17/2025    POTASSIUM 3.8 03/17/2025    CHLORIDE 101 03/17/2025    CO2 21 (L) 03/17/2025    ANIONGAP 15 03/17/2025    GLC 96 03/17/2025    BUN 8.6 03/17/2025    CR 0.77 03/17/2025    GFRESTIMATED 83 03/17/2025    RIDGE 9.6 03/17/2025       Lab Results   Component Value Date    WBC 11.3 08/20/2024     Lab Results   Component Value Date    RBC 4.41 08/20/2024     Lab Results   Component Value Date    HGB 13.1 08/20/2024     Lab Results " "  Component Value Date    HCT 40.1 08/20/2024     No components found for: \"MCT\"  Lab Results   Component Value Date    MCV 91 08/20/2024     Lab Results   Component Value Date    MCH 29.7 08/20/2024     Lab Results   Component Value Date    MCHC 32.7 08/20/2024     Lab Results   Component Value Date    RDW 13.6 08/20/2024     Lab Results   Component Value Date     08/20/2024       ASSESSMENT/PLAN:    Acute / Subacute L3 Compression Fracture   Osteoporosis   Acute Low Back Pain   Mechanical Fall 2/13   Generalized Weakness   Describes several months of low back pain with history of compression fracture diagnosed at OSH at unclear location. Fell on 2/13, tripped getting out of the shower. Increased low back pain, generalized weakness since then. CT lumbar spine w/o IV Cont on admission with superior endplate compression fracture at L3 with 20-30% anterior vertebral height loss. Neurologically intact aside from pain and limited ROM with R hip flexion.   - NSGY consulted: appt on 4/9 for follow up  - Neuro checks q4h   - MR lumbar spine ordered   - Plan for Coreline brace when OOB or HOB >30 degrees   - PT / OT consults, okay to get up once Coreline brace delivered   - Continue PTA calcium + vitamin D   - Scheduled tylenol, resume low dose gabapentin 100 mg TID, lidocaine patch, PRN oxycodone   - WC ordered for discharge.     Bilateral Lower Extremity Edema   New pitting lower extremity over the past 2-3 weeks. Denies shortness of breath, chest pain, lightheadedness or dizziness. Had switched from HCTZ to amlodipine at beginning of Feb, called back to clinic reporting edema 2/20 and had switched back recently. BNP 17 (may be falsely low in setting of obesity), albumin 3.4, UA unremarkable. Does have reduced mobility but given symmetric edema held off on duplex US.   - Increased lasix to 40mg  - Continue tubigrip compression stockings daily.  -  BMP recheck 3/17: reviewed and stable as above.   -daily weights : " 186 pounds today> 191 >187.4 >185>186.      Hypokalemia   K 2.6, Mag 1.8 on admission. Was previously on long term potassium supplement with use of HCTZ that she had stopped earlier in the month. Denies N/V/D, somewhat poor appetite due to fatigue.   - Currently on Potassium supplementation 40 meq daily.  - BMP stable     Hyponatremia   Na 130, may be in setting of poor appetite.   - Encourage PO, recheck in AM   - BMP stable     Hypertension   Had recently switched from PTA HCTZ + potassium to amlodipine at beginning of Feb. Called back on 2/20 with reports of increased lower extremity and was told stop amlodipine at that time and restart HCTZ + potassium which patient tells me she has done.   - BP within normal limits on admission. Agree with stopping amlodipine give lower extremity edema   - Hold PTA HCTZ for now, would resume if elevated BP and improving potassium  - Started on Lasix 20 mg, increase lasix to 40mg. Weights improved: 190>185 today    Asthma   - Continue Dulera + PRN albuterol     GERD   - Continue PTA PPI     Constipation   - PRN bowel regimen, would schedule if requiring frequent opioids     Anxiety   - Continue PTA fluoxetine, PCP has been considering decreasing dose due to tremors  - Continue PTA clonazepam 0.5 mg TID    Cutaneous Candidiasis  - Resolved, monitor     Tinea pedis  Oral fluconazole x 2 weeks. Take one tablet a week  Terbinafine cream 1% daily x 1 week, will reassess       Electronically signed by:  Diane Bhatt CNP       Total time greater than 25 minutes reviewing chart in EPIC and PCC, medications, labs, vitals. Discussing with social work, physical therapy, occupational therapy and nursing.

## 2025-04-07 NOTE — LETTER
4/7/2025      Tootie Marin  [address]  [place] [address]        [facility] GERIATRICS    PRIMARY CARE PROVIDER AND CLINIC:  Latricia Cloud MD, [address] [facility] [address]  Chief Complaint   Patient presents with     RECHECK      [facility] Medical Record Number:  [id number]  Place of Service where encounter took place:  [facility] ([facility]) [[id number]]    Tootie Marin  is a 69 year old  (1955), admitted to the above facility from  [facility] . Hospital stay 2/25/2025 through 2/27/2025..       Patient seen today for follow up NP visit in TCU:  1. Tinea pedis of right foot    2. Closed compression fracture of L3 vertebra, initial encounter (H)    3. Bilateral lower extremity edema          HPI:    Tinea to right foot, has used creams in the past with not great improvement. Will add oral fluconazole and topical cream.   S/p compression fracture and right radius fracture.   Completed therapies.   Right arm in sling, NWB. Chronic anxiety, on scheduled TID meds.   Denies HA, chest pain, palpitations, dizziness.  No troubles breathing, no SOB, no Concerns with urination, no constipation. Continue senna. Eating and drinking okay. Sleeping okay. Working with therapies. Pain controlled   Weights improved to 185 >186.4>187.4 pounds today with increase lasix. Labs reviewed   Lives at senior living apartments, ambulates with a walker or wheelchair.        CODE STATUS/ADVANCE DIRECTIVES DISCUSSION:  Full Code  CPR/Full code   ALLERGIES:   Allergies   Allergen Reactions     Aspirin (Tartrazine Only) [Tartrazine] Unknown     Chicken Meat (Diagnostic) GI Disturbance     Chicken [Chicken Protein] Unknown     Other Food Allergy Unknown     TURKEY      PAST MEDICAL HISTORY:   Past Medical History:   Diagnosis Date     Anxiety      Chest wall trauma      Chronic prescription benzodiazepine use      Hypercholesteremia      Osteopenia of multiple sites  02/20/2024     Panic disorder       PAST SURGICAL HISTORY:   has a past surgical history that includes Biopsy breast (Right, 1987).  FAMILY HISTORY: family history includes Cancer in her brother and father; Heart Disease in her mother.  SOCIAL HISTORY:   reports that she has never smoked. She has never used smokeless tobacco. She reports that she does not drink alcohol and does not use drugs.  Patient's living condition: lives alone    Post Discharge Medication Reconciliation Status:   MED REC REQUIRED  Post Medication Reconciliation Status: discharge medications reconciled, continue medications without change       Current Outpatient Medications   Medication Sig Dispense Refill     acetaminophen (TYLENOL) 500 MG tablet Take 1-2 tablets (500-1,000 mg) by mouth every 6 hours as needed for mild pain 100 tablet 3     albuterol (PROAIR HFA/PROVENTIL HFA/VENTOLIN HFA) 108 (90 Base) MCG/ACT inhaler Inhale 2 puffs into the lungs every 6 hours as needed. 54 g 0     clonazePAM (KLONOPIN) 0.5 MG tablet Take 1 tablet (0.5 mg) by mouth 3 times daily. 60 tablet 0     FLUoxetine (PROZAC) 20 MG capsule Take 20 mg by mouth daily. 60 capsule 1     FLUoxetine (PROZAC) 40 MG capsule Take 40 mg by mouth daily.       gabapentin (NEURONTIN) 100 MG capsule Take 1 capsule (100 mg) by mouth 3 times daily. Increase to 200mg three times daily after 3-5 days if tolerating side effects and needing more pain relief. 180 capsule 0     Lidocaine (LIDOCARE) 4 % Patch Place 1 patch onto the skin every 24 hours. To prevent lidocaine toxicity, patient should be patch free for 12 hrs daily.       lovastatin (MEVACOR) 20 MG tablet Take 1 tablet (20 mg) by mouth at bedtime 90 tablet 3     mometasone-formoterol (DULERA) 200-5 MCG/ACT inhaler Inhale 2 puffs into the lungs 2 times daily. 13 g 3     nystatin (MYCOSTATIN) 028537 UNIT/GM external powder Apply topically.       omeprazole (PRILOSEC) 20 MG DR capsule Take 1 capsule (20 mg) by mouth daily 90  "capsule 3     potassium chloride jossue ER (KLOR-CON M10) 10 MEQ CR tablet Take 1 tablet (10 mEq) by mouth daily. (Patient taking differently: Take 40 mEq by mouth daily.) 90 tablet 3     No current facility-administered medications for this visit.       ROS:  10 point ROS of systems including Constitutional, Eyes, Respiratory, Cardiovascular, Gastroenterology, Genitourinary, Integumentary, Musculoskeletal, Psychiatric were all negative except for pertinent positives noted in my HPI.    Vitals:  BP (!) 147/78   Pulse 62   Temp 98.5  F (36.9  C)   Resp 18   Ht 1.499 m (4' 11\")   Wt 82.6 kg (182 lb)   SpO2 97%   BMI 36.76 kg/m    Exam:  GENERAL APPEARANCE:  Alert, in no distress, oriented, cooperative. Sitting up in chair  ENT:  Mouth and posterior oropharynx normal, moist mucous membranes, several teeth missing.  EYES:  EOM, conjunctivae, lids, pupils and irises normal  NECK:  No adenopathy,masses or thyromegaly  RESP:  respiratory effort and palpation of chest normal, lungs clear to auscultation , no respiratory distress  CV:  Palpation and auscultation of heart done , regular rate and rhythm, no murmur, rub, or gallop, +2 edema in bilateral lower extremities  GI/ABDOMEN:  normal bowel sounds, soft, nontender, no hepatosplenomegaly or other masses  M/S:   moves extremities spontaneously. Cast to left arm   SKIN:  no rash to exposed skin  NEURO:   intact   PSYCH:  oriented X 4, normal insight, judgement and memory, affect and mood normal.      Lab/Diagnostic data:  Recent labs in [facility] reviewed by me today.     Last Comprehensive Metabolic Panel:  Lab Results   Component Value Date     03/17/2025    POTASSIUM 3.8 03/17/2025    CHLORIDE 101 03/17/2025    CO2 21 (L) 03/17/2025    ANIONGAP 15 03/17/2025    GLC 96 03/17/2025    BUN 8.6 03/17/2025    CR 0.77 03/17/2025    GFRESTIMATED 83 03/17/2025    RIDGE 9.6 03/17/2025       Lab Results   Component Value Date    WBC 11.3 08/20/2024     Lab Results   Component " "Value Date    RBC 4.41 08/20/2024     Lab Results   Component Value Date    HGB 13.1 08/20/2024     Lab Results   Component Value Date    HCT 40.1 08/20/2024     No components found for: \"MCT\"  Lab Results   Component Value Date    MCV 91 08/20/2024     Lab Results   Component Value Date    MCH 29.7 08/20/2024     Lab Results   Component Value Date    MCHC 32.7 08/20/2024     Lab Results   Component Value Date    RDW 13.6 08/20/2024     Lab Results   Component Value Date     08/20/2024       ASSESSMENT/PLAN:    Acute / Subacute L3 Compression Fracture   Osteoporosis   Acute Low Back Pain   Mechanical Fall 2/13   Generalized Weakness   Describes several months of low back pain with history of compression fracture diagnosed at OSH at unclear location. Fell on 2/13, tripped getting out of the shower. Increased low back pain, generalized weakness since then. CT lumbar spine w/o IV Cont on admission with superior endplate compression fracture at L3 with 20-30% anterior vertebral height loss. Neurologically intact aside from pain and limited ROM with R hip flexion.   - NSGY consulted: appt on 4/9 for follow up  - Neuro checks q4h   - MR lumbar spine ordered   - Plan for Coreline brace when OOB or HOB >30 degrees   - PT / OT consults, okay to get up once Coreline brace delivered   - Continue PTA calcium + vitamin D   - Scheduled tylenol, resume low dose gabapentin 100 mg TID, lidocaine patch, PRN oxycodone   - WC ordered for discharge.     Bilateral Lower Extremity Edema   New pitting lower extremity over the past 2-3 weeks. Denies shortness of breath, chest pain, lightheadedness or dizziness. Had switched from HCTZ to amlodipine at beginning of Feb, called back to clinic reporting edema 2/20 and had switched back recently. BNP 17 (may be falsely low in setting of obesity), albumin 3.4, UA unremarkable. Does have reduced mobility but given symmetric edema held off on duplex US.   - Increased lasix to 40mg  - " Continue tubigrip compression stockings daily.  -  BMP recheck 3/17: reviewed and stable as above.   -daily weights : 186 pounds today> 191 >187.4 >185>186.      Hypokalemia   K 2.6, Mag 1.8 on admission. Was previously on long term potassium supplement with use of HCTZ that she had stopped earlier in the month. Denies N/V/D, somewhat poor appetite due to fatigue.   - Currently on Potassium supplementation 40 meq daily.  - BMP stable     Hyponatremia   Na 130, may be in setting of poor appetite.   - Encourage PO, recheck in AM   - BMP stable     Hypertension   Had recently switched from PTA HCTZ + potassium to amlodipine at beginning of Feb. Called back on 2/20 with reports of increased lower extremity and was told stop amlodipine at that time and restart HCTZ + potassium which patient tells me she has done.   - BP within normal limits on admission. Agree with stopping amlodipine give lower extremity edema   - Hold PTA HCTZ for now, would resume if elevated BP and improving potassium  - Started on Lasix 20 mg, increase lasix to 40mg. Weights improved: 190>185 today    Asthma   - Continue Dulera + PRN albuterol     GERD   - Continue PTA PPI     Constipation   - PRN bowel regimen, would schedule if requiring frequent opioids     Anxiety   - Continue PTA fluoxetine, PCP has been considering decreasing dose due to tremors  - Continue PTA clonazepam 0.5 mg TID    Cutaneous Candidiasis  - Resolved, monitor     Tinea pedis  Oral fluconazole x 2 weeks. Take one tablet a week  Terbinafine cream 1% daily x 1 week, will reassess       Electronically signed by:  Diane Bhatt CNP       Total time greater than 25 minutes reviewing chart in EPIC and PCC, medications, labs, vitals. Discussing with social work, physical therapy, occupational therapy and nursing.           Sincerely,        Diane Bhatt CNP    Electronically signed

## 2025-04-08 ENCOUNTER — TRANSITIONAL CARE UNIT VISIT (OUTPATIENT)
Dept: GERIATRICS | Facility: CLINIC | Age: 70
End: 2025-04-08
Payer: COMMERCIAL

## 2025-04-08 DIAGNOSIS — F41.1 ANXIETY STATE: ICD-10-CM

## 2025-04-08 DIAGNOSIS — S62.102D CLOSED FRACTURE OF LEFT WRIST WITH ROUTINE HEALING, SUBSEQUENT ENCOUNTER: ICD-10-CM

## 2025-04-08 DIAGNOSIS — I10 ESSENTIAL HYPERTENSION: ICD-10-CM

## 2025-04-08 DIAGNOSIS — S32.030D COMPRESSION FRACTURE OF L3 VERTEBRA WITH ROUTINE HEALING, SUBSEQUENT ENCOUNTER: Primary | ICD-10-CM

## 2025-04-08 DIAGNOSIS — J45.20 MILD INTERMITTENT ASTHMA WITHOUT COMPLICATION: ICD-10-CM

## 2025-04-08 DIAGNOSIS — E78.5 HYPERLIPIDEMIA, UNSPECIFIED HYPERLIPIDEMIA TYPE: ICD-10-CM

## 2025-04-08 PROCEDURE — 99309 SBSQ NF CARE MODERATE MDM 30: CPT | Performed by: FAMILY MEDICINE

## 2025-04-08 NOTE — LETTER
4/8/2025      Tootie Marin  [address]  [place] [address]        No notes on file      Sincerely,        Aida Hussein MD    Electronically signed

## 2025-04-09 VITALS
WEIGHT: 184 LBS | OXYGEN SATURATION: 95 % | DIASTOLIC BLOOD PRESSURE: 67 MMHG | BODY MASS INDEX: 37.09 KG/M2 | TEMPERATURE: 98.5 F | RESPIRATION RATE: 18 BRPM | SYSTOLIC BLOOD PRESSURE: 144 MMHG | HEIGHT: 59 IN | HEART RATE: 66 BPM

## 2025-04-15 ENCOUNTER — TRANSITIONAL CARE UNIT VISIT (OUTPATIENT)
Dept: GERIATRICS | Facility: CLINIC | Age: 70
End: 2025-04-15
Payer: COMMERCIAL

## 2025-04-15 VITALS
WEIGHT: 185 LBS | OXYGEN SATURATION: 97 % | SYSTOLIC BLOOD PRESSURE: 143 MMHG | HEIGHT: 59 IN | RESPIRATION RATE: 18 BRPM | TEMPERATURE: 98.7 F | DIASTOLIC BLOOD PRESSURE: 60 MMHG | HEART RATE: 70 BPM | BODY MASS INDEX: 37.29 KG/M2

## 2025-04-15 DIAGNOSIS — F41.1 ANXIETY STATE: ICD-10-CM

## 2025-04-15 DIAGNOSIS — Z99.3 WHEELCHAIR DEPENDENCE: ICD-10-CM

## 2025-04-15 DIAGNOSIS — E87.6 ACUTE HYPOKALEMIA: ICD-10-CM

## 2025-04-15 DIAGNOSIS — M81.0 AGE-RELATED OSTEOPOROSIS WITHOUT CURRENT PATHOLOGICAL FRACTURE: ICD-10-CM

## 2025-04-15 DIAGNOSIS — M80.032D PATHOLOGICAL FRACTURE OF LEFT RADIUS DUE TO OSTEOPOROSIS WITH ROUTINE HEALING, UNSPECIFIED OSTEOPOROSIS TYPE, SUBSEQUENT ENCOUNTER: Primary | ICD-10-CM

## 2025-04-15 DIAGNOSIS — F41.0 PANIC DISORDER WITHOUT AGORAPHOBIA: ICD-10-CM

## 2025-04-15 DIAGNOSIS — K21.9 GASTROESOPHAGEAL REFLUX DISEASE WITHOUT ESOPHAGITIS: ICD-10-CM

## 2025-04-15 DIAGNOSIS — S32.030A CLOSED COMPRESSION FRACTURE OF L3 VERTEBRA, INITIAL ENCOUNTER (H): ICD-10-CM

## 2025-04-15 DIAGNOSIS — B35.3 TINEA PEDIS OF RIGHT FOOT: ICD-10-CM

## 2025-04-15 DIAGNOSIS — R60.0 BILATERAL LOWER EXTREMITY EDEMA: ICD-10-CM

## 2025-04-15 PROCEDURE — 99309 SBSQ NF CARE MODERATE MDM 30: CPT | Performed by: NURSE PRACTITIONER

## 2025-04-15 RX ORDER — CLONAZEPAM 0.5 MG/1
0.5 TABLET ORAL 3 TIMES DAILY
Qty: 60 TABLET | Refills: 0 | Status: SHIPPED | OUTPATIENT
Start: 2025-04-15

## 2025-04-15 NOTE — LETTER
4/15/2025      Tootie Marin  [address]  [place] [address]        [facility] GERIATRICS    PRIMARY CARE PROVIDER AND CLINIC:  Latricia Cloud MD, [address] [facility] [address]  Chief Complaint   Patient presents with     RECHECK      [facility] Medical Record Number:  [id number]  Place of Service where encounter took place:  [facility] ([facility]) [[id number]]    Tootie Marin  is a 69 year old  (1955), admitted to the above facility from  [facility] . Hospital stay 2/25/2025 through 2/27/2025..       Patient seen today for follow up NP visit in TCU:  1. Pathological fracture of left radius due to osteoporosis with routine healing, unspecified osteoporosis type, subsequent encounter    2. Tinea pedis of right foot    3. Gastroesophageal reflux disease without esophagitis    4. Acute hypokalemia    5. Closed compression fracture of L3 vertebra, initial encounter (H)    6. Wheelchair dependence    7. Anxiety    8. Bilateral lower extremity edema    9. Age-related osteoporosis without current pathological fracture          HPI:    Tinea to right foot, has used creams in the past with not great improvement. Add oral fluconazole and topical cream, improved  S/p compression fracture and right radius fracture. Going for Ortho appt today to have cast removed   Completed therapies.   Chronic anxiety, on scheduled TID meds.   Denies HA, chest pain, palpitations, dizziness.  No troubles breathing, no SOB, no Concerns with urination, no constipation. Continue senna. Eating and drinking okay. Sleeping okay. Working with therapies. Pain controlled   Weights improved to 185 >186.4>187.4> 185.2 pounds today with increase lasix. Labs reviewed   Lives at senior living apartments, ambulates with a walker or wheelchair.        CODE STATUS/ADVANCE DIRECTIVES DISCUSSION:  Full Code  CPR/Full code   ALLERGIES:   Allergies   Allergen Reactions     Aspirin  (Tartrazine Only) [Tartrazine] Unknown     Chicken Meat (Diagnostic) GI Disturbance     Chicken [Chicken Protein] Unknown     Other Food Allergy Unknown     TURKEY      PAST MEDICAL HISTORY:   Past Medical History:   Diagnosis Date     Anxiety      Chest wall trauma      Chronic prescription benzodiazepine use      Hypercholesteremia      Osteopenia of multiple sites 02/20/2024     Panic disorder       PAST SURGICAL HISTORY:   has a past surgical history that includes Biopsy breast (Right, 1987).  FAMILY HISTORY: family history includes Cancer in her brother and father; Heart Disease in her mother.  SOCIAL HISTORY:   reports that she has never smoked. She has never used smokeless tobacco. She reports that she does not drink alcohol and does not use drugs.  Patient's living condition: lives alone    Post Discharge Medication Reconciliation Status:   MED REC REQUIRED  Post Medication Reconciliation Status: discharge medications reconciled, continue medications without change       Current Outpatient Medications   Medication Sig Dispense Refill     acetaminophen (TYLENOL) 500 MG tablet Take 1-2 tablets (500-1,000 mg) by mouth every 6 hours as needed for mild pain 100 tablet 3     albuterol (PROAIR HFA/PROVENTIL HFA/VENTOLIN HFA) 108 (90 Base) MCG/ACT inhaler Inhale 2 puffs into the lungs every 6 hours as needed. 54 g 0     clonazePAM (KLONOPIN) 0.5 MG tablet Take 1 tablet (0.5 mg) by mouth 3 times daily. 60 tablet 0     FLUoxetine (PROZAC) 20 MG capsule Take 20 mg by mouth daily. 60 capsule 1     FLUoxetine (PROZAC) 40 MG capsule Take 40 mg by mouth daily.       gabapentin (NEURONTIN) 100 MG capsule Take 1 capsule (100 mg) by mouth 3 times daily. Increase to 200mg three times daily after 3-5 days if tolerating side effects and needing more pain relief. 180 capsule 0     Lidocaine (LIDOCARE) 4 % Patch Place 1 patch onto the skin every 24 hours. To prevent lidocaine toxicity, patient should be patch free for 12 hrs  "daily.       lovastatin (MEVACOR) 20 MG tablet Take 1 tablet (20 mg) by mouth at bedtime 90 tablet 3     mometasone-formoterol (DULERA) 200-5 MCG/ACT inhaler Inhale 2 puffs into the lungs 2 times daily. 13 g 3     nystatin (MYCOSTATIN) 475128 UNIT/GM external powder Apply topically.       omeprazole (PRILOSEC) 20 MG DR capsule Take 1 capsule (20 mg) by mouth daily 90 capsule 3     potassium chloride jossue ER (KLOR-CON M10) 10 MEQ CR tablet Take 1 tablet (10 mEq) by mouth daily. (Patient taking differently: Take 40 mEq by mouth daily.) 90 tablet 3     No current facility-administered medications for this visit.       ROS:  10 point ROS of systems including Constitutional, Eyes, Respiratory, Cardiovascular, Gastroenterology, Genitourinary, Integumentary, Musculoskeletal, Psychiatric were all negative except for pertinent positives noted in my HPI.    Vitals:  BP (!) 143/60   Pulse 70   Temp 98.7  F (37.1  C)   Resp 18   Ht 1.499 m (4' 11\")   Wt 83.9 kg (185 lb)   SpO2 97%   BMI 37.37 kg/m    Exam:  GENERAL APPEARANCE:  Alert, in no distress, oriented, cooperative. Laying in bed  ENT:  Mouth and posterior oropharynx normal, moist mucous membranes, several teeth missing.  EYES:  EOM, conjunctivae, lids, pupils and irises normal  NECK:  No adenopathy,masses or thyromegaly  RESP:  respiratory effort and palpation of chest normal, lungs clear to auscultation , no respiratory distress  CV:  Palpation and auscultation of heart done , regular rate and rhythm, no murmur, rub, or gallop, +2 edema in bilateral lower extremities  GI/ABDOMEN:  normal bowel sounds, soft, nontender, no hepatosplenomegaly or other masses  M/S:   moves extremities spontaneously. Cast to left arm   SKIN:  no rash to exposed skin  NEURO:   intact   PSYCH:  oriented X 4, normal insight, judgement and memory, affect and mood normal.      Lab/Diagnostic data:  Recent labs in [facility] reviewed by me today.     Last Comprehensive Metabolic Panel:  Lab " "Results   Component Value Date     03/17/2025    POTASSIUM 3.8 03/17/2025    CHLORIDE 101 03/17/2025    CO2 21 (L) 03/17/2025    ANIONGAP 15 03/17/2025    GLC 96 03/17/2025    BUN 8.6 03/17/2025    CR 0.77 03/17/2025    GFRESTIMATED 83 03/17/2025    RIDGE 9.6 03/17/2025       Lab Results   Component Value Date    WBC 11.3 08/20/2024     Lab Results   Component Value Date    RBC 4.41 08/20/2024     Lab Results   Component Value Date    HGB 13.1 08/20/2024     Lab Results   Component Value Date    HCT 40.1 08/20/2024     No components found for: \"MCT\"  Lab Results   Component Value Date    MCV 91 08/20/2024     Lab Results   Component Value Date    MCH 29.7 08/20/2024     Lab Results   Component Value Date    MCHC 32.7 08/20/2024     Lab Results   Component Value Date    RDW 13.6 08/20/2024     Lab Results   Component Value Date     08/20/2024       ASSESSMENT/PLAN:    Acute / Subacute L3 Compression Fracture   Osteoporosis   Acute Low Back Pain   Mechanical Fall 2/13   Generalized Weakness   Left radius fracture  Describes several months of low back pain with history of compression fracture diagnosed at OSH at unclear location. Fell on 2/13, tripped getting out of the shower. Increased low back pain, generalized weakness since then. CT lumbar spine w/o IV Cont on admission with superior endplate compression fracture at L3 with 20-30% anterior vertebral height loss. Neurologically intact aside from pain and limited ROM with R hip flexion.   - NSGY consulted: appt on 4/9 for follow up  - PT / OT completed in TCU  - Continue PTA calcium + vitamin D   - Pain well controlled, plan to discontinue oxycodone  -Going for Ortho follow up today to have left radius cast removed   - WC ordered for discharge.     Bilateral Lower Extremity Edema   New pitting lower extremity over the past 2-3 weeks. Denies shortness of breath, chest pain, lightheadedness or dizziness. Had switched from HCTZ to amlodipine at beginning of " Feb, called back to clinic reporting edema 2/20 and had switched back recently. BNP 17 (may be falsely low in setting of obesity), albumin 3.4, UA unremarkable. Does have reduced mobility but given symmetric edema held off on duplex US.   - Increased lasix to 40mg  - Continue tubigrip compression stockings daily.  -  BMP recheck 3/17: reviewed and stable as above.   -daily weights : 186 pounds today> 191 >187.4 >185>186.      Hypokalemia   K 2.6, Mag 1.8 on admission. Was previously on long term potassium supplement with use of HCTZ that she had stopped earlier in the month. Denies N/V/D, somewhat poor appetite due to fatigue.   - Currently on Potassium supplementation 40 meq daily.  - BMP stable     Hyponatremia   Na 130, may be in setting of poor appetite.   - Encourage PO, recheck in AM   - BMP stable     Hypertension   Had recently switched from PTA HCTZ + potassium to amlodipine at beginning of Feb. Called back on 2/20 with reports of increased lower extremity and was told stop amlodipine at that time and restart HCTZ + potassium which patient tells me she has done.   - BP within normal limits on admission. Agree with stopping amlodipine give lower extremity edema   - Hold PTA HCTZ for now, would resume if elevated BP and improving potassium  - Started on Lasix 20 mg, increase lasix to 40mg. Weights improved: 190>185 today    Asthma   - Continue Dulera + PRN albuterol     GERD   - Continue PTA PPI     Constipation   - PRN bowel regimen, would schedule if requiring frequent opioids     Anxiety   - Continue PTA fluoxetine, PCP has been considering decreasing dose due to tremors  - Continue PTA clonazepam 0.5 mg TID    Cutaneous Candidiasis  - Resolved, monitor     Tinea pedis  Oral fluconazole x 2 weeks. Take one tablet a week  Terbinafine cream 1% daily x 1 week, will reassess   Improved     Electronically signed by:  Diane Bhatt CNP       Total time greater than 25 minutes reviewing chart in EPIC and PCC,  medications, labs, vitals. Discussing with social work, physical therapy, occupational therapy and nursing.           Sincerely,        Diane Bhatt CNP    Electronically signed

## 2025-04-15 NOTE — PROGRESS NOTES
[facility] GERIATRICS    PRIMARY CARE PROVIDER AND CLINIC:  Latricia Cloud MD, [address] [facility] [address]  Chief Complaint   Patient presents with    KAYLINECK      [place] Medical Record Number:  [id number]  Place of Service where encounter took place:  [facility] ([facility]) [[id number]]    Tootie Marin  is a 69 year old  (1955), admitted to the above facility from  [facility] . Hospital stay 2/25/2025 through 2/27/2025..       Patient seen today for follow up NP visit in TCU:  1. Pathological fracture of left radius due to osteoporosis with routine healing, unspecified osteoporosis type, subsequent encounter    2. Tinea pedis of right foot    3. Gastroesophageal reflux disease without esophagitis    4. Acute hypokalemia    5. Closed compression fracture of L3 vertebra, initial encounter (H)    6. Wheelchair dependence    7. Anxiety    8. Bilateral lower extremity edema    9. Age-related osteoporosis without current pathological fracture          HPI:    Tinea to right foot, has used creams in the past with not great improvement. Add oral fluconazole and topical cream, improved  S/p compression fracture and right radius fracture. Going for Ortho appt today to have cast removed   Completed therapies.   Chronic anxiety, on scheduled TID meds.   Denies HA, chest pain, palpitations, dizziness.  No troubles breathing, no SOB, no Concerns with urination, no constipation. Continue senna. Eating and drinking okay. Sleeping okay. Working with therapies. Pain controlled   Weights improved to 185 >186.4>187.4> 185.2 pounds today with increase lasix. Labs reviewed   Lives at senior living apartments, ambulates with a walker or wheelchair.        CODE STATUS/ADVANCE DIRECTIVES DISCUSSION:  Full Code  CPR/Full code   ALLERGIES:   Allergies   Allergen Reactions    Aspirin (Tartrazine Only) [Tartrazine] Unknown    Chicken Meat (Diagnostic) GI Disturbance    Chicken  [Chicken Protein] Unknown    Other Food Allergy Unknown     TURKEY      PAST MEDICAL HISTORY:   Past Medical History:   Diagnosis Date    Anxiety     Chest wall trauma     Chronic prescription benzodiazepine use     Hypercholesteremia     Osteopenia of multiple sites 02/20/2024    Panic disorder       PAST SURGICAL HISTORY:   has a past surgical history that includes Biopsy breast (Right, 1987).  FAMILY HISTORY: family history includes Cancer in her brother and father; Heart Disease in her mother.  SOCIAL HISTORY:   reports that she has never smoked. She has never used smokeless tobacco. She reports that she does not drink alcohol and does not use drugs.  Patient's living condition: lives alone    Post Discharge Medication Reconciliation Status:   MED REC REQUIRED  Post Medication Reconciliation Status: discharge medications reconciled, continue medications without change       Current Outpatient Medications   Medication Sig Dispense Refill    acetaminophen (TYLENOL) 500 MG tablet Take 1-2 tablets (500-1,000 mg) by mouth every 6 hours as needed for mild pain 100 tablet 3    albuterol (PROAIR HFA/PROVENTIL HFA/VENTOLIN HFA) 108 (90 Base) MCG/ACT inhaler Inhale 2 puffs into the lungs every 6 hours as needed. 54 g 0    clonazePAM (KLONOPIN) 0.5 MG tablet Take 1 tablet (0.5 mg) by mouth 3 times daily. 60 tablet 0    FLUoxetine (PROZAC) 20 MG capsule Take 20 mg by mouth daily. 60 capsule 1    FLUoxetine (PROZAC) 40 MG capsule Take 40 mg by mouth daily.      gabapentin (NEURONTIN) 100 MG capsule Take 1 capsule (100 mg) by mouth 3 times daily. Increase to 200mg three times daily after 3-5 days if tolerating side effects and needing more pain relief. 180 capsule 0    Lidocaine (LIDOCARE) 4 % Patch Place 1 patch onto the skin every 24 hours. To prevent lidocaine toxicity, patient should be patch free for 12 hrs daily.      lovastatin (MEVACOR) 20 MG tablet Take 1 tablet (20 mg) by mouth at bedtime 90 tablet 3     "mometasone-formoterol (DULERA) 200-5 MCG/ACT inhaler Inhale 2 puffs into the lungs 2 times daily. 13 g 3    nystatin (MYCOSTATIN) 133816 UNIT/GM external powder Apply topically.      omeprazole (PRILOSEC) 20 MG DR capsule Take 1 capsule (20 mg) by mouth daily 90 capsule 3    potassium chloride jossue ER (KLOR-CON M10) 10 MEQ CR tablet Take 1 tablet (10 mEq) by mouth daily. (Patient taking differently: Take 40 mEq by mouth daily.) 90 tablet 3     No current facility-administered medications for this visit.       ROS:  10 point ROS of systems including Constitutional, Eyes, Respiratory, Cardiovascular, Gastroenterology, Genitourinary, Integumentary, Musculoskeletal, Psychiatric were all negative except for pertinent positives noted in my HPI.    Vitals:  BP (!) 143/60   Pulse 70   Temp 98.7  F (37.1  C)   Resp 18   Ht 1.499 m (4' 11\")   Wt 83.9 kg (185 lb)   SpO2 97%   BMI 37.37 kg/m    Exam:  GENERAL APPEARANCE:  Alert, in no distress, oriented, cooperative. Laying in bed  ENT:  Mouth and posterior oropharynx normal, moist mucous membranes, several teeth missing.  EYES:  EOM, conjunctivae, lids, pupils and irises normal  NECK:  No adenopathy,masses or thyromegaly  RESP:  respiratory effort and palpation of chest normal, lungs clear to auscultation , no respiratory distress  CV:  Palpation and auscultation of heart done , regular rate and rhythm, no murmur, rub, or gallop, +2 edema in bilateral lower extremities  GI/ABDOMEN:  normal bowel sounds, soft, nontender, no hepatosplenomegaly or other masses  M/S:   moves extremities spontaneously. Cast to left arm   SKIN:  no rash to exposed skin  NEURO:   intact   PSYCH:  oriented X 4, normal insight, judgement and memory, affect and mood normal.      Lab/Diagnostic data:  Recent labs in [facility] reviewed by me today.     Last Comprehensive Metabolic Panel:  Lab Results   Component Value Date     03/17/2025    POTASSIUM 3.8 03/17/2025    CHLORIDE 101 03/17/2025    " "CO2 21 (L) 03/17/2025    ANIONGAP 15 03/17/2025    GLC 96 03/17/2025    BUN 8.6 03/17/2025    CR 0.77 03/17/2025    GFRESTIMATED 83 03/17/2025    RIDGE 9.6 03/17/2025       Lab Results   Component Value Date    WBC 11.3 08/20/2024     Lab Results   Component Value Date    RBC 4.41 08/20/2024     Lab Results   Component Value Date    HGB 13.1 08/20/2024     Lab Results   Component Value Date    HCT 40.1 08/20/2024     No components found for: \"MCT\"  Lab Results   Component Value Date    MCV 91 08/20/2024     Lab Results   Component Value Date    MCH 29.7 08/20/2024     Lab Results   Component Value Date    MCHC 32.7 08/20/2024     Lab Results   Component Value Date    RDW 13.6 08/20/2024     Lab Results   Component Value Date     08/20/2024       ASSESSMENT/PLAN:    Acute / Subacute L3 Compression Fracture   Osteoporosis   Acute Low Back Pain   Mechanical Fall 2/13   Generalized Weakness   Left radius fracture  Describes several months of low back pain with history of compression fracture diagnosed at OSH at unclear location. Fell on 2/13, tripped getting out of the shower. Increased low back pain, generalized weakness since then. CT lumbar spine w/o IV Cont on admission with superior endplate compression fracture at L3 with 20-30% anterior vertebral height loss. Neurologically intact aside from pain and limited ROM with R hip flexion.   - NSGY consulted: appt on 4/9 for follow up  - PT / OT completed in TCU  - Continue PTA calcium + vitamin D   - Pain well controlled, plan to discontinue oxycodone  -Going for Ortho follow up today to have left radius cast removed   - WC ordered for discharge.     Bilateral Lower Extremity Edema   New pitting lower extremity over the past 2-3 weeks. Denies shortness of breath, chest pain, lightheadedness or dizziness. Had switched from HCTZ to amlodipine at beginning of Feb, called back to clinic reporting edema 2/20 and had switched back recently. BNP 17 (may be falsely low in " setting of obesity), albumin 3.4, UA unremarkable. Does have reduced mobility but given symmetric edema held off on duplex US.   - Increased lasix to 40mg  - Continue tubigrip compression stockings daily.  -  BMP recheck 3/17: reviewed and stable as above.   -daily weights : 186 pounds today> 191 >187.4 >185>186.      Hypokalemia   K 2.6, Mag 1.8 on admission. Was previously on long term potassium supplement with use of HCTZ that she had stopped earlier in the month. Denies N/V/D, somewhat poor appetite due to fatigue.   - Currently on Potassium supplementation 40 meq daily.  - BMP stable     Hyponatremia   Na 130, may be in setting of poor appetite.   - Encourage PO, recheck in AM   - BMP stable     Hypertension   Had recently switched from PTA HCTZ + potassium to amlodipine at beginning of Feb. Called back on 2/20 with reports of increased lower extremity and was told stop amlodipine at that time and restart HCTZ + potassium which patient tells me she has done.   - BP within normal limits on admission. Agree with stopping amlodipine give lower extremity edema   - Hold PTA HCTZ for now, would resume if elevated BP and improving potassium  - Started on Lasix 20 mg, increase lasix to 40mg. Weights improved: 190>185 today    Asthma   - Continue Dulera + PRN albuterol     GERD   - Continue PTA PPI     Constipation   - PRN bowel regimen, would schedule if requiring frequent opioids     Anxiety   - Continue PTA fluoxetine, PCP has been considering decreasing dose due to tremors  - Continue PTA clonazepam 0.5 mg TID    Cutaneous Candidiasis  - Resolved, monitor     Tinea pedis  Oral fluconazole x 2 weeks. Take one tablet a week  Terbinafine cream 1% daily x 1 week, will reassess   Improved     Electronically signed by:  Diane Bhatt CNP       Total time greater than 25 minutes reviewing chart in EPIC and PCC, medications, labs, vitals. Discussing with social work, physical therapy, occupational therapy and nursing.

## 2025-04-22 ENCOUNTER — TRANSITIONAL CARE UNIT VISIT (OUTPATIENT)
Dept: GERIATRICS | Facility: CLINIC | Age: 70
End: 2025-04-22
Payer: COMMERCIAL

## 2025-04-22 ENCOUNTER — PATIENT OUTREACH (OUTPATIENT)
Dept: GERIATRIC MEDICINE | Facility: CLINIC | Age: 70
End: 2025-04-22

## 2025-04-22 VITALS
BODY MASS INDEX: 37.24 KG/M2 | TEMPERATURE: 98.7 F | DIASTOLIC BLOOD PRESSURE: 68 MMHG | HEIGHT: 59 IN | WEIGHT: 184.7 LBS | OXYGEN SATURATION: 95 % | SYSTOLIC BLOOD PRESSURE: 143 MMHG | HEART RATE: 70 BPM | RESPIRATION RATE: 18 BRPM

## 2025-04-22 DIAGNOSIS — I10 ESSENTIAL HYPERTENSION: ICD-10-CM

## 2025-04-22 DIAGNOSIS — S62.102D CLOSED FRACTURE OF LEFT WRIST WITH ROUTINE HEALING, SUBSEQUENT ENCOUNTER: ICD-10-CM

## 2025-04-22 DIAGNOSIS — F41.1 ANXIETY STATE: ICD-10-CM

## 2025-04-22 DIAGNOSIS — S32.030D COMPRESSION FRACTURE OF L3 VERTEBRA WITH ROUTINE HEALING, SUBSEQUENT ENCOUNTER: Primary | ICD-10-CM

## 2025-04-22 DIAGNOSIS — J45.20 MILD INTERMITTENT ASTHMA WITHOUT COMPLICATION: ICD-10-CM

## 2025-04-22 PROCEDURE — 99309 SBSQ NF CARE MODERATE MDM 30: CPT | Performed by: FAMILY MEDICINE

## 2025-04-22 ASSESSMENT — LIFESTYLE VARIABLES
HOW OFTEN DO YOU HAVE SIX OR MORE DRINKS ON ONE OCCASION: NEVER
HOW MANY STANDARD DRINKS CONTAINING ALCOHOL DO YOU HAVE ON A TYPICAL DAY: PATIENT DOES NOT DRINK
SKIP TO QUESTIONS 9-10: 1
HOW OFTEN DO YOU HAVE A DRINK CONTAINING ALCOHOL: NEVER
AUDIT-C TOTAL SCORE: 0

## 2025-04-22 NOTE — LETTER
4/22/2025      Tootie Marin  [address]  [place] [address]        No notes on file      Sincerely,        Aida Hussein MD    Electronically signed   numbness, tingling or focal weakness. No lightheadedness or dizziness. No urinary symptoms including retention, although did have 1-2 days where urine felt 'hot' prior to admission. Describes chronic constipation. No saddle paresthesia. No fevers, chills, sweats, chest pain / pressure, shortness of breath, cough, abdominal pain, N/V/D. Has had increasing bilateral lower extremity swelling for the past few weeks. Was taken off HCTZ + potassium at the beginning of Feb and switched to amlodipine. Called her clinic with concerns of swelling 2/20 and was recommended to stop amlodipine at that time.     On arrival to the ED, vitals within normal limits. Labs notable for K 2.6, Na 130, WBC 10.9, BNP 17, UA with mucus present. CT lumbar spine shows superior endplate compression fracture at L3 with 20-30% anterior vertebral height loss is somewhat age-indeterminate, but potentially acute to subacute in nature. Received 40 mEq effer-k. ED discussed case with trauma and NSGY.     Hospital Course, by problem:   Tootie Marin is a 69 y.o. female with PMH of osteoporosis, asthma, anxiety, HTN, GERD, HLD, MDD who presented to the ED for increased bilateral lower extremity edema, dysuria and hypertension.     Acute / Subacute L3 Compression Fracture   Osteoporosis   Acute Low Back Pain   Mechanical Fall 2/13   Generalized Weakness   Describes several months of low back pain with history of compression fracture diagnosed at OSH at unclear location. Fell on 2/13, tripped getting out of the shower. Increased low back pain, generalized weakness since then. CT lumbar spine w/o IV Cont on admission with superior endplate compression fracture at L3 with 20-30% anterior vertebral height loss. Neurologically intact aside from pain and limited ROM with R hip flexion.   - NSGY consulted  - Neuro checks q4h   - MR lumbar spine ordered   - Plan for Coreline brace when OOB or HOB >30 degrees   - PT / OT consults, okay to get up once Coreline  brace delivered   - Continue PTA calcium + vitamin D   - Scheduled tylenol, resume low dose gabapentin 100 mg TID, lidocaine patch, PRN oxycodone     Bilateral Lower Extremity Edema   New pitting lower extremity over the past 2-3 weeks. Denies shortness of breath, chest pain, lightheadedness or dizziness. Had switched from HCTZ to amlodipine at beginning of Feb, called back to clinic reporting edema 2/20 and had switched back recently. BNP 17 (may be falsely low in setting of obesity), albumin 3.4, UA unremarkable. Does have reduced mobility but given symmetric edema held off on duplex US.   - Suspect most likely in setting of stopping HCTZ and recent addition of amlodipine   - Agree with stopping amlodipine   - Restart HCTZ when potassium improved   - TTE ordered -> overall normal  - BP stable on d/c so HCTZ not reordered, can be considered as outpatient     Hypokalemia   K 2.6, Mag 1.8 on admission. Was previously on long term potassium supplement with use of HCTZ that she had stopped earlier in the month. Denies N/V/D, somewhat poor appetite due to fatigue.   - Electrolyte replacement protocol     Hyponatremia   Na 130, may be in setting of poor appetite.   - Encourage PO, recheck in AM     Hypertension   Had recently switched from PTA HCTZ + potassium to amlodipine at beginning of Feb. Called back on 2/20 with reports of increased lower extremity and was told stop amlodipine at that time and restart HCTZ + potassium which patient tells me she has done.   - BP within normal limits on admission. Agree with stopping amlodipine give lower extremity edema   - Hold PTA HCTZ for now, would resume if elevated BP and improving potassium     Distal Radius Fracture   -fall 2 weeks ago, repeat x ray shows fracture  -consulted plastics, they will cast  -follow up OP with plastics, referral sent.    Asthma   - Continue Dulera + PRN albuterol     GERD   - Continue PTA PPI     Constipation   - PRN bowel regimen, would schedule  if requiring frequent opioids     Anxiety   - Continue PTA fluoxetine, PCP has been considering decreasing dose due to tremors  - Continue PTA clonazepam 0.5 mg TID     Overall stabilized and discharged to TCU on 2/27/2025 for PT, OT, nursing cares, medical management and monitoring.     Today:  Now has removable splint for left wrist, pain is minimal. Not wearing back brace much anymore, had follow up with neurosurgery on 4/9/2025, RTC in 6 weeks with xrays to assess stability of L3 fracture. She has been working with therapy, ambulating at times without assistive device though she has a walker. No new concerns. No SOB at rest, chest pain, dizziness. No cough, congestion. Appetite is good. No diarrhea or constipation.       PAST MEDICAL HISTORY:  Past Medical History:   Diagnosis Date     Anxiety      Chest wall trauma      Chronic prescription benzodiazepine use      Hypercholesteremia      Osteopenia of multiple sites 02/20/2024     Panic disorder        MEDICATIONS:  Current Outpatient Medications   Medication Sig Dispense Refill     acetaminophen (TYLENOL) 500 MG tablet Take 1-2 tablets (500-1,000 mg) by mouth every 6 hours as needed for mild pain 100 tablet 3     albuterol (PROAIR HFA/PROVENTIL HFA/VENTOLIN HFA) 108 (90 Base) MCG/ACT inhaler Inhale 2 puffs into the lungs every 6 hours as needed. 54 g 0     clonazePAM (KLONOPIN) 0.5 MG tablet Take 1 tablet (0.5 mg) by mouth 3 times daily. 60 tablet 0     FLUoxetine (PROZAC) 20 MG capsule Take 20 mg by mouth daily. 60 capsule 1     FLUoxetine (PROZAC) 40 MG capsule Take 40 mg by mouth daily.       gabapentin (NEURONTIN) 100 MG capsule Take 1 capsule (100 mg) by mouth 3 times daily. Increase to 200mg three times daily after 3-5 days if tolerating side effects and needing more pain relief. 180 capsule 0     Lidocaine (LIDOCARE) 4 % Patch Place 1 patch onto the skin every 24 hours. To prevent lidocaine toxicity, patient should be patch free for 12 hrs daily.        "lovastatin (MEVACOR) 20 MG tablet Take 1 tablet (20 mg) by mouth at bedtime 90 tablet 3     mometasone-formoterol (DULERA) 200-5 MCG/ACT inhaler Inhale 2 puffs into the lungs 2 times daily. 13 g 3     nystatin (MYCOSTATIN) 778370 UNIT/GM external powder Apply topically.       omeprazole (PRILOSEC) 20 MG DR capsule Take 1 capsule (20 mg) by mouth daily 90 capsule 3     potassium chloride jossue ER (KLOR-CON M10) 10 MEQ CR tablet Take 1 tablet (10 mEq) by mouth daily. (Patient taking differently: Take 40 mEq by mouth daily.) 90 tablet 3        PHYSICAL EXAM:  General: Patient is alert female, no distress.   Vitals: BP (!) 143/68   Pulse 70   Temp 98.7  F (37.1  C)   Resp 18   Ht 1.499 m (4' 11\")   Wt 83.8 kg (184 lb 11.2 oz)   SpO2 95%   BMI 37.30 kg/m    HEENT: Head is NCAT. Eyes show no injection or icterus. Nares negative. Oropharynx well hydrated.  Neck: No JVD.  Lungs: Non labored respirations.  Back: Back brace.   : Deferred.  Extremities: Mild LE edema.  Musculoskeletal: Removable splint left forearm/wrist.   Skin: Dry skin on feet.   Psych: Mood is good.       LABS/DIAGNOSTIC DATA:    EXAM: CT LUMBAR SPINE WO IV CONT  LOCATION: [facility]  DATE: 2/25/2025  INDICATION: Lumbar pain after a fall  COMPARISON: None.  TECHNIQUE: Routine CT Lumbar Spine without IV contrast. Multiplanar reformats. Dose reduction techniques were used.    IMPRESSION:  1. Superior endplate compression fracture at L3 with 20-30% anterior vertebral height loss is somewhat age-indeterminate, but potentially acute to subacute in nature. Correlate with point tenderness. MRI could confirm the acuity of this finding if indicated.  2. No additional CT evidence for acute fracture or posttraumatic subluxation.  3. Multilevel lumbar spondylosis as above.       EXAM: XR WRIST LT 3 VIEWS  LOCATION: [facility]  DATE: 2/26/2025  INDICATION: Hx of trauma, pain, INFLAMMATION/SWELLING  COMPARISON: 2/13/2025    IMPRESSION: Nondisplaced " impacted horizontal fracture of the distal radial metaphysis, increased in conspicuity compared to 12/13/2025. No dislocation. Moderate soft tissue edema.       ASSESSMENT/PLAN:  Compression Fx L3. Had follow up in office with neurosurgery 4/9/2025, RTC 6 weeks with repeat xrays, LSO brace discontinued.  Left wrist fracture. Removable splint. Continue to follow up with orthopedics.   HTN. Monitor BPs, adjust meds as needed.   Anxiety/depression. On fluoxetine and clonazepam, continue.   Asthma. Respiratory status is stable. Continue Dulera and prn Albuterol MDI.  HLD. On lovastatin.       Electronically signed by: Aida Hussein MD       Sincerely,        Aida Hussein MD    Electronically signed

## 2025-04-28 ENCOUNTER — TRANSITIONAL CARE UNIT VISIT (OUTPATIENT)
Dept: GERIATRICS | Facility: CLINIC | Age: 70
End: 2025-04-28
Payer: COMMERCIAL

## 2025-04-28 VITALS
TEMPERATURE: 98.3 F | OXYGEN SATURATION: 100 % | WEIGHT: 186.4 LBS | RESPIRATION RATE: 18 BRPM | SYSTOLIC BLOOD PRESSURE: 144 MMHG | BODY MASS INDEX: 37.58 KG/M2 | DIASTOLIC BLOOD PRESSURE: 72 MMHG | HEART RATE: 74 BPM | HEIGHT: 59 IN

## 2025-04-28 DIAGNOSIS — S32.030D COMPRESSION FRACTURE OF L3 VERTEBRA WITH ROUTINE HEALING, SUBSEQUENT ENCOUNTER: ICD-10-CM

## 2025-04-28 DIAGNOSIS — B35.3 TINEA PEDIS OF RIGHT FOOT: ICD-10-CM

## 2025-04-28 DIAGNOSIS — K21.9 GASTROESOPHAGEAL REFLUX DISEASE WITHOUT ESOPHAGITIS: ICD-10-CM

## 2025-04-28 DIAGNOSIS — J45.20 MILD INTERMITTENT ASTHMA WITHOUT COMPLICATION: ICD-10-CM

## 2025-04-28 DIAGNOSIS — F41.0 PANIC DISORDER WITHOUT AGORAPHOBIA: ICD-10-CM

## 2025-04-28 DIAGNOSIS — M80.032D PATHOLOGICAL FRACTURE OF LEFT RADIUS DUE TO OSTEOPOROSIS WITH ROUTINE HEALING, UNSPECIFIED OSTEOPOROSIS TYPE, SUBSEQUENT ENCOUNTER: Primary | ICD-10-CM

## 2025-04-28 DIAGNOSIS — S32.030A CLOSED COMPRESSION FRACTURE OF L3 VERTEBRA, INITIAL ENCOUNTER (H): ICD-10-CM

## 2025-04-28 DIAGNOSIS — I10 ESSENTIAL HYPERTENSION: ICD-10-CM

## 2025-04-28 PROCEDURE — 99308 SBSQ NF CARE LOW MDM 20: CPT | Performed by: NURSE PRACTITIONER

## 2025-04-28 NOTE — PROGRESS NOTES
[facility] GERIATRICS  [place] Medical Record Number:  [id number]  Place of Service where encounter took place: [facility] ([facility]) [[id number]]  CODE STATUS:   CPR/Full code     Chief Complaint/Reason for Visit:  Chief Complaint   Patient presents with    RECHECK       TCU HPI:    Tootie Marin is a 69 year old female with hx of HTN, HLD, asthma, depression, admitted to the hospital on 2/25/2025 as noted below.     [facility] Medicine Discharge Summary  Admit date: 2/25/2025  Discharge date: 2/27/2025     Brief HPI Summary:   Tootie Marin is a 69 y.o. female with PMH of osteoporosis, asthma, anxiety, HTN, GERD, HLD, MDD who presented to the ED for increased bilateral lower extremity edema, dysuria and hypertension. Patient had a fall at home while getting out of the shower on 2/13. Was seen in the ED at that time, XR of Lt wrist + forearm, R elbow and CXR without acute fracture and discharged to home following.     Patient reports that since return to home she has had increasing difficulty getting around her home. Reports several months of low back pain. States she has prior knowledge of a previous compression fracture, unsure location, for which she was evaluated at OSH. Has known history of osteoporosis, offered bisphosphonate but declined due to concern of side effects. Felt as though she stood up one day several weeks earlier and had pain to the point that she thought she broke her back, however continued to be able to do day to day activities. Since the fall on 2/13 has now had worsening low back pain with radiation to bilateral hips. Has been getting up very little. Does not typically use a cane or a walker. Her PCP has been helping her get set up with home hospital bed and lift but these have not yet arrived.     Called EMS today due to ongoing fatigue and weakness. Denies numbness, tingling or focal weakness. No lightheadedness or dizziness. No urinary symptoms  including retention, although did have 1-2 days where urine felt 'hot' prior to admission. Describes chronic constipation. No saddle paresthesia. No fevers, chills, sweats, chest pain / pressure, shortness of breath, cough, abdominal pain, N/V/D. Has had increasing bilateral lower extremity swelling for the past few weeks. Was taken off HCTZ + potassium at the beginning of Feb and switched to amlodipine. Called her clinic with concerns of swelling 2/20 and was recommended to stop amlodipine at that time.     On arrival to the ED, vitals within normal limits. Labs notable for K 2.6, Na 130, WBC 10.9, BNP 17, UA with mucus present. CT lumbar spine shows superior endplate compression fracture at L3 with 20-30% anterior vertebral height loss is somewhat age-indeterminate, but potentially acute to subacute in nature. Received 40 mEq effer-k. ED discussed case with trauma and NSGY.     Hospital Course, by problem:   Tootie Marin is a 69 y.o. female with PMH of osteoporosis, asthma, anxiety, HTN, GERD, HLD, MDD who presented to the ED for increased bilateral lower extremity edema, dysuria and hypertension.     Acute / Subacute L3 Compression Fracture   Osteoporosis   Acute Low Back Pain   Mechanical Fall 2/13   Generalized Weakness   Describes several months of low back pain with history of compression fracture diagnosed at OSH at unclear location. Fell on 2/13, tripped getting out of the shower. Increased low back pain, generalized weakness since then. CT lumbar spine w/o IV Cont on admission with superior endplate compression fracture at L3 with 20-30% anterior vertebral height loss. Neurologically intact aside from pain and limited ROM with R hip flexion.   - NSGY consulted  - Neuro checks q4h   - MR lumbar spine ordered   - Plan for Coreline brace when OOB or HOB >30 degrees   - PT / OT consults, okay to get up once Coreline brace delivered   - Continue PTA calcium + vitamin D   - Scheduled tylenol, resume low dose  gabapentin 100 mg TID, lidocaine patch, PRN oxycodone     Bilateral Lower Extremity Edema   New pitting lower extremity over the past 2-3 weeks. Denies shortness of breath, chest pain, lightheadedness or dizziness. Had switched from HCTZ to amlodipine at beginning of Feb, called back to clinic reporting edema 2/20 and had switched back recently. BNP 17 (may be falsely low in setting of obesity), albumin 3.4, UA unremarkable. Does have reduced mobility but given symmetric edema held off on duplex US.   - Suspect most likely in setting of stopping HCTZ and recent addition of amlodipine   - Agree with stopping amlodipine   - Restart HCTZ when potassium improved   - TTE ordered -> overall normal  - BP stable on d/c so HCTZ not reordered, can be considered as outpatient     Hypokalemia   K 2.6, Mag 1.8 on admission. Was previously on long term potassium supplement with use of HCTZ that she had stopped earlier in the month. Denies N/V/D, somewhat poor appetite due to fatigue.   - Electrolyte replacement protocol     Hyponatremia   Na 130, may be in setting of poor appetite.   - Encourage PO, recheck in AM     Hypertension   Had recently switched from PTA HCTZ + potassium to amlodipine at beginning of Feb. Called back on 2/20 with reports of increased lower extremity and was told stop amlodipine at that time and restart HCTZ + potassium which patient tells me she has done.   - BP within normal limits on admission. Agree with stopping amlodipine give lower extremity edema   - Hold PTA HCTZ for now, would resume if elevated BP and improving potassium     Distal Radius Fracture   -fall 2 weeks ago, repeat x ray shows fracture  -consulted plastics, they will cast  -follow up OP with plastics, referral sent.    Asthma   - Continue Dulera + PRN albuterol     GERD   - Continue PTA PPI     Constipation   - PRN bowel regimen, would schedule if requiring frequent opioids     Anxiety   - Continue PTA fluoxetine, PCP has been  considering decreasing dose due to tremors  - Continue PTA clonazepam 0.5 mg TID     Overall stabilized and discharged to TCU on 2/27/2025 for PT, OT, nursing cares, medical management and monitoring.     Today:  Now has removable splint for left wrist, pain is minimal. Not wearing back brace much anymore, had follow up with neurosurgery on 4/9/2025, RTC in 6 weeks with xrays to assess stability of L3 fracture. She has been working with therapy, ambulating at times without assistive device though she has a walker. No new concerns. No SOB at rest, chest pain, dizziness. No cough, congestion. Appetite is good. No diarrhea or constipation.       PAST MEDICAL HISTORY:  Past Medical History:   Diagnosis Date    Anxiety     Chest wall trauma     Chronic prescription benzodiazepine use     Hypercholesteremia     Osteopenia of multiple sites 02/20/2024    Panic disorder        MEDICATIONS:  Current Outpatient Medications   Medication Sig Dispense Refill    acetaminophen (TYLENOL) 500 MG tablet Take 1-2 tablets (500-1,000 mg) by mouth every 6 hours as needed for mild pain 100 tablet 3    albuterol (PROAIR HFA/PROVENTIL HFA/VENTOLIN HFA) 108 (90 Base) MCG/ACT inhaler Inhale 2 puffs into the lungs every 6 hours as needed. 54 g 0    clonazePAM (KLONOPIN) 0.5 MG tablet Take 1 tablet (0.5 mg) by mouth 3 times daily. 60 tablet 0    FLUoxetine (PROZAC) 20 MG capsule Take 20 mg by mouth daily. 60 capsule 1    FLUoxetine (PROZAC) 40 MG capsule Take 40 mg by mouth daily.      gabapentin (NEURONTIN) 100 MG capsule Take 1 capsule (100 mg) by mouth 3 times daily. Increase to 200mg three times daily after 3-5 days if tolerating side effects and needing more pain relief. 180 capsule 0    Lidocaine (LIDOCARE) 4 % Patch Place 1 patch onto the skin every 24 hours. To prevent lidocaine toxicity, patient should be patch free for 12 hrs daily.      lovastatin (MEVACOR) 20 MG tablet Take 1 tablet (20 mg) by mouth at bedtime 90 tablet 3     "mometasone-formoterol (DULERA) 200-5 MCG/ACT inhaler Inhale 2 puffs into the lungs 2 times daily. 13 g 3    nystatin (MYCOSTATIN) 603940 UNIT/GM external powder Apply topically.      omeprazole (PRILOSEC) 20 MG DR capsule Take 1 capsule (20 mg) by mouth daily 90 capsule 3    potassium chloride jossue ER (KLOR-CON M10) 10 MEQ CR tablet Take 1 tablet (10 mEq) by mouth daily. (Patient taking differently: Take 40 mEq by mouth daily.) 90 tablet 3        PHYSICAL EXAM:  General: Patient is alert female, no distress.   Vitals: BP (!) 143/68   Pulse 70   Temp 98.7  F (37.1  C)   Resp 18   Ht 1.499 m (4' 11\")   Wt 83.8 kg (184 lb 11.2 oz)   SpO2 95%   BMI 37.30 kg/m    HEENT: Head is NCAT. Eyes show no injection or icterus. Nares negative. Oropharynx well hydrated.  Neck: No JVD.  Lungs: Non labored respirations.  Back: Back brace.   : Deferred.  Extremities: Mild LE edema.  Musculoskeletal: Removable splint left forearm/wrist.   Skin: Dry skin on feet.   Psych: Mood is good.       LABS/DIAGNOSTIC DATA:    EXAM: CT LUMBAR SPINE WO IV CONT  LOCATION: [facility]  DATE: 2/25/2025  INDICATION: Lumbar pain after a fall  COMPARISON: None.  TECHNIQUE: Routine CT Lumbar Spine without IV contrast. Multiplanar reformats. Dose reduction techniques were used.    IMPRESSION:  1. Superior endplate compression fracture at L3 with 20-30% anterior vertebral height loss is somewhat age-indeterminate, but potentially acute to subacute in nature. Correlate with point tenderness. MRI could confirm the acuity of this finding if indicated.  2. No additional CT evidence for acute fracture or posttraumatic subluxation.  3. Multilevel lumbar spondylosis as above.       EXAM: XR WRIST LT 3 VIEWS  LOCATION: [facility]  DATE: 2/26/2025  INDICATION: Hx of trauma, pain, INFLAMMATION/SWELLING  COMPARISON: 2/13/2025    IMPRESSION: Nondisplaced impacted horizontal fracture of the distal radial metaphysis, increased in conspicuity compared " to 12/13/2025. No dislocation. Moderate soft tissue edema.       ASSESSMENT/PLAN:  Compression Fx L3. Had follow up in office with neurosurgery 4/9/2025, RTC 6 weeks with repeat xrays, LSO brace discontinued.  Left wrist fracture. Removable splint. Continue to follow up with orthopedics.   HTN. Monitor BPs, adjust meds as needed.   Anxiety/depression. On fluoxetine and clonazepam, continue.   Asthma. Respiratory status is stable. Continue Dulera and prn Albuterol MDI.  HLD. On lovastatin.       Electronically signed by: Aida Hussein MD

## 2025-04-28 NOTE — PROGRESS NOTES
[facility] GERIATRICS    PRIMARY CARE PROVIDER AND CLINIC:  Latricia Cloud MD, [address] [facility] [id number]  Chief Complaint   Patient presents with    Hospital F/U      [place] Medical Record Number:  [id number]  Place of Service where encounter took place:  [facility] ([facility]) [[id number]]    Tootie Marin  is a 69 year old  (1955), admitted to the above facility from  [facility] . Hospital stay 2/25/2025 through 2/27/2025..       Patient seen today for follow up NP visit in TCU:  1. Pathological fracture of left radius due to osteoporosis with routine healing, unspecified osteoporosis type, subsequent encounter    2. Panic disorder without agoraphobia    3. Mild intermittent asthma without complication    4. Tinea pedis of right foot    5. Gastroesophageal reflux disease without esophagitis    6. Essential hypertension    7. Closed compression fracture of L3 vertebra, initial encounter (H)    8. Compression fracture of L3 vertebra with routine healing, subsequent encounter          HPI:    Tinea to right foot, added in house podiatry to see   S/p compression fracture and right radius fracture. Ortho appt with recent cast removed   Completed therapies.   Chronic anxiety, on scheduled TID meds.   Denies HA, chest pain, palpitations, dizziness.  No troubles breathing, no SOB, no Concerns with urination, no constipation. Continue senna. Eating and drinking okay. Sleeping okay. Working with therapies. Pain controlled   Weights improved to 185 >186.4>187.4> 185.2 pounds today with increase lasix. Labs reviewed   Lives at senior living apartments, ambulates with a walker or wheelchair.        CODE STATUS/ADVANCE DIRECTIVES DISCUSSION:  Full Code  CPR/Full code   ALLERGIES:   Allergies   Allergen Reactions    Aspirin (Tartrazine Only) [Tartrazine] Unknown    Chicken Meat (Diagnostic) GI Disturbance    Chicken [Chicken Protein] Unknown    Other Food Allergy  Unknown     TURKEY      PAST MEDICAL HISTORY:   Past Medical History:   Diagnosis Date    Anxiety     Chest wall trauma     Chronic prescription benzodiazepine use     Hypercholesteremia     Osteopenia of multiple sites 02/20/2024    Panic disorder       PAST SURGICAL HISTORY:   has a past surgical history that includes Biopsy breast (Right, 1987).  FAMILY HISTORY: family history includes Cancer in her brother and father; Heart Disease in her mother.  SOCIAL HISTORY:   reports that she has never smoked. She has never used smokeless tobacco. She reports that she does not drink alcohol and does not use drugs.  Patient's living condition: lives alone    Post Discharge Medication Reconciliation Status:   MED REC REQUIRED  Post Medication Reconciliation Status: discharge medications reconciled, continue medications without change       Current Outpatient Medications   Medication Sig Dispense Refill    acetaminophen (TYLENOL) 500 MG tablet Take 1-2 tablets (500-1,000 mg) by mouth every 6 hours as needed for mild pain 100 tablet 3    albuterol (PROAIR HFA/PROVENTIL HFA/VENTOLIN HFA) 108 (90 Base) MCG/ACT inhaler Inhale 2 puffs into the lungs every 6 hours as needed. 54 g 0    clonazePAM (KLONOPIN) 0.5 MG tablet Take 1 tablet (0.5 mg) by mouth 3 times daily. 60 tablet 0    FLUoxetine (PROZAC) 20 MG capsule Take 20 mg by mouth daily. 60 capsule 1    FLUoxetine (PROZAC) 40 MG capsule Take 40 mg by mouth daily.      gabapentin (NEURONTIN) 100 MG capsule Take 1 capsule (100 mg) by mouth 3 times daily. Increase to 200mg three times daily after 3-5 days if tolerating side effects and needing more pain relief. 180 capsule 0    Lidocaine (LIDOCARE) 4 % Patch Place 1 patch onto the skin every 24 hours. To prevent lidocaine toxicity, patient should be patch free for 12 hrs daily.      lovastatin (MEVACOR) 20 MG tablet Take 1 tablet (20 mg) by mouth at bedtime 90 tablet 3    mometasone-formoterol (DULERA) 200-5 MCG/ACT inhaler Inhale  "2 puffs into the lungs 2 times daily. 13 g 3    nystatin (MYCOSTATIN) 209930 UNIT/GM external powder Apply topically.      omeprazole (PRILOSEC) 20 MG DR capsule Take 1 capsule (20 mg) by mouth daily 90 capsule 3    potassium chloride jossue ER (KLOR-CON M10) 10 MEQ CR tablet Take 1 tablet (10 mEq) by mouth daily. (Patient taking differently: Take 40 mEq by mouth daily.) 90 tablet 3     No current facility-administered medications for this visit.       ROS:  10 point ROS of systems including Constitutional, Eyes, Respiratory, Cardiovascular, Gastroenterology, Genitourinary, Integumentary, Musculoskeletal, Psychiatric were all negative except for pertinent positives noted in my HPI.    Vitals:  BP (!) 144/72   Pulse 74   Temp 98.3  F (36.8  C)   Resp 18   Ht 1.499 m (4' 11\")   Wt 84.6 kg (186 lb 6.4 oz)   SpO2 100%   BMI 37.65 kg/m    Exam:  GENERAL APPEARANCE:  Alert, in no distress, oriented, cooperative. Laying in bed  ENT:  Mouth and posterior oropharynx normal, moist mucous membranes, several teeth missing.  EYES:  EOM, conjunctivae, lids, pupils and irises normal  NECK:  No adenopathy,masses or thyromegaly  RESP:  respiratory effort and palpation of chest normal, lungs clear to auscultation , no respiratory distress  CV:  Palpation and auscultation of heart done , regular rate and rhythm, no murmur, rub, or gallop, +1 edema in bilateral lower extremities  GI/ABDOMEN:  normal bowel sounds, soft, nontender, no hepatosplenomegaly or other masses  M/S:   moves extremities spontaneously. Brace to left arm   SKIN:  no rash to exposed skin  NEURO:   intact   PSYCH:  oriented X 4, normal insight, judgement and memory, affect and mood normal.      Lab/Diagnostic data:  Recent labs in [facility] reviewed by me today.     Last Comprehensive Metabolic Panel:  Lab Results   Component Value Date     03/17/2025    POTASSIUM 3.8 03/17/2025    CHLORIDE 101 03/17/2025    CO2 21 (L) 03/17/2025    ANIONGAP 15 03/17/2025    " "GLC 96 03/17/2025    BUN 8.6 03/17/2025    CR 0.77 03/17/2025    GFRESTIMATED 83 03/17/2025    RIDGE 9.6 03/17/2025       Lab Results   Component Value Date    WBC 11.3 08/20/2024     Lab Results   Component Value Date    RBC 4.41 08/20/2024     Lab Results   Component Value Date    HGB 13.1 08/20/2024     Lab Results   Component Value Date    HCT 40.1 08/20/2024     No components found for: \"MCT\"  Lab Results   Component Value Date    MCV 91 08/20/2024     Lab Results   Component Value Date    MCH 29.7 08/20/2024     Lab Results   Component Value Date    MCHC 32.7 08/20/2024     Lab Results   Component Value Date    RDW 13.6 08/20/2024     Lab Results   Component Value Date     08/20/2024       ASSESSMENT/PLAN:    Acute / Subacute L3 Compression Fracture   Osteoporosis   Acute Low Back Pain   Mechanical Fall 2/13   Generalized Weakness   Left radius fracture  Describes several months of low back pain with history of compression fracture diagnosed at OSH at unclear location. Fell on 2/13, tripped getting out of the shower. Increased low back pain, generalized weakness since then. CT lumbar spine w/o IV Cont on admission with superior endplate compression fracture at L3 with 20-30% anterior vertebral height loss. Neurologically intact aside from pain and limited ROM with R hip flexion.   - NSGY consulted: appt on 4/9 for follow up  - PT / OT completed in TCU  - Continue PTA calcium + vitamin D   - Pain well controlled, plan to discontinue oxycodone  -Ortho follow up left radius cast removed   - WC ordered for discharge.     Bilateral Lower Extremity Edema   New pitting lower extremity over the past 2-3 weeks. Denies shortness of breath, chest pain, lightheadedness or dizziness. Had switched from HCTZ to amlodipine at beginning of Feb, called back to clinic reporting edema 2/20 and had switched back recently. BNP 17 (may be falsely low in setting of obesity), albumin 3.4, UA unremarkable. Does have reduced " mobility but given symmetric edema held off on duplex US.   - Increased lasix to 40mg  - Continue tubigrip compression stockings daily.    Hypokalemia   K 2.6, Mag 1.8 on admission. Was previously on long term potassium supplement with use of HCTZ that she had stopped earlier in the month. Denies N/V/D, somewhat poor appetite due to fatigue.   - Currently on Potassium supplementation 40 meq daily.  - BMP stable     Hyponatremia   Na 130, may be in setting of poor appetite.   - Encourage PO, recheck in AM   - BMP stable     Hypertension   Had recently switched from PTA HCTZ + potassium to amlodipine at beginning of Feb. Called back on 2/20 with reports of increased lower extremity and was told stop amlodipine at that time and restart HCTZ + potassium which patient tells me she has done.   - BP within normal limits on admission. Agree with stopping amlodipine give lower extremity edema   - Hold PTA HCTZ for now, would resume if elevated BP and improving potassium  - lasix to 40mg. Weights improved: 190>185 today    Asthma   - Continue Dulera + PRN albuterol     GERD   - Continue PTA PPI     Constipation   - PRN bowel regimen, would schedule if requiring frequent opioids     Anxiety   - Continue PTA fluoxetine, PCP has been considering decreasing dose due to tremors  - Continue PTA clonazepam 0.5 mg TID    Cutaneous Candidiasis  - Resolved, monitor     Tinea pedis  Terbinafine cream 1% daily x 1 week, will reassess   Improved   Okay for in house podiatry     Electronically signed by:  Diane Bhatt CNP             Total time greater than 25 minutes reviewing chart in EPIC and PCC, medications, labs, vitals. Discussing with social work, physical therapy, occupational therapy and nursing.

## 2025-04-28 NOTE — LETTER
4/28/2025      Tootie Marin  [address]  [place] [address]        [facility] GERIATRICS    PRIMARY CARE PROVIDER AND CLINIC:  Latricia Cloud MD, [address] [facility] [address]  Chief Complaint   Patient presents with     Hospital F/U      [place] Medical Record Number:  [id number]  Place of Service where encounter took place:  [facility] () [[id number]]    Tootie Marin  is a 69 year old  (1955), admitted to the above facility from  [facility] . Hospital stay 2/25/2025 through 2/27/2025..       Patient seen today for follow up NP visit in TCU:  1. Pathological fracture of left radius due to osteoporosis with routine healing, unspecified osteoporosis type, subsequent encounter    2. Panic disorder without agoraphobia    3. Mild intermittent asthma without complication    4. Tinea pedis of right foot    5. Gastroesophageal reflux disease without esophagitis    6. Essential hypertension    7. Closed compression fracture of L3 vertebra, initial encounter (H)    8. Compression fracture of L3 vertebra with routine healing, subsequent encounter          HPI:    Tinea to right foot, added in house podiatry to see   S/p compression fracture and right radius fracture. Ortho appt with recent cast removed   Completed therapies.   Chronic anxiety, on scheduled TID meds.   Denies HA, chest pain, palpitations, dizziness.  No troubles breathing, no SOB, no Concerns with urination, no constipation. Continue senna. Eating and drinking okay. Sleeping okay. Working with therapies. Pain controlled   Weights improved to 185 >186.4>187.4> 185.2 pounds today with increase lasix. Labs reviewed   Lives at senior living apartments, ambulates with a walker or wheelchair.        CODE STATUS/ADVANCE DIRECTIVES DISCUSSION:  Full Code  CPR/Full code   ALLERGIES:   Allergies   Allergen Reactions     Aspirin (Tartrazine Only) [Tartrazine] Unknown     Chicken Meat  (Diagnostic) GI Disturbance     Chicken [Chicken Protein] Unknown     Other Food Allergy Unknown     TURKEY      PAST MEDICAL HISTORY:   Past Medical History:   Diagnosis Date     Anxiety      Chest wall trauma      Chronic prescription benzodiazepine use      Hypercholesteremia      Osteopenia of multiple sites 02/20/2024     Panic disorder       PAST SURGICAL HISTORY:   has a past surgical history that includes Biopsy breast (Right, 1987).  FAMILY HISTORY: family history includes Cancer in her brother and father; Heart Disease in her mother.  SOCIAL HISTORY:   reports that she has never smoked. She has never used smokeless tobacco. She reports that she does not drink alcohol and does not use drugs.  Patient's living condition: lives alone    Post Discharge Medication Reconciliation Status:   MED REC REQUIRED  Post Medication Reconciliation Status: discharge medications reconciled, continue medications without change       Current Outpatient Medications   Medication Sig Dispense Refill     acetaminophen (TYLENOL) 500 MG tablet Take 1-2 tablets (500-1,000 mg) by mouth every 6 hours as needed for mild pain 100 tablet 3     albuterol (PROAIR HFA/PROVENTIL HFA/VENTOLIN HFA) 108 (90 Base) MCG/ACT inhaler Inhale 2 puffs into the lungs every 6 hours as needed. 54 g 0     clonazePAM (KLONOPIN) 0.5 MG tablet Take 1 tablet (0.5 mg) by mouth 3 times daily. 60 tablet 0     FLUoxetine (PROZAC) 20 MG capsule Take 20 mg by mouth daily. 60 capsule 1     FLUoxetine (PROZAC) 40 MG capsule Take 40 mg by mouth daily.       gabapentin (NEURONTIN) 100 MG capsule Take 1 capsule (100 mg) by mouth 3 times daily. Increase to 200mg three times daily after 3-5 days if tolerating side effects and needing more pain relief. 180 capsule 0     Lidocaine (LIDOCARE) 4 % Patch Place 1 patch onto the skin every 24 hours. To prevent lidocaine toxicity, patient should be patch free for 12 hrs daily.       lovastatin (MEVACOR) 20 MG tablet Take 1 tablet  "(20 mg) by mouth at bedtime 90 tablet 3     mometasone-formoterol (DULERA) 200-5 MCG/ACT inhaler Inhale 2 puffs into the lungs 2 times daily. 13 g 3     nystatin (MYCOSTATIN) 345817 UNIT/GM external powder Apply topically.       omeprazole (PRILOSEC) 20 MG DR capsule Take 1 capsule (20 mg) by mouth daily 90 capsule 3     potassium chloride jossue ER (KLOR-CON M10) 10 MEQ CR tablet Take 1 tablet (10 mEq) by mouth daily. (Patient taking differently: Take 40 mEq by mouth daily.) 90 tablet 3     No current facility-administered medications for this visit.       ROS:  10 point ROS of systems including Constitutional, Eyes, Respiratory, Cardiovascular, Gastroenterology, Genitourinary, Integumentary, Musculoskeletal, Psychiatric were all negative except for pertinent positives noted in my HPI.    Vitals:  BP (!) 144/72   Pulse 74   Temp 98.3  F (36.8  C)   Resp 18   Ht 1.499 m (4' 11\")   Wt 84.6 kg (186 lb 6.4 oz)   SpO2 100%   BMI 37.65 kg/m    Exam:  GENERAL APPEARANCE:  Alert, in no distress, oriented, cooperative. Laying in bed  ENT:  Mouth and posterior oropharynx normal, moist mucous membranes, several teeth missing.  EYES:  EOM, conjunctivae, lids, pupils and irises normal  NECK:  No adenopathy,masses or thyromegaly  RESP:  respiratory effort and palpation of chest normal, lungs clear to auscultation , no respiratory distress  CV:  Palpation and auscultation of heart done , regular rate and rhythm, no murmur, rub, or gallop, +1 edema in bilateral lower extremities  GI/ABDOMEN:  normal bowel sounds, soft, nontender, no hepatosplenomegaly or other masses  M/S:   moves extremities spontaneously. Brace to left arm   SKIN:  no rash to exposed skin  NEURO:   intact   PSYCH:  oriented X 4, normal insight, judgement and memory, affect and mood normal.      Lab/Diagnostic data:  Recent labs in [facility] reviewed by me today.     Last Comprehensive Metabolic Panel:  Lab Results   Component Value Date     03/17/2025    " "POTASSIUM 3.8 03/17/2025    CHLORIDE 101 03/17/2025    CO2 21 (L) 03/17/2025    ANIONGAP 15 03/17/2025    GLC 96 03/17/2025    BUN 8.6 03/17/2025    CR 0.77 03/17/2025    GFRESTIMATED 83 03/17/2025    RIDGE 9.6 03/17/2025       Lab Results   Component Value Date    WBC 11.3 08/20/2024     Lab Results   Component Value Date    RBC 4.41 08/20/2024     Lab Results   Component Value Date    HGB 13.1 08/20/2024     Lab Results   Component Value Date    HCT 40.1 08/20/2024     No components found for: \"MCT\"  Lab Results   Component Value Date    MCV 91 08/20/2024     Lab Results   Component Value Date    MCH 29.7 08/20/2024     Lab Results   Component Value Date    MCHC 32.7 08/20/2024     Lab Results   Component Value Date    RDW 13.6 08/20/2024     Lab Results   Component Value Date     08/20/2024       ASSESSMENT/PLAN:    Acute / Subacute L3 Compression Fracture   Osteoporosis   Acute Low Back Pain   Mechanical Fall 2/13   Generalized Weakness   Left radius fracture  Describes several months of low back pain with history of compression fracture diagnosed at OSH at unclear location. Fell on 2/13, tripped getting out of the shower. Increased low back pain, generalized weakness since then. CT lumbar spine w/o IV Cont on admission with superior endplate compression fracture at L3 with 20-30% anterior vertebral height loss. Neurologically intact aside from pain and limited ROM with R hip flexion.   - NSGY consulted: appt on 4/9 for follow up  - PT / OT completed in TCU  - Continue PTA calcium + vitamin D   - Pain well controlled, plan to discontinue oxycodone  -Ortho follow up left radius cast removed   - WC ordered for discharge.     Bilateral Lower Extremity Edema   New pitting lower extremity over the past 2-3 weeks. Denies shortness of breath, chest pain, lightheadedness or dizziness. Had switched from HCTZ to amlodipine at beginning of Feb, called back to clinic reporting edema 2/20 and had switched back recently. " BNP 17 (may be falsely low in setting of obesity), albumin 3.4, UA unremarkable. Does have reduced mobility but given symmetric edema held off on duplex US.   - Increased lasix to 40mg  - Continue tubigrip compression stockings daily.    Hypokalemia   K 2.6, Mag 1.8 on admission. Was previously on long term potassium supplement with use of HCTZ that she had stopped earlier in the month. Denies N/V/D, somewhat poor appetite due to fatigue.   - Currently on Potassium supplementation 40 meq daily.  - BMP stable     Hyponatremia   Na 130, may be in setting of poor appetite.   - Encourage PO, recheck in AM   - BMP stable     Hypertension   Had recently switched from PTA HCTZ + potassium to amlodipine at beginning of Feb. Called back on 2/20 with reports of increased lower extremity and was told stop amlodipine at that time and restart HCTZ + potassium which patient tells me she has done.   - BP within normal limits on admission. Agree with stopping amlodipine give lower extremity edema   - Hold PTA HCTZ for now, would resume if elevated BP and improving potassium  - lasix to 40mg. Weights improved: 190>185 today    Asthma   - Continue Dulera + PRN albuterol     GERD   - Continue PTA PPI     Constipation   - PRN bowel regimen, would schedule if requiring frequent opioids     Anxiety   - Continue PTA fluoxetine, PCP has been considering decreasing dose due to tremors  - Continue PTA clonazepam 0.5 mg TID    Cutaneous Candidiasis  - Resolved, monitor     Tinea pedis  Terbinafine cream 1% daily x 1 week, will reassess   Improved   Okay for in house podiatry     Electronically signed by:  Diane Bhatt CNP             Total time greater than 25 minutes reviewing chart in EPIC and PCC, medications, labs, vitals. Discussing with social work, physical therapy, occupational therapy and nursing.             Sincerely,        Diane Bhatt CNP    Electronically signed

## 2025-04-28 NOTE — PROGRESS NOTES
[facility] GERIATRICS  [place] Medical Record Number:  [id number]  Place of Service where encounter took place: [facility] [[id number]]  CODE STATUS:   CPR/Full code     Chief Complaint/Reason for Visit:  Chief Complaint   Patient presents with    RECHECK     TCU 4/8/2025.        TCU HPI:    Tootie Marin is a 69 year old female with hx of HTN, HLD, asthma, depression, admitted to the hospital on 2/25/2025 as noted below.     [facility] Medicine Discharge Summary  Admit date: 2/25/2025  Discharge date: 2/27/2025     Brief HPI Summary:   Tootie Marin is a 69 y.o. female with PMH of osteoporosis, asthma, anxiety, HTN, GERD, HLD, MDD who presented to the ED for increased bilateral lower extremity edema, dysuria and hypertension. Patient had a fall at home while getting out of the shower on 2/13. Was seen in the ED at that time, XR of Lt wrist + forearm, R elbow and CXR without acute fracture and discharged to home following.     Patient reports that since return to home she has had increasing difficulty getting around her home. Reports several months of low back pain. States she has prior knowledge of a previous compression fracture, unsure location, for which she was evaluated at OSH. Has known history of osteoporosis, offered bisphosphonate but declined due to concern of side effects. Felt as though she stood up one day several weeks earlier and had pain to the point that she thought she broke her back, however continued to be able to do day to day activities. Since the fall on 2/13 has now had worsening low back pain with radiation to bilateral hips. Has been getting up very little. Does not typically use a cane or a walker. Her PCP has been helping her get set up with home hospital bed and lift but these have not yet arrived.     Called EMS today due to ongoing fatigue and weakness. Denies numbness, tingling or focal weakness. No lightheadedness or dizziness. No  urinary symptoms including retention, although did have 1-2 days where urine felt 'hot' prior to admission. Describes chronic constipation. No saddle paresthesia. No fevers, chills, sweats, chest pain / pressure, shortness of breath, cough, abdominal pain, N/V/D. Has had increasing bilateral lower extremity swelling for the past few weeks. Was taken off HCTZ + potassium at the beginning of Feb and switched to amlodipine. Called her clinic with concerns of swelling 2/20 and was recommended to stop amlodipine at that time.     On arrival to the ED, vitals within normal limits. Labs notable for K 2.6, Na 130, WBC 10.9, BNP 17, UA with mucus present. CT lumbar spine shows superior endplate compression fracture at L3 with 20-30% anterior vertebral height loss is somewhat age-indeterminate, but potentially acute to subacute in nature. Received 40 mEq effer-k. ED discussed case with trauma and NSGY.     Hospital Course, by problem:   Tootie Marin is a 69 y.o. female with PMH of osteoporosis, asthma, anxiety, HTN, GERD, HLD, MDD who presented to the ED for increased bilateral lower extremity edema, dysuria and hypertension.     Acute / Subacute L3 Compression Fracture   Osteoporosis   Acute Low Back Pain   Mechanical Fall 2/13   Generalized Weakness   Describes several months of low back pain with history of compression fracture diagnosed at OSH at unclear location. Fell on 2/13, tripped getting out of the shower. Increased low back pain, generalized weakness since then. CT lumbar spine w/o IV Cont on admission with superior endplate compression fracture at L3 with 20-30% anterior vertebral height loss. Neurologically intact aside from pain and limited ROM with R hip flexion.   - NSGY consulted  - Neuro checks q4h   - MR lumbar spine ordered   - Plan for Coreline brace when OOB or HOB >30 degrees   - PT / OT consults, okay to get up once Coreline brace delivered   - Continue PTA calcium + vitamin D   - Scheduled  tylenol, resume low dose gabapentin 100 mg TID, lidocaine patch, PRN oxycodone     Bilateral Lower Extremity Edema   New pitting lower extremity over the past 2-3 weeks. Denies shortness of breath, chest pain, lightheadedness or dizziness. Had switched from HCTZ to amlodipine at beginning of Feb, called back to clinic reporting edema 2/20 and had switched back recently. BNP 17 (may be falsely low in setting of obesity), albumin 3.4, UA unremarkable. Does have reduced mobility but given symmetric edema held off on duplex US.   - Suspect most likely in setting of stopping HCTZ and recent addition of amlodipine   - Agree with stopping amlodipine   - Restart HCTZ when potassium improved   - TTE ordered -> overall normal  - BP stable on d/c so HCTZ not reordered, can be considered as outpatient     Hypokalemia   K 2.6, Mag 1.8 on admission. Was previously on long term potassium supplement with use of HCTZ that she had stopped earlier in the month. Denies N/V/D, somewhat poor appetite due to fatigue.   - Electrolyte replacement protocol     Hyponatremia   Na 130, may be in setting of poor appetite.   - Encourage PO, recheck in AM     Hypertension   Had recently switched from PTA HCTZ + potassium to amlodipine at beginning of Feb. Called back on 2/20 with reports of increased lower extremity and was told stop amlodipine at that time and restart HCTZ + potassium which patient tells me she has done.   - BP within normal limits on admission. Agree with stopping amlodipine give lower extremity edema   - Hold PTA HCTZ for now, would resume if elevated BP and improving potassium     Distal Radius Fracture   -fall 2 weeks ago, repeat x ray shows fracture  -consulted plastics, they will cast  -follow up OP with plastics, referral sent.    Asthma   - Continue Dulera + PRN albuterol     GERD   - Continue PTA PPI     Constipation   - PRN bowel regimen, would schedule if requiring frequent opioids     Anxiety   - Continue PTA  fluoxetine, PCP has been considering decreasing dose due to tremors  - Continue PTA clonazepam 0.5 mg TID     Overall stabilized and discharged to TCU on 2/27/2025 for PT, OT, nursing cares, medical management and monitoring.     Today:  Has ongoing follow up with orthopedics and will see neurosurgery for back compression fx L3 tomorrow. Pain is managed. Working with therapy. No SOB at rest, chest pain, dizziness. No cough, congestion. Appetite is good. No diarrhea or constipation.       PAST MEDICAL HISTORY:  Past Medical History:   Diagnosis Date    Anxiety     Chest wall trauma     Chronic prescription benzodiazepine use     Hypercholesteremia     Osteopenia of multiple sites 02/20/2024    Panic disorder        MEDICATIONS:  Current Outpatient Medications   Medication Sig Dispense Refill    acetaminophen (TYLENOL) 500 MG tablet Take 1-2 tablets (500-1,000 mg) by mouth every 6 hours as needed for mild pain 100 tablet 3    albuterol (PROAIR HFA/PROVENTIL HFA/VENTOLIN HFA) 108 (90 Base) MCG/ACT inhaler Inhale 2 puffs into the lungs every 6 hours as needed. 54 g 0    clonazePAM (KLONOPIN) 0.5 MG tablet Take 1 tablet (0.5 mg) by mouth 3 times daily. 60 tablet 0    FLUoxetine (PROZAC) 20 MG capsule Take 20 mg by mouth daily. 60 capsule 1    FLUoxetine (PROZAC) 40 MG capsule Take 40 mg by mouth daily.      gabapentin (NEURONTIN) 100 MG capsule Take 1 capsule (100 mg) by mouth 3 times daily. Increase to 200mg three times daily after 3-5 days if tolerating side effects and needing more pain relief. 180 capsule 0    Lidocaine (LIDOCARE) 4 % Patch Place 1 patch onto the skin every 24 hours. To prevent lidocaine toxicity, patient should be patch free for 12 hrs daily.      lovastatin (MEVACOR) 20 MG tablet Take 1 tablet (20 mg) by mouth at bedtime 90 tablet 3    mometasone-formoterol (DULERA) 200-5 MCG/ACT inhaler Inhale 2 puffs into the lungs 2 times daily. 13 g 3    nystatin (MYCOSTATIN) 923755 UNIT/GM external powder  "Apply topically.      omeprazole (PRILOSEC) 20 MG DR capsule Take 1 capsule (20 mg) by mouth daily 90 capsule 3    potassium chloride jossue ER (KLOR-CON M10) 10 MEQ CR tablet Take 1 tablet (10 mEq) by mouth daily. (Patient taking differently: Take 40 mEq by mouth daily.) 90 tablet 3        PHYSICAL EXAM:  General: Patient is alert female, no distress.   Vitals: BP (!) 144/67   Pulse 66   Temp 98.5  F (36.9  C)   Resp 18   Ht 1.499 m (4' 11\")   Wt 83.5 kg (184 lb)   SpO2 95%   BMI 37.16 kg/m    HEENT: Head is NCAT. Eyes show no injection or icterus. Nares negative. Oropharynx well hydrated.  Neck: No JVD.  Lungs: Non labored respirations.  Back: Back brace.   : Deferred.  Extremities: Minimal LE edema is noted.  Musculoskeletal: Splint left forearm/wrist.   Skin: Warm and dry.   Psych: Mood is good.       LABS/DIAGNOSTIC DATA:    EXAM: CT LUMBAR SPINE WO IV CONT  LOCATION: [facility]  DATE: 2/25/2025  INDICATION: Lumbar pain after a fall  COMPARISON: None.  TECHNIQUE: Routine CT Lumbar Spine without IV contrast. Multiplanar reformats. Dose reduction techniques were used.    IMPRESSION:  1. Superior endplate compression fracture at L3 with 20-30% anterior vertebral height loss is somewhat age-indeterminate, but potentially acute to subacute in nature. Correlate with point tenderness. MRI could confirm the acuity of this finding if indicated.  2. No additional CT evidence for acute fracture or posttraumatic subluxation.  3. Multilevel lumbar spondylosis as above.       EXAM: XR WRIST LT 3 VIEWS  LOCATION: [facility]  DATE: 2/26/2025  INDICATION: Hx of trauma, pain, INFLAMMATION/SWELLING  COMPARISON: 2/13/2025    IMPRESSION: Nondisplaced impacted horizontal fracture of the distal radial metaphysis, increased in conspicuity compared to 12/13/2025. No dislocation. Moderate soft tissue edema.       ASSESSMENT/PLAN:  Compression Fx L3. Follow up in office with neurosurgery tomorrow. Adequate pain " management, back brace when out of bed.  Left wrist fracture. WB restrictions, immobilization.   HTN. Monitor BPs, adjust meds as needed.   Anxiety/depression. On fluoxetine and clonazepam.   Asthma. Respiratory status is stable. Continue Dulera and prn Albuterol MDI.  HLD. On lovastatin.       Electronically signed by: Aida Hussein MD

## 2025-05-05 ENCOUNTER — TRANSITIONAL CARE UNIT VISIT (OUTPATIENT)
Dept: GERIATRICS | Facility: CLINIC | Age: 70
End: 2025-05-05
Payer: COMMERCIAL

## 2025-05-05 ENCOUNTER — PATIENT OUTREACH (OUTPATIENT)
Dept: GERIATRIC MEDICINE | Facility: CLINIC | Age: 70
End: 2025-05-05

## 2025-05-05 VITALS
HEIGHT: 59 IN | HEART RATE: 70 BPM | SYSTOLIC BLOOD PRESSURE: 139 MMHG | TEMPERATURE: 98.5 F | OXYGEN SATURATION: 98 % | WEIGHT: 187 LBS | DIASTOLIC BLOOD PRESSURE: 75 MMHG | BODY MASS INDEX: 37.7 KG/M2 | RESPIRATION RATE: 18 BRPM

## 2025-05-05 DIAGNOSIS — B35.3 TINEA PEDIS OF RIGHT FOOT: ICD-10-CM

## 2025-05-05 DIAGNOSIS — K21.9 GASTROESOPHAGEAL REFLUX DISEASE WITHOUT ESOPHAGITIS: ICD-10-CM

## 2025-05-05 DIAGNOSIS — M80.032D PATHOLOGICAL FRACTURE OF LEFT RADIUS DUE TO OSTEOPOROSIS WITH ROUTINE HEALING, UNSPECIFIED OSTEOPOROSIS TYPE, SUBSEQUENT ENCOUNTER: Primary | ICD-10-CM

## 2025-05-05 DIAGNOSIS — F41.0 PANIC DISORDER WITHOUT AGORAPHOBIA: ICD-10-CM

## 2025-05-05 DIAGNOSIS — S32.030D COMPRESSION FRACTURE OF L3 VERTEBRA WITH ROUTINE HEALING, SUBSEQUENT ENCOUNTER: ICD-10-CM

## 2025-05-05 DIAGNOSIS — S32.030A CLOSED COMPRESSION FRACTURE OF L3 VERTEBRA, INITIAL ENCOUNTER (H): ICD-10-CM

## 2025-05-05 DIAGNOSIS — J45.20 MILD INTERMITTENT ASTHMA WITHOUT COMPLICATION: ICD-10-CM

## 2025-05-05 PROCEDURE — 99308 SBSQ NF CARE LOW MDM 20: CPT | Performed by: NURSE PRACTITIONER

## 2025-05-05 NOTE — PROGRESS NOTES
[facility] GERIATRICS    PRIMARY CARE PROVIDER AND CLINIC:  Latricia Cloud MD, [address] [facility] [address]  Chief Complaint   Patient presents with    KAYLINECK      [facility] Medical Record Number:  [id number]  Place of Service where encounter took place:  [facility] ([place], [place]) ([facility]) [[id number]]    Tootie Marin  is a 69 year old  (1955), admitted to the above facility from  [facility] . Hospital stay 2/25/2025 through 2/27/2025..       Patient seen today for follow up NP visit in TCU:  1. Pathological fracture of left radius due to osteoporosis with routine healing, unspecified osteoporosis type, subsequent encounter    2. Tinea pedis of right foot    3. Closed compression fracture of L3 vertebra, initial encounter (H)    4. Panic disorder without agoraphobia    5. Gastroesophageal reflux disease without esophagitis    6. Compression fracture of L3 vertebra with routine healing, subsequent encounter    7. Mild intermittent asthma without complication          HPI:    Tinea to right foot, will change Terbinafine to solution. Ongoing.   S/p compression fracture and right radius fracture. Ortho appt tomorrow for follow up  Completed therapies.   Chronic anxiety, on scheduled TID meds.   Denies HA, chest pain, palpitations, dizziness.  No troubles breathing, no SOB, no Concerns with urination, no constipation. Continue senna. Eating and drinking okay. Sleeping okay. Working with therapies. Pain controlled   Weights improved to 185 >186.4>187.4> 185.2 pounds today with increase lasix. Labs reviewed   Lives at senior living apartments, ambulates with a walker or wheelchair.        CODE STATUS/ADVANCE DIRECTIVES DISCUSSION:  Full Code  CPR/Full code   ALLERGIES:   Allergies   Allergen Reactions    Aspirin (Tartrazine Only) [Tartrazine] Unknown    Chicken Meat (Diagnostic) GI Disturbance    Chicken [Chicken Protein] Unknown    Other Food Allergy Unknown     TURKEY       PAST MEDICAL HISTORY:   Past Medical History:   Diagnosis Date    Anxiety     Chest wall trauma     Chronic prescription benzodiazepine use     Hypercholesteremia     Osteopenia of multiple sites 02/20/2024    Panic disorder       PAST SURGICAL HISTORY:   has a past surgical history that includes Biopsy breast (Right, 1987).  FAMILY HISTORY: family history includes Cancer in her brother and father; Heart Disease in her mother.  SOCIAL HISTORY:   reports that she has never smoked. She has never used smokeless tobacco. She reports that she does not drink alcohol and does not use drugs.  Patient's living condition: lives alone    Post Discharge Medication Reconciliation Status:   MED REC REQUIRED  Post Medication Reconciliation Status: discharge medications reconciled, continue medications without change       Current Outpatient Medications   Medication Sig Dispense Refill    acetaminophen (TYLENOL) 500 MG tablet Take 1-2 tablets (500-1,000 mg) by mouth every 6 hours as needed for mild pain 100 tablet 3    albuterol (PROAIR HFA/PROVENTIL HFA/VENTOLIN HFA) 108 (90 Base) MCG/ACT inhaler Inhale 2 puffs into the lungs every 6 hours as needed. 54 g 0    clonazePAM (KLONOPIN) 0.5 MG tablet Take 1 tablet (0.5 mg) by mouth 3 times daily. 60 tablet 0    FLUoxetine (PROZAC) 20 MG capsule Take 20 mg by mouth daily. 60 capsule 1    FLUoxetine (PROZAC) 40 MG capsule Take 40 mg by mouth daily.      gabapentin (NEURONTIN) 100 MG capsule Take 1 capsule (100 mg) by mouth 3 times daily. Increase to 200mg three times daily after 3-5 days if tolerating side effects and needing more pain relief. 180 capsule 0    Lidocaine (LIDOCARE) 4 % Patch Place 1 patch onto the skin every 24 hours. To prevent lidocaine toxicity, patient should be patch free for 12 hrs daily.      lovastatin (MEVACOR) 20 MG tablet Take 1 tablet (20 mg) by mouth at bedtime 90 tablet 3    mometasone-formoterol (DULERA) 200-5 MCG/ACT inhaler Inhale 2 puffs into the lungs  "2 times daily. 13 g 3    nystatin (MYCOSTATIN) 549293 UNIT/GM external powder Apply topically.      omeprazole (PRILOSEC) 20 MG DR capsule Take 1 capsule (20 mg) by mouth daily 90 capsule 3    potassium chloride jossue ER (KLOR-CON M10) 10 MEQ CR tablet Take 1 tablet (10 mEq) by mouth daily. (Patient taking differently: Take 40 mEq by mouth daily.) 90 tablet 3     No current facility-administered medications for this visit.       ROS:  10 point ROS of systems including Constitutional, Eyes, Respiratory, Cardiovascular, Gastroenterology, Genitourinary, Integumentary, Musculoskeletal, Psychiatric were all negative except for pertinent positives noted in my HPI.    Vitals:  /75   Pulse 70   Temp 98.5  F (36.9  C)   Resp 18   Ht 1.499 m (4' 11\")   Wt 84.8 kg (187 lb)   SpO2 98%   BMI 37.77 kg/m    Exam:  GENERAL APPEARANCE:  Alert, in no distress, oriented, cooperative. Sitting up in chair.   ENT:  Mouth and posterior oropharynx normal, moist mucous membranes, several teeth missing.  EYES:  EOM, conjunctivae, lids, pupils and irises normal  NECK:  No adenopathy,masses or thyromegaly  RESP:  respiratory effort and palpation of chest normal, lungs clear to auscultation , no respiratory distress  CV:  Palpation and auscultation of heart done , regular rate and rhythm, no murmur, rub, or gallop, +1 edema in bilateral lower extremities  GI/ABDOMEN:  normal bowel sounds, soft, nontender, no hepatosplenomegaly or other masses  M/S:   moves extremities spontaneously. Brace to left arm   SKIN:  no rash to exposed skin  NEURO:   intact   PSYCH:  oriented X 4, normal insight, judgement and memory, affect and mood normal.      Lab/Diagnostic data:  Recent labs in [facility] reviewed by me today.     Last Comprehensive Metabolic Panel:  Lab Results   Component Value Date     03/17/2025    POTASSIUM 3.8 03/17/2025    CHLORIDE 101 03/17/2025    CO2 21 (L) 03/17/2025    ANIONGAP 15 03/17/2025    GLC 96 03/17/2025    BUN 8.6 " "03/17/2025    CR 0.77 03/17/2025    GFRESTIMATED 83 03/17/2025    RIDGE 9.6 03/17/2025       Lab Results   Component Value Date    WBC 11.3 08/20/2024     Lab Results   Component Value Date    RBC 4.41 08/20/2024     Lab Results   Component Value Date    HGB 13.1 08/20/2024     Lab Results   Component Value Date    HCT 40.1 08/20/2024     No components found for: \"MCT\"  Lab Results   Component Value Date    MCV 91 08/20/2024     Lab Results   Component Value Date    MCH 29.7 08/20/2024     Lab Results   Component Value Date    MCHC 32.7 08/20/2024     Lab Results   Component Value Date    RDW 13.6 08/20/2024     Lab Results   Component Value Date     08/20/2024       ASSESSMENT/PLAN:    Acute / Subacute L3 Compression Fracture   Osteoporosis   Acute Low Back Pain   Mechanical Fall 2/13   Generalized Weakness   Left radius fracture  Describes several months of low back pain with history of compression fracture diagnosed at OSH at unclear location. Fell on 2/13, tripped getting out of the shower. Increased low back pain, generalized weakness since then. CT lumbar spine w/o IV Cont on admission with superior endplate compression fracture at L3 with 20-30% anterior vertebral height loss. Neurologically intact aside from pain and limited ROM with R hip flexion.   - NSGY consulted: appt on 4/9 for follow up  - PT / OT completed in TCU  - Continue PTA calcium + vitamin D   - Pain well controlled, plan to discontinue oxycodone  -Ortho follow up left radius cast removed - follow up again tomorrow   - WC ordered for discharge.     Bilateral Lower Extremity Edema   New pitting lower extremity over the past 2-3 weeks. Denies shortness of breath, chest pain, lightheadedness or dizziness. Had switched from HCTZ to amlodipine at beginning of Feb, called back to clinic reporting edema 2/20 and had switched back recently. BNP 17 (may be falsely low in setting of obesity), albumin 3.4, UA unremarkable. Does have reduced mobility " but given symmetric edema held off on duplex US.   - Increased lasix to 40mg  - Continue tubigrip compression stockings daily.    Hypokalemia   K 2.6, Mag 1.8 on admission. Was previously on long term potassium supplement with use of HCTZ that she had stopped earlier in the month. Denies N/V/D, somewhat poor appetite due to fatigue.   - Currently on Potassium supplementation 40 meq daily.  - BMP stable     Hyponatremia   Na 130, may be in setting of poor appetite.   - Encourage PO, recheck in AM   - BMP stable     Hypertension   Had recently switched from PTA HCTZ + potassium to amlodipine at beginning of Feb. Called back on 2/20 with reports of increased lower extremity and was told stop amlodipine at that time and restart HCTZ + potassium which patient tells me she has done.   - BP within normal limits on admission. Agree with stopping amlodipine give lower extremity edema   - Hold PTA HCTZ for now, would resume if elevated BP and improving potassium  - lasix to 40mg. Weights improved: 190>185>186 today    Asthma   - Continue Dulera + PRN albuterol     GERD   - Continue PTA PPI     Constipation   - PRN bowel regimen, would schedule if requiring frequent opioids     Anxiety   - Continue PTA fluoxetine, PCP has been considering decreasing dose due to tremors  - Continue PTA clonazepam 0.5 mg TID    Cutaneous Candidiasis  - Resolved, monitor     Tinea pedis  Ongoing   Terbinafine cream 1% daily , will change to solution today BID x 2 weeks. will reassess       Electronically signed by:  Diane Bhatt CNP             Total time greater than 25 minutes reviewing chart in EPIC and PCC, medications, labs, vitals. Discussing with social work, physical therapy, occupational therapy and nursing.

## 2025-05-05 NOTE — LETTER
5/5/2025      Tootie Marin  [address]  [place] [address]        [facility] GERIATRICS    PRIMARY CARE PROVIDER AND CLINIC:  Latricia Cloud MD, [address] [facility] [address]  Chief Complaint   Patient presents with     RECHECK      [facility] Medical Record Number:  [id number]  Place of Service where encounter took place:  [facility] ([facility]) [[id number]]    Tootie Marin  is a 69 year old  (1955), admitted to the above facility from  [facility] . Hospital stay 2/25/2025 through 2/27/2025..       Patient seen today for follow up NP visit in TCU:  1. Pathological fracture of left radius due to osteoporosis with routine healing, unspecified osteoporosis type, subsequent encounter    2. Tinea pedis of right foot    3. Closed compression fracture of L3 vertebra, initial encounter (H)    4. Panic disorder without agoraphobia    5. Gastroesophageal reflux disease without esophagitis    6. Compression fracture of L3 vertebra with routine healing, subsequent encounter    7. Mild intermittent asthma without complication          HPI:    Tinea to right foot, will change Terbinafine to solution. Ongoing.   S/p compression fracture and right radius fracture. Ortho appt tomorrow for follow up  Completed therapies.   Chronic anxiety, on scheduled TID meds.   Denies HA, chest pain, palpitations, dizziness.  No troubles breathing, no SOB, no Concerns with urination, no constipation. Continue senna. Eating and drinking okay. Sleeping okay. Working with therapies. Pain controlled   Weights improved to 185 >186.4>187.4> 185.2 pounds today with increase lasix. Labs reviewed   Lives at senior living apartments, ambulates with a walker or wheelchair.        CODE STATUS/ADVANCE DIRECTIVES DISCUSSION:  Full Code  CPR/Full code   ALLERGIES:   Allergies   Allergen Reactions     Aspirin (Tartrazine Only) [Tartrazine] Unknown     Chicken Meat (Diagnostic) GI  Disturbance     Chicken [Chicken Protein] Unknown     Other Food Allergy Unknown     TURKEY      PAST MEDICAL HISTORY:   Past Medical History:   Diagnosis Date     Anxiety      Chest wall trauma      Chronic prescription benzodiazepine use      Hypercholesteremia      Osteopenia of multiple sites 02/20/2024     Panic disorder       PAST SURGICAL HISTORY:   has a past surgical history that includes Biopsy breast (Right, 1987).  FAMILY HISTORY: family history includes Cancer in her brother and father; Heart Disease in her mother.  SOCIAL HISTORY:   reports that she has never smoked. She has never used smokeless tobacco. She reports that she does not drink alcohol and does not use drugs.  Patient's living condition: lives alone    Post Discharge Medication Reconciliation Status:   MED REC REQUIRED  Post Medication Reconciliation Status: discharge medications reconciled, continue medications without change       Current Outpatient Medications   Medication Sig Dispense Refill     acetaminophen (TYLENOL) 500 MG tablet Take 1-2 tablets (500-1,000 mg) by mouth every 6 hours as needed for mild pain 100 tablet 3     albuterol (PROAIR HFA/PROVENTIL HFA/VENTOLIN HFA) 108 (90 Base) MCG/ACT inhaler Inhale 2 puffs into the lungs every 6 hours as needed. 54 g 0     clonazePAM (KLONOPIN) 0.5 MG tablet Take 1 tablet (0.5 mg) by mouth 3 times daily. 60 tablet 0     FLUoxetine (PROZAC) 20 MG capsule Take 20 mg by mouth daily. 60 capsule 1     FLUoxetine (PROZAC) 40 MG capsule Take 40 mg by mouth daily.       gabapentin (NEURONTIN) 100 MG capsule Take 1 capsule (100 mg) by mouth 3 times daily. Increase to 200mg three times daily after 3-5 days if tolerating side effects and needing more pain relief. 180 capsule 0     Lidocaine (LIDOCARE) 4 % Patch Place 1 patch onto the skin every 24 hours. To prevent lidocaine toxicity, patient should be patch free for 12 hrs daily.       lovastatin (MEVACOR) 20 MG tablet Take 1 tablet (20 mg) by mouth  "at bedtime 90 tablet 3     mometasone-formoterol (DULERA) 200-5 MCG/ACT inhaler Inhale 2 puffs into the lungs 2 times daily. 13 g 3     nystatin (MYCOSTATIN) 557178 UNIT/GM external powder Apply topically.       omeprazole (PRILOSEC) 20 MG DR capsule Take 1 capsule (20 mg) by mouth daily 90 capsule 3     potassium chloride jossue ER (KLOR-CON M10) 10 MEQ CR tablet Take 1 tablet (10 mEq) by mouth daily. (Patient taking differently: Take 40 mEq by mouth daily.) 90 tablet 3     No current facility-administered medications for this visit.       ROS:  10 point ROS of systems including Constitutional, Eyes, Respiratory, Cardiovascular, Gastroenterology, Genitourinary, Integumentary, Musculoskeletal, Psychiatric were all negative except for pertinent positives noted in my HPI.    Vitals:  /75   Pulse 70   Temp 98.5  F (36.9  C)   Resp 18   Ht 1.499 m (4' 11\")   Wt 84.8 kg (187 lb)   SpO2 98%   BMI 37.77 kg/m    Exam:  GENERAL APPEARANCE:  Alert, in no distress, oriented, cooperative. Sitting up in chair.   ENT:  Mouth and posterior oropharynx normal, moist mucous membranes, several teeth missing.  EYES:  EOM, conjunctivae, lids, pupils and irises normal  NECK:  No adenopathy,masses or thyromegaly  RESP:  respiratory effort and palpation of chest normal, lungs clear to auscultation , no respiratory distress  CV:  Palpation and auscultation of heart done , regular rate and rhythm, no murmur, rub, or gallop, +1 edema in bilateral lower extremities  GI/ABDOMEN:  normal bowel sounds, soft, nontender, no hepatosplenomegaly or other masses  M/S:   moves extremities spontaneously. Brace to left arm   SKIN:  no rash to exposed skin  NEURO:   intact   PSYCH:  oriented X 4, normal insight, judgement and memory, affect and mood normal.      Lab/Diagnostic data:  Recent labs in [facility] reviewed by me today.     Last Comprehensive Metabolic Panel:  Lab Results   Component Value Date     03/17/2025    POTASSIUM 3.8 " "03/17/2025    CHLORIDE 101 03/17/2025    CO2 21 (L) 03/17/2025    ANIONGAP 15 03/17/2025    GLC 96 03/17/2025    BUN 8.6 03/17/2025    CR 0.77 03/17/2025    GFRESTIMATED 83 03/17/2025    RIDGE 9.6 03/17/2025       Lab Results   Component Value Date    WBC 11.3 08/20/2024     Lab Results   Component Value Date    RBC 4.41 08/20/2024     Lab Results   Component Value Date    HGB 13.1 08/20/2024     Lab Results   Component Value Date    HCT 40.1 08/20/2024     No components found for: \"MCT\"  Lab Results   Component Value Date    MCV 91 08/20/2024     Lab Results   Component Value Date    MCH 29.7 08/20/2024     Lab Results   Component Value Date    MCHC 32.7 08/20/2024     Lab Results   Component Value Date    RDW 13.6 08/20/2024     Lab Results   Component Value Date     08/20/2024       ASSESSMENT/PLAN:    Acute / Subacute L3 Compression Fracture   Osteoporosis   Acute Low Back Pain   Mechanical Fall 2/13   Generalized Weakness   Left radius fracture  Describes several months of low back pain with history of compression fracture diagnosed at OSH at unclear location. Fell on 2/13, tripped getting out of the shower. Increased low back pain, generalized weakness since then. CT lumbar spine w/o IV Cont on admission with superior endplate compression fracture at L3 with 20-30% anterior vertebral height loss. Neurologically intact aside from pain and limited ROM with R hip flexion.   - NSGY consulted: appt on 4/9 for follow up  - PT / OT completed in TCU  - Continue PTA calcium + vitamin D   - Pain well controlled, plan to discontinue oxycodone  -Ortho follow up left radius cast removed - follow up again tomorrow   - WC ordered for discharge.     Bilateral Lower Extremity Edema   New pitting lower extremity over the past 2-3 weeks. Denies shortness of breath, chest pain, lightheadedness or dizziness. Had switched from HCTZ to amlodipine at beginning of Feb, called back to clinic reporting edema 2/20 and had switched " back recently. BNP 17 (may be falsely low in setting of obesity), albumin 3.4, UA unremarkable. Does have reduced mobility but given symmetric edema held off on duplex US.   - Increased lasix to 40mg  - Continue tubigrip compression stockings daily.    Hypokalemia   K 2.6, Mag 1.8 on admission. Was previously on long term potassium supplement with use of HCTZ that she had stopped earlier in the month. Denies N/V/D, somewhat poor appetite due to fatigue.   - Currently on Potassium supplementation 40 meq daily.  - BMP stable     Hyponatremia   Na 130, may be in setting of poor appetite.   - Encourage PO, recheck in AM   - BMP stable     Hypertension   Had recently switched from PTA HCTZ + potassium to amlodipine at beginning of Feb. Called back on 2/20 with reports of increased lower extremity and was told stop amlodipine at that time and restart HCTZ + potassium which patient tells me she has done.   - BP within normal limits on admission. Agree with stopping amlodipine give lower extremity edema   - Hold PTA HCTZ for now, would resume if elevated BP and improving potassium  - lasix to 40mg. Weights improved: 190>185>186 today    Asthma   - Continue Dulera + PRN albuterol     GERD   - Continue PTA PPI     Constipation   - PRN bowel regimen, would schedule if requiring frequent opioids     Anxiety   - Continue PTA fluoxetine, PCP has been considering decreasing dose due to tremors  - Continue PTA clonazepam 0.5 mg TID    Cutaneous Candidiasis  - Resolved, monitor     Tinea pedis  Ongoing   Terbinafine cream 1% daily , will change to solution today BID x 2 weeks. will reassess       Electronically signed by:  Diane Bhatt CNP             Total time greater than 25 minutes reviewing chart in EPIC and PCC, medications, labs, vitals. Discussing with social work, physical therapy, occupational therapy and nursing.           Sincerely,        Diane Bhatt CNP    Electronically signed

## 2025-05-06 NOTE — PROGRESS NOTES
Member called writer and left voice mail stating that she received a letter telling her that she has to pay $940/month to stay in the TCU.  Member said that she only gets $890/month, so she is not sure how she will pay for the TCU.    Writer called member and left voice mail stating that writer only has access to member's health records and that member will have to call [facility] financial worker for more information on the letter.  Writer asked that member call writer with update after she talks to [facility] worker.    Writer also called [facility] and talked to Asiya Eisenberg.  Writer asked Raven to have member listen to her voice mail and call writer with any questions.    nIez Dela Cruz RN  [facility]  [phone]

## 2025-05-07 DIAGNOSIS — F41.1 ANXIETY STATE: ICD-10-CM

## 2025-05-07 DIAGNOSIS — F41.0 PANIC DISORDER WITHOUT AGORAPHOBIA: ICD-10-CM

## 2025-05-07 RX ORDER — CLONAZEPAM 0.5 MG/1
0.5 TABLET ORAL 3 TIMES DAILY
Qty: 60 TABLET | Refills: 0 | Status: SHIPPED | OUTPATIENT
Start: 2025-05-07

## 2025-05-12 ENCOUNTER — TRANSITIONAL CARE UNIT VISIT (OUTPATIENT)
Dept: GERIATRICS | Facility: CLINIC | Age: 70
End: 2025-05-12
Payer: COMMERCIAL

## 2025-05-12 VITALS
HEART RATE: 75 BPM | BODY MASS INDEX: 37.7 KG/M2 | DIASTOLIC BLOOD PRESSURE: 96 MMHG | WEIGHT: 187 LBS | HEIGHT: 59 IN | RESPIRATION RATE: 18 BRPM | OXYGEN SATURATION: 98 % | SYSTOLIC BLOOD PRESSURE: 133 MMHG | TEMPERATURE: 95 F

## 2025-05-12 DIAGNOSIS — S32.030D COMPRESSION FRACTURE OF L3 VERTEBRA WITH ROUTINE HEALING, SUBSEQUENT ENCOUNTER: ICD-10-CM

## 2025-05-12 DIAGNOSIS — M80.032D PATHOLOGICAL FRACTURE OF LEFT RADIUS DUE TO OSTEOPOROSIS WITH ROUTINE HEALING, UNSPECIFIED OSTEOPOROSIS TYPE, SUBSEQUENT ENCOUNTER: ICD-10-CM

## 2025-05-12 DIAGNOSIS — S62.102D CLOSED FRACTURE OF LEFT WRIST WITH ROUTINE HEALING, SUBSEQUENT ENCOUNTER: Primary | ICD-10-CM

## 2025-05-12 DIAGNOSIS — F41.0 PANIC DISORDER WITHOUT AGORAPHOBIA: ICD-10-CM

## 2025-05-12 DIAGNOSIS — J45.20 MILD INTERMITTENT ASTHMA WITHOUT COMPLICATION: ICD-10-CM

## 2025-05-12 DIAGNOSIS — K21.9 GASTROESOPHAGEAL REFLUX DISEASE WITHOUT ESOPHAGITIS: ICD-10-CM

## 2025-05-12 DIAGNOSIS — I10 ESSENTIAL HYPERTENSION: ICD-10-CM

## 2025-05-12 DIAGNOSIS — F41.1 ANXIETY STATE: ICD-10-CM

## 2025-05-12 DIAGNOSIS — E87.6 ACUTE HYPOKALEMIA: ICD-10-CM

## 2025-05-12 DIAGNOSIS — S32.030A CLOSED COMPRESSION FRACTURE OF L3 VERTEBRA, INITIAL ENCOUNTER (H): ICD-10-CM

## 2025-05-12 DIAGNOSIS — B35.3 TINEA PEDIS OF RIGHT FOOT: ICD-10-CM

## 2025-05-12 PROCEDURE — 99308 SBSQ NF CARE LOW MDM 20: CPT | Performed by: NURSE PRACTITIONER

## 2025-05-12 NOTE — PROGRESS NOTES
[facility] GERIATRICS    PRIMARY CARE PROVIDER AND CLINIC:  Latricia Cloud MD, [address] [facility] [address]  Chief Complaint   Patient presents with    Follow Up      [facility] Medical Record Number:  [id number]  Place of Service where encounter took place:  [facility] ([facility]) [[id number]]    Tootie Marin  is a 69 year old  (1955), admitted to the above facility from  [facility] . Hospital stay 2/25/2025 through 2/27/2025..       Patient seen today for follow up NP visit in TCU:  1. Closed fracture of left wrist with routine healing, subsequent encounter    2. Tinea pedis of right foot    3. Compression fracture of L3 vertebra with routine healing, subsequent encounter    4. Panic disorder without agoraphobia    5. Closed compression fracture of L3 vertebra, initial encounter (H)    6. Anxiety    7. Mild intermittent asthma without complication    8. Acute hypokalemia    9. Pathological fracture of left radius due to osteoporosis with routine healing, unspecified osteoporosis type, subsequent encounter    10. Gastroesophageal reflux disease without esophagitis    11. Essential hypertension          HPI:    Tinea to right foot on Terbinafine to solution. Ongoing. Improved.   S/p compression fracture and right radius fracture. Ortho follow up-splint off. Now WBAT and okay to use walker without platform.   Hoping to get into AL.   Chronic anxiety, on scheduled TID meds.   Denies HA, chest pain, palpitations, dizziness.  No troubles breathing, no SOB, no Concerns with urination, no constipation. Continue senna. Eating and drinking okay. Sleeping okay. Working with therapies. Pain controlled   Weights improved to 185 >186.4>187.4> 185.2 pounds today with increase lasix. Labs reviewed   Lives at senior living apartments, ambulates with a walker or wheelchair.        CODE STATUS/ADVANCE DIRECTIVES DISCUSSION:  Full Code  CPR/Full code   ALLERGIES:   Allergies    Allergen Reactions    Aspirin (Tartrazine Only) [Tartrazine] Unknown    Chicken Meat (Diagnostic) GI Disturbance    Chicken [Chicken Protein] Unknown    Other Food Allergy Unknown     TURKEY      PAST MEDICAL HISTORY:   Past Medical History:   Diagnosis Date    Anxiety     Chest wall trauma     Chronic prescription benzodiazepine use     Hypercholesteremia     Osteopenia of multiple sites 02/20/2024    Panic disorder       PAST SURGICAL HISTORY:   has a past surgical history that includes Biopsy breast (Right, 1987).  FAMILY HISTORY: family history includes Cancer in her brother and father; Heart Disease in her mother.  SOCIAL HISTORY:   reports that she has never smoked. She has never used smokeless tobacco. She reports that she does not drink alcohol and does not use drugs.  Patient's living condition: lives alone    Post Discharge Medication Reconciliation Status:   MED REC REQUIRED  Post Medication Reconciliation Status: discharge medications reconciled, continue medications without change       Current Outpatient Medications   Medication Sig Dispense Refill    acetaminophen (TYLENOL) 500 MG tablet Take 1-2 tablets (500-1,000 mg) by mouth every 6 hours as needed for mild pain 100 tablet 3    albuterol (PROAIR HFA/PROVENTIL HFA/VENTOLIN HFA) 108 (90 Base) MCG/ACT inhaler Inhale 2 puffs into the lungs every 6 hours as needed. 54 g 0    clonazePAM (KLONOPIN) 0.5 MG tablet Take 1 tablet (0.5 mg) by mouth 3 times daily. 60 tablet 0    FLUoxetine (PROZAC) 20 MG capsule Take 20 mg by mouth daily. 60 capsule 1    FLUoxetine (PROZAC) 40 MG capsule Take 40 mg by mouth daily.      gabapentin (NEURONTIN) 100 MG capsule Take 1 capsule (100 mg) by mouth 3 times daily. Increase to 200mg three times daily after 3-5 days if tolerating side effects and needing more pain relief. 180 capsule 0    Lidocaine (LIDOCARE) 4 % Patch Place 1 patch onto the skin every 24 hours. To prevent lidocaine toxicity, patient should be patch free  "for 12 hrs daily.      lovastatin (MEVACOR) 20 MG tablet Take 1 tablet (20 mg) by mouth at bedtime 90 tablet 3    mometasone-formoterol (DULERA) 200-5 MCG/ACT inhaler Inhale 2 puffs into the lungs 2 times daily. 13 g 3    nystatin (MYCOSTATIN) 115185 UNIT/GM external powder Apply topically.      omeprazole (PRILOSEC) 20 MG DR capsule Take 1 capsule (20 mg) by mouth daily 90 capsule 3    potassium chloride jossue ER (KLOR-CON M10) 10 MEQ CR tablet Take 1 tablet (10 mEq) by mouth daily. (Patient taking differently: Take 40 mEq by mouth daily.) 90 tablet 3     No current facility-administered medications for this visit.       ROS:  10 point ROS of systems including Constitutional, Eyes, Respiratory, Cardiovascular, Gastroenterology, Genitourinary, Integumentary, Musculoskeletal, Psychiatric were all negative except for pertinent positives noted in my HPI.    Vitals:  BP (!) 133/96   Pulse 75   Temp (!) 95  F (35  C)   Resp 18   Ht 1.499 m (4' 11\")   Wt 84.8 kg (187 lb)   SpO2 98%   BMI 37.77 kg/m    Exam:  GENERAL APPEARANCE:  Alert, in no distress, oriented, cooperative. Sitting up in chair.   ENT:  Mouth and posterior oropharynx normal, moist mucous membranes, several teeth missing.  EYES:  EOM, conjunctivae, lids, pupils and irises normal  NECK:  No adenopathy,masses or thyromegaly  RESP:  respiratory effort and palpation of chest normal, lungs clear to auscultation , no respiratory distress  CV:  Palpation and auscultation of heart done , regular rate and rhythm, no murmur, rub, or gallop, No edema in bilateral lower extremities  GI/ABDOMEN:  normal bowel sounds, soft, nontender, no hepatosplenomegaly or other masses  M/S:   moves extremities spontaneously. Left arm splint/brace off. No redness/swelling or warmth   SKIN:  no rash to exposed skin  NEURO:   intact   PSYCH:  oriented X 4, normal insight, judgement and memory, affect and mood normal.      Lab/Diagnostic data:  Recent labs in [facility] reviewed by me " "today.     Last Comprehensive Metabolic Panel:  Lab Results   Component Value Date     03/17/2025    POTASSIUM 3.8 03/17/2025    CHLORIDE 101 03/17/2025    CO2 21 (L) 03/17/2025    ANIONGAP 15 03/17/2025    GLC 96 03/17/2025    BUN 8.6 03/17/2025    CR 0.77 03/17/2025    GFRESTIMATED 83 03/17/2025    RIDGE 9.6 03/17/2025       Lab Results   Component Value Date    WBC 11.3 08/20/2024     Lab Results   Component Value Date    RBC 4.41 08/20/2024     Lab Results   Component Value Date    HGB 13.1 08/20/2024     Lab Results   Component Value Date    HCT 40.1 08/20/2024     No components found for: \"MCT\"  Lab Results   Component Value Date    MCV 91 08/20/2024     Lab Results   Component Value Date    MCH 29.7 08/20/2024     Lab Results   Component Value Date    MCHC 32.7 08/20/2024     Lab Results   Component Value Date    RDW 13.6 08/20/2024     Lab Results   Component Value Date     08/20/2024       ASSESSMENT/PLAN:    Acute / Subacute L3 Compression Fracture   Osteoporosis   Acute Low Back Pain   Mechanical Fall 2/13   Generalized Weakness   Left radius fracture  Describes several months of low back pain with history of compression fracture diagnosed at OSH at unclear location. Fell on 2/13, tripped getting out of the shower. Increased low back pain, generalized weakness since then. CT lumbar spine w/o IV Cont on admission with superior endplate compression fracture at L3 with 20-30% anterior vertebral height loss. Neurologically intact aside from pain and limited ROM with R hip flexion.   - NSGY consulted: appt on 4/9 for follow up  - PT / OT completed in TCU  - Continue PTA calcium + vitamin D   - Pain well controlled, plan to discontinue oxycodone  -Ortho follow up, splint now off. WBAT, okay to use walker without platform.   - WC ordered for discharge.     Bilateral Lower Extremity Edema   New pitting lower extremity over the past 2-3 weeks. Denies shortness of breath, chest pain, lightheadedness or " dizziness. Had switched from HCTZ to amlodipine at beginning of Feb, called back to clinic reporting edema 2/20 and had switched back recently. BNP 17 (may be falsely low in setting of obesity), albumin 3.4, UA unremarkable. Does have reduced mobility but given symmetric edema held off on duplex US.   - Increased lasix to 40mg  - Continue tubigrip compression stockings daily.    Hypokalemia   K 2.6, Mag 1.8 on admission. Was previously on long term potassium supplement with use of HCTZ that she had stopped earlier in the month. Denies N/V/D, somewhat poor appetite due to fatigue.   - Currently on Potassium supplementation 40 meq daily.  - BMP stable     Hyponatremia   Na 130, may be in setting of poor appetite.   - Encourage PO, recheck in AM   - BMP stable     Hypertension   Had recently switched from PTA HCTZ + potassium to amlodipine at beginning of Feb. Called back on 2/20 with reports of increased lower extremity and was told stop amlodipine at that time and restart HCTZ + potassium which patient tells me she has done.   - BP within normal limits on admission. Agree with stopping amlodipine give lower extremity edema   - Hold PTA HCTZ for now, would resume if elevated BP and improving potassium  - lasix to 40mg. Weights improved: 190>185>186 today    Asthma   - Continue Dulera + PRN albuterol     GERD   - Continue PTA PPI     Constipation   - PRN bowel regimen, would schedule if requiring frequent opioids     Anxiety   - Continue PTA fluoxetine, PCP has been considering decreasing dose due to tremors  - Continue PTA clonazepam 0.5 mg TID    Cutaneous Candidiasis  - Resolved, monitor     Tinea pedis  Ongoing   Terbinafine cream 1% daily , changed to solution today BID x 2 weeks. will reassess -reports improved.  She does like cream better, may change back.       Electronically signed by:  Diane Bhatt CNP             Total time greater than 25 minutes reviewing chart in EPIC and PCC, medications, labs, vitals.  Discussing with social work, physical therapy, occupational therapy and nursing.

## 2025-05-12 NOTE — LETTER
5/12/2025      Tootie Marin  [address]  [place] [address]        [facility] GERIATRICS    PRIMARY CARE PROVIDER AND CLINIC:  Latricia Cloud MD, [address] [facility] [address]  Chief Complaint   Patient presents with     Follow Up      [facility] Medical Record Number:  [id number]  Place of Service where encounter took place:  [facility] ([facility]) [[id number]]    Tootie Marin  is a 69 year old  (1955), admitted to the above facility from  [facility] . Hospital stay 2/25/2025 through 2/27/2025..       Patient seen today for follow up NP visit in TCU:  1. Closed fracture of left wrist with routine healing, subsequent encounter    2. Tinea pedis of right foot    3. Compression fracture of L3 vertebra with routine healing, subsequent encounter    4. Panic disorder without agoraphobia    5. Closed compression fracture of L3 vertebra, initial encounter (H)    6. Anxiety    7. Mild intermittent asthma without complication    8. Acute hypokalemia    9. Pathological fracture of left radius due to osteoporosis with routine healing, unspecified osteoporosis type, subsequent encounter    10. Gastroesophageal reflux disease without esophagitis    11. Essential hypertension          HPI:    Tinea to right foot on Terbinafine to solution. Ongoing. Improved.   S/p compression fracture and right radius fracture. Ortho follow up-splint off. Now WBAT and okay to use walker without platform.   Hoping to get into AL.   Chronic anxiety, on scheduled TID meds.   Denies HA, chest pain, palpitations, dizziness.  No troubles breathing, no SOB, no Concerns with urination, no constipation. Continue senna. Eating and drinking okay. Sleeping okay. Working with therapies. Pain controlled   Weights improved to 185 >186.4>187.4> 185.2 pounds today with increase lasix. Labs reviewed   Lives at senior living apartments, ambulates with a walker or wheelchair.        CODE  STATUS/ADVANCE DIRECTIVES DISCUSSION:  Full Code  CPR/Full code   ALLERGIES:   Allergies   Allergen Reactions     Aspirin (Tartrazine Only) [Tartrazine] Unknown     Chicken Meat (Diagnostic) GI Disturbance     Chicken [Chicken Protein] Unknown     Other Food Allergy Unknown     TURKEY      PAST MEDICAL HISTORY:   Past Medical History:   Diagnosis Date     Anxiety      Chest wall trauma      Chronic prescription benzodiazepine use      Hypercholesteremia      Osteopenia of multiple sites 02/20/2024     Panic disorder       PAST SURGICAL HISTORY:   has a past surgical history that includes Biopsy breast (Right, 1987).  FAMILY HISTORY: family history includes Cancer in her brother and father; Heart Disease in her mother.  SOCIAL HISTORY:   reports that she has never smoked. She has never used smokeless tobacco. She reports that she does not drink alcohol and does not use drugs.  Patient's living condition: lives alone    Post Discharge Medication Reconciliation Status:   MED REC REQUIRED  Post Medication Reconciliation Status: discharge medications reconciled, continue medications without change       Current Outpatient Medications   Medication Sig Dispense Refill     acetaminophen (TYLENOL) 500 MG tablet Take 1-2 tablets (500-1,000 mg) by mouth every 6 hours as needed for mild pain 100 tablet 3     albuterol (PROAIR HFA/PROVENTIL HFA/VENTOLIN HFA) 108 (90 Base) MCG/ACT inhaler Inhale 2 puffs into the lungs every 6 hours as needed. 54 g 0     clonazePAM (KLONOPIN) 0.5 MG tablet Take 1 tablet (0.5 mg) by mouth 3 times daily. 60 tablet 0     FLUoxetine (PROZAC) 20 MG capsule Take 20 mg by mouth daily. 60 capsule 1     FLUoxetine (PROZAC) 40 MG capsule Take 40 mg by mouth daily.       gabapentin (NEURONTIN) 100 MG capsule Take 1 capsule (100 mg) by mouth 3 times daily. Increase to 200mg three times daily after 3-5 days if tolerating side effects and needing more pain relief. 180 capsule 0     Lidocaine (LIDOCARE) 4 %  "Patch Place 1 patch onto the skin every 24 hours. To prevent lidocaine toxicity, patient should be patch free for 12 hrs daily.       lovastatin (MEVACOR) 20 MG tablet Take 1 tablet (20 mg) by mouth at bedtime 90 tablet 3     mometasone-formoterol (DULERA) 200-5 MCG/ACT inhaler Inhale 2 puffs into the lungs 2 times daily. 13 g 3     nystatin (MYCOSTATIN) 322618 UNIT/GM external powder Apply topically.       omeprazole (PRILOSEC) 20 MG DR capsule Take 1 capsule (20 mg) by mouth daily 90 capsule 3     potassium chloride jossue ER (KLOR-CON M10) 10 MEQ CR tablet Take 1 tablet (10 mEq) by mouth daily. (Patient taking differently: Take 40 mEq by mouth daily.) 90 tablet 3     No current facility-administered medications for this visit.       ROS:  10 point ROS of systems including Constitutional, Eyes, Respiratory, Cardiovascular, Gastroenterology, Genitourinary, Integumentary, Musculoskeletal, Psychiatric were all negative except for pertinent positives noted in my HPI.    Vitals:  BP (!) 133/96   Pulse 75   Temp (!) 95  F (35  C)   Resp 18   Ht 1.499 m (4' 11\")   Wt 84.8 kg (187 lb)   SpO2 98%   BMI 37.77 kg/m    Exam:  GENERAL APPEARANCE:  Alert, in no distress, oriented, cooperative. Sitting up in chair.   ENT:  Mouth and posterior oropharynx normal, moist mucous membranes, several teeth missing.  EYES:  EOM, conjunctivae, lids, pupils and irises normal  NECK:  No adenopathy,masses or thyromegaly  RESP:  respiratory effort and palpation of chest normal, lungs clear to auscultation , no respiratory distress  CV:  Palpation and auscultation of heart done , regular rate and rhythm, no murmur, rub, or gallop, No edema in bilateral lower extremities  GI/ABDOMEN:  normal bowel sounds, soft, nontender, no hepatosplenomegaly or other masses  M/S:   moves extremities spontaneously. Left arm splint/brace off. No redness/swelling or warmth   SKIN:  no rash to exposed skin  NEURO:   intact   PSYCH:  oriented X 4, normal " "insight, judgement and memory, affect and mood normal.      Lab/Diagnostic data:  Recent labs in [facility] reviewed by me today.     Last Comprehensive Metabolic Panel:  Lab Results   Component Value Date     03/17/2025    POTASSIUM 3.8 03/17/2025    CHLORIDE 101 03/17/2025    CO2 21 (L) 03/17/2025    ANIONGAP 15 03/17/2025    GLC 96 03/17/2025    BUN 8.6 03/17/2025    CR 0.77 03/17/2025    GFRESTIMATED 83 03/17/2025    RIDGE 9.6 03/17/2025       Lab Results   Component Value Date    WBC 11.3 08/20/2024     Lab Results   Component Value Date    RBC 4.41 08/20/2024     Lab Results   Component Value Date    HGB 13.1 08/20/2024     Lab Results   Component Value Date    HCT 40.1 08/20/2024     No components found for: \"MCT\"  Lab Results   Component Value Date    MCV 91 08/20/2024     Lab Results   Component Value Date    MCH 29.7 08/20/2024     Lab Results   Component Value Date    MCHC 32.7 08/20/2024     Lab Results   Component Value Date    RDW 13.6 08/20/2024     Lab Results   Component Value Date     08/20/2024       ASSESSMENT/PLAN:    Acute / Subacute L3 Compression Fracture   Osteoporosis   Acute Low Back Pain   Mechanical Fall 2/13   Generalized Weakness   Left radius fracture  Describes several months of low back pain with history of compression fracture diagnosed at OSH at unclear location. Fell on 2/13, tripped getting out of the shower. Increased low back pain, generalized weakness since then. CT lumbar spine w/o IV Cont on admission with superior endplate compression fracture at L3 with 20-30% anterior vertebral height loss. Neurologically intact aside from pain and limited ROM with R hip flexion.   - NSGY consulted: appt on 4/9 for follow up  - PT / OT completed in TCU  - Continue PTA calcium + vitamin D   - Pain well controlled, plan to discontinue oxycodone  -Ortho follow up, splint now off. WBAT, okay to use walker without platform.   - WC ordered for discharge.     Bilateral Lower Extremity " Edema   New pitting lower extremity over the past 2-3 weeks. Denies shortness of breath, chest pain, lightheadedness or dizziness. Had switched from HCTZ to amlodipine at beginning of Feb, called back to clinic reporting edema 2/20 and had switched back recently. BNP 17 (may be falsely low in setting of obesity), albumin 3.4, UA unremarkable. Does have reduced mobility but given symmetric edema held off on duplex US.   - Increased lasix to 40mg  - Continue tubigrip compression stockings daily.    Hypokalemia   K 2.6, Mag 1.8 on admission. Was previously on long term potassium supplement with use of HCTZ that she had stopped earlier in the month. Denies N/V/D, somewhat poor appetite due to fatigue.   - Currently on Potassium supplementation 40 meq daily.  - BMP stable     Hyponatremia   Na 130, may be in setting of poor appetite.   - Encourage PO, recheck in AM   - BMP stable     Hypertension   Had recently switched from PTA HCTZ + potassium to amlodipine at beginning of Feb. Called back on 2/20 with reports of increased lower extremity and was told stop amlodipine at that time and restart HCTZ + potassium which patient tells me she has done.   - BP within normal limits on admission. Agree with stopping amlodipine give lower extremity edema   - Hold PTA HCTZ for now, would resume if elevated BP and improving potassium  - lasix to 40mg. Weights improved: 190>185>186 today    Asthma   - Continue Dulera + PRN albuterol     GERD   - Continue PTA PPI     Constipation   - PRN bowel regimen, would schedule if requiring frequent opioids     Anxiety   - Continue PTA fluoxetine, PCP has been considering decreasing dose due to tremors  - Continue PTA clonazepam 0.5 mg TID    Cutaneous Candidiasis  - Resolved, monitor     Tinea pedis  Ongoing   Terbinafine cream 1% daily , changed to solution today BID x 2 weeks. will reassess -reports improved.  She does like cream better, may change back.       Electronically signed by:  Diane  LEX Bhatt             Total time greater than 25 minutes reviewing chart in EPIC and PCC, medications, labs, vitals. Discussing with social work, physical therapy, occupational therapy and nursing.           Sincerely,        Diane Bhatt CNP    Electronically signed

## 2025-05-16 ENCOUNTER — LAB REQUISITION (OUTPATIENT)
Dept: LAB | Facility: CLINIC | Age: 70
End: 2025-05-16
Payer: COMMERCIAL

## 2025-05-16 DIAGNOSIS — E87.6 HYPOKALEMIA: ICD-10-CM

## 2025-05-19 ENCOUNTER — DISCHARGE SUMMARY NURSING HOME (OUTPATIENT)
Dept: GERIATRICS | Facility: CLINIC | Age: 70
End: 2025-05-19
Payer: COMMERCIAL

## 2025-05-19 ENCOUNTER — RESULTS FOLLOW-UP (OUTPATIENT)
Dept: GERIATRICS | Facility: CLINIC | Age: 70
End: 2025-05-19

## 2025-05-19 VITALS
HEIGHT: 59 IN | TEMPERATURE: 99.1 F | SYSTOLIC BLOOD PRESSURE: 138 MMHG | OXYGEN SATURATION: 97 % | WEIGHT: 190 LBS | BODY MASS INDEX: 38.3 KG/M2 | RESPIRATION RATE: 18 BRPM | HEART RATE: 80 BPM | DIASTOLIC BLOOD PRESSURE: 67 MMHG

## 2025-05-19 DIAGNOSIS — S32.030D COMPRESSION FRACTURE OF L3 VERTEBRA WITH ROUTINE HEALING, SUBSEQUENT ENCOUNTER: ICD-10-CM

## 2025-05-19 DIAGNOSIS — J45.20 MILD INTERMITTENT ASTHMA WITHOUT COMPLICATION: ICD-10-CM

## 2025-05-19 DIAGNOSIS — K59.01 SLOW TRANSIT CONSTIPATION: ICD-10-CM

## 2025-05-19 DIAGNOSIS — I10 ESSENTIAL HYPERTENSION: ICD-10-CM

## 2025-05-19 DIAGNOSIS — K21.9 GASTROESOPHAGEAL REFLUX DISEASE WITHOUT ESOPHAGITIS: ICD-10-CM

## 2025-05-19 DIAGNOSIS — B35.3 TINEA PEDIS OF RIGHT FOOT: Primary | ICD-10-CM

## 2025-05-19 DIAGNOSIS — R60.0 BILATERAL LOWER EXTREMITY EDEMA: ICD-10-CM

## 2025-05-19 DIAGNOSIS — M81.0 AGE-RELATED OSTEOPOROSIS WITHOUT CURRENT PATHOLOGICAL FRACTURE: ICD-10-CM

## 2025-05-19 DIAGNOSIS — S62.102D CLOSED FRACTURE OF LEFT WRIST WITH ROUTINE HEALING, SUBSEQUENT ENCOUNTER: ICD-10-CM

## 2025-05-19 DIAGNOSIS — E87.6 ACUTE HYPOKALEMIA: ICD-10-CM

## 2025-05-19 LAB
ANION GAP SERPL CALCULATED.3IONS-SCNC: 12 MMOL/L (ref 7–15)
BUN SERPL-MCNC: 9.4 MG/DL (ref 8–23)
CALCIUM SERPL-MCNC: 9.9 MG/DL (ref 8.8–10.4)
CHLORIDE SERPL-SCNC: 104 MMOL/L (ref 98–107)
CREAT SERPL-MCNC: 0.79 MG/DL (ref 0.51–0.95)
EGFRCR SERPLBLD CKD-EPI 2021: 81 ML/MIN/1.73M2
GLUCOSE SERPL-MCNC: 89 MG/DL (ref 70–99)
HCO3 SERPL-SCNC: 26 MMOL/L (ref 22–29)
POTASSIUM SERPL-SCNC: 4.1 MMOL/L (ref 3.4–5.3)
SODIUM SERPL-SCNC: 142 MMOL/L (ref 135–145)

## 2025-05-19 PROCEDURE — 80048 BASIC METABOLIC PNL TOTAL CA: CPT | Mod: ORL | Performed by: NURSE PRACTITIONER

## 2025-05-19 PROCEDURE — 99316 NF DSCHRG MGMT 30 MIN+: CPT | Performed by: NURSE PRACTITIONER

## 2025-05-19 PROCEDURE — P9603 ONE-WAY ALLOW PRORATED MILES: HCPCS | Mod: ORL | Performed by: NURSE PRACTITIONER

## 2025-05-19 PROCEDURE — 36415 COLL VENOUS BLD VENIPUNCTURE: CPT | Mod: ORL | Performed by: NURSE PRACTITIONER

## 2025-05-19 NOTE — LETTER
5/19/2025      Tootie Marin  [address]  [place] [address]        [facility] GERIATRICS DISCHARGE SUMMARY  PATIENT'S NAME: Tootie Marin  YOB: 1955  MEDICAL RECORD NUMBER:  [id number]  Place of Service where encounter took place:  [facility] ([facility]) [[id number]]    PRIMARY CARE PROVIDER AND CLINIC RESPONSIBLE AFTER TRANSFER:   Latricia Cloud MD, [address] [facility] [facility] [address]    Nursing Home: [facility]     Transferring providers: Diane Bhatt CNP, Aida Hussein MD  Recent Hospitalization/ED:  [facility]  stay 2/25/2025 to 2/27/2025.  Date of SNF Admission: 2/27/2025  Date of [facility] (anticipated) Discharge: 5/19/25-moving to LTC temporarily   Discharged to: new skilled nursing facility LTC  Cognitive Scores: see therapy notes   Physical Function: See therapy notes   DME: No DME needed    CODE STATUS/ADVANCE DIRECTIVES DISCUSSION:  Full Code   ALLERGIES: Aspirin (tartrazine only) [tartrazine], Chicken meat (diagnostic), Chicken [chicken protein], and Other food allergy    NURSING FACILITY COURSE   Medication Changes/Rationale:   See below     Summary of nursing facility stay:   Acute / Subacute L3 Compression Fracture   Osteoporosis   Acute Low Back Pain   Mechanical Fall 2/13   Generalized Weakness   Left radius fracture  Describes several months of low back pain with history of compression fracture diagnosed at OSH at unclear location. Fell on 2/13, tripped getting out of the shower. Increased low back pain, generalized weakness since then. CT lumbar spine w/o IV Cont on admission with superior endplate compression fracture at L3 with 20-30% anterior vertebral height loss. Neurologically intact aside from pain and limited ROM with R hip flexion.   - NSGY consulted: appt on 4/9 for follow up  - PT / OT completed in TCU  - Continue PTA calcium + vitamin D   - Pain well controlled, plan to discontinue  oxycodone  -Ortho follow up, splint now off. WBAT, okay to use walker without platform.   - WC ordered for discharge.     Bilateral Lower Extremity Edema   New pitting lower extremity over the past 2-3 weeks. Denies shortness of breath, chest pain, lightheadedness or dizziness. Had switched from HCTZ to amlodipine at beginning of Feb, called back to clinic reporting edema 2/20 and had switched back recently. BNP 17 (may be falsely low in setting of obesity), albumin 3.4, UA unremarkable. Does have reduced mobility but given symmetric edema held off on duplex US.   - Increased lasix to 40mg  - Continue tubigrip compression stockings daily.    Hypokalemia   K 2.6, Mag 1.8 on admission. Was previously on long term potassium supplement with use of HCTZ that she had stopped earlier in the month. Denies N/V/D, somewhat poor appetite due to fatigue.   - Currently on Potassium supplementation 40 meq daily.  - BMP stable     Hyponatremia   Na 130, may be in setting of poor appetite.   - Encourage PO, recheck in AM   - BMP stable     Hypertension   Had recently switched from PTA HCTZ + potassium to amlodipine at beginning of Feb. Called back on 2/20 with reports of increased lower extremity and was told stop amlodipine at that time and restart HCTZ + potassium which patient tells me she has done.   - BP within normal limits on admission. Agree with stopping amlodipine give lower extremity edema   - Hold PTA HCTZ for now, would resume if elevated BP and improving potassium  - lasix to 40mg. Weights improved: 190>185>186 today    Asthma   - Continue Dulera + PRN albuterol     GERD   - Continue PTA PPI     Constipation   - PRN bowel regimen, would schedule if requiring frequent opioids     Anxiety   - Continue PTA fluoxetine, PCP has been considering decreasing dose due to tremors  - Continue PTA clonazepam 0.5 mg TID     Cutaneous Candidiasis  - Resolved, monitor      Tinea pedis  Ongoing   Terbinafine cream 1% daily , changed  to solution today BID x 2 weeks however likes cream better          Discharge Medications:  MED REC REQUIRED  Post Medication Reconciliation Status: discharge medications reconciled, continue medications without change       Current Outpatient Medications   Medication Sig Dispense Refill     acetaminophen (TYLENOL) 500 MG tablet Take 1-2 tablets (500-1,000 mg) by mouth every 6 hours as needed for mild pain 100 tablet 3     albuterol (PROAIR HFA/PROVENTIL HFA/VENTOLIN HFA) 108 (90 Base) MCG/ACT inhaler Inhale 2 puffs into the lungs every 6 hours as needed. 54 g 0     clonazePAM (KLONOPIN) 0.5 MG tablet Take 1 tablet (0.5 mg) by mouth 3 times daily. 60 tablet 0     FLUoxetine (PROZAC) 20 MG capsule Take 20 mg by mouth daily. 60 capsule 1     FLUoxetine (PROZAC) 40 MG capsule Take 40 mg by mouth daily.       gabapentin (NEURONTIN) 100 MG capsule Take 1 capsule (100 mg) by mouth 3 times daily. Increase to 200mg three times daily after 3-5 days if tolerating side effects and needing more pain relief. 180 capsule 0     Lidocaine (LIDOCARE) 4 % Patch Place 1 patch onto the skin every 24 hours. To prevent lidocaine toxicity, patient should be patch free for 12 hrs daily.       lovastatin (MEVACOR) 20 MG tablet Take 1 tablet (20 mg) by mouth at bedtime 90 tablet 3     mometasone-formoterol (DULERA) 200-5 MCG/ACT inhaler Inhale 2 puffs into the lungs 2 times daily. 13 g 3     nystatin (MYCOSTATIN) 780708 UNIT/GM external powder Apply topically.       omeprazole (PRILOSEC) 20 MG DR capsule Take 1 capsule (20 mg) by mouth daily 90 capsule 3     potassium chloride jossue ER (KLOR-CON M10) 10 MEQ CR tablet Take 1 tablet (10 mEq) by mouth daily. (Patient taking differently: Take 40 mEq by mouth daily.) 90 tablet 3          Controlled medications:   not applicable/none     Past Medical History:   Past Medical History:   Diagnosis Date     Anxiety      Chest wall trauma      Chronic prescription benzodiazepine use       "Hypercholesteremia      Osteopenia of multiple sites 02/20/2024     Panic disorder      Physical Exam:   Vitals: /67   Pulse 80   Temp 99.1  F (37.3  C)   Resp 18   Ht 1.499 m (4' 11\")   Wt 86.2 kg (190 lb)   SpO2 97%   BMI 38.38 kg/m    BMI: Body mass index is 38.38 kg/m .  GENERAL APPEARANCE:  Alert, in no distress, oriented, cooperative  ENT:  Mouth and posterior oropharynx normal, moist mucous membranes  EYES:  EOM, conjunctivae, lids, pupils and irises normal  NECK:  No adenopathy,masses or thyromegaly  RESP:  respiratory effort and palpation of chest normal, lungs clear to auscultation , no respiratory distress  CV:  Palpation and auscultation of heart done , regular rate and rhythm, no murmur, rub, or gallop, no edema to LE  GI/ABDOMEN:  normal bowel sounds, soft, nontender, no hepatosplenomegaly or other masses  M/S:   moves extremities spontaneously.   SKIN:  no rashes to exposed skin  NEURO:   intact   PSYCH:  oriented X 3, normal insight, judgement and memory, affect and mood normal,        SNF labs: Recent labs in [facility] reviewed by me today.     DISCHARGE PLAN:  Follow up labs: As directed by PCP  Medical Follow Up:      Follow up with primary care provider in 1 weeks  Follow up with specialist Podiatry ordered    [facility] scheduled appointments:  Appointments in Next Year      May 19, 2025 10:30 AM  Transitional Care Unit Visit with Diane Bhatt CNP  [facility] ([facility] ) [phone]     May 29, 2025 3:00 PM  Pharmacist Visit with Cindi Nieves RPH  [facility] ([facility] - [facility] ) [phone]           Discharge Services: Home Care:  Occupational Therapy, Physical Therapy, Registered Nurse, and Home Health Aide  Discharge Instructions Verbalized to Patient at Discharge:   Notify your surgeon if you have increased redness, swelling, tenderness, or drainage at your " incision site.     TOTAL DISCHARGE TIME:   Greater than 30 minutes  Electronically signed by:  Diane Bhatt CNP     Documentation of Face to Face and Certification for Home Health Services    I certify that services are/were furnished while this patient was under the care of a physician and that a physician or an allowed non-physician practitioner (NPP), had a face-to-face encounter that meets the physician face-to-face encounter requirements. The encounter was in whole, or in part, related to the primary reason for home health. The patient is confined to his/her home and needs intermittent skilled nursing, physical therapy, speech-language pathology, or the continued need for occupational therapy. A plan of care has been established by a physician and is periodically reviewed by a physician.  Date of Face-to-Face Encounter: 5/19/2025.    I certify that, based on my findings, the following services are medically necessary home health services: Nursing, Occupational Therapy, Physical Therapy, and HHA.    My clinical findings support the need for the above skilled services because: Requires assistance of another person or specialized equipment to access medical services because patient: Requires supervision of another for safe transfer...    Patient to re-establish plan of care with their PCP within 7-10 days after leaving the facility to reestablish care.  Medicare certified PECOS provider: Diane Bhatt CNP  Date: May 19, 2025              Sincerely,        Diane Bhatt CNP    Electronically signed

## 2025-05-19 NOTE — PROGRESS NOTES
[facility] GERIATRICS DISCHARGE SUMMARY  PATIENT'S NAME: Tootie Marin  YOB: 1955  MEDICAL RECORD NUMBER:  [id number]  Place of Service where encounter took place:  [facility] ([facility]) [[id number]]    PRIMARY CARE PROVIDER AND CLINIC RESPONSIBLE AFTER TRANSFER:   Latricia Cloud MD, [address] [facility] [address] [address]    Nursing Home: [facility]     Transferring providers: Diane Bhatt CNP, Aida Hussein MD  Recent Hospitalization/ED:  Hospital  [facility]  stay 2/25/2025 to 2/27/2025.  Date of SNF Admission: 2/27/2025  Date of SNF (anticipated) Discharge: 5/19/25-moving to LTC temporarily   Discharged to: new skilled nursing facility LTC  Cognitive Scores: see therapy notes   Physical Function: See therapy notes   DME: No DME needed    CODE STATUS/ADVANCE DIRECTIVES DISCUSSION:  Full Code   ALLERGIES: Aspirin (tartrazine only) [tartrazine], Chicken meat (diagnostic), Chicken [chicken protein], and Other food allergy    NURSING FACILITY COURSE   Medication Changes/Rationale:   See below     Summary of nursing facility stay:   Acute / Subacute L3 Compression Fracture   Osteoporosis   Acute Low Back Pain   Mechanical Fall 2/13   Generalized Weakness   Left radius fracture  Describes several months of low back pain with history of compression fracture diagnosed at OSH at unclear location. Fell on 2/13, tripped getting out of the shower. Increased low back pain, generalized weakness since then. CT lumbar spine w/o IV Cont on admission with superior endplate compression fracture at L3 with 20-30% anterior vertebral height loss. Neurologically intact aside from pain and limited ROM with R hip flexion.   - NSGY consulted: appt on 4/9 for follow up  - PT / OT completed in TCU  - Continue PTA calcium + vitamin D   - Pain well controlled, plan to discontinue oxycodone  -Ortho follow up, splint now off. WBAT, okay to use walker without platform.    - WC ordered for discharge.     Bilateral Lower Extremity Edema   New pitting lower extremity over the past 2-3 weeks. Denies shortness of breath, chest pain, lightheadedness or dizziness. Had switched from HCTZ to amlodipine at beginning of Feb, called back to clinic reporting edema 2/20 and had switched back recently. BNP 17 (may be falsely low in setting of obesity), albumin 3.4, UA unremarkable. Does have reduced mobility but given symmetric edema held off on duplex US.   - Increased lasix to 40mg  - Continue tubigrip compression stockings daily.    Hypokalemia   K 2.6, Mag 1.8 on admission. Was previously on long term potassium supplement with use of HCTZ that she had stopped earlier in the month. Denies N/V/D, somewhat poor appetite due to fatigue.   - Currently on Potassium supplementation 40 meq daily.  - BMP stable     Hyponatremia   Na 130, may be in setting of poor appetite.   - Encourage PO, recheck in AM   - BMP stable     Hypertension   Had recently switched from PTA HCTZ + potassium to amlodipine at beginning of Feb. Called back on 2/20 with reports of increased lower extremity and was told stop amlodipine at that time and restart HCTZ + potassium which patient tells me she has done.   - BP within normal limits on admission. Agree with stopping amlodipine give lower extremity edema   - Hold PTA HCTZ for now, would resume if elevated BP and improving potassium  - lasix to 40mg. Weights improved: 190>185>186 today    Asthma   - Continue Dulera + PRN albuterol     GERD   - Continue PTA PPI     Constipation   - PRN bowel regimen, would schedule if requiring frequent opioids     Anxiety   - Continue PTA fluoxetine, PCP has been considering decreasing dose due to tremors  - Continue PTA clonazepam 0.5 mg TID     Cutaneous Candidiasis  - Resolved, monitor      Tinea pedis  Ongoing   Terbinafine cream 1% daily , changed to solution today BID x 2 weeks however likes cream better          Discharge  Medications:  MED REC REQUIRED  Post Medication Reconciliation Status: discharge medications reconciled, continue medications without change       Current Outpatient Medications   Medication Sig Dispense Refill    acetaminophen (TYLENOL) 500 MG tablet Take 1-2 tablets (500-1,000 mg) by mouth every 6 hours as needed for mild pain 100 tablet 3    albuterol (PROAIR HFA/PROVENTIL HFA/VENTOLIN HFA) 108 (90 Base) MCG/ACT inhaler Inhale 2 puffs into the lungs every 6 hours as needed. 54 g 0    clonazePAM (KLONOPIN) 0.5 MG tablet Take 1 tablet (0.5 mg) by mouth 3 times daily. 60 tablet 0    FLUoxetine (PROZAC) 20 MG capsule Take 20 mg by mouth daily. 60 capsule 1    FLUoxetine (PROZAC) 40 MG capsule Take 40 mg by mouth daily.      gabapentin (NEURONTIN) 100 MG capsule Take 1 capsule (100 mg) by mouth 3 times daily. Increase to 200mg three times daily after 3-5 days if tolerating side effects and needing more pain relief. 180 capsule 0    Lidocaine (LIDOCARE) 4 % Patch Place 1 patch onto the skin every 24 hours. To prevent lidocaine toxicity, patient should be patch free for 12 hrs daily.      lovastatin (MEVACOR) 20 MG tablet Take 1 tablet (20 mg) by mouth at bedtime 90 tablet 3    mometasone-formoterol (DULERA) 200-5 MCG/ACT inhaler Inhale 2 puffs into the lungs 2 times daily. 13 g 3    nystatin (MYCOSTATIN) 334136 UNIT/GM external powder Apply topically.      omeprazole (PRILOSEC) 20 MG DR capsule Take 1 capsule (20 mg) by mouth daily 90 capsule 3    potassium chloride jossue ER (KLOR-CON M10) 10 MEQ CR tablet Take 1 tablet (10 mEq) by mouth daily. (Patient taking differently: Take 40 mEq by mouth daily.) 90 tablet 3          Controlled medications:   not applicable/none     Past Medical History:   Past Medical History:   Diagnosis Date    Anxiety     Chest wall trauma     Chronic prescription benzodiazepine use     Hypercholesteremia     Osteopenia of multiple sites 02/20/2024    Panic disorder      Physical Exam:  "  Vitals: /67   Pulse 80   Temp 99.1  F (37.3  C)   Resp 18   Ht 1.499 m (4' 11\")   Wt 86.2 kg (190 lb)   SpO2 97%   BMI 38.38 kg/m    BMI: Body mass index is 38.38 kg/m .  GENERAL APPEARANCE:  Alert, in no distress, oriented, cooperative  ENT:  Mouth and posterior oropharynx normal, moist mucous membranes  EYES:  EOM, conjunctivae, lids, pupils and irises normal  NECK:  No adenopathy,masses or thyromegaly  RESP:  respiratory effort and palpation of chest normal, lungs clear to auscultation , no respiratory distress  CV:  Palpation and auscultation of heart done , regular rate and rhythm, no murmur, rub, or gallop, no edema to LE  GI/ABDOMEN:  normal bowel sounds, soft, nontender, no hepatosplenomegaly or other masses  M/S:   moves extremities spontaneously.   SKIN:  no rashes to exposed skin  NEURO:   intact   PSYCH:  oriented X 3, normal insight, judgement and memory, affect and mood normal,        SNF labs: Recent labs in [facility] reviewed by me today.     DISCHARGE PLAN:  Follow up labs: As directed by PCP  Medical Follow Up:      Follow up with primary care provider in 1 weeks  Follow up with specialist Podiatry ordered    [facility] scheduled appointments:  Appointments in Next Year      May 19, 2025 10:30 AM  Transitional Care Unit Visit with Diane Bhatt CNP  [facility] ([facility] ) [phone]     May 29, 2025 3:00 PM  Pharmacist Visit with Cindi Nieves RPH  [facility] ([facility] ) [phone]           Discharge Services: Home Care:  Occupational Therapy, Physical Therapy, Registered Nurse, and Home Health Aide  Discharge Instructions Verbalized to Patient at Discharge:   Notify your surgeon if you have increased redness, swelling, tenderness, or drainage at your incision site.     TOTAL DISCHARGE TIME:   Greater than 30 minutes  Electronically signed by:  Diane Bhatt" CNP     Documentation of Face to Face and Certification for Home Health Services    I certify that services are/were furnished while this patient was under the care of a physician and that a physician or an allowed non-physician practitioner (NPP), had a face-to-face encounter that meets the physician face-to-face encounter requirements. The encounter was in whole, or in part, related to the primary reason for home health. The patient is confined to his/her home and needs intermittent skilled nursing, physical therapy, speech-language pathology, or the continued need for occupational therapy. A plan of care has been established by a physician and is periodically reviewed by a physician.  Date of Face-to-Face Encounter: 5/19/2025.    I certify that, based on my findings, the following services are medically necessary home health services: Nursing, Occupational Therapy, Physical Therapy, and HHA.    My clinical findings support the need for the above skilled services because: Requires assistance of another person or specialized equipment to access medical services because patient: Requires supervision of another for safe transfer...    Patient to re-establish plan of care with their PCP within 7-10 days after leaving the facility to reestablish care.  Medicare certified PECOS provider: Diane Bhatt CNP  Date: May 19, 2025

## 2025-05-20 ENCOUNTER — PATIENT OUTREACH (OUTPATIENT)
Dept: GERIATRIC MEDICINE | Facility: CLINIC | Age: 70
End: 2025-05-20
Payer: COMMERCIAL

## 2025-05-20 NOTE — PROGRESS NOTES
[facility] Care Coordination Contact    Received after visit chart from care coordinator.  Completed following tasks: Mailed copy of support plan to member, Mailed [place] Cho[place] signature sheet pages 3-4, Mailed Safe Medication Disposal , and Mailed [employer] Leave Behind Letter.     Gayle Naranjo (My-Lee)  Care Management Specialist  [facility]  [phone]

## 2025-05-20 NOTE — LETTER
May 20, 2025    ELIUD MARINO  [address]  [place] [address]      Marcelo Sommers,    I hope you ve been well since we last spoke. Attached is your copy of your personalized Support Plan which summarizes our conversation.   My goal is to help you keep your health on track. Your Support Plan includes the steps we agreed would help you with that. We can talk about these steps during our next meeting or sooner if you d like.   Here are additional programs and services I can help you with:    Get to appointments with [facility]    If you need a ride to medical or dental visits, [facility] can help. Call to schedule a ride. [phone] (TTY:090), 8 a.m. - 6 p.m. CT, Monday - Friday.    Access [employer] to boost your health    Get a gym membership, at-home fitness classes, and free access to fun activities that help your brain. To get access, go to [employer] or call One[employer].   [phone] (TTY:714), 8 a.m. - 9 p.m. CT, Monday - Friday      Set up your health care directive  A directive is a legal form that explains your health care wishes. You can request this form from me, and I ll walk you through it before you discuss your plans with your doctor.    Schedule your annual physical  I can help set up your annual physical at your clinic of choice. It's an important step towards good health.      Have questions? I m here to help.  Call me at [phone] (TTY:[phone]) 8am - 5pm, Monday - Friday.  I ll reach out to you again in 6 months to follow up via telephone.  Warm regards,    Inez Dela Cruz RN  [phone]  [email or web address]    cc: member records                                                                    [email or web address]

## 2025-05-21 ENCOUNTER — DOCUMENTATION ONLY (OUTPATIENT)
Dept: GERIATRICS | Facility: CLINIC | Age: 70
End: 2025-05-21
Payer: COMMERCIAL

## 2025-05-21 DIAGNOSIS — Z09 HOSPITAL DISCHARGE FOLLOW-UP: ICD-10-CM

## 2025-05-22 ENCOUNTER — TELEPHONE (OUTPATIENT)
Dept: GERIATRICS | Facility: CLINIC | Age: 70
End: 2025-05-22
Payer: COMMERCIAL

## 2025-05-22 NOTE — TELEPHONE ENCOUNTER
MTM referral from: Transitions of Care (recent hospital discharge, TCU discharge, or ED visit)    MTM referral outreach attempt #1 on May 22, 2025 at 12:58 PM      Outcome: Patient is not interested at this time because no changes    Use   5/22 [employer]  [facility] map(tcu d/c 05/19/25)    for the carrier/Plan on the flowsheet          Tara Correa MT

## 2025-05-27 DIAGNOSIS — F41.1 ANXIETY STATE: ICD-10-CM

## 2025-05-27 DIAGNOSIS — F41.0 PANIC DISORDER WITHOUT AGORAPHOBIA: ICD-10-CM

## 2025-05-27 RX ORDER — CLONAZEPAM 0.5 MG/1
0.5 TABLET ORAL 3 TIMES DAILY
Qty: 60 TABLET | Refills: 0 | OUTPATIENT
Start: 2025-05-27

## 2025-05-27 RX ORDER — CLONAZEPAM 0.5 MG/1
0.5 TABLET ORAL 3 TIMES DAILY
Qty: 90 TABLET | Refills: 0 | Status: SHIPPED | OUTPATIENT
Start: 2025-05-27

## 2025-05-28 ENCOUNTER — PATIENT OUTREACH (OUTPATIENT)
Dept: GERIATRIC MEDICINE | Facility: CLINIC | Age: 70
End: 2025-05-28

## 2025-05-28 ENCOUNTER — NURSING HOME VISIT (OUTPATIENT)
Dept: GERIATRICS | Facility: CLINIC | Age: 70
End: 2025-05-28
Payer: COMMERCIAL

## 2025-05-28 VITALS
OXYGEN SATURATION: 97 % | RESPIRATION RATE: 18 BRPM | HEART RATE: 78 BPM | BODY MASS INDEX: 37.9 KG/M2 | HEIGHT: 59 IN | TEMPERATURE: 98.3 F | WEIGHT: 188 LBS | SYSTOLIC BLOOD PRESSURE: 139 MMHG | DIASTOLIC BLOOD PRESSURE: 76 MMHG

## 2025-05-28 DIAGNOSIS — S62.102D CLOSED FRACTURE OF LEFT WRIST WITH ROUTINE HEALING, SUBSEQUENT ENCOUNTER: ICD-10-CM

## 2025-05-28 DIAGNOSIS — I50.20 SYSTOLIC CONGESTIVE HEART FAILURE, UNSPECIFIED HF CHRONICITY (H): ICD-10-CM

## 2025-05-28 DIAGNOSIS — E66.01 MORBID OBESITY (H): ICD-10-CM

## 2025-05-28 DIAGNOSIS — J45.20 MILD INTERMITTENT ASTHMA WITHOUT COMPLICATION: ICD-10-CM

## 2025-05-28 DIAGNOSIS — I10 ESSENTIAL HYPERTENSION: ICD-10-CM

## 2025-05-28 DIAGNOSIS — S32.030D CLOSED COMPRESSION FRACTURE OF L3 LUMBAR VERTEBRA WITH ROUTINE HEALING, SUBSEQUENT ENCOUNTER: Primary | ICD-10-CM

## 2025-05-28 PROCEDURE — 99309 SBSQ NF CARE MODERATE MDM 30: CPT | Performed by: NURSE PRACTITIONER

## 2025-05-28 NOTE — PROGRESS NOTES
[facility] GERIATRICS    PRIMARY CARE PROVIDER AND CLINIC:  Yesica Trammell, CNP, [address] [facility] [address]  Chief Complaint   Patient presents with    [facility] Medical Record Number:  [id number]  Place of Service where encounter took place:  [facility] () [[id number]]    Tootie Marin  is a 69 year old  (1955), admitted to the above facility from  [facility] . Hospital stay 2/25 through 2/27..   HPI:    Tootie is seen to establish care on the LTC side of [facility]. She has been on the TCU since February, now on our ltc while awaiting transfer to [facility]. She had a fall at home and resulting L3 burst fracture and distal radius fracture. Was casted. No longer needing brace. In TCU her lasix was increased to 40 and started on compression socks.     She is seen sitting up in her recliner. She can ambulate short distances with walker. She is often out of the facility on LOAs. A/o. Awaiting [facility] placement and EW. Denies pain or SOB today. Labs look stable. Weights 1888 today which looks like TCU baseline.     CODE STATUS/ADVANCE DIRECTIVES DISCUSSION:  Full Code  CPR/Full code   ALLERGIES:   Allergies   Allergen Reactions    Aspirin (Tartrazine Only) [Tartrazine] Unknown    Chicken Meat (Diagnostic) GI Disturbance    Chicken [Chicken Protein] Unknown    Other Food Allergy Unknown     TURKEY      PAST MEDICAL HISTORY:   Past Medical History:   Diagnosis Date    Anxiety     Chest wall trauma     Chronic prescription benzodiazepine use     Hypercholesteremia     Osteopenia of multiple sites 02/20/2024    Panic disorder       PAST SURGICAL HISTORY:   has a past surgical history that includes Biopsy breast (Right, 1987).  FAMILY HISTORY: family history includes Cancer in her brother and father; Heart Disease in her mother.  SOCIAL HISTORY:   reports that she has never smoked. She has never used smokeless tobacco. She reports that she does  not drink alcohol and does not use drugs.  Patient's living condition: awaiting [facility]INE    Post Discharge Medication Reconciliation Status:   MED REC REQUIRED  Post Medication Reconciliation Status:  Discharge medications reconciled and changed, see notes/orders       Current Outpatient Medications   Medication Sig Dispense Refill    acetaminophen (TYLENOL) 500 MG tablet Take 1-2 tablets (500-1,000 mg) by mouth every 6 hours as needed for mild pain 100 tablet 3    albuterol (PROAIR HFA/PROVENTIL HFA/VENTOLIN HFA) 108 (90 Base) MCG/ACT inhaler Inhale 2 puffs into the lungs every 6 hours as needed. 54 g 0    clonazePAM (KLONOPIN) 0.5 MG tablet Take 1 tablet (0.5 mg) by mouth 3 times daily. 90 tablet 0    FLUoxetine (PROZAC) 20 MG capsule Take 20 mg by mouth daily. 60 capsule 1    FLUoxetine (PROZAC) 40 MG capsule Take 40 mg by mouth daily.      gabapentin (NEURONTIN) 100 MG capsule Take 1 capsule (100 mg) by mouth 3 times daily. Increase to 200mg three times daily after 3-5 days if tolerating side effects and needing more pain relief. 180 capsule 0    Lidocaine (LIDOCARE) 4 % Patch Place 1 patch onto the skin every 24 hours. To prevent lidocaine toxicity, patient should be patch free for 12 hrs daily.      lovastatin (MEVACOR) 20 MG tablet Take 1 tablet (20 mg) by mouth at bedtime 90 tablet 3    mometasone-formoterol (DULERA) 200-5 MCG/ACT inhaler Inhale 2 puffs into the lungs 2 times daily. 13 g 3    nystatin (MYCOSTATIN) 569284 UNIT/GM external powder Apply topically.      omeprazole (PRILOSEC) 20 MG DR capsule Take 1 capsule (20 mg) by mouth daily 90 capsule 3    potassium chloride jossue ER (KLOR-CON M10) 10 MEQ CR tablet Take 1 tablet (10 mEq) by mouth daily. (Patient taking differently: Take 40 mEq by mouth daily.) 90 tablet 3     No current facility-administered medications for this visit.       ROS:  4 point ROS including Respiratory, CV, GI and , other than that noted in the HPI,  is negative    Vitals:  BP  "139/76   Pulse 78   Temp 98.3  F (36.8  C)   Resp 18   Ht 1.499 m (4' 11\")   Wt 85.3 kg (188 lb)   SpO2 97%   BMI 37.97 kg/m    Exam:  General appearance: alert, cooperative. Obese.   Lungs: respirations unlabored,no wheezing or rales.   Cardiovascular: Regular rate and rhythm.   ABDOMEN: Globular and soft, non tender.    Extremities: extremities normal, atraumatic,.  Skin: No rashes or lesions where visible.   Neurologic: oriented. No focal deficits.   Psych: interacts well with caregivers,  pleasant    Lab/Diagnostic data:  Recent labs in [facility] reviewed by me today.     ASSESSMENT/PLAN:    1. Closed compression fracture of L3 lumbar vertebra with routine healing, subsequent encounter    2. Mild intermittent asthma without complication    3. Essential hypertension    4. Morbid obesity (H)    5. Closed fracture of left wrist with routine healing, subsequent encounter    6. Systolic congestive heart failure, unspecified HF chronicity (H)      Compression fx of L3  Left wrist radial fx  -No longer using brace.   -Pain controlled with acetaminophen.     HTN  CHF  Jose Rafael LE edema  -Furosemide 40 daily.   -Daily weights  -KCL supplement 40 meq daily.   -BMPs have been stable. Periodic monitoring.     Mild intermittent asthma  -Albuterol prn, Dulera daily.     Orders:  NNO    Electronically signed by:  Yesica Trammell CNP                 "

## 2025-05-28 NOTE — LETTER
5/28/2025      Tootie Marin  [address]  [place] [address]        [facility] GERIATRICS    PRIMARY CARE PROVIDER AND CLINIC:  Yesica Trammell, CNP, [address] [facility] [address]  Chief Complaint   Patient presents with     Establish Care      [facility] Record Number:  [id number]  Place of Service where encounter took place:  [facility] () [[id number]]    Tootie Marin  is a 69 year old  (1955), admitted to the above facility from  [facility] . Hospital stay 2/25 through 2/27..   HPI:    Tootie is seen to establish care on the LTC side of [facility]. She has been on the TCU since February, now [facility] ltc [facility] She had a fall at home and resulting L3 burst fracture and distal radius fracture. Was casted. No longer needing brace. In TCU her lasix was increased to 40 and started on compression socks.     She is seen sitting up in her recliner. She can ambulate short distances with walker. She is often out of the facility on LOAs. A/o. [facility]wait[facility] and EW. Denies pain or SOB today. Labs look stable. Weights 1888 today which looks like TCU baseline.     CODE STATUS/ADVANCE DIRECTIVES DISCUSSION:  Full Code  CPR/Full code   ALLERGIES:   Allergies   Allergen Reactions     Aspirin (Tartrazine Only) [Tartrazine] Unknown     Chicken Meat (Diagnostic) GI Disturbance     Chicken [Chicken Protein] Unknown     Other Food Allergy Unknown     TURKEY      PAST MEDICAL HISTORY:   Past Medical History:   Diagnosis Date     Anxiety      Chest wall trauma      Chronic prescription benzodiazepine use      Hypercholesteremia      Osteopenia of multiple sites 02/20/2024     Panic disorder       PAST SURGICAL HISTORY:   has a past surgical history that includes Biopsy breast (Right, 1987).  FAMILY HISTORY: family history includes Cancer in her brother and father; Heart Disease in her mother.  SOCIAL HISTORY:    reports that she has never smoked. She has never used smokeless tobacco. She reports that she does not drink alcohol and does not use drugs.  Patient's living condition: awaiting [facility]    Post Discharge Medication Reconciliation Status:   MED REC REQUIRED  Post Medication Reconciliation Status:  Discharge medications reconciled and changed, see notes/orders       Current Outpatient Medications   Medication Sig Dispense Refill     acetaminophen (TYLENOL) 500 MG tablet Take 1-2 tablets (500-1,000 mg) by mouth every 6 hours as needed for mild pain 100 tablet 3     albuterol (PROAIR HFA/PROVENTIL HFA/VENTOLIN HFA) 108 (90 Base) MCG/ACT inhaler Inhale 2 puffs into the lungs every 6 hours as needed. 54 g 0     clonazePAM (KLONOPIN) 0.5 MG tablet Take 1 tablet (0.5 mg) by mouth 3 times daily. 90 tablet 0     FLUoxetine (PROZAC) 20 MG capsule Take 20 mg by mouth daily. 60 capsule 1     FLUoxetine (PROZAC) 40 MG capsule Take 40 mg by mouth daily.       gabapentin (NEURONTIN) 100 MG capsule Take 1 capsule (100 mg) by mouth 3 times daily. Increase to 200mg three times daily after 3-5 days if tolerating side effects and needing more pain relief. 180 capsule 0     Lidocaine (LIDOCARE) 4 % Patch Place 1 patch onto the skin every 24 hours. To prevent lidocaine toxicity, patient should be patch free for 12 hrs daily.       lovastatin (MEVACOR) 20 MG tablet Take 1 tablet (20 mg) by mouth at bedtime 90 tablet 3     mometasone-formoterol (DULERA) 200-5 MCG/ACT inhaler Inhale 2 puffs into the lungs 2 times daily. 13 g 3     nystatin (MYCOSTATIN) 265322 UNIT/GM external powder Apply topically.       omeprazole (PRILOSEC) 20 MG DR capsule Take 1 capsule (20 mg) by mouth daily 90 capsule 3     potassium chloride jossue ER (KLOR-CON M10) 10 MEQ CR tablet Take 1 tablet (10 mEq) by mouth daily. (Patient taking differently: Take 40 mEq by mouth daily.) 90 tablet 3     No current facility-administered medications for this visit.       ROS:  4  "point ROS including Respiratory, CV, GI and , other than that noted in the HPI,  is negative    Vitals:  /76   Pulse 78   Temp 98.3  F (36.8  C)   Resp 18   Ht 1.499 m (4' 11\")   Wt 85.3 kg (188 lb)   SpO2 97%   BMI 37.97 kg/m    Exam:  General appearance: alert, cooperative. Obese.   Lungs: respirations unlabored,no wheezing or rales.   Cardiovascular: Regular rate and rhythm.   ABDOMEN: Globular and soft, non tender.    Extremities: extremities normal, atraumatic,.  Skin: No rashes or lesions where visible.   Neurologic: oriented. No focal deficits.   Psych: interacts well with caregivers,  pleasant    Lab/Diagnostic data:  Recent labs in [facility] reviewed by me today.     ASSESSMENT/PLAN:    1. Closed compression fracture of L3 lumbar vertebra with routine healing, subsequent encounter    2. Mild intermittent asthma without complication    3. Essential hypertension    4. Morbid obesity (H)    5. Closed fracture of left wrist with routine healing, subsequent encounter    6. Systolic congestive heart failure, unspecified HF chronicity (H)      Compression fx of L3  Left wrist radial fx  -No longer using brace.   -Pain controlled with acetaminophen.     HTN  CHF  Jose Rafael LE edema  -Furosemide 40 daily.   -Daily weights  -KCL supplement 40 meq daily.   -BMPs have been stable. Periodic monitoring.     Mild intermittent asthma  -Albuterol prn, Dulera daily.     Orders:  NNO    Electronically signed by:  Yesica Trammell CNP                   Sincerely,        Yesica Trammell CNP    Electronically signed  "

## 2025-05-29 NOTE — TELEPHONE ENCOUNTER
Looks like pt is now in assisted living. She has established with another provider at this time. Will close encounter.       Marcelo Stanford Jr., CMA on 5/29/2025 at 6:03 PM    
Pt is still in TCU      Marcleo Stanford Jr., CMA on 3/17/2025 at 8:14 AM    
Received two forms today from [facility] for pt as they are requesting a medical bed and power lift mechanism.       EDFU is needed for these forms to be filled out.     Writer did call pt, and relayed this information to her. Pt seemed a little confused regarding the call, but was in the middle of PT. Writer did advise that a call will be placed next week or so to see if pt is still in TCU, and if she can schedule the appt.       Pt was thankful for the call.       Marcelo Stanford Jr., CMA on 3/6/2025 at 2:38 PM    
IV discontinued, cath removed intact

## 2025-06-05 ENCOUNTER — NURSING HOME VISIT (OUTPATIENT)
Dept: GERIATRICS | Facility: CLINIC | Age: 70
End: 2025-06-05
Payer: COMMERCIAL

## 2025-06-05 DIAGNOSIS — F41.1 ANXIETY STATE: ICD-10-CM

## 2025-06-05 DIAGNOSIS — S62.102D CLOSED FRACTURE OF LEFT WRIST WITH ROUTINE HEALING, SUBSEQUENT ENCOUNTER: ICD-10-CM

## 2025-06-05 DIAGNOSIS — J45.20 MILD INTERMITTENT ASTHMA WITHOUT COMPLICATION: ICD-10-CM

## 2025-06-05 DIAGNOSIS — S32.030D CLOSED COMPRESSION FRACTURE OF L3 LUMBAR VERTEBRA WITH ROUTINE HEALING, SUBSEQUENT ENCOUNTER: Primary | ICD-10-CM

## 2025-06-05 DIAGNOSIS — I10 ESSENTIAL HYPERTENSION: ICD-10-CM

## 2025-06-05 PROCEDURE — 99309 SBSQ NF CARE MODERATE MDM 30: CPT | Performed by: FAMILY MEDICINE

## 2025-06-05 NOTE — LETTER
6/5/2025      Tootie Marin  [address]  [place] [address]        No notes on file      Sincerely,        Aida Hussein MD    Electronically signed

## 2025-06-08 RX ORDER — POTASSIUM CHLORIDE 750 MG/1
40 TABLET, EXTENDED RELEASE ORAL DAILY
COMMUNITY
Start: 2025-05-19

## 2025-06-08 RX ORDER — FUROSEMIDE 40 MG/1
40 TABLET ORAL DAILY
COMMUNITY
Start: 2025-06-08

## 2025-06-10 VITALS
WEIGHT: 188.5 LBS | HEART RATE: 75 BPM | DIASTOLIC BLOOD PRESSURE: 79 MMHG | RESPIRATION RATE: 18 BRPM | HEIGHT: 59 IN | OXYGEN SATURATION: 99 % | SYSTOLIC BLOOD PRESSURE: 108 MMHG | BODY MASS INDEX: 38 KG/M2 | TEMPERATURE: 98.9 F

## 2025-06-11 ENCOUNTER — LAB REQUISITION (OUTPATIENT)
Dept: LAB | Facility: CLINIC | Age: 70
End: 2025-06-11
Payer: COMMERCIAL

## 2025-06-11 DIAGNOSIS — E87.6 HYPOKALEMIA: ICD-10-CM

## 2025-06-11 NOTE — PROGRESS NOTES
Writer emailed Annalise ([email or web address] to ask about U-code removal, so that writer can open member up to Elderly Waiver services.  Writer asked for a status update.    Inez Dela Cruz RN  [facility]  [phone]

## 2025-06-11 NOTE — PROGRESS NOTES
Writer send another email to Annalise ([email or web address].) asking for a status update on U-code removal.    Inez DelaC ruz RN  [facility]  [phone]

## 2025-06-12 ENCOUNTER — RESULTS FOLLOW-UP (OUTPATIENT)
Dept: GERIATRICS | Facility: CLINIC | Age: 70
End: 2025-06-12

## 2025-06-12 LAB
ANION GAP SERPL CALCULATED.3IONS-SCNC: 8 MMOL/L (ref 7–15)
BUN SERPL-MCNC: 13.9 MG/DL (ref 8–23)
CALCIUM SERPL-MCNC: 9.5 MG/DL (ref 8.8–10.4)
CHLORIDE SERPL-SCNC: 105 MMOL/L (ref 98–107)
CREAT SERPL-MCNC: 0.83 MG/DL (ref 0.51–0.95)
EGFRCR SERPLBLD CKD-EPI 2021: 76 ML/MIN/1.73M2
GLUCOSE SERPL-MCNC: 87 MG/DL (ref 70–99)
HCO3 SERPL-SCNC: 27 MMOL/L (ref 22–29)
POTASSIUM SERPL-SCNC: 3.7 MMOL/L (ref 3.4–5.3)
SODIUM SERPL-SCNC: 140 MMOL/L (ref 135–145)

## 2025-06-12 PROCEDURE — 36415 COLL VENOUS BLD VENIPUNCTURE: CPT | Mod: ORL | Performed by: NURSE PRACTITIONER

## 2025-06-12 PROCEDURE — P9604 ONE-WAY ALLOW PRORATED TRIP: HCPCS | Mod: ORL | Performed by: NURSE PRACTITIONER

## 2025-06-12 PROCEDURE — 80048 BASIC METABOLIC PNL TOTAL CA: CPT | Mod: ORL | Performed by: NURSE PRACTITIONER

## 2025-06-12 NOTE — RESULT ENCOUNTER NOTE
Potassium stable (pt was asking why not on supplement); has not been on KCL for 2+ months per MAR review I did yesterday. No need at this time.

## 2025-06-23 DIAGNOSIS — F41.0 PANIC DISORDER WITHOUT AGORAPHOBIA: ICD-10-CM

## 2025-06-23 DIAGNOSIS — F41.1 ANXIETY STATE: ICD-10-CM

## 2025-06-23 RX ORDER — CLONAZEPAM 0.5 MG/1
0.5 TABLET ORAL 3 TIMES DAILY
Qty: 30 TABLET | Refills: 1 | Status: SHIPPED | OUTPATIENT
Start: 2025-06-23

## 2025-06-25 ENCOUNTER — PATIENT OUTREACH (OUTPATIENT)
Dept: GERIATRIC MEDICINE | Facility: CLINIC | Age: 70
End: 2025-06-25
Payer: COMMERCIAL

## 2025-06-25 ASSESSMENT — ACTIVITIES OF DAILY LIVING (ADL): DEPENDENT_IADLS:: CLEANING;COOKING;LAUNDRY;MEAL PREPARATION;MEDICATION MANAGEMENT

## 2025-06-25 NOTE — PROGRESS NOTES
[facility] Care Coordination Contact    [facility] Early Reassessment     Home visit for Change in Condition Health Risk Assessment with Tootie Marin completed on April 22, 2025    Reason for Early reassessment: Health Status Change  Yes, if yes explain fall resulting in injury.                  Current Care Plan  Member currently receiving the following home care services:     Member currently receiving the following community resources:         Medication Review  Medication reconciliation completed in Epic: Yes  Medication set-up & administration: RN set up daily.  Self-administers medications.  Medication Risk Assessment Medication (1 or more, place referral to MTM): N/A: No risk factors identified  MTM Referral Placed: No: No risk factors idenified    Mental/Behavioral Health   Depression Screening:                     Falls Assessment:            ADL/IADL Dependencies:           Health Plan sponsored benefits: [employer]: Shared information regarding [employer]. Reviewed preventative health screening and health plan supplemental benefits/incentives. Reviewed medication disposal form.    CFSS Assessment completed at visit: Not Applicable      Elderly Waiver Eligibility: Yes-will open to EW    Care Plan & Recommendations: Per Support Plan.    See [place] for detailed assessment information.    Follow-Up Plan: Member informed of future contact, plan to f/u with member with a 6 month telephone assessment.  Contact information shared with member and family, encouraged member to call with any questions or concerns at any time.    [place] care continuum providers: Please see Snapshot and Care Management Flowsheets for Specific details of care plan.    This CC note routed to PCP, Yesica Trammell.     Inez Dela Cruz RN  [facility]  [phone]

## 2025-06-25 NOTE — PROGRESS NOTES
Writer had been trying to contact this member, but her phone has been sending writer to voice mail, so writer had been leaving her messages.  Writer called the facility today and asked to speak to member.  Member told writer that member's phone has been off.  Writer asked member for an update on which assisted living locations she has visited.  Member told writer that she visited a location, but that location requires that she pay 6 months upfront, so she is planning to visit a location in [place] on 7-3-25.  Writer asked member if she was able to visit more than 1 location in the last 2 months, but she said that she's been on a  time out  for 2-3 weeks with [employer] and that has not allowed her to visit all of the places that member told writer about.      Writer asked member about her thoughts on staying at her current location ([facility]) vs the [place] location and she told me that she doesn't mind her current location, but she [place] the [place] location [place].  She told writer that she is also considering staying at [facility], but she will decide after visiting the [place] location.    The annual assessment with this member was done 65 days ago.  The [facility] [employer] worker responded to writer's emails (5-28-25 and 6-11-25) on Monday and told me that the request for the Ucode to be removed has been denied until the member has an anticipated date.    Writer will wait to hear back from member as well as management to see what best course of action is.    Inez Dela Cruz RN  [facility]  [phone]

## 2025-07-02 ENCOUNTER — TELEPHONE (OUTPATIENT)
Dept: GERIATRICS | Facility: CLINIC | Age: 70
End: 2025-07-02
Payer: COMMERCIAL

## 2025-07-02 NOTE — TELEPHONE ENCOUNTER
Noted and agree with plan.   
[facility] Geriatrics Triage Nurse Telephone Encounter    Provider: ROBERT Parker  Facility: [facility] Facility Type:  LTC    Caller: Matthew  Call Back Number: [phone]    Allergies:    Allergies   Allergen Reactions    Aspirin (Tartrazine Only) [Tartrazine] Unknown    Chicken Meat (Diagnostic) GI Disturbance    Chicken [Chicken Protein] Unknown    Other Food Allergy Unknown     TURKEY        Reason for call: Pt fell on 6/30 in the evening while walking in the hallway. Now c/o pain in right hip and buttocks region. States ambulation is difficult since falling. VS stable.     Verbal Order/Direction given by Provider:   [facility] STANDING ORDER GIVEN:  - X-ray for trauma/bruising and acute pain for affected limb or body part. (XR of right hip and pelvis)    Provider giving Order:  ROBERT Parker    Verbal Order given to: Matthew Hyatt RN      
0 = independent

## 2025-07-03 ENCOUNTER — TRANSFERRED RECORDS (OUTPATIENT)
Dept: HEALTH INFORMATION MANAGEMENT | Facility: CLINIC | Age: 70
End: 2025-07-03
Payer: COMMERCIAL

## 2025-07-07 NOTE — PROGRESS NOTES
[facility] GERIATRICS  [facility] Medical Record Number:  [id number]  Place of Service where encounter took place: [facility] () [[id number]]  CODE STATUS:   CPR/Full code     Chief Complaint/Reason for Visit:  Chief Complaint   Patient presents with    [facility] Regulatory     LTC 6/5/2025.       TCU HPI:    Tootie Marin is a 69 year old female with hx of HTN, HLD, asthma, depression, admitted to the hospital on 2/25/2025 as noted below.     [facility] Medicine Discharge Summary  Admit date: 2/25/2025  Discharge date: 2/27/2025     Brief HPI Summary:   Tootie Marin is a 69 y.o. female with PMH of osteoporosis, asthma, anxiety, HTN, GERD, HLD, MDD who presented to the ED for increased bilateral lower extremity edema, dysuria and hypertension. Patient had a fall at home while getting out of the shower on 2/13. Was seen in the ED at that time, XR of Lt wrist + forearm, R elbow and CXR without acute fracture and discharged to home following.     Patient reports that since return to home she has had increasing difficulty getting around her home. Reports several months of low back pain. States she has prior knowledge of a previous compression fracture, unsure location, for which she was evaluated at OSH. Has known history of osteoporosis, offered bisphosphonate but declined due to concern of side effects. Felt as though she stood up one day several weeks earlier and had pain to the point that she thought she broke her back, however continued to be able to do day to day activities. Since the fall on 2/13 has now had worsening low back pain with radiation to bilateral hips. Has been getting up very little. Does not typically use a cane or a walker. Her PCP has been helping her get set up with home hospital bed and lift but these have not yet arrived.     Called EMS today due to ongoing fatigue and weakness. Denies numbness, tingling or focal weakness. No lightheadedness or  dizziness. No urinary symptoms including retention, although did have 1-2 days where urine felt 'hot' prior to admission. Describes chronic constipation. No saddle paresthesia. No fevers, chills, sweats, chest pain / pressure, shortness of breath, cough, abdominal pain, N/V/D. Has had increasing bilateral lower extremity swelling for the past few weeks. Was taken off HCTZ + potassium at the beginning of Feb and switched to amlodipine. Called her clinic with concerns of swelling 2/20 and was recommended to stop amlodipine at that time.     On arrival to the ED, vitals within normal limits. Labs notable for K 2.6, Na 130, WBC 10.9, BNP 17, UA with mucus present. CT lumbar spine shows superior endplate compression fracture at L3 with 20-30% anterior vertebral height loss is somewhat age-indeterminate, but potentially acute to subacute in nature. Received 40 mEq effer-k. ED discussed case with trauma and NSGY.     Hospital Course, by problem:   Tootie Marin is a 69 y.o. female with PMH of osteoporosis, asthma, anxiety, HTN, GERD, HLD, MDD who presented to the ED for increased bilateral lower extremity edema, dysuria and hypertension.     Acute / Subacute L3 Compression Fracture   Osteoporosis   Acute Low Back Pain   Mechanical Fall 2/13   Generalized Weakness   Describes several months of low back pain with history of compression fracture diagnosed at OSH at unclear location. Fell on 2/13, tripped getting out of the shower. Increased low back pain, generalized weakness since then. CT lumbar spine w/o IV Cont on admission with superior endplate compression fracture at L3 with 20-30% anterior vertebral height loss. Neurologically intact aside from pain and limited ROM with R hip flexion.   - NSGY consulted  - Neuro checks q4h   - MR lumbar spine ordered   - Plan for Coreline brace when OOB or HOB >30 degrees   - PT / OT consults, okay to get up once Coreline brace delivered   - Continue PTA calcium + vitamin D   -  Scheduled tylenol, resume low dose gabapentin 100 mg TID, lidocaine patch, PRN oxycodone     Bilateral Lower Extremity Edema   New pitting lower extremity over the past 2-3 weeks. Denies shortness of breath, chest pain, lightheadedness or dizziness. Had switched from HCTZ to amlodipine at beginning of Feb, called back to clinic reporting edema 2/20 and had switched back recently. BNP 17 (may be falsely low in setting of obesity), albumin 3.4, UA unremarkable. Does have reduced mobility but given symmetric edema held off on duplex US.   - Suspect most likely in setting of stopping HCTZ and recent addition of amlodipine   - Agree with stopping amlodipine   - Restart HCTZ when potassium improved   - TTE ordered -> overall normal  - BP stable on d/c so HCTZ not reordered, can be considered as outpatient     Hypokalemia   K 2.6, Mag 1.8 on admission. Was previously on long term potassium supplement with use of HCTZ that she had stopped earlier in the month. Denies N/V/D, somewhat poor appetite due to fatigue.   - Electrolyte replacement protocol     Hyponatremia   Na 130, may be in setting of poor appetite.   - Encourage PO, recheck in AM     Hypertension   Had recently switched from PTA HCTZ + potassium to amlodipine at beginning of Feb. Called back on 2/20 with reports of increased lower extremity and was told stop amlodipine at that time and restart HCTZ + potassium which patient tells me she has done.   - BP within normal limits on admission. Agree with stopping amlodipine give lower extremity edema   - Hold PTA HCTZ for now, would resume if elevated BP and improving potassium     Distal Radius Fracture   -fall 2 weeks ago, repeat x ray shows fracture  -consulted plastics, they will cast  -follow up OP with plastics, referral sent.    Asthma   - Continue Dulera + PRN albuterol     GERD   - Continue PTA PPI     Constipation   - PRN bowel regimen, would schedule if requiring frequent opioids     Anxiety   - Continue  PTA fluoxetine, PCP has been considering decreasing dose due to tremors  - Continue PTA clonazepam 0.5 mg TID     Overall stabilized and discharged to TCU on 2/27/2025 for PT, OT, nursing cares, medical management and monitoring.     Today:  Seen for regulatory visit in LTC. Overall stable with no new or acute concerns per nursing or patient. No SOB at rest, chest pain, dizziness. No cough, congestion. Appetite is good. No diarrhea or constipation.       PAST MEDICAL HISTORY:  Past Medical History:   Diagnosis Date    Anxiety     Chest wall trauma     Chronic prescription benzodiazepine use     Hypercholesteremia     Osteopenia of multiple sites 02/20/2024    Panic disorder        MEDICATIONS:  Current Outpatient Medications   Medication Sig Dispense Refill    acetaminophen (TYLENOL) 500 MG tablet Take 1-2 tablets (500-1,000 mg) by mouth every 6 hours as needed for mild pain 100 tablet 3    albuterol (PROAIR HFA/PROVENTIL HFA/VENTOLIN HFA) 108 (90 Base) MCG/ACT inhaler Inhale 2 puffs into the lungs every 6 hours as needed. 54 g 0    clonazePAM (KLONOPIN) 0.5 MG tablet TAKE 1 TABLET BY MOUTH THREE TIMES DAILY 30 tablet 1    FLUoxetine (PROZAC) 20 MG capsule Take 20 mg by mouth daily. 60 capsule 1    FLUoxetine (PROZAC) 40 MG capsule Take 40 mg by mouth daily.      furosemide (LASIX) 40 MG tablet Take 1 tablet (40 mg) by mouth daily.      gabapentin (NEURONTIN) 100 MG capsule Take 1 capsule (100 mg) by mouth 3 times daily. Increase to 200mg three times daily after 3-5 days if tolerating side effects and needing more pain relief. 180 capsule 0    Lidocaine (LIDOCARE) 4 % Patch Place 1 patch onto the skin every 24 hours. To prevent lidocaine toxicity, patient should be patch free for 12 hrs daily.      lovastatin (MEVACOR) 20 MG tablet Take 1 tablet (20 mg) by mouth at bedtime 90 tablet 3    mometasone-formoterol (DULERA) 200-5 MCG/ACT inhaler Inhale 2 puffs into the lungs 2 times daily. 13 g 3    nystatin (MYCOSTATIN)  "557339 UNIT/GM external powder Apply topically.      omeprazole (PRILOSEC) 20 MG DR capsule Take 1 capsule (20 mg) by mouth daily 90 capsule 3        PHYSICAL EXAM:  General: Patient is alert female, no distress.   Vitals: /79   Pulse 75   Temp 98.9  F (37.2  C)   Resp 18   Ht 1.499 m (4' 11\")   Wt 85.5 kg (188 lb 8 oz)   SpO2 99%   BMI 38.07 kg/m    HEENT: Head is NCAT. Eyes show no injection or icterus. Nares negative. Oropharynx well hydrated.  Neck: No JVD.  Lungs: Non labored respirations.  : Deferred.  Extremities: Mild LE edema.  Musculoskeletal: Degen changes.  Skin: Warm and dry.  Psych: Mood is good.       LABS/DIAGNOSTIC DATA:    EXAM: CT LUMBAR SPINE WO IV CONT  LOCATION: [facility]  DATE: 2/25/2025  INDICATION: Lumbar pain after a fall  COMPARISON: None.  TECHNIQUE: Routine CT Lumbar Spine without IV contrast. Multiplanar reformats. Dose reduction techniques were used.    IMPRESSION:  1. Superior endplate compression fracture at L3 with 20-30% anterior vertebral height loss is somewhat age-indeterminate, but potentially acute to subacute in nature. Correlate with point tenderness. MRI could confirm the acuity of this finding if indicated.  2. No additional CT evidence for acute fracture or posttraumatic subluxation.  3. Multilevel lumbar spondylosis as above.       EXAM: XR WRIST LT 3 VIEWS  LOCATION: [facility]  DATE: 2/26/2025  INDICATION: Hx of trauma, pain, INFLAMMATION/SWELLING  COMPARISON: 2/13/2025    IMPRESSION: Nondisplaced impacted horizontal fracture of the distal radial metaphysis, increased in conspicuity compared to 12/13/2025. No dislocation. Moderate soft tissue edema.       ASSESSMENT/PLAN:  Compression Fx L3. Following with neurosurgery, no longer needs to wear brace.  Left wrist fracture. Non operative. Continue to follow up with orthopedics.   HTN. Monitor BPs, adjust meds as needed.   Anxiety/depression. On fluoxetine and clonazepam.   Asthma. Stable on " Dulera and prn Albuterol MDI.  HLD. On lovastatin.       Electronically signed by: Aida Hussein MD

## 2025-07-09 ENCOUNTER — PATIENT OUTREACH (OUTPATIENT)
Dept: GERIATRIC MEDICINE | Facility: CLINIC | Age: 70
End: 2025-07-09
Payer: COMMERCIAL

## 2025-07-09 NOTE — PROGRESS NOTES
[facility] Care Coordination Contact:      Received a call from Tootie stating she would like to go to assisted living in [place].  States she has been trying to go down there and see the rooms but has had a few falls so has not been able to make it down there.  She was asking about an elderly waiver and what that all would entail.  Reviewed elderly waiver and services it provides , she would basically get the same amount she is currently is an just living in a different facility on her own.  After thinking it over Tootie states she will just stay there she does have a relative in the building which she often goes to visit.  She likes the staff and feels comfortable at the facility.  Advised I will be coming to see her in the next couple weeks I will let her know of the date when it is determined.    NELSON CornejoN, RN, PHN, [facility]  Care Coordinator-Long Term Care  [address] [address]  [email or web address]   [email or web address]     Office: [phone]   Fax: [phone]

## 2025-07-15 DIAGNOSIS — F41.1 ANXIETY STATE: ICD-10-CM

## 2025-07-15 DIAGNOSIS — F41.0 PANIC DISORDER WITHOUT AGORAPHOBIA: ICD-10-CM

## 2025-07-15 RX ORDER — CLONAZEPAM 0.5 MG/1
0.5 TABLET ORAL 3 TIMES DAILY
Qty: 90 TABLET | Refills: 0 | Status: SHIPPED | OUTPATIENT
Start: 2025-07-15

## 2025-07-21 ENCOUNTER — PATIENT OUTREACH (OUTPATIENT)
Dept: CARE COORDINATION | Facility: CLINIC | Age: 70
End: 2025-07-21
Payer: COMMERCIAL

## 2025-07-22 ENCOUNTER — PATIENT OUTREACH (OUTPATIENT)
Dept: GERIATRIC MEDICINE | Facility: CLINIC | Age: 70
End: 2025-07-22
Payer: COMMERCIAL

## 2025-07-22 ENCOUNTER — TELEPHONE (OUTPATIENT)
Dept: PHARMACY | Facility: CLINIC | Age: 70
End: 2025-07-22
Payer: COMMERCIAL

## 2025-07-22 SDOH — HEALTH STABILITY: PHYSICAL HEALTH: ON AVERAGE, HOW MANY MINUTES DO YOU ENGAGE IN EXERCISE AT THIS LEVEL?: 20 MIN

## 2025-07-22 SDOH — HEALTH STABILITY: PHYSICAL HEALTH: ON AVERAGE, HOW MANY DAYS PER WEEK DO YOU ENGAGE IN MODERATE TO STRENUOUS EXERCISE (LIKE A BRISK WALK)?: 3 DAYS

## 2025-07-22 ASSESSMENT — LIFESTYLE VARIABLES
SKIP TO QUESTIONS 9-10: 1
HOW MANY STANDARD DRINKS CONTAINING ALCOHOL DO YOU HAVE ON A TYPICAL DAY: PATIENT DOES NOT DRINK
HOW OFTEN DO YOU HAVE A DRINK CONTAINING ALCOHOL: NEVER
HOW OFTEN DO YOU HAVE SIX OR MORE DRINKS ON ONE OCCASION: NEVER
AUDIT-C TOTAL SCORE: 0

## 2025-07-22 ASSESSMENT — SOCIAL DETERMINANTS OF HEALTH (SDOH)
ARE YOU MARRIED, WIDOWED, DIVORCED, SEPARATED, NEVER MARRIED, OR LIVING WITH A PARTNER?: NEVER MARRIED
IN A TYPICAL WEEK, HOW MANY TIMES DO YOU TALK ON THE PHONE WITH FAMILY, FRIENDS, OR NEIGHBORS?: THREE TIMES A WEEK
HOW OFTEN DO YOU GET TOGETHER WITH FRIENDS OR RELATIVES?: MORE THAN THREE TIMES A WEEK
HOW OFTEN DO YOU ATTEND CHURCH OR RELIGIOUS SERVICES?: 1 TO 4 TIMES PER YEAR

## 2025-07-22 ASSESSMENT — ACTIVITIES OF DAILY LIVING (ADL): DEPENDENT_IADLS:: CLEANING;COOKING;LAUNDRY;MEAL PREPARATION;MEDICATION MANAGEMENT;TRANSPORTATION

## 2025-07-22 NOTE — TELEPHONE ENCOUNTER
Called and left message to schedule MTM follow up appointment.    Cindi Nieves PharmD  Medication Therapy Management Provider  Phone:[phone]  Pager: [phone]

## 2025-07-22 NOTE — PROGRESS NOTES
[facility] Institutional Initial Assessment     Institutional Assessment for Health Risk Assessment with Tootie Marin completed on July 22, 2025 at [facility]    Type of residence:: Nursing home  Current living arrangement:: I live in a nursing home ([phone])     Assessment completed with:: Patient, Care Team Member    Mental/Behavioral Health   Depression Screening:   PHQ-2 Total Score (Adult) - Positive if 3 or more points; Administer PHQ-9 if positive: 0       Mental health DX:: No        Falls Assessment:   Fallen 2 or more times in the past year?: (!) Yes   Any fall with injury in the past year?: Yes    ADL/IADL Dependencies:   Dependent ADLs:: Dressing, Grooming, Bathing, Positioning  Dependent IADLs:: Cleaning, Cooking, Laundry, Meal Preparation, Medication Management, Transportation      Care Plan & Recommendations: Met with Tootie at [facility], introduced self and role. Tootie was out visiting her friend.  I did find her and she was agreeable to move to the lunch room where it was private as it was not being used. She had been wheeling herself around the facility visiting a few other residents.  She was dressed appropriately and wearing a base ball cap, she appeared clean and well kept.  Tootie states she remains mostly independent with her ADLS but does need assistance with bathing/showering. She states she doesn't like the food and wants to a high protein, low carb diet so she can loose weight.  States she likes salad but they are small and don't have many vegetables in them. Suggested she talk with the nutritionist about what she should be eating and what is available. She said she would try.  Discussed options/opportunities for transitions. She did go and visit a AL facility and states she didn't like it.  She wants to just stay where she is.  She states one of the residents introduced her to making beads and she really enjoys it.  She wished  she had more money to buy more beads. When asked who manages her money she said she does.  She states she is working with Millie at the [facility] regarding her finances but can't tell me who is paying the bills. Advised I will follow up with the facility and possibly Millie if needed.     See Institutional Care Plan for detailed assessment information.    Obtained a copy of the facility care plan and MDS from facility electronic records. Requested of shelter social worker to put this care coordinator on care conference attendee list.    Placed the Health Plan facility face sheet in the member's facility chart.    Follow-Up Plan: Member informed of future contact, plan to f/u with member with a 6 month assessment, attend 1 care conference annually, and will follow any hospitalizations or transitions. Care Coordinator contact information shared with member/family and facility, and encouraged to call this care coordinator with any questions or concerns at any time.     [place] care continuum providers: Please see Snapshot and Care Management Flowsheets for Specific details of care plan.    This CC note routed to PCP, Yesica Trammell.    TOBI Cornejo, RN, PHN, CCM  Care Coordinator-Long Term Care  [address] [address]  [email or web address]   [email or web address]     Office: [phone]   Fax: [phone]

## 2025-07-30 ENCOUNTER — DOCUMENTATION ONLY (OUTPATIENT)
Dept: OTHER | Facility: CLINIC | Age: 70
End: 2025-07-30
Payer: COMMERCIAL

## 2025-08-04 ENCOUNTER — PATIENT OUTREACH (OUTPATIENT)
Dept: CARE COORDINATION | Facility: CLINIC | Age: 70
End: 2025-08-04
Payer: COMMERCIAL

## 2025-08-06 ENCOUNTER — NURSING HOME VISIT (OUTPATIENT)
Dept: GERIATRICS | Facility: CLINIC | Age: 70
End: 2025-08-06
Payer: COMMERCIAL

## 2025-08-06 VITALS
RESPIRATION RATE: 18 BRPM | TEMPERATURE: 98.9 F | HEART RATE: 75 BPM | WEIGHT: 188 LBS | SYSTOLIC BLOOD PRESSURE: 108 MMHG | BODY MASS INDEX: 37.9 KG/M2 | DIASTOLIC BLOOD PRESSURE: 79 MMHG | HEIGHT: 59 IN | OXYGEN SATURATION: 99 %

## 2025-08-06 DIAGNOSIS — Z53.9 ERRONEOUS ENCOUNTER--DISREGARD: Primary | ICD-10-CM

## 2025-08-12 ENCOUNTER — TELEPHONE (OUTPATIENT)
Dept: GERIATRICS | Facility: CLINIC | Age: 70
End: 2025-08-12
Payer: COMMERCIAL

## 2025-08-12 ENCOUNTER — LAB REQUISITION (OUTPATIENT)
Dept: LAB | Facility: CLINIC | Age: 70
End: 2025-08-12
Payer: COMMERCIAL

## 2025-08-13 DIAGNOSIS — F41.0 PANIC DISORDER WITHOUT AGORAPHOBIA: ICD-10-CM

## 2025-08-13 DIAGNOSIS — F41.1 ANXIETY STATE: ICD-10-CM

## 2025-08-13 RX ORDER — CLONAZEPAM 0.5 MG/1
0.5 TABLET ORAL 3 TIMES DAILY
Qty: 90 TABLET | Refills: 0 | Status: SHIPPED | OUTPATIENT
Start: 2025-08-13

## 2025-08-14 ENCOUNTER — NURSING HOME VISIT (OUTPATIENT)
Dept: GERIATRICS | Facility: CLINIC | Age: 70
End: 2025-08-14
Payer: COMMERCIAL

## 2025-08-14 ENCOUNTER — LAB REQUISITION (OUTPATIENT)
Dept: LAB | Facility: CLINIC | Age: 70
End: 2025-08-14
Payer: COMMERCIAL

## 2025-08-14 DIAGNOSIS — E87.6 HYPOKALEMIA: ICD-10-CM

## 2025-08-15 LAB — POTASSIUM SERPL-SCNC: 3.7 MMOL/L (ref 3.4–5.3)

## 2025-08-15 PROCEDURE — 84132 ASSAY OF SERUM POTASSIUM: CPT | Mod: ORL | Performed by: NURSE PRACTITIONER

## 2025-08-15 PROCEDURE — 36415 COLL VENOUS BLD VENIPUNCTURE: CPT | Mod: ORL | Performed by: NURSE PRACTITIONER

## 2025-08-15 PROCEDURE — P9604 ONE-WAY ALLOW PRORATED TRIP: HCPCS | Mod: ORL | Performed by: NURSE PRACTITIONER

## 2025-08-20 ENCOUNTER — NURSING HOME VISIT (OUTPATIENT)
Dept: GERIATRICS | Facility: CLINIC | Age: 70
End: 2025-08-20
Payer: COMMERCIAL

## 2025-08-20 VITALS
RESPIRATION RATE: 18 BRPM | OXYGEN SATURATION: 99 % | HEART RATE: 77 BPM | DIASTOLIC BLOOD PRESSURE: 84 MMHG | WEIGHT: 176.6 LBS | BODY MASS INDEX: 35.67 KG/M2 | TEMPERATURE: 98.5 F | SYSTOLIC BLOOD PRESSURE: 110 MMHG